# Patient Record
Sex: MALE | Race: WHITE | NOT HISPANIC OR LATINO
[De-identification: names, ages, dates, MRNs, and addresses within clinical notes are randomized per-mention and may not be internally consistent; named-entity substitution may affect disease eponyms.]

---

## 2017-02-01 ENCOUNTER — APPOINTMENT (OUTPATIENT)
Dept: INTERNAL MEDICINE | Facility: CLINIC | Age: 62
End: 2017-02-01

## 2017-02-01 VITALS
DIASTOLIC BLOOD PRESSURE: 60 MMHG | BODY MASS INDEX: 32.47 KG/M2 | SYSTOLIC BLOOD PRESSURE: 110 MMHG | HEIGHT: 66 IN | RESPIRATION RATE: 14 BRPM | WEIGHT: 202 LBS | HEART RATE: 60 BPM

## 2017-02-01 LAB
ALBUMIN: 150
CREATININE: 200
MICROALBUMIN/CREAT UR TEST STR-RTO: ABNORMAL

## 2017-02-08 ENCOUNTER — APPOINTMENT (OUTPATIENT)
Dept: CARDIOLOGY | Facility: CLINIC | Age: 62
End: 2017-02-08

## 2017-02-08 ENCOUNTER — NON-APPOINTMENT (OUTPATIENT)
Age: 62
End: 2017-02-08

## 2017-02-08 VITALS
SYSTOLIC BLOOD PRESSURE: 130 MMHG | HEART RATE: 70 BPM | WEIGHT: 202 LBS | DIASTOLIC BLOOD PRESSURE: 76 MMHG | HEIGHT: 66 IN | BODY MASS INDEX: 32.47 KG/M2

## 2017-02-21 ENCOUNTER — RX RENEWAL (OUTPATIENT)
Age: 62
End: 2017-02-21

## 2017-03-15 ENCOUNTER — EMERGENCY (EMERGENCY)
Facility: HOSPITAL | Age: 62
LOS: 1 days | Discharge: HOME | End: 2017-03-15
Attending: EMERGENCY MEDICINE | Admitting: EMERGENCY MEDICINE
Payer: COMMERCIAL

## 2017-03-15 VITALS
RESPIRATION RATE: 19 BRPM | DIASTOLIC BLOOD PRESSURE: 79 MMHG | HEART RATE: 62 BPM | SYSTOLIC BLOOD PRESSURE: 148 MMHG | TEMPERATURE: 98 F | OXYGEN SATURATION: 96 %

## 2017-03-15 VITALS — OXYGEN SATURATION: 99 % | HEART RATE: 103 BPM | RESPIRATION RATE: 16 BRPM

## 2017-03-15 DIAGNOSIS — Z90.89 ACQUIRED ABSENCE OF OTHER ORGANS: ICD-10-CM
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
## 2017-03-15 DIAGNOSIS — E78.5 HYPERLIPIDEMIA, UNSPECIFIED: ICD-10-CM

## 2017-03-15 DIAGNOSIS — S09.90XA UNSPECIFIED INJURY OF HEAD, INITIAL ENCOUNTER: ICD-10-CM

## 2017-03-15 DIAGNOSIS — Z98.89 OTHER SPECIFIED POSTPROCEDURAL STATES: Chronic | ICD-10-CM

## 2017-03-15 DIAGNOSIS — Z90.89 ACQUIRED ABSENCE OF OTHER ORGANS: Chronic | ICD-10-CM

## 2017-03-15 DIAGNOSIS — Z79.82 LONG TERM (CURRENT) USE OF ASPIRIN: ICD-10-CM
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
## 2017-03-15 DIAGNOSIS — Z23 ENCOUNTER FOR IMMUNIZATION: ICD-10-CM

## 2017-03-15 DIAGNOSIS — S01.01XA LACERATION WITHOUT FOREIGN BODY OF SCALP, INITIAL ENCOUNTER: ICD-10-CM

## 2017-03-15 DIAGNOSIS — M79.641 PAIN IN RIGHT HAND: ICD-10-CM
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
## 2017-03-15 DIAGNOSIS — E11.65 TYPE 2 DIABETES MELLITUS WITH HYPERGLYCEMIA: ICD-10-CM

## 2017-03-15 DIAGNOSIS — I10 ESSENTIAL (PRIMARY) HYPERTENSION: ICD-10-CM

## 2017-03-15 LAB
ACETONE SERPL-MCNC: NEGATIVE — SIGNIFICANT CHANGE UP
ALBUMIN SERPL ELPH-MCNC: 3.7 G/DL — SIGNIFICANT CHANGE UP (ref 3.3–5)
ALP SERPL-CCNC: 74 U/L — SIGNIFICANT CHANGE UP (ref 40–120)
ALT FLD-CCNC: 16 U/L RC — SIGNIFICANT CHANGE UP (ref 10–45)
ANION GAP SERPL CALC-SCNC: 12 MMOL/L — SIGNIFICANT CHANGE UP (ref 5–17)
ANION GAP SERPL CALC-SCNC: 19 MMOL/L — HIGH (ref 5–17)
APTT BLD: 32.2 SEC — SIGNIFICANT CHANGE UP (ref 27.5–37.4)
AST SERPL-CCNC: 11 U/L — SIGNIFICANT CHANGE UP (ref 10–40)
BASE EXCESS BLDV CALC-SCNC: 0.9 MMOL/L — SIGNIFICANT CHANGE UP (ref -2–2)
BASOPHILS # BLD AUTO: 0 K/UL — SIGNIFICANT CHANGE UP (ref 0–0.2)
BASOPHILS NFR BLD AUTO: 0.3 % — SIGNIFICANT CHANGE UP (ref 0–2)
BILIRUB SERPL-MCNC: 0.3 MG/DL — SIGNIFICANT CHANGE UP (ref 0.2–1.2)
BUN SERPL-MCNC: 17 MG/DL — SIGNIFICANT CHANGE UP (ref 7–23)
BUN SERPL-MCNC: 23 MG/DL — SIGNIFICANT CHANGE UP (ref 7–23)
CA-I SERPL-SCNC: 1.26 MMOL/L — SIGNIFICANT CHANGE UP (ref 1.12–1.3)
CALCIUM SERPL-MCNC: 8.6 MG/DL — SIGNIFICANT CHANGE UP (ref 8.4–10.5)
CALCIUM SERPL-MCNC: 9.9 MG/DL — SIGNIFICANT CHANGE UP (ref 8.4–10.5)
CHLORIDE BLDV-SCNC: 103 MMOL/L — SIGNIFICANT CHANGE UP (ref 96–108)
CHLORIDE SERPL-SCNC: 106 MMOL/L — SIGNIFICANT CHANGE UP (ref 96–108)
CHLORIDE SERPL-SCNC: 98 MMOL/L — SIGNIFICANT CHANGE UP (ref 96–108)
CO2 BLDV-SCNC: 28 MMOL/L — SIGNIFICANT CHANGE UP (ref 22–30)
CO2 SERPL-SCNC: 18 MMOL/L — LOW (ref 22–31)
CO2 SERPL-SCNC: 22 MMOL/L — SIGNIFICANT CHANGE UP (ref 22–31)
CREAT SERPL-MCNC: 1.18 MG/DL — SIGNIFICANT CHANGE UP (ref 0.5–1.3)
CREAT SERPL-MCNC: 1.42 MG/DL — HIGH (ref 0.5–1.3)
EOSINOPHIL # BLD AUTO: 0.2 K/UL — SIGNIFICANT CHANGE UP (ref 0–0.5)
EOSINOPHIL NFR BLD AUTO: 2 % — SIGNIFICANT CHANGE UP (ref 0–6)
GAS PNL BLDV: 135 MMOL/L — LOW (ref 136–145)
GAS PNL BLDV: SIGNIFICANT CHANGE UP
GAS PNL BLDV: SIGNIFICANT CHANGE UP
GLUCOSE BLDV-MCNC: 471 MG/DL — HIGH (ref 70–99)
GLUCOSE SERPL-MCNC: 225 MG/DL — HIGH (ref 70–99)
GLUCOSE SERPL-MCNC: 505 MG/DL — CRITICAL HIGH (ref 70–99)
HCO3 BLDV-SCNC: 27 MMOL/L — SIGNIFICANT CHANGE UP (ref 21–29)
HCT VFR BLD CALC: 44 % — SIGNIFICANT CHANGE UP (ref 39–50)
HCT VFR BLDA CALC: 43 % — SIGNIFICANT CHANGE UP (ref 39–50)
HGB BLD CALC-MCNC: 14 G/DL — SIGNIFICANT CHANGE UP (ref 13–17)
HGB BLD-MCNC: 14.1 G/DL — SIGNIFICANT CHANGE UP (ref 13–17)
INR BLD: 0.99 RATIO — SIGNIFICANT CHANGE UP (ref 0.88–1.16)
LACTATE BLDV-MCNC: 1.1 MMOL/L — SIGNIFICANT CHANGE UP (ref 0.7–2)
LYMPHOCYTES # BLD AUTO: 2.4 K/UL — SIGNIFICANT CHANGE UP (ref 1–3.3)
LYMPHOCYTES # BLD AUTO: 25.5 % — SIGNIFICANT CHANGE UP (ref 13–44)
MCHC RBC-ENTMCNC: 27.1 PG — SIGNIFICANT CHANGE UP (ref 27–34)
MCHC RBC-ENTMCNC: 31.9 GM/DL — LOW (ref 32–36)
MCV RBC AUTO: 85 FL — SIGNIFICANT CHANGE UP (ref 80–100)
MONOCYTES # BLD AUTO: 0.9 K/UL — SIGNIFICANT CHANGE UP (ref 0–0.9)
MONOCYTES NFR BLD AUTO: 9.6 % — SIGNIFICANT CHANGE UP (ref 2–14)
NEUTROPHILS # BLD AUTO: 6 K/UL — SIGNIFICANT CHANGE UP (ref 1.8–7.4)
NEUTROPHILS NFR BLD AUTO: 62.6 % — SIGNIFICANT CHANGE UP (ref 43–77)
PCO2 BLDV: 49 MMHG — SIGNIFICANT CHANGE UP (ref 35–50)
PH BLDV: 7.35 — SIGNIFICANT CHANGE UP (ref 7.35–7.45)
PLATELET # BLD AUTO: 244 K/UL — SIGNIFICANT CHANGE UP (ref 150–400)
PO2 BLDV: 26 MMHG — SIGNIFICANT CHANGE UP (ref 25–45)
POTASSIUM BLDV-SCNC: 4.6 MMOL/L — SIGNIFICANT CHANGE UP (ref 3.5–5)
POTASSIUM SERPL-MCNC: 3.8 MMOL/L — SIGNIFICANT CHANGE UP (ref 3.5–5.3)
POTASSIUM SERPL-MCNC: 5.7 MMOL/L — HIGH (ref 3.5–5.3)
POTASSIUM SERPL-SCNC: 3.8 MMOL/L — SIGNIFICANT CHANGE UP (ref 3.5–5.3)
POTASSIUM SERPL-SCNC: 5.7 MMOL/L — HIGH (ref 3.5–5.3)
PROT SERPL-MCNC: 6.3 G/DL — SIGNIFICANT CHANGE UP (ref 6–8.3)
PROTHROM AB SERPL-ACNC: 10.8 SEC — SIGNIFICANT CHANGE UP (ref 10–13.1)
RBC # BLD: 5.18 M/UL — SIGNIFICANT CHANGE UP (ref 4.2–5.8)
RBC # FLD: 13.3 % — SIGNIFICANT CHANGE UP (ref 10.3–14.5)
SAO2 % BLDV: 38 % — LOW (ref 67–88)
SODIUM SERPL-SCNC: 135 MMOL/L — SIGNIFICANT CHANGE UP (ref 135–145)
SODIUM SERPL-SCNC: 140 MMOL/L — SIGNIFICANT CHANGE UP (ref 135–145)
WBC # BLD: 9.5 K/UL — SIGNIFICANT CHANGE UP (ref 3.8–10.5)
WBC # FLD AUTO: 9.5 K/UL — SIGNIFICANT CHANGE UP (ref 3.8–10.5)

## 2017-03-15 PROCEDURE — G0378: CPT

## 2017-03-15 PROCEDURE — 73130 X-RAY EXAM OF HAND: CPT | Mod: 26,RT

## 2017-03-15 PROCEDURE — 85610 PROTHROMBIN TIME: CPT

## 2017-03-15 PROCEDURE — 96372 THER/PROPH/DIAG INJ SC/IM: CPT | Mod: XU

## 2017-03-15 PROCEDURE — 93010 ELECTROCARDIOGRAM REPORT: CPT | Mod: 59

## 2017-03-15 PROCEDURE — 82330 ASSAY OF CALCIUM: CPT

## 2017-03-15 PROCEDURE — 93005 ELECTROCARDIOGRAM TRACING: CPT | Mod: XU

## 2017-03-15 PROCEDURE — 84295 ASSAY OF SERUM SODIUM: CPT

## 2017-03-15 PROCEDURE — 82435 ASSAY OF BLOOD CHLORIDE: CPT

## 2017-03-15 PROCEDURE — 96374 THER/PROPH/DIAG INJ IV PUSH: CPT | Mod: XU

## 2017-03-15 PROCEDURE — 73130 X-RAY EXAM OF HAND: CPT

## 2017-03-15 PROCEDURE — 12001 RPR S/N/AX/GEN/TRNK 2.5CM/<: CPT

## 2017-03-15 PROCEDURE — 80048 BASIC METABOLIC PNL TOTAL CA: CPT

## 2017-03-15 PROCEDURE — 82947 ASSAY GLUCOSE BLOOD QUANT: CPT

## 2017-03-15 PROCEDURE — 70450 CT HEAD/BRAIN W/O DYE: CPT

## 2017-03-15 PROCEDURE — 84132 ASSAY OF SERUM POTASSIUM: CPT

## 2017-03-15 PROCEDURE — 82803 BLOOD GASES ANY COMBINATION: CPT

## 2017-03-15 PROCEDURE — 85027 COMPLETE CBC AUTOMATED: CPT

## 2017-03-15 PROCEDURE — 99284 EMERGENCY DEPT VISIT MOD MDM: CPT | Mod: 25

## 2017-03-15 PROCEDURE — 82009 KETONE BODYS QUAL: CPT

## 2017-03-15 PROCEDURE — 85730 THROMBOPLASTIN TIME PARTIAL: CPT

## 2017-03-15 PROCEDURE — 99220: CPT | Mod: 25

## 2017-03-15 PROCEDURE — 70450 CT HEAD/BRAIN W/O DYE: CPT | Mod: 26,77

## 2017-03-15 PROCEDURE — 90715 TDAP VACCINE 7 YRS/> IM: CPT

## 2017-03-15 PROCEDURE — 90471 IMMUNIZATION ADMIN: CPT

## 2017-03-15 PROCEDURE — 83605 ASSAY OF LACTIC ACID: CPT

## 2017-03-15 PROCEDURE — 70450 CT HEAD/BRAIN W/O DYE: CPT | Mod: 26

## 2017-03-15 PROCEDURE — 85014 HEMATOCRIT: CPT

## 2017-03-15 PROCEDURE — 80053 COMPREHEN METABOLIC PANEL: CPT

## 2017-03-15 RX ORDER — TETANUS TOXOID, REDUCED DIPHTHERIA TOXOID AND ACELLULAR PERTUSSIS VACCINE, ADSORBED 5; 2.5; 8; 8; 2.5 [IU]/.5ML; [IU]/.5ML; UG/.5ML; UG/.5ML; UG/.5ML
0.5 SUSPENSION INTRAMUSCULAR ONCE
Qty: 0 | Refills: 0 | Status: COMPLETED | OUTPATIENT
Start: 2017-03-15 | End: 2017-03-15

## 2017-03-15 RX ORDER — SODIUM CHLORIDE 9 MG/ML
1000 INJECTION INTRAMUSCULAR; INTRAVENOUS; SUBCUTANEOUS ONCE
Qty: 0 | Refills: 0 | Status: COMPLETED | OUTPATIENT
Start: 2017-03-15 | End: 2017-03-15

## 2017-03-15 RX ORDER — SODIUM CHLORIDE 9 MG/ML
3 INJECTION INTRAMUSCULAR; INTRAVENOUS; SUBCUTANEOUS EVERY 8 HOURS
Qty: 0 | Refills: 0 | Status: DISCONTINUED | OUTPATIENT
Start: 2017-03-15 | End: 2017-03-19

## 2017-03-15 RX ORDER — INSULIN LISPRO 100/ML
6 VIAL (ML) SUBCUTANEOUS ONCE
Qty: 0 | Refills: 0 | Status: COMPLETED | OUTPATIENT
Start: 2017-03-15 | End: 2017-03-15

## 2017-03-15 RX ORDER — ACETAMINOPHEN 500 MG
1000 TABLET ORAL ONCE
Qty: 0 | Refills: 0 | Status: COMPLETED | OUTPATIENT
Start: 2017-03-15 | End: 2017-03-15

## 2017-03-15 RX ORDER — INSULIN GLARGINE 100 [IU]/ML
15 INJECTION, SOLUTION SUBCUTANEOUS ONCE
Qty: 0 | Refills: 0 | Status: COMPLETED | OUTPATIENT
Start: 2017-03-15 | End: 2017-03-15

## 2017-03-15 RX ADMIN — Medication 6 UNIT(S): at 09:25

## 2017-03-15 RX ADMIN — Medication 400 MILLIGRAM(S): at 07:00

## 2017-03-15 RX ADMIN — SODIUM CHLORIDE 3 MILLILITER(S): 9 INJECTION INTRAMUSCULAR; INTRAVENOUS; SUBCUTANEOUS at 16:09

## 2017-03-15 RX ADMIN — SODIUM CHLORIDE 2000 MILLILITER(S): 9 INJECTION INTRAMUSCULAR; INTRAVENOUS; SUBCUTANEOUS at 09:25

## 2017-03-15 RX ADMIN — Medication 1000 MILLIGRAM(S): at 07:00

## 2017-03-15 RX ADMIN — SODIUM CHLORIDE 2000 MILLILITER(S): 9 INJECTION INTRAMUSCULAR; INTRAVENOUS; SUBCUTANEOUS at 08:10

## 2017-03-15 RX ADMIN — TETANUS TOXOID, REDUCED DIPHTHERIA TOXOID AND ACELLULAR PERTUSSIS VACCINE, ADSORBED 0.5 MILLILITER(S): 5; 2.5; 8; 8; 2.5 SUSPENSION INTRAMUSCULAR at 07:07

## 2017-03-15 RX ADMIN — INSULIN GLARGINE 15 UNIT(S): 100 INJECTION, SOLUTION SUBCUTANEOUS at 09:24

## 2017-03-15 NOTE — ED PROVIDER NOTE - PROGRESS NOTE DETAILS
60yo M, on Brelinta, s/p mechanical fall, with head trauma; initial CT head shows no acute bleeding.  On exam, no neurologic deficits, well appearing.  Labs notable for hyperglycemia (500) without signs of DKA (neg acetone, normal pH).  Given half usual dose of evening lantus this am and 6 U humalog sc.  Will place in CDU for repeat bmp, serial FSG and repeat head CT 6 hours post initial head ct.  If glucose improves and no evidence of ICH on repeat CT, may be discharged from CDU.  Christian

## 2017-03-15 NOTE — ED PROVIDER NOTE - MEDICAL DECISION MAKING DETAILS
61M cad on asa and plavix presents s/p slip and fall on ice. will obtain ct head labs lac repair and reassess 61M cad on asa and plavix presents s/p slip and fall on ice. will obtain ct head labs lac repair and reassess  Aguilar Cha DO; see attending attestation

## 2017-03-15 NOTE — ED PROVIDER NOTE - SHIFT CHANGE DETAILS
62yo M, h/o HTN DM CAD on brelinta, presenting s/p mechanical fall, +head trauma; CT performed, read pending.  HA initially on presentation, now resolved.  Labs notable for elevated glucose (500) - additional labs sent and are pending (vbg, acetone - r/o dka).  Plan by prior team to obtain repeat CT head 6 hours post initial ct (1300) and to attempt glycemic control if patient not in DKA (if in DKA, will require admission). -Michael

## 2017-03-15 NOTE — ED CDU PROVIDER NOTE - DETAILS
- observation  - vitals q4h  - monitor finger sticks, repeat BMP  - repeat CT head at 130pm  - case discussed with Dr. Cha

## 2017-03-15 NOTE — ED PROCEDURE NOTE - CPROC ED LACER REPAIR DETAIL1
The wound was explored to base in bloodless field./No foreign body/All foreign material was removed.

## 2017-03-15 NOTE — ED CDU PROVIDER NOTE - PROGRESS NOTE DETAILS
CDU NOTE RONIT KEMP: Pt resting comfortably, feeling well without complaint. NAD, VSS. pt waiting for repeat head CT. CDU NOTE RONIT KEMP: Pt resting comfortably, feeling well without complaint. NAD, VSS. Waiting for CT head results. CDU NOTE RONIT KEMP: CT head negative. case and plan discussed with Dr. Castillo; pt stable for discharge. ct stable head injury instructyions given I , Dr Delio Castillo has seen and evaluated the patient.  The pateient reports improvement in symptoms.  The patient is stable for discharge home and will follow up with their primary physiacian.

## 2017-03-15 NOTE — ED CDU PROVIDER NOTE - PLAN OF CARE
1. Hydrate, rest.  2. Continue your home medications  3. 1. Hydrate, rest.  2. Continue your home medications  3. Follow up with your PMD in 1-2 days. Bring a copy of your results with you.   4. Return to the ED if you experience any worsening headaches, blurry vision, mental status change, or other concerning symptoms.   3.

## 2017-03-15 NOTE — ED CDU PROVIDER NOTE - PHYSICAL EXAMINATION
Constitutional: mild distress  Eyes: PERRLA EOMI  Head: 0.5 cm lac to occiput  Cardiac: regular rate   Resp: Lungs CTAB  GI: Abd s/nt/nd  Neuro: CN2-12 intact  Skin: lac to occiput  msk: ttp of right hand dorsum. no snuffbox ttp.   The patient’s cervical collar was clinically cleared using the NEXUS criteria: they have no focal neurologic deficit, no midline cervical spine tenderness is present, they have a normal level of consciousness and are not intoxicated, and no distracting injury is present.

## 2017-03-15 NOTE — ED CDU PROVIDER NOTE - OBJECTIVE STATEMENT
62y/o M with PMH HTN, HLD, DM, CAD s/p stent on asa and plavix presents s/p fall. Pt was walking slipped on ice fell backwards and hit head. No LOC. no neck pain. No other injuries. Pain in head moderate constant non-radiating. happened immediately prior to arrival. 62y/o M with PMH HTN, HLD, DM, CAD s/p stent on asa and plavix presents s/p fall. Pt was walking from car to work, slipped on ice, fell backwards and hit head. No LOC. no neck pain. pt states he had a H/A but it resolved with Tylenol. pt states he has mild pain in R hand when making a fist, xray was done in ED - no fx. denies vision changes, weakness, decreased balance, CP, SOB, pain anywhere else.  pt states he missed his night dose of DM meds last night and had a large breakfast today.

## 2017-03-15 NOTE — ED PROVIDER NOTE - ATTENDING CONTRIBUTION TO CARE
61yoM on plavix s/p slip and fell with head trauma.   On exam, scalp laceration. GCS 15  A: closed head trauma; laceration  P: CT brain, labs, cxr, tetanus, lac repair.   Pt on plavix, some evidence suggests higher rate of delayed bleeds, will admit ot CDU and get 6h repeat ct brain.

## 2017-03-15 NOTE — ED PROVIDER NOTE - PHYSICAL EXAMINATION
Constitutional: mild distress  Eyes: PERRLA EOMI  Head: 0.5 cm lac to occiput  Cardiac: regular rate   Resp: Lungs CTAB  GI: Abd s/nt/nd  Neuro: CN2-12 intact  Skin: lac to occiput Constitutional: mild distress  Eyes: PERRLA EOMI  Head: 0.5 cm lac to occiput  Cardiac: regular rate   Resp: Lungs CTAB  GI: Abd s/nt/nd  Neuro: CN2-12 intact  Skin: lac to occiput  msk: ttp of right hand dorsum. no snuffbox ttp.   The patient’s cervical collar was clinically cleared using the NEXUS criteria: they have no focal neurologic deficit, no midline cervical spine tenderness is present, they have a normal level of consciousness and are not intoxicated, and no distracting injury is present.

## 2017-03-15 NOTE — ED PROVIDER NOTE - NS ED ROS FT
Constitutional: No fever or chills  Eyes: No visual changes  HEENT: No throat pain  CV: No chest pain  Resp: No SOB no cough  GI: No abd pain, nause or vomiting  : No dysuria  MSK: No musculoskeletal pain  Skin: lac to occiput  Neuro: + headache Constitutional: No fever or chills  Eyes: No visual changes  HEENT: No throat pain  CV: No chest pain  Resp: No SOB no cough  GI: No abd pain, nause or vomiting  : No dysuria  MSK: hand pain  Skin: lac to occiput  Neuro: + headache

## 2017-03-15 NOTE — ED CDU PROVIDER NOTE - ATTENDING CONTRIBUTION TO CARE
I have examined and evaluated this patient with the above resident or PA, and agree with the documented clinical history, exam and plan.   Briefly: 62yo M, on Brelinta, s/p mechanical fall, with head trauma; initial CT head shows no acute bleeding.  On exam, no neurologic deficits, well appearing.  Labs notable for hyperglycemia (500) without signs of DKA (neg acetone, normal pH).  Given half usual dose of evening lantus this am and 6 U humalog sc.  Will place in CDU for repeat bmp, serial FSG and repeat head CT 6 hours post initial head ct.  If glucose improves and no evidence of ICH on repeat CT, may be discharged from CDU.  -Michael

## 2017-03-15 NOTE — ED CDU PROVIDER NOTE - NS ED ROS FT
Constitutional: No fever or chills  Eyes: No visual changes  HEENT: No throat pain  CV: No chest pain  Resp: No SOB no cough  GI: No abd pain, nause or vomiting  : No dysuria  MSK: hand pain  Skin: lac to occiput  Neuro: + headache

## 2017-03-21 ENCOUNTER — EMERGENCY (EMERGENCY)
Facility: HOSPITAL | Age: 62
LOS: 1 days | Discharge: HOME | End: 2017-03-21
Attending: EMERGENCY MEDICINE | Admitting: EMERGENCY MEDICINE
Payer: COMMERCIAL

## 2017-03-21 VITALS
OXYGEN SATURATION: 97 % | SYSTOLIC BLOOD PRESSURE: 106 MMHG | RESPIRATION RATE: 18 BRPM | HEART RATE: 74 BPM | DIASTOLIC BLOOD PRESSURE: 69 MMHG | TEMPERATURE: 97 F
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
## 2017-03-21 DIAGNOSIS — Z98.89 OTHER SPECIFIED POSTPROCEDURAL STATES: Chronic | ICD-10-CM

## 2017-03-21 DIAGNOSIS — S01.01XD LACERATION WITHOUT FOREIGN BODY OF SCALP, SUBSEQUENT ENCOUNTER: ICD-10-CM

## 2017-03-21 DIAGNOSIS — Z90.89 ACQUIRED ABSENCE OF OTHER ORGANS: ICD-10-CM

## 2017-03-21 DIAGNOSIS — Z90.89 ACQUIRED ABSENCE OF OTHER ORGANS: Chronic | ICD-10-CM

## 2017-03-21 DIAGNOSIS — I10 ESSENTIAL (PRIMARY) HYPERTENSION: ICD-10-CM

## 2017-03-21 LAB
ALBUMIN SERPL ELPH-MCNC: 4.4 G/DL — SIGNIFICANT CHANGE UP (ref 3.3–5)
ALP SERPL-CCNC: 91 U/L — SIGNIFICANT CHANGE UP (ref 40–120)
ALT FLD-CCNC: 17 U/L RC — SIGNIFICANT CHANGE UP (ref 10–45)
ANION GAP SERPL CALC-SCNC: 14 MMOL/L — SIGNIFICANT CHANGE UP (ref 5–17)
AST SERPL-CCNC: 12 U/L — SIGNIFICANT CHANGE UP (ref 10–40)
BASOPHILS # BLD AUTO: 0 K/UL — SIGNIFICANT CHANGE UP (ref 0–0.2)
BASOPHILS NFR BLD AUTO: 0.4 % — SIGNIFICANT CHANGE UP (ref 0–2)
BILIRUB SERPL-MCNC: 0.6 MG/DL — SIGNIFICANT CHANGE UP (ref 0.2–1.2)
BUN SERPL-MCNC: 52 MG/DL — HIGH (ref 7–23)
CALCIUM SERPL-MCNC: 9.8 MG/DL — SIGNIFICANT CHANGE UP (ref 8.4–10.5)
CHLORIDE SERPL-SCNC: 96 MMOL/L — SIGNIFICANT CHANGE UP (ref 96–108)
CO2 SERPL-SCNC: 22 MMOL/L — SIGNIFICANT CHANGE UP (ref 22–31)
CREAT SERPL-MCNC: 2.01 MG/DL — HIGH (ref 0.5–1.3)
EOSINOPHIL # BLD AUTO: 0.3 K/UL — SIGNIFICANT CHANGE UP (ref 0–0.5)
EOSINOPHIL NFR BLD AUTO: 2 % — SIGNIFICANT CHANGE UP (ref 0–6)
GLUCOSE SERPL-MCNC: 410 MG/DL — HIGH (ref 70–99)
HCT VFR BLD CALC: 41.6 % — SIGNIFICANT CHANGE UP (ref 39–50)
HGB BLD-MCNC: 14.1 G/DL — SIGNIFICANT CHANGE UP (ref 13–17)
HIV 1 & 2 AB SERPL IA.RAPID: SIGNIFICANT CHANGE UP
LYMPHOCYTES # BLD AUTO: 28 % — SIGNIFICANT CHANGE UP (ref 13–44)
LYMPHOCYTES # BLD AUTO: 3.7 K/UL — HIGH (ref 1–3.3)
MCHC RBC-ENTMCNC: 28.3 PG — SIGNIFICANT CHANGE UP (ref 27–34)
MCHC RBC-ENTMCNC: 33.9 GM/DL — SIGNIFICANT CHANGE UP (ref 32–36)
MCV RBC AUTO: 83.5 FL — SIGNIFICANT CHANGE UP (ref 80–100)
MONOCYTES # BLD AUTO: 1 K/UL — HIGH (ref 0–0.9)
MONOCYTES NFR BLD AUTO: 7.7 % — SIGNIFICANT CHANGE UP (ref 2–14)
NEUTROPHILS # BLD AUTO: 8.2 K/UL — HIGH (ref 1.8–7.4)
NEUTROPHILS NFR BLD AUTO: 62 % — SIGNIFICANT CHANGE UP (ref 43–77)
PLATELET # BLD AUTO: 303 K/UL — SIGNIFICANT CHANGE UP (ref 150–400)
POTASSIUM SERPL-MCNC: 5 MMOL/L — SIGNIFICANT CHANGE UP (ref 3.5–5.3)
POTASSIUM SERPL-SCNC: 5 MMOL/L — SIGNIFICANT CHANGE UP (ref 3.5–5.3)
PROT SERPL-MCNC: 7.4 G/DL — SIGNIFICANT CHANGE UP (ref 6–8.3)
RBC # BLD: 4.98 M/UL — SIGNIFICANT CHANGE UP (ref 4.2–5.8)
RBC # FLD: 14 % — SIGNIFICANT CHANGE UP (ref 10.3–14.5)
SODIUM SERPL-SCNC: 132 MMOL/L — LOW (ref 135–145)
WBC # BLD: 13.2 K/UL — HIGH (ref 3.8–10.5)
WBC # FLD AUTO: 13.2 K/UL — HIGH (ref 3.8–10.5)

## 2017-03-21 PROCEDURE — 80053 COMPREHEN METABOLIC PANEL: CPT

## 2017-03-21 PROCEDURE — 85027 COMPLETE CBC AUTOMATED: CPT

## 2017-03-21 PROCEDURE — G0463: CPT

## 2017-03-21 PROCEDURE — 80074 ACUTE HEPATITIS PANEL: CPT

## 2017-03-21 PROCEDURE — 86703 HIV-1/HIV-2 1 RESULT ANTBDY: CPT

## 2017-03-21 PROCEDURE — 87521 HEPATITIS C PROBE&RVRS TRNSC: CPT

## 2017-03-21 NOTE — ED ADULT NURSE NOTE - OBJECTIVE STATEMENT
61 year old male arrives to ed for staple removal. 1 staple placed 3/15 after slip and fall on ice. states co workers were exposed to his blood when he fell and pt needs blood work done them. no headache lightheadednes or dizziness no n/v seen by MD  staple site clean dry intact

## 2017-03-21 NOTE — ED PROVIDER NOTE - MEDICAL DECISION MAKING DETAILS
Ren:60y/o M here for staple removal. pt had 1 staple placed in occipital area of head on 3/15/17 s/p fall from slipping on ice.   1. staple removal  2 pt states some co-workers were exposed to his blood last week when he fell and he needs blood work done for his workers comp- will draw labs and d/c and will call with all results not completed at the time of discharge

## 2017-03-21 NOTE — ED PROVIDER NOTE - PLAN OF CARE
1. Apply bacitracin.    2. Follow up with your PMD within 48-72 hours.   3. Any increased pain, redness, streaking (red lines), swelling, fever, chills return to ER. 1. Apply bacitracin. Hydrate as your creatinine was elevated.   2. Follow up with your PMD within 48-72 hours. Bring a copy of your results and discuss results with your PMD.   3. Any increased pain, redness, streaking (red lines), swelling, fever, chills return to ER.

## 2017-03-21 NOTE — ED PROVIDER NOTE - OBJECTIVE STATEMENT
62y/o M here for staple removal. pt had 1 staple placed in occipital area of head on 3/15/17 s/p fall from slipping on ice. pt states some co-workers were exposed to his blood last week when he fell and he needs blood work done for his workers comp.

## 2017-03-21 NOTE — ED PROVIDER NOTE - CARE PLAN
Principal Discharge DX:	Laceration  Instructions for follow-up, activity and diet:	1. Apply bacitracin.    2. Follow up with your PMD within 48-72 hours.   3. Any increased pain, redness, streaking (red lines), swelling, fever, chills return to ER. Principal Discharge DX:	Laceration  Instructions for follow-up, activity and diet:	1. Apply bacitracin. Hydrate as your creatinine was elevated.   2. Follow up with your PMD within 48-72 hours. Bring a copy of your results and discuss results with your PMD.   3. Any increased pain, redness, streaking (red lines), swelling, fever, chills return to ER.

## 2017-03-22 LAB
HAV IGM SER-ACNC: SIGNIFICANT CHANGE UP
HBV CORE IGM SER-ACNC: SIGNIFICANT CHANGE UP
HBV SURFACE AG SER-ACNC: SIGNIFICANT CHANGE UP
HCV AB S/CO SERPL IA: 1.5 S/CO — SIGNIFICANT CHANGE UP
HCV AB SERPL-IMP: ABNORMAL

## 2017-03-23 LAB
HCV RNA FLD QL NAA+PROBE: SIGNIFICANT CHANGE UP
HCV RNA SPEC QL PROBE+SIG AMP: DETECTED

## 2017-03-23 NOTE — ED POST DISCHARGE NOTE - OTHER COMMUNICATION
Gave pt phone number for after care told to call us back monday to see if final results come back. If neg, nothing to do. Pt agrees if not back by monday, asked patient to call ID for appt. [phone] number given to pt. pravin

## 2017-04-04 ENCOUNTER — APPOINTMENT (OUTPATIENT)
Dept: INTERNAL MEDICINE | Facility: CLINIC | Age: 62
End: 2017-04-04

## 2017-04-04 ENCOUNTER — LABORATORY RESULT (OUTPATIENT)
Age: 62
End: 2017-04-04

## 2017-04-04 VITALS
HEART RATE: 60 BPM | WEIGHT: 199 LBS | BODY MASS INDEX: 32.12 KG/M2 | DIASTOLIC BLOOD PRESSURE: 80 MMHG | SYSTOLIC BLOOD PRESSURE: 125 MMHG

## 2017-04-07 LAB
HCV AB SER QL: ABNORMAL
HCV S/CO RATIO: 1.2 S/CO

## 2017-05-02 ENCOUNTER — APPOINTMENT (OUTPATIENT)
Dept: INTERNAL MEDICINE | Facility: CLINIC | Age: 62
End: 2017-05-02

## 2017-05-02 VITALS
WEIGHT: 199 LBS | SYSTOLIC BLOOD PRESSURE: 118 MMHG | OXYGEN SATURATION: 98 % | BODY MASS INDEX: 32.12 KG/M2 | DIASTOLIC BLOOD PRESSURE: 72 MMHG | RESPIRATION RATE: 12 BRPM | HEART RATE: 68 BPM

## 2017-05-06 RX ORDER — IBUPROFEN 800 MG/1
800 TABLET, FILM COATED ORAL
Qty: 90 | Refills: 0 | Status: COMPLETED | COMMUNITY
Start: 2017-03-11

## 2017-06-07 ENCOUNTER — NON-APPOINTMENT (OUTPATIENT)
Age: 62
End: 2017-06-07

## 2017-06-07 ENCOUNTER — APPOINTMENT (OUTPATIENT)
Dept: CARDIOLOGY | Facility: CLINIC | Age: 62
End: 2017-06-07

## 2017-06-07 VITALS
SYSTOLIC BLOOD PRESSURE: 130 MMHG | DIASTOLIC BLOOD PRESSURE: 80 MMHG | OXYGEN SATURATION: 97 % | WEIGHT: 200 LBS | HEART RATE: 72 BPM | BODY MASS INDEX: 32.28 KG/M2

## 2017-06-07 RX ORDER — MONTELUKAST 10 MG/1
10 TABLET, FILM COATED ORAL
Qty: 90 | Refills: 0 | Status: COMPLETED | COMMUNITY
Start: 2017-03-11

## 2017-09-05 ENCOUNTER — APPOINTMENT (OUTPATIENT)
Dept: INTERNAL MEDICINE | Facility: CLINIC | Age: 62
End: 2017-09-05
Payer: COMMERCIAL

## 2017-09-05 VITALS
DIASTOLIC BLOOD PRESSURE: 70 MMHG | BODY MASS INDEX: 31.82 KG/M2 | WEIGHT: 198 LBS | SYSTOLIC BLOOD PRESSURE: 125 MMHG | HEIGHT: 66 IN | HEART RATE: 65 BPM | OXYGEN SATURATION: 98 % | RESPIRATION RATE: 14 BRPM

## 2017-09-05 PROCEDURE — 99396 PREV VISIT EST AGE 40-64: CPT

## 2017-10-11 ENCOUNTER — APPOINTMENT (OUTPATIENT)
Dept: CARDIOLOGY | Facility: CLINIC | Age: 62
End: 2017-10-11

## 2017-11-29 ENCOUNTER — APPOINTMENT (OUTPATIENT)
Dept: CARDIOLOGY | Facility: CLINIC | Age: 62
End: 2017-11-29
Payer: COMMERCIAL

## 2017-11-29 ENCOUNTER — NON-APPOINTMENT (OUTPATIENT)
Age: 62
End: 2017-11-29

## 2017-11-29 VITALS — DIASTOLIC BLOOD PRESSURE: 80 MMHG | SYSTOLIC BLOOD PRESSURE: 125 MMHG | HEART RATE: 71 BPM | OXYGEN SATURATION: 99 %

## 2017-11-29 PROCEDURE — 93000 ELECTROCARDIOGRAM COMPLETE: CPT

## 2017-11-29 PROCEDURE — 99214 OFFICE O/P EST MOD 30 MIN: CPT

## 2017-12-03 ENCOUNTER — RX RENEWAL (OUTPATIENT)
Age: 62
End: 2017-12-03

## 2018-03-13 ENCOUNTER — APPOINTMENT (OUTPATIENT)
Dept: INTERNAL MEDICINE | Facility: CLINIC | Age: 63
End: 2018-03-13
Payer: COMMERCIAL

## 2018-03-13 VITALS
WEIGHT: 186 LBS | SYSTOLIC BLOOD PRESSURE: 140 MMHG | RESPIRATION RATE: 12 BRPM | BODY MASS INDEX: 29.89 KG/M2 | OXYGEN SATURATION: 98 % | DIASTOLIC BLOOD PRESSURE: 80 MMHG | HEART RATE: 79 BPM | HEIGHT: 66 IN

## 2018-03-13 DIAGNOSIS — Z11.59 ENCOUNTER FOR SCREENING FOR OTHER VIRAL DISEASES: ICD-10-CM

## 2018-03-13 PROCEDURE — 99214 OFFICE O/P EST MOD 30 MIN: CPT

## 2018-03-14 PROBLEM — Z11.59 NEED FOR HEPATITIS C SCREENING TEST: Status: RESOLVED | Noted: 2017-04-04 | Resolved: 2018-03-14

## 2018-09-07 ENCOUNTER — APPOINTMENT (OUTPATIENT)
Dept: INTERNAL MEDICINE | Facility: CLINIC | Age: 63
End: 2018-09-07

## 2018-09-11 ENCOUNTER — APPOINTMENT (OUTPATIENT)
Dept: INTERNAL MEDICINE | Facility: CLINIC | Age: 63
End: 2018-09-11
Payer: COMMERCIAL

## 2018-09-11 VITALS
SYSTOLIC BLOOD PRESSURE: 138 MMHG | OXYGEN SATURATION: 98 % | BODY MASS INDEX: 28.57 KG/M2 | HEART RATE: 77 BPM | WEIGHT: 177 LBS | DIASTOLIC BLOOD PRESSURE: 70 MMHG

## 2018-09-11 DIAGNOSIS — R06.09 OTHER FORMS OF DYSPNEA: ICD-10-CM

## 2018-09-11 DIAGNOSIS — T82.867A THROMBOSIS DUE CARDIAC PROSTHETIC DEVICES, IMPLANTS AND GRAFTS, INITIAL ENCOUNTER: ICD-10-CM

## 2018-09-11 DIAGNOSIS — I21.9 ACUTE MYOCARDIAL INFARCTION, UNSPECIFIED: ICD-10-CM

## 2018-09-11 DIAGNOSIS — Z00.00 ENCOUNTER FOR GENERAL ADULT MEDICAL EXAMINATION W/OUT ABNORMAL FINDINGS: ICD-10-CM

## 2018-09-11 DIAGNOSIS — E66.9 OBESITY, UNSPECIFIED: ICD-10-CM

## 2018-09-11 DIAGNOSIS — Z87.898 PERSONAL HISTORY OF OTHER SPECIFIED CONDITIONS: ICD-10-CM

## 2018-09-11 DIAGNOSIS — Z86.79 PERSONAL HISTORY OF OTHER DISEASES OF THE CIRCULATORY SYSTEM: ICD-10-CM

## 2018-09-11 LAB
ANION GAP SERPL CALC-SCNC: 17 MMOL/L
BUN SERPL-MCNC: 22 MG/DL
CALCIUM SERPL-MCNC: 9.8 MG/DL
CHLORIDE SERPL-SCNC: 94 MMOL/L
CHOLEST SERPL-MCNC: 237 MG/DL
CHOLEST/HDLC SERPL: 5.4 RATIO
CO2 SERPL-SCNC: 23 MMOL/L
CREAT SERPL-MCNC: 1.19 MG/DL
GLUCOSE SERPL-MCNC: 447 MG/DL
HDLC SERPL-MCNC: 44 MG/DL
LDLC SERPL CALC-MCNC: 157 MG/DL
POTASSIUM SERPL-SCNC: 4.2 MMOL/L
SODIUM SERPL-SCNC: 134 MMOL/L
TRIGL SERPL-MCNC: 182 MG/DL

## 2018-09-11 PROCEDURE — G0009: CPT

## 2018-09-11 PROCEDURE — 99397 PER PM REEVAL EST PAT 65+ YR: CPT | Mod: 25

## 2018-09-11 PROCEDURE — 90670 PCV13 VACCINE IM: CPT

## 2018-09-12 LAB — HBA1C MFR BLD HPLC: >15.5 %

## 2018-09-19 ENCOUNTER — RX RENEWAL (OUTPATIENT)
Age: 63
End: 2018-09-19

## 2018-09-30 NOTE — COUNSELING
[Weight management counseling provided] : Weight management [Healthy eating counseling provided] : healthy eating [Activity counseling provided] : activity [Behavioral health counseling provided] : behavioral health  [Decrease Portions] : Decrease food portions [Walking] : Walking [Patient Non-adherent to care plan] : Patient non-adherent to care plan [Patient motivation] : Patient motivation [Needs reinforcement, provided] : Patient needs reinforcement on understanding lifestyle changes and  the steps needed to achieve self management goals and reinforcement was provided

## 2018-09-30 NOTE — REVIEW OF SYSTEMS
[Dysuria] : no dysuria [Incontinence] : no incontinence [Hesitancy] : no hesitancy [Nocturia] : nocturia [Hematuria] : no hematuria [Frequency] : frequency [Impotence] : impotence [Negative] : Heme/Lymph [FreeTextEntry8] : see hpi

## 2018-09-30 NOTE — ASSESSMENT
[FreeTextEntry1] : 1) HCM - encouraged flu vacc at local pharmacy or here in a few weeks, underscored importance in DM.  Needed prevnar still, given.  TDAP utd; had zostavax 2017, can boost with shingrix in coming years.  Colon screen utd.  Labs for cpe ordered.  2) DM - going to have very high A1C mostly likely; got A1C with rest of labs, will report back to him and likely start insulin/glucometer back to regimen he was on after his CAD/intervention.  Urged visits to endo, more consistency, checking, being more engaged.  Self foot exam and ophtho reminded.  Microalb, filament exams followed q visit at endo.  Monofilament fine here today.  3) bp/lipids in good secondary prevention range - on atorva 80, asa, ARB, seeing card.

## 2018-09-30 NOTE — HEALTH RISK ASSESSMENT
[Good] : ~his/her~ current health as good [Very Good] : ~his/her~  mood as very good [] : No [No falls in past year] : Patient reported no falls in the past year [0] : 2) Feeling down, depressed, or hopeless: Not at all (0) [Patient reported colonoscopy was normal] : Patient reported colonoscopy was normal [Behavioral] : behavioral [Health Literacy] : health literacy [With Significant Other] : lives with significant other [Retired] : retired [College] : College [] :  [# Of Children ___] : has [unfilled] children [High Risk Behavior] : no high risk behavior [Feels Safe at Home] : Feels safe at home [Fully functional (bathing, dressing, toileting, transferring, walking, feeding)] : Fully functional (bathing, dressing, toileting, transferring, walking, feeding) [Fully functional (using the telephone, shopping, preparing meals, housekeeping, doing laundry, using] : Fully functional and needs no help or supervision to perform IADLs (using the telephone, shopping, preparing meals, housekeeping, doing laundry, using transportation, managing medications and managing finances) [Reports changes in hearing] : Reports no changes in hearing [Reports changes in vision] : Reports no changes in vision [Reports changes in dental health] : Reports no changes in dental health [Smoke Detector] : smoke detector [Carbon Monoxide Detector] : carbon monoxide detector [Guns at Home] : no guns at home [Safety elements used in home] : safety elements used in home [Seat Belt] :  uses seat belt [Sunscreen] : uses sunscreen [Travel to Developing Areas] : travel to developing areas [TB Exposure] : is not being exposed to tuberculosis [Caregiver Concerns] : does not have caregiver concerns [ColonoscopyDate] : 05/16 [ColonoscopyComments] : no polyps [HepatitisCDate] : 04/17

## 2018-09-30 NOTE — PHYSICAL EXAM
[No Acute Distress] : no acute distress [Well Nourished] : well nourished [Well Developed] : well developed [Well-Appearing] : well-appearing [Normal Sclera/Conjunctiva] : normal sclera/conjunctiva [PERRL] : pupils equal round and reactive to light [EOMI] : extraocular movements intact [Normal Outer Ear/Nose] : the outer ears and nose were normal in appearance [Normal Oropharynx] : the oropharynx was normal [Normal TMs] : both tympanic membranes were normal [Normal Nasal Mucosa] : the nasal mucosa was normal [No JVD] : no jugular venous distention [Supple] : supple [No Lymphadenopathy] : no lymphadenopathy [Thyroid Normal, No Nodules] : the thyroid was normal and there were no nodules present [No Respiratory Distress] : no respiratory distress  [Clear to Auscultation] : lungs were clear to auscultation bilaterally [No Accessory Muscle Use] : no accessory muscle use [Normal Percussion] : the chest was normal to percussion [Normal Rate] : normal rate  [Regular Rhythm] : with a regular rhythm [Normal S1, S2] : normal S1 and S2 [No Murmur] : no murmur heard [No Carotid Bruits] : no carotid bruits [No Abdominal Bruit] : a ~M bruit was not heard ~T in the abdomen [No Varicosities] : no varicosities [Pedal Pulses Present] : the pedal pulses are present [No Edema] : there was no peripheral edema [No Extremity Clubbing/Cyanosis] : no extremity clubbing/cyanosis [No Palpable Aorta] : no palpable aorta [Soft] : abdomen soft [Non Tender] : non-tender [Non-distended] : non-distended [No Masses] : no abdominal mass palpated [No HSM] : no HSM [Normal Bowel Sounds] : normal bowel sounds [Normal Supraclavicular Nodes] : no supraclavicular lymphadenopathy [Normal Posterior Cervical Nodes] : no posterior cervical lymphadenopathy [Normal Anterior Cervical Nodes] : no anterior cervical lymphadenopathy [Normal Inguinal Nodes] : no inguinal lymphadenopathy [No CVA Tenderness] : no CVA  tenderness [No Spinal Tenderness] : no spinal tenderness [No Joint Swelling] : no joint swelling [Grossly Normal Strength/Tone] : grossly normal strength/tone [No Rash] : no rash [No Skin Lesions] : no skin lesions [Normal Gait] : normal gait [Coordination Grossly Intact] : coordination grossly intact [No Focal Deficits] : no focal deficits [Deep Tendon Reflexes (DTR)] : deep tendon reflexes were 2+ and symmetric [Speech Grossly Normal] : speech grossly normal [Memory Grossly Normal] : memory grossly normal [Normal Affect] : the affect was normal [Alert and Oriented x3] : oriented to person, place, and time [Normal Mood] : the mood was normal [Normal Insight/Judgement] : insight and judgment were intact [] : both feet [de-identified] : no suspiciuos nevi

## 2018-09-30 NOTE — HISTORY OF PRESENT ILLNESS
[FreeTextEntry1] : Here for annual exam [de-identified] : Here for annual and to f/u DM, HTN, lipids, CAD s/p stenting of LAD/restenting for stent failure, obesity, prolactinoma.  Has been ok since last visit but reports he wanted to see how his sugars were off of insulin last few months.  Has continued ?byetta that his endo began, along with oral meds (unclear what last regimen was with endo, no consult notes here).  Believes his sugars may not have responded well to that because noticing a lot of urination.  Doesn't check sugars on his glucometer.  Sometimes can't explain why he isn't always engaged with his DM, doesn't consistently do 'what he's supposed to'.  Has no anginal syx; denies sprague, palpitations, orthopnea, cough, wheeze; has no syx referable to GI or  systems, other than polyuria of his DM.

## 2018-11-26 ENCOUNTER — RX RENEWAL (OUTPATIENT)
Age: 63
End: 2018-11-26

## 2019-07-15 NOTE — ED PROVIDER NOTE - DISCHARGE REVIEW MATERIAL PRESENTED
"""Follow drusen without treatment. Call if vision or distortion increases. Recommend regular amsler checks. """ .

## 2019-09-26 ENCOUNTER — OUTPATIENT (OUTPATIENT)
Dept: OUTPATIENT SERVICES | Facility: HOSPITAL | Age: 64
LOS: 1 days | End: 2019-09-26
Payer: SELF-PAY

## 2019-09-26 DIAGNOSIS — E11.9 TYPE 2 DIABETES MELLITUS WITHOUT COMPLICATIONS: ICD-10-CM

## 2019-09-26 DIAGNOSIS — R63.4 ABNORMAL WEIGHT LOSS: ICD-10-CM

## 2019-09-26 DIAGNOSIS — Z98.89 OTHER SPECIFIED POSTPROCEDURAL STATES: Chronic | ICD-10-CM

## 2019-09-26 DIAGNOSIS — Z90.89 ACQUIRED ABSENCE OF OTHER ORGANS: Chronic | ICD-10-CM

## 2019-09-26 LAB
ALBUMIN SERPL ELPH-MCNC: 3.5 G/DL — SIGNIFICANT CHANGE UP (ref 3.3–5)
ALP SERPL-CCNC: 118 U/L — SIGNIFICANT CHANGE UP (ref 30–120)
ALT FLD-CCNC: 24 U/L DA — SIGNIFICANT CHANGE UP (ref 10–60)
ANION GAP SERPL CALC-SCNC: 10 MMOL/L — SIGNIFICANT CHANGE UP (ref 5–17)
AST SERPL-CCNC: 17 U/L — SIGNIFICANT CHANGE UP (ref 10–40)
BASOPHILS # BLD AUTO: 0.07 K/UL — SIGNIFICANT CHANGE UP (ref 0–0.2)
BASOPHILS NFR BLD AUTO: 0.7 % — SIGNIFICANT CHANGE UP (ref 0–2)
BILIRUB SERPL-MCNC: 0.6 MG/DL — SIGNIFICANT CHANGE UP (ref 0.2–1.2)
BUN SERPL-MCNC: 24 MG/DL — HIGH (ref 7–23)
CALCIUM SERPL-MCNC: 9.7 MG/DL — SIGNIFICANT CHANGE UP (ref 8.4–10.5)
CHLORIDE SERPL-SCNC: 98 MMOL/L — SIGNIFICANT CHANGE UP (ref 96–108)
CO2 SERPL-SCNC: 28 MMOL/L — SIGNIFICANT CHANGE UP (ref 22–31)
CREAT SERPL-MCNC: 1.15 MG/DL — SIGNIFICANT CHANGE UP (ref 0.5–1.3)
EOSINOPHIL # BLD AUTO: 0.09 K/UL — SIGNIFICANT CHANGE UP (ref 0–0.5)
EOSINOPHIL NFR BLD AUTO: 0.9 % — SIGNIFICANT CHANGE UP (ref 0–6)
GLUCOSE SERPL-MCNC: 420 MG/DL — HIGH (ref 70–99)
HBA1C BLD-MCNC: 14 % — HIGH (ref 4–5.6)
HCT VFR BLD CALC: 44.1 % — SIGNIFICANT CHANGE UP (ref 39–50)
HGB BLD-MCNC: 14.9 G/DL — SIGNIFICANT CHANGE UP (ref 13–17)
IMM GRANULOCYTES NFR BLD AUTO: 0.8 % — SIGNIFICANT CHANGE UP (ref 0–1.5)
LDH SERPL L TO P-CCNC: 239 U/L — SIGNIFICANT CHANGE UP (ref 50–242)
LYMPHOCYTES # BLD AUTO: 2.28 K/UL — SIGNIFICANT CHANGE UP (ref 1–3.3)
LYMPHOCYTES # BLD AUTO: 23.6 % — SIGNIFICANT CHANGE UP (ref 13–44)
MCHC RBC-ENTMCNC: 28.8 PG — SIGNIFICANT CHANGE UP (ref 27–34)
MCHC RBC-ENTMCNC: 33.8 GM/DL — SIGNIFICANT CHANGE UP (ref 32–36)
MCV RBC AUTO: 85.1 FL — SIGNIFICANT CHANGE UP (ref 80–100)
MONOCYTES # BLD AUTO: 0.89 K/UL — SIGNIFICANT CHANGE UP (ref 0–0.9)
MONOCYTES NFR BLD AUTO: 9.2 % — SIGNIFICANT CHANGE UP (ref 2–14)
NEUTROPHILS # BLD AUTO: 6.25 K/UL — SIGNIFICANT CHANGE UP (ref 1.8–7.4)
NEUTROPHILS NFR BLD AUTO: 64.8 % — SIGNIFICANT CHANGE UP (ref 43–77)
NRBC # BLD: 0 /100 WBCS — SIGNIFICANT CHANGE UP (ref 0–0)
PLATELET # BLD AUTO: 371 K/UL — SIGNIFICANT CHANGE UP (ref 150–400)
POTASSIUM SERPL-MCNC: 3.8 MMOL/L — SIGNIFICANT CHANGE UP (ref 3.5–5.3)
POTASSIUM SERPL-SCNC: 3.8 MMOL/L — SIGNIFICANT CHANGE UP (ref 3.5–5.3)
PROT SERPL-MCNC: 7.2 G/DL — SIGNIFICANT CHANGE UP (ref 6–8.3)
RBC # BLD: 5.18 M/UL — SIGNIFICANT CHANGE UP (ref 4.2–5.8)
RBC # FLD: 12.9 % — SIGNIFICANT CHANGE UP (ref 10.3–14.5)
SODIUM SERPL-SCNC: 136 MMOL/L — SIGNIFICANT CHANGE UP (ref 135–145)
T4 FREE SERPL-MCNC: 1.4 NG/DL — SIGNIFICANT CHANGE UP (ref 0.9–1.8)
TSH SERPL-MCNC: 1.35 UIU/ML — SIGNIFICANT CHANGE UP (ref 0.27–4.2)
WBC # BLD: 9.66 K/UL — SIGNIFICANT CHANGE UP (ref 3.8–10.5)
WBC # FLD AUTO: 9.66 K/UL — SIGNIFICANT CHANGE UP (ref 3.8–10.5)

## 2019-09-26 PROCEDURE — 84439 ASSAY OF FREE THYROXINE: CPT

## 2019-09-26 PROCEDURE — 86480 TB TEST CELL IMMUN MEASURE: CPT

## 2019-09-26 PROCEDURE — 83615 LACTATE (LD) (LDH) ENZYME: CPT

## 2019-09-26 PROCEDURE — 83036 HEMOGLOBIN GLYCOSYLATED A1C: CPT

## 2019-09-26 PROCEDURE — 85027 COMPLETE CBC AUTOMATED: CPT

## 2019-09-26 PROCEDURE — 84443 ASSAY THYROID STIM HORMONE: CPT

## 2019-09-26 PROCEDURE — 36415 COLL VENOUS BLD VENIPUNCTURE: CPT

## 2019-09-26 PROCEDURE — 80053 COMPREHEN METABOLIC PANEL: CPT

## 2019-09-28 LAB
GAMMA INTERFERON BACKGROUND BLD IA-ACNC: 0.01 IU/ML — SIGNIFICANT CHANGE UP
M TB IFN-G BLD-IMP: NEGATIVE — SIGNIFICANT CHANGE UP
M TB IFN-G CD4+ BCKGRND COR BLD-ACNC: 0.01 IU/ML — SIGNIFICANT CHANGE UP
M TB IFN-G CD4+CD8+ BCKGRND COR BLD-ACNC: 0 IU/ML — SIGNIFICANT CHANGE UP
QUANT TB PLUS MITOGEN MINUS NIL: >10 IU/ML — SIGNIFICANT CHANGE UP

## 2019-10-01 ENCOUNTER — APPOINTMENT (OUTPATIENT)
Dept: INTERNAL MEDICINE | Facility: CLINIC | Age: 64
End: 2019-10-01
Payer: COMMERCIAL

## 2019-10-01 VITALS
SYSTOLIC BLOOD PRESSURE: 120 MMHG | WEIGHT: 153 LBS | BODY MASS INDEX: 24.59 KG/M2 | HEART RATE: 98 BPM | OXYGEN SATURATION: 98 % | HEIGHT: 66 IN | DIASTOLIC BLOOD PRESSURE: 70 MMHG

## 2019-10-01 DIAGNOSIS — R63.4 ABNORMAL WEIGHT LOSS: ICD-10-CM

## 2019-10-01 PROCEDURE — 82044 UR ALBUMIN SEMIQUANTITATIVE: CPT | Mod: QW

## 2019-10-01 PROCEDURE — 81003 URINALYSIS AUTO W/O SCOPE: CPT | Mod: QW

## 2019-10-01 PROCEDURE — 99214 OFFICE O/P EST MOD 30 MIN: CPT | Mod: 25

## 2019-10-02 LAB
ALBUMIN: 150
BILIRUB UR QL STRIP: NORMAL
CLARITY UR: CLEAR
COLLECTION METHOD: NORMAL
CREATININE: 50
GLUCOSE UR-MCNC: NORMAL
HCG UR QL: 0.2 EU/DL
HGB UR QL STRIP.AUTO: NORMAL
KETONES UR-MCNC: NORMAL
LEUKOCYTE ESTERASE UR QL STRIP: NORMAL
MICROALBUMIN/CREAT UR TEST STR-RTO: 300
NITRITE UR QL STRIP: NORMAL
PH UR STRIP: 5.5
PROT UR STRIP-MCNC: NORMAL
SP GR UR STRIP: 1.01

## 2019-10-06 PROBLEM — R63.4 WEIGHT LOSS, UNINTENTIONAL: Status: ACTIVE | Noted: 2019-09-24

## 2019-10-06 NOTE — HISTORY OF PRESENT ILLNESS
[FreeTextEntry8] : Here earlier than scheduled appt because has been sicker.  Has had a big drop in weight over last few months.  Is worried, also noting he has less energy, feels lightheaded and that his muscles in legs are smaller in his thighs and that he feels weaker in his legs.  Has no fever, chills, sob, sprague, BRB, melena, dysphagia, emesis, anorexia - has regular appetite.  On further history taking, he admits that over this time he stopped 'all his meds'.  Was on janumet with his endo, and also with that had trulicity added.  'Got tired of the whole thing', thought he could just be very careful with his diet.  Slowly with this approach he began losing the weight.  Admits to increased urination and thirst.  Has not been checking his sugars over the time period because hates sticking himself.  Sees magaly Pereira - 'he's going to tear into me'.

## 2019-10-06 NOTE — PHYSICAL EXAM
[No Acute Distress] : no acute distress [Normal Voice/Communication] : normal voice/communication [Ill-Appearing] : ill-appearing [No JVD] : no jugular venous distention [No Lymphadenopathy] : no lymphadenopathy [Supple] : supple [Thyroid Normal, No Nodules] : the thyroid was normal and there were no nodules present [No Respiratory Distress] : no respiratory distress  [No Accessory Muscle Use] : no accessory muscle use [Clear to Auscultation] : lungs were clear to auscultation bilaterally [Normal Percussion] : the chest was normal to percussion [Normal Rate] : normal rate  [Regular Rhythm] : with a regular rhythm [Normal S1, S2] : normal S1 and S2 [No Murmur] : no murmur heard [No Carotid Bruits] : no carotid bruits [No Edema] : there was no peripheral edema [No Extremity Clubbing/Cyanosis] : no extremity clubbing/cyanosis [Soft] : abdomen soft [Non Tender] : non-tender [Non-distended] : non-distended [No Masses] : no abdominal mass palpated [Normal Bowel Sounds] : normal bowel sounds [No HSM] : no HSM [Alert and Oriented x3] : oriented to person, place, and time [Speech Grossly Normal] : speech grossly normal [Normal Mood] : the mood was normal [de-identified] : looks drawn for him [de-identified] : no icterus/pallor

## 2019-10-06 NOTE — ASSESSMENT
[FreeTextEntry1] : 1) encouraged flu vacc within a month.  2) DM - did labs ahead of this visit because of weight loss.  A1C > 15.5.  Glc at time of draw 400+, Na and Cl dilutionally down sl.  Kidneys ok, no gap, no drop in bicarb.  Today on UA here sl ketones.  Does not want hospital, which we discussed.  Don't believe he is in florid DKA, he is type 2.  Needs to get on insulin to get out of toxicity/catabolic state.  He knows how to use pen from recent trulicity he was put on.  Lantus 20 HS started, also given humalog 3-4 AM, 4-5 lunch, 7-8 dinner discussed.  He'd like to try and get a freestyle sarah with endocrine so he doesn't have to stick himself.  Holding off on janumet right now b/c so glucose toxic.  Can go back to his trulicity with insulin in short time if can't get his sugars < 300; once < 200 can consider back to janumet.  To see endocrine next 1-2 weeks.  3) if he doesn't put weight back on with correction of sugar, will expand wt loss work up.  4) offered therapist to discuss his frustration, ?illness related depression/maladjustment at times.  Urged to not go off his meds ever again and to see endo q 3 mos for A1C cycle.

## 2019-10-06 NOTE — REVIEW OF SYSTEMS
[Fever] : no fever [Chills] : no chills [Fatigue] : fatigue [Night Sweats] : no night sweats [Recent Change In Weight] : ~T recent weight change [Vision Problems] : no vision problems [Dysuria] : no dysuria [Hesitancy] : no hesitancy [Nocturia] : nocturia [Hematuria] : no hematuria [Frequency] : frequency [Anxiety] : no anxiety [Negative] : Gastrointestinal [FreeTextEntry2] : see hpi [FreeTextEntry9] : see hpi [de-identified] : gets very frustrated/down w his DM - today is case in point.

## 2019-10-06 NOTE — COUNSELING
[Fall prevention counseling provided] : Fall prevention counseling provided [Patient motivation] : Patient motivation [Other: ____] : [unfilled] [Needs reinforcement, provided] : Patient needs reinforcement on understanding of lifestyle changes and steps needed to achieve self management goal; reinforcement was provided

## 2019-10-08 ENCOUNTER — APPOINTMENT (OUTPATIENT)
Dept: INTERNAL MEDICINE | Facility: CLINIC | Age: 64
End: 2019-10-08

## 2019-10-18 ENCOUNTER — MEDICATION RENEWAL (OUTPATIENT)
Age: 64
End: 2019-10-18

## 2019-10-22 ENCOUNTER — RX RENEWAL (OUTPATIENT)
Age: 64
End: 2019-10-22

## 2019-11-13 ENCOUNTER — EMERGENCY (EMERGENCY)
Facility: HOSPITAL | Age: 64
LOS: 1 days | Discharge: HOME | End: 2019-11-13
Attending: EMERGENCY MEDICINE
Payer: COMMERCIAL

## 2019-11-13 VITALS
OXYGEN SATURATION: 99 % | TEMPERATURE: 98 F | DIASTOLIC BLOOD PRESSURE: 84 MMHG | SYSTOLIC BLOOD PRESSURE: 135 MMHG | HEART RATE: 93 BPM | RESPIRATION RATE: 16 BRPM

## 2019-11-13 VITALS
SYSTOLIC BLOOD PRESSURE: 102 MMHG | HEART RATE: 112 BPM | DIASTOLIC BLOOD PRESSURE: 73 MMHG | WEIGHT: 153 LBS | HEIGHT: 67 IN | OXYGEN SATURATION: 100 % | RESPIRATION RATE: 18 BRPM | TEMPERATURE: 98 F

## 2019-11-13 DIAGNOSIS — Z90.89 ACQUIRED ABSENCE OF OTHER ORGANS: Chronic | ICD-10-CM

## 2019-11-13 DIAGNOSIS — Z98.89 OTHER SPECIFIED POSTPROCEDURAL STATES: Chronic | ICD-10-CM

## 2019-11-13 LAB
ALBUMIN SERPL ELPH-MCNC: 4.1 G/DL — SIGNIFICANT CHANGE UP (ref 3.3–5)
ALP SERPL-CCNC: 93 U/L — SIGNIFICANT CHANGE UP (ref 40–120)
ALT FLD-CCNC: 14 U/L — SIGNIFICANT CHANGE UP (ref 10–45)
ANION GAP SERPL CALC-SCNC: 20 MMOL/L — HIGH (ref 5–17)
APPEARANCE UR: CLEAR — SIGNIFICANT CHANGE UP
AST SERPL-CCNC: 14 U/L — SIGNIFICANT CHANGE UP (ref 10–40)
BACTERIA # UR AUTO: NEGATIVE — SIGNIFICANT CHANGE UP
BASE EXCESS BLDV CALC-SCNC: 0.1 MMOL/L — SIGNIFICANT CHANGE UP (ref -2–2)
BASOPHILS # BLD AUTO: 0.07 K/UL — SIGNIFICANT CHANGE UP (ref 0–0.2)
BASOPHILS NFR BLD AUTO: 0.5 % — SIGNIFICANT CHANGE UP (ref 0–2)
BILIRUB SERPL-MCNC: 0.7 MG/DL — SIGNIFICANT CHANGE UP (ref 0.2–1.2)
BILIRUB UR-MCNC: NEGATIVE — SIGNIFICANT CHANGE UP
BUN SERPL-MCNC: 23 MG/DL — SIGNIFICANT CHANGE UP (ref 7–23)
CA-I SERPL-SCNC: 1.21 MMOL/L — SIGNIFICANT CHANGE UP (ref 1.12–1.3)
CALCIUM SERPL-MCNC: 10.5 MG/DL — SIGNIFICANT CHANGE UP (ref 8.4–10.5)
CHLORIDE BLDV-SCNC: 95 MMOL/L — LOW (ref 96–108)
CHLORIDE SERPL-SCNC: 93 MMOL/L — LOW (ref 96–108)
CO2 BLDV-SCNC: 25 MMOL/L — SIGNIFICANT CHANGE UP (ref 22–30)
CO2 SERPL-SCNC: 21 MMOL/L — LOW (ref 22–31)
COLOR SPEC: SIGNIFICANT CHANGE UP
CREAT SERPL-MCNC: 1.15 MG/DL — SIGNIFICANT CHANGE UP (ref 0.5–1.3)
DIFF PNL FLD: NEGATIVE — SIGNIFICANT CHANGE UP
EOSINOPHIL # BLD AUTO: 0.01 K/UL — SIGNIFICANT CHANGE UP (ref 0–0.5)
EOSINOPHIL NFR BLD AUTO: 0.1 % — SIGNIFICANT CHANGE UP (ref 0–6)
EPI CELLS # UR: 2 /HPF — SIGNIFICANT CHANGE UP
ETHANOL SERPL-MCNC: SIGNIFICANT CHANGE UP MG/DL (ref 0–10)
GAS PNL BLDV: 136 MMOL/L — SIGNIFICANT CHANGE UP (ref 135–145)
GAS PNL BLDV: SIGNIFICANT CHANGE UP
GLUCOSE BLDV-MCNC: 379 MG/DL — HIGH (ref 70–99)
GLUCOSE SERPL-MCNC: 434 MG/DL — HIGH (ref 70–99)
GLUCOSE UR QL: ABNORMAL
HCO3 BLDV-SCNC: 24 MMOL/L — SIGNIFICANT CHANGE UP (ref 21–29)
HCT VFR BLD CALC: 45.4 % — SIGNIFICANT CHANGE UP (ref 39–50)
HCT VFR BLDA CALC: 47 % — SIGNIFICANT CHANGE UP (ref 39–50)
HGB BLD CALC-MCNC: 15.4 G/DL — SIGNIFICANT CHANGE UP (ref 13–17)
HGB BLD-MCNC: 15.4 G/DL — SIGNIFICANT CHANGE UP (ref 13–17)
HYALINE CASTS # UR AUTO: 0 /LPF — SIGNIFICANT CHANGE UP (ref 0–2)
IMM GRANULOCYTES NFR BLD AUTO: 1.1 % — SIGNIFICANT CHANGE UP (ref 0–1.5)
KETONES UR-MCNC: ABNORMAL
LACTATE BLDV-MCNC: 3.7 MMOL/L — HIGH (ref 0.7–2)
LEUKOCYTE ESTERASE UR-ACNC: NEGATIVE — SIGNIFICANT CHANGE UP
LYMPHOCYTES # BLD AUTO: 1.74 K/UL — SIGNIFICANT CHANGE UP (ref 1–3.3)
LYMPHOCYTES # BLD AUTO: 12.7 % — LOW (ref 13–44)
MCHC RBC-ENTMCNC: 28.7 PG — SIGNIFICANT CHANGE UP (ref 27–34)
MCHC RBC-ENTMCNC: 33.9 GM/DL — SIGNIFICANT CHANGE UP (ref 32–36)
MCV RBC AUTO: 84.5 FL — SIGNIFICANT CHANGE UP (ref 80–100)
MONOCYTES # BLD AUTO: 1.09 K/UL — HIGH (ref 0–0.9)
MONOCYTES NFR BLD AUTO: 8 % — SIGNIFICANT CHANGE UP (ref 2–14)
NEUTROPHILS # BLD AUTO: 10.64 K/UL — HIGH (ref 1.8–7.4)
NEUTROPHILS NFR BLD AUTO: 77.6 % — HIGH (ref 43–77)
NITRITE UR-MCNC: NEGATIVE — SIGNIFICANT CHANGE UP
NRBC # BLD: 0 /100 WBCS — SIGNIFICANT CHANGE UP (ref 0–0)
PCO2 BLDV: 37 MMHG — SIGNIFICANT CHANGE UP (ref 35–50)
PH BLDV: 7.42 — SIGNIFICANT CHANGE UP (ref 7.35–7.45)
PH UR: 6 — SIGNIFICANT CHANGE UP (ref 5–8)
PLATELET # BLD AUTO: 381 K/UL — SIGNIFICANT CHANGE UP (ref 150–400)
PO2 BLDV: 22 MMHG — LOW (ref 25–45)
POTASSIUM BLDV-SCNC: 3.7 MMOL/L — SIGNIFICANT CHANGE UP (ref 3.5–5.3)
POTASSIUM SERPL-MCNC: 4.5 MMOL/L — SIGNIFICANT CHANGE UP (ref 3.5–5.3)
POTASSIUM SERPL-SCNC: 4.5 MMOL/L — SIGNIFICANT CHANGE UP (ref 3.5–5.3)
PROT SERPL-MCNC: 7.2 G/DL — SIGNIFICANT CHANGE UP (ref 6–8.3)
PROT UR-MCNC: ABNORMAL
RBC # BLD: 5.37 M/UL — SIGNIFICANT CHANGE UP (ref 4.2–5.8)
RBC # FLD: 13.7 % — SIGNIFICANT CHANGE UP (ref 10.3–14.5)
RBC CASTS # UR COMP ASSIST: 1 /HPF — SIGNIFICANT CHANGE UP (ref 0–4)
SAO2 % BLDV: 34 % — LOW (ref 67–88)
SODIUM SERPL-SCNC: 134 MMOL/L — LOW (ref 135–145)
SP GR SPEC: 1.01 — SIGNIFICANT CHANGE UP (ref 1.01–1.02)
UROBILINOGEN FLD QL: NEGATIVE — SIGNIFICANT CHANGE UP
WBC # BLD: 13.7 K/UL — HIGH (ref 3.8–10.5)
WBC # FLD AUTO: 13.7 K/UL — HIGH (ref 3.8–10.5)
WBC UR QL: 3 /HPF — SIGNIFICANT CHANGE UP (ref 0–5)

## 2019-11-13 PROCEDURE — 73502 X-RAY EXAM HIP UNI 2-3 VIEWS: CPT | Mod: 26,RT

## 2019-11-13 PROCEDURE — 80307 DRUG TEST PRSMV CHEM ANLYZR: CPT

## 2019-11-13 PROCEDURE — 82435 ASSAY OF BLOOD CHLORIDE: CPT

## 2019-11-13 PROCEDURE — 70450 CT HEAD/BRAIN W/O DYE: CPT

## 2019-11-13 PROCEDURE — 96360 HYDRATION IV INFUSION INIT: CPT

## 2019-11-13 PROCEDURE — 82962 GLUCOSE BLOOD TEST: CPT

## 2019-11-13 PROCEDURE — 85027 COMPLETE CBC AUTOMATED: CPT

## 2019-11-13 PROCEDURE — 70450 CT HEAD/BRAIN W/O DYE: CPT | Mod: 26

## 2019-11-13 PROCEDURE — 99285 EMERGENCY DEPT VISIT HI MDM: CPT | Mod: 25

## 2019-11-13 PROCEDURE — 96361 HYDRATE IV INFUSION ADD-ON: CPT

## 2019-11-13 PROCEDURE — 82947 ASSAY GLUCOSE BLOOD QUANT: CPT

## 2019-11-13 PROCEDURE — 80053 COMPREHEN METABOLIC PANEL: CPT

## 2019-11-13 PROCEDURE — 99284 EMERGENCY DEPT VISIT MOD MDM: CPT

## 2019-11-13 PROCEDURE — 84295 ASSAY OF SERUM SODIUM: CPT

## 2019-11-13 PROCEDURE — 81001 URINALYSIS AUTO W/SCOPE: CPT

## 2019-11-13 PROCEDURE — 90792 PSYCH DIAG EVAL W/MED SRVCS: CPT | Mod: GT

## 2019-11-13 PROCEDURE — 82803 BLOOD GASES ANY COMBINATION: CPT

## 2019-11-13 PROCEDURE — 82330 ASSAY OF CALCIUM: CPT

## 2019-11-13 PROCEDURE — 84132 ASSAY OF SERUM POTASSIUM: CPT

## 2019-11-13 PROCEDURE — 73502 X-RAY EXAM HIP UNI 2-3 VIEWS: CPT

## 2019-11-13 PROCEDURE — 83605 ASSAY OF LACTIC ACID: CPT

## 2019-11-13 PROCEDURE — 85014 HEMATOCRIT: CPT

## 2019-11-13 RX ORDER — SODIUM CHLORIDE 9 MG/ML
1000 INJECTION INTRAMUSCULAR; INTRAVENOUS; SUBCUTANEOUS ONCE
Refills: 0 | Status: COMPLETED | OUTPATIENT
Start: 2019-11-13 | End: 2019-11-13

## 2019-11-13 RX ADMIN — SODIUM CHLORIDE 1000 MILLILITER(S): 9 INJECTION INTRAMUSCULAR; INTRAVENOUS; SUBCUTANEOUS at 20:30

## 2019-11-13 RX ADMIN — SODIUM CHLORIDE 1000 MILLILITER(S): 9 INJECTION INTRAMUSCULAR; INTRAVENOUS; SUBCUTANEOUS at 20:00

## 2019-11-13 RX ADMIN — SODIUM CHLORIDE 2000 MILLILITER(S): 9 INJECTION INTRAMUSCULAR; INTRAVENOUS; SUBCUTANEOUS at 17:57

## 2019-11-13 RX ADMIN — SODIUM CHLORIDE 1000 MILLILITER(S): 9 INJECTION INTRAMUSCULAR; INTRAVENOUS; SUBCUTANEOUS at 16:57

## 2019-11-13 NOTE — ED ADULT NURSE NOTE - OBJECTIVE STATEMENT
65yo male brought to ED by family c/o unmanaged diabetes presenting with unstable glucose levels. Hyperglycemia, patient and family states patient is not compliant with insulin therapy because patient does not like needles. Pt presents with yellowish skin, dry mouth and cool extremities. Oral temp is 97.5 degrees fahrenheit. PMH diabetes, right leg weakness and pain, hyperlipidemia, HTN. IV 20 gg RIGHT Forearm.  Pt is receiving normal saline bolus 1000mL. Patient resting comfortably although pt states "im very thirsty". Safety and comfort measures in place. Family at the bedside. Will continue to monitor.

## 2019-11-13 NOTE — ED PROVIDER NOTE - NSFOLLOWUPINSTRUCTIONS_ED_ALL_ED_FT
Thank you for visiting our Emergency Department, it has been a pleasure taking part in your healthcare.    Please follow up with your Primary Doctor in 1-2 days.    Deconditioning  Deconditioning refers to the changes in your body that occur during a period of inactivity. The changes happen in your heart, lungs, and muscles. They decrease your ability to be active, and they make you feel tired and weak.  There are three stages of deconditioning:  Mild deconditioning. At this stage, you will notice a change in your ability to do your usual exercise activities, such as running, biking, or swimming.Moderate deconditioning. At this stage, you will notice a change in your ability to do normal everyday activities, such as walking, grocery shopping, and doing chores.Severe deconditioning. At this stage, you will notice a change in your ability to do minimal activity or normal self-care.Deconditioning can occur after only a few days of inactivity. The longer the period of inactivity, the more severe the deconditioning will be, and the longer it will take to return to your previous level of functioning.  What are the causes?  Deconditioning is often caused by inactivity due to:  Illnesses, such as cancer, stroke, heart attack, fibromyalgia, and chronic fatigue syndrome.Injuries, especially back injuries, broken bones, and ligament and tendon injuries.A long stay in the hospital.Pregnancy, especially if long periods of bed rest are needed.What increases the risk?  This condition is more likely to develop in:  People who are hospitalized.People on bed rest.People who are obese.People with poor nutrition.Elderly adults.People with injuries or illnesses that interfere with movement and activity.What are the signs or symptoms?  Symptoms of deconditioning include:  Weakness.Tiredness.Shortness of breath with minor exertion.A faster-than-normal heartbeat. You may not notice this without taking your pulse.Pain or discomfort with activity.Decreased strength.Decreased sense of balance.Decreased endurance.Difficulty doing your usual forms of exercise.Difficulty doing activities of daily living, such as grocery shopping or chores.Difficulty walking around the house and doing basic self-care, such as getting to the bathroom, preparing meals, or doing laundry.How is this diagnosed?  Deconditioning is diagnosed based on your medical history and a physical exam. During the physical exam, your health care provider will check for signs of deconditioning, such as:  Decreased size of muscles.Decreased strength.Trouble with balance.Shortness of breath or abnormally increased heart rate after minor exertion.How is this treated?  Treatment for deconditioning usually involves following a structured exercise program in which activity is increased gradually. Your health care provider will determine which exercises are right for you. The exercise program will likely include aerobic exercise and strength training:  Aerobic exercise helps improve the functioning of the heart and lungs as well as the muscles.Strength training helps improve muscle size and strength.Both of these types of exercise will improve your endurance. You may be referred to a physical therapist who can create a safe strengthening program for you to follow.  Follow these instructions at home:  Follow the exercise program that is recommended by your health care provider or physical therapist.Do not increase your exercise any faster than directed.Eat a healthy diet.Do not use any products that contain nicotine or tobacco, such as cigarettes and e-cigarettes. If you need help quitting, ask your health care provider.Take over-the-counter and prescription medicines only as told by your health care provider.Keep all follow-up visits as told by your health care provider. This is important.Contact a health care provider if:  You are not able to carry out the prescribed exercise program.You are becoming more and more fatigued and weak.You become light-headed when rising to a sitting or standing position.Your level of endurance decreases after it has improved.Get help right away if:  You have chest pain.You are very short of breath.You have any episodes of passing out.This information is not intended to replace advice given to you by your health care provider. Make sure you discuss any questions you have with your health care provider.

## 2019-11-13 NOTE — ED ADULT NURSE NOTE - NSIMPLEMENTINTERV_GEN_ALL_ED
Implemented All Fall Risk Interventions:  [place] to call system. Call bell, personal items and telephone within reach. Instruct patient to call for assistance. Room bathroom lighting operational. Non-slip footwear when patient is off stretcher. Physically safe environment: no spills, clutter or unnecessary equipment. Stretcher in lowest position, wheels locked, appropriate side rails in place. Provide visual cue, wrist band, yellow gown, etc. Monitor gait and stability. Monitor for mental status changes and reorient to person, place, and time. Review medications for side effects contributing to fall risk. Reinforce activity limits and safety measures with patient and family.

## 2019-11-13 NOTE — ED BEHAVIORAL HEALTH ASSESSMENT NOTE - SUMMARY
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  on assessment, pt is pleasant and denies depression, suicidal ideation , and all psychiatric sx's. he has had a decline in physical functioning due to noncompliance with insulin but in no way does this appear to be attempts of self harm or due to major depression. wife has no major safety concerns. pt is not in need of inpatient psychiatric hospitalization, as he is not an acute psychiatric danger. 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  on assessment, pt is pleasant and denies depression, suicidal ideation , and all psychiatric sx's. he has had an adjustment to aging and long-term, and a decline in physical functioning due to a stubborn noncompliance with insulin, but in no way does this appear to be attempts of self harm or due to major depression. wife has no major safety concerns. pt is not in need of inpatient psychiatric hospitalization, as he is not an acute psychiatric danger.

## 2019-11-13 NOTE — ED ADULT NURSE REASSESSMENT NOTE - NS ED NURSE REASSESS COMMENT FT1
Pt resting comfortably, denies any pain or discomfort. Awaiting labwork and dispo. Ambulated safely with steady gait with 1 person assist to the restroom. Comfort and safety measures maintained.

## 2019-11-13 NOTE — ED ADULT NURSE NOTE - EENT WDL
Eyes with no visual disturbances.  Ears clean and dry and no hearing difficulties. Nose with pink mucosa and no drainage.  Mouth mucous membranes pale and dry.  No tenderness or swelling to throat or neck.

## 2019-11-13 NOTE — ED PROVIDER NOTE - PATIENT PORTAL LINK FT
You can access the FollowMyHealth Patient Portal offered by [facility] by registering at the following website: [email or web address]. By joining [facility]’s FollowMyHealth portal, you will also be able to view your health information using other applications (apps) compatible with our system.

## 2019-11-13 NOTE — ED PROVIDER NOTE - OBJECTIVE STATEMENT
64y male PMHx uncontrolled DM 2, depression presenting from home with elevated blood sugar. Pt has been experiencing weight loss, fatigue with frequent falls for the past month. As per pts family, he has a long history of noncompliance with his medications. His last A1C was reported to be 15. Family is concerned about his constant noncompliance and psychiatric issues at home including depression and hoarding with frequent falls at home. They spoke to his PCP Dr Blair Jefferson who told him to come in for admission for diabetes education and possible psych eval. Pt currently c/o dry mouth with no other acute complaints. Denies CP, SOB, palpitations, syncope, nausea, vomiting, diarrhea, abd pain, fever, chills.  Pts FS was 413 in triage. 64y male PMHx uncontrolled DM 2, depression presenting from home with elevated blood sugar. Pt has been experiencing weight loss, fatigue for the past few months. Notes increased falls for the last month. As per pts family, he has a long history of noncompliance with his medications. His last A1C was reported to be 15. Family is concerned about his constant noncompliance and psychiatric issues at home including depression and hoarding with frequent falls at home. They spoke to his PCP Dr Blair Jefferson who told him to come in for admission for diabetes education and possible psych eval. Pt currently c/o dry mouth with no other acute complaints. Pt states he does not want to take insulin because of the needles. Denies CP, SOB, palpitations, syncope, nausea, vomiting, diarrhea, abd pain, fever, chills.  Pts FS was 413 in triage.

## 2019-11-13 NOTE — ED PROVIDER NOTE - FAMILY HISTORY
This note was copied from the mother's chart.     06/01/19 1220   Maternal Assessment   Breast Density Bilateral:;soft   Maternal Infant Feeding   Maternal Emotional State relaxed   Equipment Type   Breast Pump Type double electric, hospital grade   Breast Pump Flange Type hard   Breast Pump Flange Size 24 mm   Breast Pumping   Breast Pumping Interventions post-feed pumping encouraged   Breast Pumping double electric breast pump utilized   lactation education reviewed for pumping mother for NICU. NICU guide given. Reviewed. Discussed hand expression post pumping. encouraged to call for assistance.    Mother  Still living? Unknown  Family history of Hodgkins disease, Age at diagnosis: Age Unknown     Father  Still living? Unknown  Family history of Alzheimer's disease, Age at diagnosis: Age Unknown

## 2019-11-13 NOTE — ED PROVIDER NOTE - ATTENDING CONTRIBUTION TO CARE
Nara Harrison MD - Attending Physician: I have personally seen and examined this patient. I have discussed the case with the ACP. I have reviewed all pertinent clinical information, including history, physical exam, plan and the ACP’s note and agree except as noted. See MDM Nara Harrison MD - Attending Physician: I have personally seen and examined this patient with the resident/fellow.  I have fully participated in the care of this patient. I have reviewed all pertinent clinical information, including history, physical exam, plan and the Resident/Fellow’s note and agree except as noted. See MDM

## 2019-11-13 NOTE — ED BEHAVIORAL HEALTH ASSESSMENT NOTE - RISK ASSESSMENT
denies depression, no prior Low Acute Suicide Risk denies depression, no prior suicidality or psychiatric history, supportive family, children/grandchildren

## 2019-11-13 NOTE — ED PROVIDER NOTE - CLINICAL SUMMARY MEDICAL DECISION MAKING FREE TEXT BOX
Nara Harrison MD - Attending Physician: Pt here with chronic med noncompliance, dehydration. No SI, has capacity to make decisions and refusing to take his meds. Exam unremarkable without signs of acute pathology. Labs, IVF, Xr hip due to pan s/p fall, CT head given minor CHI s/p fall in past to r/o chronic subdural, d/w PMD

## 2019-11-13 NOTE — ED BEHAVIORAL HEALTH ASSESSMENT NOTE - SUICIDE PROTECTIVE FACTORS
Supportive social network of family or friends/Responsibility to family and others/Identifies reasons for living/Has future plans

## 2019-11-13 NOTE — ED PROVIDER NOTE - NEUROLOGICAL, MLM
Alert and oriented, no focal deficits, no motor or sensory deficits. Alert and oriented, no focal deficits, no motor or sensory deficits. Normal gait using cane (baseline). No ataxia

## 2019-11-13 NOTE — ED BEHAVIORAL HEALTH ASSESSMENT NOTE - HPI (INCLUDE ILLNESS QUALITY, SEVERITY, DURATION, TIMING, CONTEXT, MODIFYING FACTORS, ASSOCIATED SIGNS AND SYMPTOMS)
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  On interview, pt is pleasant, cooperative and engaging. he states that he has had an adjustment to aging and detention. He also hates needles and taking medications for diabetes. he admits he is stubborn, and stubbornly decided last summer he would only take his long acting insulin, so that he could have a summer like he did when he was young. He has had increasing weakness in his legs over the past months, and several episodes of falling at home as a result. he has not gone to the hospital, but has told his doctor, Dr. Aldridge, who recommended pt come to ED today. pt uses cane to ambulate. family is concerned pt has had weight loss and increase of mild hoarding activity . on interview, pt clearly, repeatedly and adamantly denies all psychiatric symptoms, saying he is not depressed, has never been depressed or anxious, denies any suicidal ideation or homicidal ideation , he is future oriented, states he has children and grandchildren to live for. he denies substance use. he is focussed on receiving physical therapy for the weakness in his legs, and feels strongly he needs this prior to going home.  Spoke with pt's wife, who supported the above. she states he has become noncompliant with insulin, not due to depression she believes, but rather 'stubborness.' she is worried about his weight loss and falls, and would like him to receive help. she denies that pt has been suicidal or a danger in any way. she has no safety concerns but would be open to pt receiving further treatment for physical rehab and outpatient f/u for possible underlying adjustment symptoms.

## 2019-11-13 NOTE — ED BEHAVIORAL HEALTH ASSESSMENT NOTE - DESCRIPTION
Pt in ED for ~ 6.5  hrs prior to Telepsych consult at 22:29. Per Triage RN, provider (verbally conveyed to primary RN Herbert) that Pt arrived at ~ 16:07 dressed appropriately for the weather, with good hygiene/grooming. Pt was compliant with good cooperation for entire process of wanding/search/ and gowning and was escorted to the  area with no issues. Nothing of note in pt’s belongings. RN reports patient provided both urine specimen and routine bloodwork willingly. Pt continued to request food and drink throughout his stay. However, his sugar levels were too high and he was not allowed to have anything yet. As per RN, pt. has not been agitated or irritable during his stay. Pt’s eye contact is good and speech volume, rate, and articulation are normal, clear, and coherent. Pt’s affect is euthymic and his thought process is linear and logical. Pt reports to RN/provider that he has fallen a few times in the home and he does not want to be a burden to his family. His family reports he is depressed and he is not compliant with his insulin. Pt. does not endorse SI/HI and any delusions. RN states he is A/Ox 4 and does not appear cognitively impaired. Per RN, pt. has not required psychiatric or medical medications or any security interventions. The pt. has spent his time in the emergency room keeping to himself and has also walked the hallways a few times.  Pt had his wife at bedside but she has left a few hours ago. Pt is placed on a 1:1 observation and in a private room ready for telepsychiatry evaluation. 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 owns a dog. plans to retire to [place]

## 2019-11-13 NOTE — ED PROVIDER NOTE - CARE PROVIDER_API CALL
Blair Jefferson)  Internal Medicine  [address], [facility] [address]  [place] [address]  Phone: [phone]  Fax: [phone]  Follow Up Time: 1-3 Days

## 2019-11-13 NOTE — ED PROVIDER NOTE - ENMT, MLM
+dry mucous membranes. Airway patent, Nasal mucosa clear. Throat has no vesicles, no oropharyngeal exudates and uvula is midline.

## 2019-11-13 NOTE — ED ADULT NURSE REASSESSMENT NOTE - NS ED NURSE REASSESS COMMENT FT1
Pt resting comfortably and feeling better. Pt is going to be admitted to [facility]

## 2019-11-13 NOTE — ED PROVIDER NOTE - CARE PLAN
Principal Discharge DX:	Noncompliance with medication regimen  Secondary Diagnosis:	Type 2 diabetes mellitus Principal Discharge DX:	Noncompliance with medication regimen  Secondary Diagnosis:	Type 2 diabetes mellitus  Secondary Diagnosis:	Malaise and fatigue

## 2019-11-13 NOTE — ED PROVIDER NOTE - PROGRESS NOTE DETAILS
Attempted to call PMD. Service reports that they will not page doctor off hours and will not get in contact for us to speak with him Had long discussion with patient and family. No acute pathology found. No focal exam findings. Patient alert and orient, aware of his surroundings, denies SI/HI. Reports he does not take his meds as he does not want to, aware his symptoms are a result of his noncompliance. Refusing discharge. Wants admit to "make me better. "Initially refused placement then with family reports wants rehab. Pt without current physical limitations. Spoke with Hospitalist. Agrees that history, labs, imaging reveal no emergent indication for admission at this time. Pt refusing dc, family left bedside. Pt capable of self discharge if needed. Will consult TelePsych for safety assessment. If deemed safe dc will call family and dc patient. If deemed need for admit or in-person psych consult, will speak with hospitalist again for admission Spoke to tele psych-  will consult pt in ED for safety assessment. Spoke with Telepsych. No safety concerns. Likely adjustment disorder to [facility], resulting in poor compliance and deconditioning. No concern for SI or true depression requiring admission.   Spoke with patient who still does not want to go home. Pt reports he wants to be admitted so he can "get some help." Able to ambulate, get to bathroom without support here, taking po. Pt to call wife and discuss dispo plan Spoke with Hospitalist. Agrees that history, labs, imaging reveal no emergent indication for admission at this time. Pt refusing dc, family left bedside. Pt capable of self discharge if needed. SW to speak with patient. Will consult TelePsych for safety assessment. If deemed safe dc will call family and dc patient. If deemed need for admit or in-person psych consult, will speak with hospitalist again for admission Patient called wife, Son to come get him. Got out of bed, ambulated to bathroom and back unassisted. Discussed at length deconditioning, need for medication compliance, follow-up with PMD, and return precautions. Psych referral given to patient as per Telepsych. No immediate safety concerns at this time.

## 2019-11-13 NOTE — ED BEHAVIORAL HEALTH ASSESSMENT NOTE - REFERRAL / APPOINTMENT DETAILS
faxed to [facility] ED resources and referrals for outpatient [facility] f/u for pt to pursue for adjustment to aging and halfway

## 2019-11-14 DIAGNOSIS — F43.20 ADJUSTMENT DISORDER, UNSPECIFIED: ICD-10-CM

## 2019-11-14 NOTE — ED ADULT NURSE REASSESSMENT NOTE - NS ED NURSE REASSESS COMMENT FT1
Pt ambulated to restroom with steady gait with personal cane and standby assist. Denies dizziness or weakness. TBDC, comfort and safety measures maintained.

## 2019-11-14 NOTE — ED PROVIDER NOTE - PMH
Deviated nasal septum    Dyslipidemia    Hypertension    Prolactinoma    Type 2 diabetes mellitus Detail Level: Detailed Initiate Treatment: Clobetasol 0.05% cream BID X 2-4 weeks Samples Given: Vanicream Plan: Advised topical steroid and recom mild soaps and avoid scrubbing the area. Advised pt that reaction most likely due to the neosporin and +/- ingredients in the ketoconazole cream (? propylene glycol, other)\\nRTC 2-4 wks prn

## 2019-12-04 ENCOUNTER — APPOINTMENT (OUTPATIENT)
Dept: CARDIOLOGY | Facility: CLINIC | Age: 64
End: 2019-12-04

## 2019-12-11 ENCOUNTER — APPOINTMENT (OUTPATIENT)
Dept: INTERNAL MEDICINE | Facility: CLINIC | Age: 64
End: 2019-12-11

## 2019-12-14 ENCOUNTER — RX RENEWAL (OUTPATIENT)
Age: 64
End: 2019-12-14

## 2019-12-26 ENCOUNTER — OUTPATIENT (OUTPATIENT)
Dept: OUTPATIENT SERVICES | Facility: HOSPITAL | Age: 64
LOS: 1 days | Discharge: STILL ADMITTED OR ADMINISTRATIVE | End: 2019-12-26

## 2019-12-26 DIAGNOSIS — Z98.89 OTHER SPECIFIED POSTPROCEDURAL STATES: Chronic | ICD-10-CM

## 2019-12-26 DIAGNOSIS — Z90.89 ACQUIRED ABSENCE OF OTHER ORGANS: Chronic | ICD-10-CM

## 2019-12-27 DIAGNOSIS — F32.9 MAJOR DEPRESSIVE DISORDER, SINGLE EPISODE, UNSPECIFIED: ICD-10-CM

## 2019-12-27 DIAGNOSIS — E11.9 TYPE 2 DIABETES MELLITUS WITHOUT COMPLICATIONS: ICD-10-CM

## 2020-01-21 ENCOUNTER — APPOINTMENT (OUTPATIENT)
Dept: INTERNAL MEDICINE | Facility: CLINIC | Age: 65
End: 2020-01-21
Payer: COMMERCIAL

## 2020-01-21 VITALS
HEART RATE: 97 BPM | WEIGHT: 136 LBS | DIASTOLIC BLOOD PRESSURE: 55 MMHG | HEIGHT: 66 IN | BODY MASS INDEX: 21.86 KG/M2 | OXYGEN SATURATION: 98 % | SYSTOLIC BLOOD PRESSURE: 100 MMHG

## 2020-01-21 PROCEDURE — 99214 OFFICE O/P EST MOD 30 MIN: CPT

## 2020-01-26 NOTE — CURRENT MEDS
[Takes medication as prescribed] : takes [None] : Patient does not have any barriers to medication adherence tender.../no rebound, no guarding/no pulsating masses/nondistended/soft

## 2020-01-26 NOTE — HISTORY OF PRESENT ILLNESS
[FreeTextEntry1] : Here with wife to f/u his DM [de-identified] : Since his last visit here for uncontrolled DM, he has been not so well mostly.   Had a long period of time of staying home, having more trouble with his leg strength, energy level and his spirits.  Got into a spiral of stopping his medications, being very frustrated - some of this was diabetes fatigue, fear of getting worse and needing anything to do with needles.  Attempted to start on lantus, took a while for him to get his supplies, try the pen. Wife had hesitation giving it to him as well.  Has lost a tremendous amount of weight becoming catabolic with sugars > 4-500, A1C was > 15.5 range at an ER visit at that time.  Was polydypsic.  Maintained normal HCO3.\par \par Now has slowly gotten on lantus, we have also added humalog via pen 9 units at each meal.  He has acclimated, although still 'doesn't like having to do it'.  Saw his endocrine - he and wife describes a visit where he was lambasted basically.  We arranged a sarah reader/sensor system because he is so averse to needles.  He did have partial coverage for it, which was helpful. Now seeing sugars in 110-160 range and has an excellent A1C today.  Still very weak in his legs and hasn't gained his weight back.  \par \par Agreeable to PT, agreeable to psychologist.  In all of this family took him to psych ER at my behest.  He was deemed not suicidal, and in need of rx/therapy.  They don't want to use [facility] because parking, getting to it, and discomfort there.  Started on sertraline at 25, we have rapidly titrated to 100 mg - says his spirits are fair since.

## 2020-01-26 NOTE — COUNSELING
[Fall prevention counseling provided] : Fall prevention counseling provided [No throw rugs] : No throw rugs [Adequate lighting] : Adequate lighting [Use proper foot wear] : Use proper foot wear [FreeTextEntry1] : asked to use walker more than cane for now.  His legs are thinner/weaker R > L.  He is agreeable, has at home [Use recommended devices] : Use recommended devices [Encouraged to increase physical activity] : Encouraged to increase physical activity [Behavioral health counseling provided] : Behavioral health counseling provided [____ min/wk Activity] : [unfilled] min/wk activity [Patient Non-adherent to care plan] : Patient non-adherent to care plan [Patient motivation] : Patient motivation [Needs reinforcement, provided] : Patient needs reinforcement on understanding of lifestyle changes and steps needed to achieve self management goal; reinforcement was provided

## 2020-01-26 NOTE — ASSESSMENT
[FreeTextEntry1] : 1) A1C on labs w endo last week 6.6.  Congratulated.  To keep lantus/humalog going at current doses.  Would like to add metformin back at low dose soon, but needs to gain weight back, holding off.  D/C januvia that was in [facility].  Urged to stick to lower carb diet.  Believe no longer in dehydration/stress hormone cycle.  2) talked long time about his spirits, his consistency/acceptance/angst re: to his DM, needing needles, etc.  Will try to get him a BH therapy piece here as he/family do not want to go to [place].  Have recently pushed sertraline to 100 mg daily, so will observe on it for a time.  Has no SI, no longer a health/dz management related threat to himself.  3) neuro referral - has an uneven atrophy/weakness in LEs after the severely hyperglycemic months.  ?DM myopathy, ?other etiology.  Can consider spinal source, eg.  ?role for EMGs, etc.  Also given PT referral for strengthening, possible LS stenosis if part of etiology, fall prevention, ambulatory training.

## 2020-01-26 NOTE — PHYSICAL EXAM
[No Acute Distress] : no acute distress [No JVD] : no jugular venous distention [No Lymphadenopathy] : no lymphadenopathy [Supple] : supple [No Respiratory Distress] : no respiratory distress  [No Accessory Muscle Use] : no accessory muscle use [Clear to Auscultation] : lungs were clear to auscultation bilaterally [Regular Rhythm] : with a regular rhythm [Normal Rate] : normal rate  [Normal Percussion] : the chest was normal to percussion [No Carotid Bruits] : no carotid bruits [Normal S1, S2] : normal S1 and S2 [No Edema] : there was no peripheral edema [Speech Grossly Normal] : speech grossly normal [Coordination Grossly Intact] : coordination grossly intact [No Extremity Clubbing/Cyanosis] : no extremity clubbing/cyanosis [Memory Grossly Normal] : memory grossly normal [Alert and Oriented x3] : oriented to person, place, and time [de-identified] : using walker; LLE 4/5 strength throughout; RLE w 3+/5 strength at hip flexors [de-identified] : still seems measured, nervous discussing his deterioration/his difficulty with rx of his DM - wife seems sl frustrated w him [de-identified] : looks better than last visit overall; still a bit drawn appearing

## 2020-04-10 ENCOUNTER — APPOINTMENT (OUTPATIENT)
Dept: INTERNAL MEDICINE | Facility: CLINIC | Age: 65
End: 2020-04-10
Payer: COMMERCIAL

## 2020-04-10 PROCEDURE — G2012 BRIEF CHECK IN BY MD/QHP: CPT

## 2020-04-10 PROCEDURE — 99443: CPT

## 2020-04-12 ENCOUNTER — RX RENEWAL (OUTPATIENT)
Age: 65
End: 2020-04-12

## 2020-04-27 ENCOUNTER — RX RENEWAL (OUTPATIENT)
Age: 65
End: 2020-04-27

## 2020-05-13 ENCOUNTER — APPOINTMENT (OUTPATIENT)
Dept: CARDIOLOGY | Facility: CLINIC | Age: 65
End: 2020-05-13
Payer: COMMERCIAL

## 2020-05-13 ENCOUNTER — NON-APPOINTMENT (OUTPATIENT)
Age: 65
End: 2020-05-13

## 2020-05-13 VITALS
OXYGEN SATURATION: 99 % | HEIGHT: 66 IN | HEART RATE: 83 BPM | BODY MASS INDEX: 20.25 KG/M2 | WEIGHT: 126 LBS | SYSTOLIC BLOOD PRESSURE: 110 MMHG | DIASTOLIC BLOOD PRESSURE: 72 MMHG

## 2020-05-13 DIAGNOSIS — I10 ESSENTIAL (PRIMARY) HYPERTENSION: ICD-10-CM

## 2020-05-13 PROCEDURE — 93000 ELECTROCARDIOGRAM COMPLETE: CPT

## 2020-05-13 PROCEDURE — 99214 OFFICE O/P EST MOD 30 MIN: CPT

## 2020-05-14 NOTE — HISTORY OF PRESENT ILLNESS
[FreeTextEntry1] : Wm  is returning  for a routine follow-up \par Recent events reviewed with him (Fall, weight loss, back injury, leg weakness)\par No CP\par No palps\par Doesnt check sugars regularly\par Cant exercise because of leg weakness - sitting in wheelchair in office today\par \par  \par \par \par

## 2020-05-14 NOTE — PHYSICAL EXAM
[General Appearance - Well Developed] : well developed [Normal Appearance] : normal appearance [Well Groomed] : well groomed [No Deformities] : no deformities [General Appearance - Well Nourished] : well nourished [General Appearance - In No Acute Distress] : no acute distress [Normal Conjunctiva] : the conjunctiva exhibited no abnormalities [Normal Oral Mucosa] : normal oral mucosa [Eyelids - No Xanthelasma] : the eyelids demonstrated no xanthelasmas [No Oral Cyanosis] : no oral cyanosis [No Oral Pallor] : no oral pallor [Normal Jugular Venous A Waves Present] : normal jugular venous A waves present [No Jugular Venous Mojica A Waves] : no jugular venous mojica A waves [Normal Jugular Venous V Waves Present] : normal jugular venous V waves present [Respiration, Rhythm And Depth] : normal respiratory rhythm and effort [Auscultation Breath Sounds / Voice Sounds] : lungs were clear to auscultation bilaterally [Exaggerated Use Of Accessory Muscles For Inspiration] : no accessory muscle use [Heart Sounds] : normal S1 and S2 [Heart Rate And Rhythm] : heart rate and rhythm were normal [Abdomen Soft] : soft [Murmurs] : no murmurs present [Abdomen Tenderness] : non-tender [Abnormal Walk] : normal gait [Abdomen Mass (___ Cm)] : no abdominal mass palpated [Cyanosis, Localized] : no localized cyanosis [Nail Clubbing] : no clubbing of the fingernails [Gait - Sufficient For Exercise Testing] : the gait was sufficient for exercise testing [Petechial Hemorrhages (___cm)] : no petechial hemorrhages [] : no rash [Skin Color & Pigmentation] : normal skin color and pigmentation [No Venous Stasis] : no venous stasis [Skin Lesions] : no skin lesions [No Xanthoma] : no  xanthoma was observed [No Skin Ulcers] : no skin ulcer [Oriented To Time, Place, And Person] : oriented to person, place, and time [Affect] : the affect was normal [Mood] : the mood was normal [No Anxiety] : not feeling anxious

## 2020-05-14 NOTE — REVIEW OF SYSTEMS
[Feeling Fatigued] : feeling fatigued [Negative] : Endocrine [Dyspnea on exertion] : not dyspnea during exertion

## 2020-06-03 ENCOUNTER — APPOINTMENT (OUTPATIENT)
Dept: INTERNAL MEDICINE | Facility: CLINIC | Age: 65
End: 2020-06-03
Payer: COMMERCIAL

## 2020-06-03 ENCOUNTER — APPOINTMENT (OUTPATIENT)
Dept: INTERNAL MEDICINE | Facility: CLINIC | Age: 65
End: 2020-06-03

## 2020-06-03 DIAGNOSIS — R49.9 UNSPECIFIED VOICE AND RESONANCE DISORDER: ICD-10-CM

## 2020-06-03 DIAGNOSIS — R29.898 OTHER SYMPTOMS AND SIGNS INVOLVING THE MUSCULOSKELETAL SYSTEM: ICD-10-CM

## 2020-06-03 PROCEDURE — 99214 OFFICE O/P EST MOD 30 MIN: CPT | Mod: 95

## 2020-06-08 PROBLEM — R49.9 ABNORMAL VOICE: Status: ACTIVE | Noted: 2020-06-08

## 2020-06-08 PROBLEM — R29.898 WEAKNESS OF BOTH LOWER EXTREMITIES: Status: ACTIVE | Noted: 2020-01-21

## 2020-06-08 NOTE — PHYSICAL EXAM
[No Acute Distress] : no acute distress [Normal Sclera/Conjunctiva] : normal sclera/conjunctiva [No JVD] : no jugular venous distention [No Respiratory Distress] : no respiratory distress  [Supple] : supple [No Accessory Muscle Use] : no accessory muscle use [No Edema] : there was no peripheral edema [No Extremity Clubbing/Cyanosis] : no extremity clubbing/cyanosis [Soft] : abdomen soft [Non Tender] : non-tender [No Joint Swelling] : no joint swelling [Non-distended] : non-distended [de-identified] : looks somewhat drawn; voice at a whisper through a lot of conversation [de-identified] : no pallor, icterus [de-identified] : self palpation across abdomen w/o tenderness [de-identified] : sitting in chair in kitchen; moving arms/UEs/hands/digits.  Wife afraid to get him up to watch gait; too weak [de-identified] : appears slow moving, lost words once in conversation

## 2020-06-08 NOTE — REVIEW OF SYSTEMS
[Cough] : cough [Unsteady Walk] : ataxia [Depression] : depression [Negative] : Genitourinary [Fever] : no fever [Chills] : no chills [Night Sweats] : no night sweats [Shortness Of Breath] : no shortness of breath [Dyspnea on Exertion] : not dyspnea on exertion [Abdominal Pain] : no abdominal pain [Nausea] : no nausea [Vomiting] : no vomiting [Headache] : no headache [Fainting] : no fainting [Memory Loss] : no memory loss [Suicidal] : not suicidal [Insomnia] : no insomnia [Anxiety] : no anxiety [FreeTextEntry6] : see hpi [FreeTextEntry9] : see hphi [de-identified] : see hpi [de-identified] : see hpi

## 2020-06-08 NOTE — ASSESSMENT
[FreeTextEntry1] : 1) DM - last A1C April 6.5.  No longer thing hyperglycemia/hydration disturbance from that large contributor to overall well being/strength.  2) discussed him w Dr. Winters/neuro a few days after visit.  The weakness/thinning to arms and voice change, light cough/mucus (?aspiration possibility) concerning - ?possible he has an underlying myopathy/neuromuscular dz.  He suggests EMG next after initial imaging was clear.  He will do televideo visit w pt based on this conversation and make plan.  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  3) if that work up negative, ?will discuss w endo; only other entity not checked so far would be cortisol disturbance, although not having n/v/anorexia it seems.  Other labs for overall situation were negative in January.  4) safety - urged to allow/have PT/OT in home to work with him.  May need placement if wife struggling too much with this. Also can push for an aide.  Sons don't think he's making good decisions and don't think wife can handle this much more.  Did encourage ER on last call here, but he/she declined. If we need placement at some point, would have to come from a hospital d/c.  5) will follow carefully.   6) will wean sertraline to 100 mg daily, then 50 mg daily and hold there.  Has not been helpful; at that point, may add eg, bupropion, ?some role for eg, seroquel, abilify, etc as atypical antidepressant w hoarding/perseveration about his DM/needles/insulin, etc.

## 2020-06-08 NOTE — HISTORY OF PRESENT ILLNESS
[Home] : at home, [unfilled] , at the time of the visit. [Verbal consent obtained from patient] : the patient, [unfilled] [Medical Office: ([facility])___] : at the medical office located in  [FreeTextEntry1] : Pt's wife/family called for telemedicine/video follow up [de-identified] : Called for video visit to review how he's been since last visit here.  Unfortunately his slow deterioration continues.  He has had lower extremity weakness/?wasting for many months now.  Initially the issue bothered his R leg, then slowly he has felt ambulatory weakness in both legs.  With the initial complaints, pt had a period of non adherence to his meds/insulin, was very frustrated/?depressed with it all.  Was staying at home mostly, sons report some hoarding x years.  It took an ER visit and a visit to psych urgent care, along with Dr. Pereira and I encouraging him, but now he is on insulin again, and for last 4-5 mos, sugars have been in <200 range and last A1C in April w endo was 6.5.  Despite this, his muscle thinning/weakness has persisted. He has had multiple falls.  Pt saw neurologist at my request, given his thinning LE muscles, and CT brain and c spine were done, w/o structural findings to explain things. \par \par At this visit, family and wife report his LE weakness continues, he hasn't had PT in home which we had tried to set up; he now feels weak in his arms, and also is noticing his voice isn't as strong.  There is also an occasional cough with some upper mucus, no fever/chills, no other syx of viral syndrome/eg, COVID.  He has not been out of house all through COVID.  Wife notes that when he throws legs around to stand out of bed, needs assist; also if he tries to walk with walker he often needs complete manual support not to slither downward as walking.  He affirms all of this report.  He states appetite good, that he's been eating fairly well.  Also says he tries to hydrate.  Has been using urinal, has been trying to get to the bathroom in time.  Using diapers.  \par \par Has been on sertraline through this, to try and address any situational depression that is contributing.  Family sees no difference in his spirits, energy on it.

## 2020-06-16 DIAGNOSIS — Z87.440 PERSONAL HISTORY OF URINARY (TRACT) INFECTIONS: ICD-10-CM

## 2020-06-17 ENCOUNTER — INPATIENT (INPATIENT)
Facility: HOSPITAL | Age: 65
LOS: 21 days | Discharge: NURSING, REHAB OR LONG-TERM CARE | DRG: 56 | End: 2020-07-09
Attending: HOSPITALIST | Admitting: HOSPITALIST
Payer: COMMERCIAL

## 2020-06-17 VITALS
HEART RATE: 79 BPM | OXYGEN SATURATION: 97 % | HEIGHT: 67 IN | TEMPERATURE: 98 F | RESPIRATION RATE: 17 BRPM | WEIGHT: 130.07 LBS | DIASTOLIC BLOOD PRESSURE: 68 MMHG | SYSTOLIC BLOOD PRESSURE: 120 MMHG

## 2020-06-17 DIAGNOSIS — R53.1 WEAKNESS: ICD-10-CM

## 2020-06-17 DIAGNOSIS — E78.5 HYPERLIPIDEMIA, UNSPECIFIED: ICD-10-CM

## 2020-06-17 DIAGNOSIS — Z98.89 OTHER SPECIFIED POSTPROCEDURAL STATES: Chronic | ICD-10-CM

## 2020-06-17 DIAGNOSIS — Z02.9 ENCOUNTER FOR ADMINISTRATIVE EXAMINATIONS, UNSPECIFIED: ICD-10-CM

## 2020-06-17 DIAGNOSIS — R62.7 ADULT FAILURE TO THRIVE: ICD-10-CM

## 2020-06-17 DIAGNOSIS — R41.0 DISORIENTATION, UNSPECIFIED: ICD-10-CM

## 2020-06-17 DIAGNOSIS — Z90.89 ACQUIRED ABSENCE OF OTHER ORGANS: Chronic | ICD-10-CM

## 2020-06-17 DIAGNOSIS — I10 ESSENTIAL (PRIMARY) HYPERTENSION: ICD-10-CM

## 2020-06-17 DIAGNOSIS — R31.9 HEMATURIA, UNSPECIFIED: ICD-10-CM

## 2020-06-17 DIAGNOSIS — E11.9 TYPE 2 DIABETES MELLITUS WITHOUT COMPLICATIONS: ICD-10-CM

## 2020-06-17 DIAGNOSIS — Z29.9 ENCOUNTER FOR PROPHYLACTIC MEASURES, UNSPECIFIED: ICD-10-CM

## 2020-06-17 LAB
ALBUMIN SERPL ELPH-MCNC: 2.9 G/DL — LOW (ref 3.3–5)
ALP SERPL-CCNC: 161 U/L — HIGH (ref 40–120)
ALT FLD-CCNC: 19 U/L — SIGNIFICANT CHANGE UP (ref 10–45)
ANION GAP SERPL CALC-SCNC: 12 MMOL/L — SIGNIFICANT CHANGE UP (ref 5–17)
ANION GAP SERPL CALC-SCNC: 23 MMOL/L
APPEARANCE UR: ABNORMAL
AST SERPL-CCNC: 14 U/L — SIGNIFICANT CHANGE UP (ref 10–40)
BACTERIA # UR AUTO: ABNORMAL
BASE EXCESS BLDV CALC-SCNC: 3.6 MMOL/L — HIGH (ref -2–2)
BASOPHILS # BLD AUTO: 0.03 K/UL — SIGNIFICANT CHANGE UP (ref 0–0.2)
BASOPHILS # BLD AUTO: 0.06 K/UL
BASOPHILS NFR BLD AUTO: 0.2 % — SIGNIFICANT CHANGE UP (ref 0–2)
BASOPHILS NFR BLD AUTO: 0.3 %
BILIRUB SERPL-MCNC: 0.5 MG/DL — SIGNIFICANT CHANGE UP (ref 0.2–1.2)
BILIRUB UR-MCNC: NEGATIVE — SIGNIFICANT CHANGE UP
BUN SERPL-MCNC: 22 MG/DL
BUN SERPL-MCNC: 23 MG/DL — SIGNIFICANT CHANGE UP (ref 7–23)
CA-I SERPL-SCNC: 1.14 MMOL/L — SIGNIFICANT CHANGE UP (ref 1.12–1.3)
CALCIUM SERPL-MCNC: 8.8 MG/DL — SIGNIFICANT CHANGE UP (ref 8.4–10.5)
CALCIUM SERPL-MCNC: 9 MG/DL
CHLORIDE BLDV-SCNC: 101 MMOL/L — SIGNIFICANT CHANGE UP (ref 96–108)
CHLORIDE SERPL-SCNC: 93 MMOL/L
CHLORIDE SERPL-SCNC: 97 MMOL/L — SIGNIFICANT CHANGE UP (ref 96–108)
CO2 BLDV-SCNC: 30 MMOL/L — SIGNIFICANT CHANGE UP (ref 22–30)
CO2 SERPL-SCNC: 19 MMOL/L
CO2 SERPL-SCNC: 24 MMOL/L — SIGNIFICANT CHANGE UP (ref 22–31)
COLOR SPEC: YELLOW — SIGNIFICANT CHANGE UP
CREAT SERPL-MCNC: 0.99 MG/DL
CREAT SERPL-MCNC: 1.02 MG/DL — SIGNIFICANT CHANGE UP (ref 0.5–1.3)
DIFF PNL FLD: ABNORMAL
EOSINOPHIL # BLD AUTO: 0.03 K/UL
EOSINOPHIL # BLD AUTO: 0.03 K/UL — SIGNIFICANT CHANGE UP (ref 0–0.5)
EOSINOPHIL NFR BLD AUTO: 0.2 %
EOSINOPHIL NFR BLD AUTO: 0.2 % — SIGNIFICANT CHANGE UP (ref 0–6)
EPI CELLS # UR: 0 /HPF — SIGNIFICANT CHANGE UP
GAS PNL BLDV: 128 MMOL/L — LOW (ref 135–145)
GAS PNL BLDV: SIGNIFICANT CHANGE UP
GLUCOSE BLDC GLUCOMTR-MCNC: 198 MG/DL — HIGH (ref 70–99)
GLUCOSE BLDV-MCNC: 182 MG/DL — HIGH (ref 70–99)
GLUCOSE SERPL-MCNC: 188 MG/DL — HIGH (ref 70–99)
GLUCOSE SERPL-MCNC: 224 MG/DL
GLUCOSE UR QL: ABNORMAL
HCO3 BLDV-SCNC: 29 MMOL/L — SIGNIFICANT CHANGE UP (ref 21–29)
HCT VFR BLD CALC: 30 % — LOW (ref 39–50)
HCT VFR BLD CALC: 36.8 %
HCT VFR BLDA CALC: 33 % — LOW (ref 39–50)
HGB BLD CALC-MCNC: 10.7 G/DL — LOW (ref 13–17)
HGB BLD-MCNC: 11.6 G/DL
HGB BLD-MCNC: 9.6 G/DL — LOW (ref 13–17)
HYALINE CASTS # UR AUTO: 12 /LPF — HIGH (ref 0–2)
IMM GRANULOCYTES NFR BLD AUTO: 0.9 %
IMM GRANULOCYTES NFR BLD AUTO: 1 % — SIGNIFICANT CHANGE UP (ref 0–1.5)
KETONES UR-MCNC: NEGATIVE — SIGNIFICANT CHANGE UP
LACTATE BLDV-MCNC: 1.5 MMOL/L — SIGNIFICANT CHANGE UP (ref 0.7–2)
LEUKOCYTE ESTERASE UR-ACNC: ABNORMAL
LYMPHOCYTES # BLD AUTO: 0.79 K/UL — LOW (ref 1–3.3)
LYMPHOCYTES # BLD AUTO: 1.51 K/UL
LYMPHOCYTES # BLD AUTO: 6.3 % — LOW (ref 13–44)
LYMPHOCYTES NFR BLD AUTO: 8.2 %
MAGNESIUM SERPL-MCNC: 1.8 MG/DL — SIGNIFICANT CHANGE UP (ref 1.6–2.6)
MAN DIFF?: NORMAL
MCHC RBC-ENTMCNC: 26.4 PG
MCHC RBC-ENTMCNC: 26.4 PG — LOW (ref 27–34)
MCHC RBC-ENTMCNC: 31.5 GM/DL
MCHC RBC-ENTMCNC: 32 GM/DL — SIGNIFICANT CHANGE UP (ref 32–36)
MCV RBC AUTO: 82.6 FL — SIGNIFICANT CHANGE UP (ref 80–100)
MCV RBC AUTO: 83.8 FL
MONOCYTES # BLD AUTO: 0.93 K/UL — HIGH (ref 0–0.9)
MONOCYTES # BLD AUTO: 1.24 K/UL
MONOCYTES NFR BLD AUTO: 6.8 %
MONOCYTES NFR BLD AUTO: 7.4 % — SIGNIFICANT CHANGE UP (ref 2–14)
NEUTROPHILS # BLD AUTO: 10.71 K/UL — HIGH (ref 1.8–7.4)
NEUTROPHILS # BLD AUTO: 15.35 K/UL
NEUTROPHILS NFR BLD AUTO: 83.6 %
NEUTROPHILS NFR BLD AUTO: 84.9 % — HIGH (ref 43–77)
NITRITE UR-MCNC: NEGATIVE — SIGNIFICANT CHANGE UP
NRBC # BLD: 0 /100 WBCS — SIGNIFICANT CHANGE UP (ref 0–0)
PCO2 BLDV: 48 MMHG — SIGNIFICANT CHANGE UP (ref 35–50)
PH BLDV: 7.39 — SIGNIFICANT CHANGE UP (ref 7.35–7.45)
PH UR: 6 — SIGNIFICANT CHANGE UP (ref 5–8)
PHOSPHATE SERPL-MCNC: 2.9 MG/DL — SIGNIFICANT CHANGE UP (ref 2.5–4.5)
PLATELET # BLD AUTO: 280 K/UL — SIGNIFICANT CHANGE UP (ref 150–400)
PLATELET # BLD AUTO: 421 K/UL
PO2 BLDV: 26 MMHG — SIGNIFICANT CHANGE UP (ref 25–45)
POTASSIUM BLDV-SCNC: 3.3 MMOL/L — LOW (ref 3.5–5.3)
POTASSIUM SERPL-MCNC: 3.5 MMOL/L — SIGNIFICANT CHANGE UP (ref 3.5–5.3)
POTASSIUM SERPL-SCNC: 3.5 MMOL/L — SIGNIFICANT CHANGE UP (ref 3.5–5.3)
POTASSIUM SERPL-SCNC: 4.3 MMOL/L
PROT SERPL-MCNC: 6 G/DL — SIGNIFICANT CHANGE UP (ref 6–8.3)
PROT UR-MCNC: 100 — SIGNIFICANT CHANGE UP
RBC # BLD: 3.63 M/UL — LOW (ref 4.2–5.8)
RBC # BLD: 4.39 M/UL
RBC # FLD: 14.1 % — SIGNIFICANT CHANGE UP (ref 10.3–14.5)
RBC # FLD: 14.4 %
RBC CASTS # UR COMP ASSIST: 7 /HPF — HIGH (ref 0–4)
SAO2 % BLDV: 38 % — LOW (ref 67–88)
SARS-COV-2 RNA SPEC QL NAA+PROBE: SIGNIFICANT CHANGE UP
SODIUM SERPL-SCNC: 133 MMOL/L — LOW (ref 135–145)
SODIUM SERPL-SCNC: 135 MMOL/L
SP GR SPEC: 1.02 — SIGNIFICANT CHANGE UP (ref 1.01–1.02)
UROBILINOGEN FLD QL: NEGATIVE — SIGNIFICANT CHANGE UP
WBC # BLD: 12.61 K/UL — HIGH (ref 3.8–10.5)
WBC # FLD AUTO: 12.61 K/UL — HIGH (ref 3.8–10.5)
WBC # FLD AUTO: 18.35 K/UL
WBC UR QL: 130 /HPF — HIGH (ref 0–5)

## 2020-06-17 PROCEDURE — 93306 TTE W/DOPPLER COMPLETE: CPT | Mod: 26

## 2020-06-17 PROCEDURE — 99223 1ST HOSP IP/OBS HIGH 75: CPT | Mod: GC

## 2020-06-17 PROCEDURE — 71260 CT THORAX DX C+: CPT | Mod: 26

## 2020-06-17 PROCEDURE — 74177 CT ABD & PELVIS W/CONTRAST: CPT | Mod: 26

## 2020-06-17 PROCEDURE — 99285 EMERGENCY DEPT VISIT HI MDM: CPT

## 2020-06-17 RX ORDER — HEPARIN SODIUM 5000 [USP'U]/ML
5000 INJECTION INTRAVENOUS; SUBCUTANEOUS EVERY 8 HOURS
Refills: 0 | Status: COMPLETED | OUTPATIENT
Start: 2020-06-17 | End: 2020-06-18

## 2020-06-17 RX ORDER — PIPERACILLIN AND TAZOBACTAM 4; .5 G/20ML; G/20ML
3.38 INJECTION, POWDER, LYOPHILIZED, FOR SOLUTION INTRAVENOUS ONCE
Refills: 0 | Status: COMPLETED | OUTPATIENT
Start: 2020-06-17 | End: 2020-06-17

## 2020-06-17 RX ORDER — DEXTROSE 50 % IN WATER 50 %
25 SYRINGE (ML) INTRAVENOUS ONCE
Refills: 0 | Status: DISCONTINUED | OUTPATIENT
Start: 2020-06-17 | End: 2020-07-09

## 2020-06-17 RX ORDER — DEXTROSE 50 % IN WATER 50 %
15 SYRINGE (ML) INTRAVENOUS ONCE
Refills: 0 | Status: DISCONTINUED | OUTPATIENT
Start: 2020-06-17 | End: 2020-07-09

## 2020-06-17 RX ORDER — DEXTROSE 50 % IN WATER 50 %
12.5 SYRINGE (ML) INTRAVENOUS ONCE
Refills: 0 | Status: DISCONTINUED | OUTPATIENT
Start: 2020-06-17 | End: 2020-07-09

## 2020-06-17 RX ORDER — PIPERACILLIN AND TAZOBACTAM 4; .5 G/20ML; G/20ML
3.38 INJECTION, POWDER, LYOPHILIZED, FOR SOLUTION INTRAVENOUS EVERY 8 HOURS
Refills: 0 | Status: DISCONTINUED | OUTPATIENT
Start: 2020-06-17 | End: 2020-06-19

## 2020-06-17 RX ORDER — GLUCAGON INJECTION, SOLUTION 0.5 MG/.1ML
1 INJECTION, SOLUTION SUBCUTANEOUS ONCE
Refills: 0 | Status: DISCONTINUED | OUTPATIENT
Start: 2020-06-17 | End: 2020-07-09

## 2020-06-17 RX ORDER — CEFTRIAXONE 500 MG/1
INJECTION, POWDER, FOR SOLUTION INTRAMUSCULAR; INTRAVENOUS
Refills: 0 | Status: DISCONTINUED | OUTPATIENT
Start: 2020-06-17 | End: 2020-06-17

## 2020-06-17 RX ORDER — INSULIN LISPRO 100/ML
VIAL (ML) SUBCUTANEOUS AT BEDTIME
Refills: 0 | Status: DISCONTINUED | OUTPATIENT
Start: 2020-06-17 | End: 2020-07-09

## 2020-06-17 RX ORDER — INSULIN LISPRO 100/ML
VIAL (ML) SUBCUTANEOUS
Refills: 0 | Status: DISCONTINUED | OUTPATIENT
Start: 2020-06-17 | End: 2020-07-09

## 2020-06-17 RX ORDER — SODIUM CHLORIDE 9 MG/ML
1000 INJECTION, SOLUTION INTRAVENOUS
Refills: 0 | Status: DISCONTINUED | OUTPATIENT
Start: 2020-06-17 | End: 2020-07-09

## 2020-06-17 RX ADMIN — HEPARIN SODIUM 5000 UNIT(S): 5000 INJECTION INTRAVENOUS; SUBCUTANEOUS at 21:23

## 2020-06-17 RX ADMIN — PIPERACILLIN AND TAZOBACTAM 200 GRAM(S): 4; .5 INJECTION, POWDER, LYOPHILIZED, FOR SOLUTION INTRAVENOUS at 18:26

## 2020-06-17 NOTE — ED ADULT NURSE NOTE - NSIMPLEMENTINTERV_GEN_ALL_ED
Implemented All Fall Risk Interventions:  [place] to call system. Call bell, personal items and telephone within reach. Instruct patient to call for assistance. Room bathroom lighting operational. Non-slip footwear when patient is off stretcher. Physically safe environment: no spills, clutter or unnecessary equipment. Stretcher in lowest position, wheels locked, appropriate side rails in place. Provide visual cue, wrist band, yellow gown, etc. Monitor gait and stability. Monitor for mental status changes and reorient to person, place, and time. Review medications for side effects contributing to fall risk. Reinforce activity limits and safety measures with patient and family.

## 2020-06-17 NOTE — H&P ADULT - PROBLEM SELECTOR PLAN 2
Hx of 6 months of weight loss, FTT 2/2 neuromuscular disorder vs paraneoplastic vs infection (Endocarditis?) vs psychiatric illness  - Physical therapy evaluation  - UTI treatment as above  - follow up CT C/A/P  - continue with wellbutrin   - follow up blood cultures Hx of 6 months of weight loss, FTT 2/2 neuromuscular disorder vs paraneoplastic vs infection (Endocarditis?) vs psychiatric illness  - Physical therapy evaluation  - UTI treatment as above  - continue with wellbutrin   - follow up blood cultures  - TTE/ DALI

## 2020-06-17 NOTE — ED CLERICAL - NS ED CLERK NOTE PRE-ARRIVAL INFORMATION; ADDITIONAL PRE-ARRIVAL INFORMATION
CC/Reason For referral: Diabetes; weak; fever; blood in urine; challenging mobility; and White blood count 18,000   Preferred Consultant(if applicable):  Who admits for you (if needed):  Do you have documents you would like to fax over?  Would you still like to speak to an ED attending? Yes

## 2020-06-17 NOTE — ED PROVIDER NOTE - OBJECTIVE STATEMENT
64y male; PMHx: Depression HTN, HLD, IDDMT2, CAD SP ptca #1 stent CHF, Prolactinoma. BIBEMS for weakness and dysuria for the past 1 week. Report gross hematuria in his urine for the past 1 week, was seen by Blair Jefferson MD (PMD) and started on Cipro, called today with WBC and sent to ED for admission. Denies any fevers, chills, CP, SOB, nausea, vomiting, diarrhea, constipation, bloody stools.

## 2020-06-17 NOTE — H&P ADULT - ATTENDING COMMENTS
Briefly, this is a 63 yo Male with a  pmhx DM2 on insulin, HTN, CAD s/p PCI, depression with sevral month history of weight loss and progressive LE weakness who presented with 2 days of hematuria and for abnormal outpatient lab work. Pt went to see his PCP for his hematuria, was ordered for a CBC which resulted a WBC of 18 and started on Cipro for which he took two doses. Currently, pt complains of some suprapubic discomfort when he presses on the region. Labs and imaging reviewed. Pt WBC improved to 12 on CBC drawn in ED. CT abdomen and pelvis significant for a left renal abscess and emphysematous cystitis. Given abscess and emphysematous cystitis will broaden abx to Zosyn for better anaerobic coverage. f/u cultures sensitivities. Place griffith. Would consult Urology given CT findings. Given size of abscess <5cm medical management with abx alone is indicated. Should cultures return negative would then consider aspiration and drainage for culture data to help guide antimicrobial selection. TTE pending to r/o IE. As for patients progressive weakness, etiology has not yet be elucidated possible myopathy vs paraneoplastic syndrome vs infectious vs endocrine. DDx is broad. Has seen a Neurologist (Dr. Winters) outpatient. Would consult his group while patient is hospitalized for further workup

## 2020-06-17 NOTE — ED ADULT NURSE NOTE - OBJECTIVE STATEMENT
Patient is a 64 yr old male with a PMh of DM/HLD/HTN who presents to the ED from home via EMS complaining of elevated WBCs. Patient states his doctor had called him and told him his white count was elevated and for him to come to the ED. Patient states he had saw his doctor yesterday due to blood in his urine and they did blood work, started him on cipro, but was unable to give a UA. Patient states he last peed on his own this morning and did feel some discomfort/pain while doing so. Upon assessment, patient is AOx4, hoarse/raspy voice, afebrile 99.5 F rectally, lung sounds clear upon ausculation, abdomen soft/flat/non distended/+bowel sounds all 4 quadrants, heart sounds S1/S2 present, +pulses, poor skin turgor with obvious weight loss, glucose monitoring device on left arm, and denies n/v/d/f/chills/CP/SOB/cough/sick contact. Provider assessed at bedside, heplock placed with labs drawn/sent, straight cathed at bedside with sterile technique/ patient tolerated well, UA/UC sent, call bell at bedside, all safety precautions maintained, and will continue to monitor.

## 2020-06-17 NOTE — ED PROVIDER NOTE - ATTENDING CONTRIBUTION TO CARE
Private Physician  Blair Jefferson [phone].  64y male pmh Depression HTN, HLD, DMT2, CAD SP ptca #1 stent CHF, Prolactinoma . Pt comes to ed complains of UTI. Pt complains of 1wk hx of gross hematuria. no fever chills. nvd, cp, shortness of breath, Pt has has been on cipro for same past few days and had labs drawn showing elevated wbc and referred to ed. Has had LE weakness with diff ambulating past two years. Able to get to bathroom with cane PE Adult male looking midly fatigued. NCAT neck supple chest clear anterior & posterior bd soft +bs No mass guarding rebound guarding. neuro gcs 15 speech soft, moves all extr  Tin Benson MD, Facep

## 2020-06-17 NOTE — H&P ADULT - NSICDXFAMILYHX_GEN_ALL_CORE_FT
FAMILY HISTORY:  Father  Still living? Unknown  Family history of Alzheimer's disease, Age at diagnosis: Age Unknown    Mother  Still living? Unknown  Family history of Hodgkins disease, Age at diagnosis: Age Unknown

## 2020-06-17 NOTE — CONSULT NOTE ADULT - SUBJECTIVE AND OBJECTIVE BOX
HPI:  Patient is a 64y Male pmhx DM2 on insulin, HTN, CAD w. LAD stent/restent after thrombosis, depression. Admitted for hematuria, weight loss. Pt reports  hematuria without clots x 2days, some burning with urination. Suprapubic discomfort.   Denies urinary frequency, urgency and dysuria. Describes color as dark red and brown. Pt denies F/C/N/V, or flank pain. pt Called aj PCP Dr. Jefferson on 6/15 who recommended hydration and sent for bloodwork the following day. Had cbc with wbc with 18 and blood culture. Started on ciprofloxacin. Was told to come to the ED due to continued weakness. pt states the Abx and hydration help with the hematouria, his urine back to yellow color today.  CT done at ED found emphysematous cystitis and L renal abscess.      PAST MEDICAL & SURGICAL HISTORY:  HLD (hyperlipidemia)  Type 2 diabetes mellitus  Hypertension  Prolactinoma  Deviated nasal septum  Dyslipidemia  History of tonsillectomy  H/O nasal septoplasty    FAMILY HISTORY:  Family history of Alzheimer's disease  Family history of Hodgkins disease    SOCIAL HISTORY:   Tobacco hx:  No Known Allergies      REVIEW OF SYSTEMS: Pertinent positives and negatives as stated in HPI, otherwise negative    Vital signs      Output      Physical Exam  Gen: NAD  Pulm: No respiratory distress, no subcostal retractions  Abd: Soft, mild tenderness on the LUQ and suprapubic area, ND  Back: No CVAT b/l  : Grewal in place, draining yellow urine      LABS:         @ 14:04    WBC 12.61 / Hct 30.0  / SCr 1.02         133<L>  |  97  |  23  ----------------------------<  188<H>  3.5   |  24  |  1.02    Ca    8.8      2020 14:04  Phos  2.9       Mg     1.8         TPro  6.0  /  Alb  2.9<L>  /  TBili  0.5  /  DBili  x   /  AST  14  /  ALT  19  /  AlkPhos  161<H>        Urinalysis Basic - ( 2020 14:03 )    Color: Yellow / Appearance: Slightly Turbid / S.021 / pH: x  Gluc: x / Ketone: Negative  / Bili: Negative / Urobili: Negative   Blood: x / Protein: 100 / Nitrite: Negative   Leuk Esterase: Large / RBC: 7 /hpf /  /HPF   Sq Epi: x / Non Sq Epi: 0 /hpf / Bacteria: Moderate        Urine Cx:   Blood Cx:    Radiology: HPI:  Patient is a 64y Male pmhx DM2 on insulin, HTN, CAD w. LAD stent/restent after thrombosis, depression. Admitted for hematuria, weight loss. Pt reports  hematuria without clots x 2days, some burning with urination. Suprapubic discomfort.   Denies urinary frequency, urgency and dysuria. Describes color as dark red and brown. Pt denies F/C/N/V, or flank pain. pt Called aj PCP Dr. Jefferson on 6/15 who recommended hydration and sent for bloodwork the following day. Had cbc with wbc with 18 and blood culture. Started on ciprofloxacin. Was told to come to the ED due to continued weakness. pt states the Abx and hydration help with the hematouria, his urine back to yellow color today.  CT done at ED found emphysematous cystitis and L renal abscess.      PAST MEDICAL & SURGICAL HISTORY:  HLD (hyperlipidemia)  Type 2 diabetes mellitus  Hypertension  Prolactinoma  Deviated nasal septum  Dyslipidemia  History of tonsillectomy  H/O nasal septoplasty    FAMILY HISTORY:  Family history of Alzheimer's disease  Family history of Hodgkins disease    SOCIAL HISTORY:   Tobacco hx:  No Known Allergies      REVIEW OF SYSTEMS: Pertinent positives and negatives as stated in HPI, otherwise negative    Vital signs      Output      Physical Exam  Gen: NAD  Pulm: No respiratory distress, no subcostal retractions  Abd: Soft, mild tenderness on the LUQ and suprapubic area, ND  Back: No CVAT b/l  : Grewal in place, draining yellow urine      LABS:         @ 14:04    WBC 12.61 / Hct 30.0  / SCr 1.02         133<L>  |  97  |  23  ----------------------------<  188<H>  3.5   |  24  |  1.02    Ca    8.8      2020 14:04  Phos  2.9       Mg     1.8         TPro  6.0  /  Alb  2.9<L>  /  TBili  0.5  /  DBili  x   /  AST  14  /  ALT  19  /  AlkPhos  161<H>        Urinalysis Basic - ( 2020 14:03 )    Color: Yellow / Appearance: Slightly Turbid / S.021 / pH: x  Gluc: x / Ketone: Negative  / Bili: Negative / Urobili: Negative   Blood: x / Protein: 100 / Nitrite: Negative   Leuk Esterase: Large / RBC: 7 /hpf /  /HPF   Sq Epi: x / Non Sq Epi: 0 /hpf / Bacteria: Moderate        Urine Cx:   Blood Cx:    Radiology:  < from: CT Abdomen and Pelvis w/ Oral Cont and w/ IV Cont (20 @ 15:10) >  KIDNEYS/URETERS: There is an ill-defined hypodense area in the lower pole of the left kidney. There is associated left perirenal fat stranding and fluid within the Gerota's fascia    BLADDER: There is air within the bladder lumen and wall as well as mural thickening compatible with emphysematous cystitis  REPRODUCTIVE ORGANS: Prostate within normal limits.    BOWEL: There is a large colonic stool burden. The stomach is markedly distended. Appendix is normal.  PERITONEUM: No ascites.  VESSELS: Atherosclerotic changes.  RETROPERITONEUM/LYMPH NODES: No lymphadenopathy.    ABDOMINAL WALL: Within normal limits.  BONES: Old right third rib fracture. Osseous degenerative change.    IMPRESSION:   Emphysematous cystitis with left renal abscess.      < end of copied text >

## 2020-06-17 NOTE — H&P ADULT - PROBLEM SELECTOR PLAN 4
Confusion iso FTT, possible UTI.   - cw wellbutrin  - frequent reorientation Confusion iso FTT, possible UTI.   - cw wellbutrin  - cw sertraline   - frequent reorientation

## 2020-06-17 NOTE — ED PROCEDURE NOTE - PROCEDURE ADDITIONAL DETAILS
Emergency Department Focused Ultrasound performed at patient's bedside for educational purposes. The study will have a follow up study performed or was performed in the direct supervision of an ultrasound trained attending. F/U Study CT Chest, Abd/Pelvis.

## 2020-06-17 NOTE — H&P ADULT - NSHPPHYSICALEXAM_GEN_ALL_CORE
T(C): 37.5 (06-17-20 @ 13:03), Max: 37.5 (06-17-20 @ 13:03)  HR: 72 (06-17-20 @ 13:03) (72 - 79)  BP: 117/60 (06-17-20 @ 13:03) (117/60 - 120/68)  RR: 18 (06-17-20 @ 13:03) (17 - 18)  SpO2: 100% (06-17-20 @ 13:03) (97% - 100%)  PHYSICAL EXAM:  GENERAL: NAD, well-developed  HEAD:  Atraumatic, Normocephalic  EYES: EOMI, PERRLA, conjunctiva and sclera clear  NECK: Supple, No JVD  CHEST/LUNG: Clear to auscultation bilaterally; No wheeze  HEART: Regular rate and rhythm; No murmurs, rubs, or gallops  ABDOMEN: Soft, Nontender, Nondistended; Bowel sounds present  EXTREMITIES:, No clubbing, cyanosis, or edema  PSYCH: AAOx3  NEUROLOGY: non-focal  SKIN: No rashes or lesions T(C): 37.5 (06-17-20 @ 13:03), Max: 37.5 (06-17-20 @ 13:03)  HR: 72 (06-17-20 @ 13:03) (72 - 79)  BP: 117/60 (06-17-20 @ 13:03) (117/60 - 120/68)  RR: 18 (06-17-20 @ 13:03) (17 - 18)  SpO2: 100% (06-17-20 @ 13:03) (97% - 100%)  PHYSICAL EXAM:  GENERAL: NAD, cachectic   HEAD:  Atraumatic, Normocephalic  EYES: EOMI, PERRLA, xanthelasma  NECK: Supple, No JVD  CHEST/LUNG: Clear to auscultation bilaterally; No wheeze  HEART: Regular rate and rhythm; No murmurs, rubs, or gallops  ABDOMEN: Soft, Nontender, Nondistended; Bowel sounds present  EXTREMITIES:, No clubbing, cyanosis, or edema, muscle wasting  PSYCH: AAOx3  NEUROLOGY: non-focal, 2/5 strength RLE, 4/5 LLE; 5/5 UE  SKIN: No rashes or lesions

## 2020-06-17 NOTE — ED PROVIDER NOTE - CLINICAL SUMMARY MEDICAL DECISION MAKING FREE TEXT BOX
64y male; PMHx: Depression HTN, HLD, DMT2, CAD SP ptca #1 stent CHF, Prolactinoma. BIBEMS for weakness and dysuria for the past 1 week. Report gross hematuria in his urine for the past 1 week, was seen by Blair Jefferson MD (PMD) and started on Cipro, called today with WBC and sent to ED for admission. Denies any fevers, chills, CP, SOB, nausea, vomiting, diarrhea, constipation, bloody stools. Exam (RRR, CTA B/L, Soft NTND, No CVA Tenderness; Globally Weak and Dehydrated), presentation, and history concerning for cystitis vs. pyelonephritis vs. dehydration; minimal concern for meningitis, encephalitis, appendicitis, cholecystitis. Plan: CBC, CMP, EKG, UA, UCx, COVID, VBG, Fluids.

## 2020-06-17 NOTE — H&P ADULT - PROBLEM SELECTOR PLAN 1
Pt presenting with 2 days of hematuria likely 2/2 UTI.   - U/A with LE, proteins, moderate blood  - follow up CT c/a/p  - received 2 days of cipro as outpatient  - will continue with ceftriaxone for total of 14 day antibiotic course  - fu outpatient blood cx's, fu urine culture Pt presenting with 2 days of hematuria 2/2 cystitis. CT a/p showing left renal abscess and emphysematous   - U/A with LE, proteins, moderate blood  - received 2 days of cipro as outpatient  - will continue with zosyn for total of 14 day antibiotic course  - fu outpatient blood cx's, fu urine culture  - urology consult

## 2020-06-17 NOTE — H&P ADULT - PROBLEM SELECTOR PLAN 9
Transitions of Care Status:  1.  Name of PCP: Dr. Jefferson  2.  PCP Contacted on Admission: [x ] Y    [ ] N    3.  PCP contacted at Discharge: [ ] Y    [ ] N    [ ] N/A  4.  Post-Discharge Appointment Date and Location:  5.  Summary of Handoff given to PCP:

## 2020-06-17 NOTE — H&P ADULT - NSHPREVIEWOFSYSTEMS_GEN_ALL_CORE
REVIEW OF SYSTEMS:  CONSTITUTIONAL: No weakness, fevers or chills  EYES/ENT: No visual changes;  No vertigo or throat pain   NECK: No pain or stiffness  RESPIRATORY: No cough, wheezing, hemoptysis; No shortness of breath  CARDIOVASCULAR: No chest pain or palpitations  GASTROINTESTINAL: No abdominal or epigastric pain. No nausea, vomiting, or hematemesis; No diarrhea or constipation. No melena or hematochezia.  GENITOURINARY: No dysuria, frequency or hematuria  NEUROLOGICAL: No numbness or weakness  SKIN: No itching, burning, rashes, or lesions   All other review of systems is negative unless indicated above. REVIEW OF SYSTEMS:  CONSTITUTIONAL: + weakness, + feversnor chills  EYES/ENT: No visual changes;  No vertigo or throat pain   NECK: No pain or stiffness  RESPIRATORY: No cough, wheezing, hemoptysis; No shortness of breath  CARDIOVASCULAR: No chest pain or palpitations  GASTROINTESTINAL: No abdominal or epigastric pain. No nausea, vomiting, or hematemesis; No diarrhea or constipation. No melena or hematochezia.  GENITOURINARY: + dysuria, frequenc +hematuria  NEUROLOGICAL: No numbness + diffuse weakness  SKIN: No itching, burning, rashes, or lesions   All other review of systems is negative unless indicated above.

## 2020-06-17 NOTE — ED PROVIDER NOTE - NS ED ROS FT
Constitution: No Fever or chills, No Weight Loss,   Eyes: No visual changes  HEENT: No cough, No Discharge, No Rhinorrhea, No URI symptoms  Cardio: No Chest pain, No Palpitations, No Dyspnea  Resp: No SOB, No Wheezing  GI: No abdominal pain, No Nausea, No Vomiting, No Constipation, No Diarrhea  : (+) Hematuria; (+) burning upon urination, trouble urinating, no foul odor from urine  MSK: No Back pain, No Numbness, No Tingling, No Weakness  Neuro: (+) Weakness; No Headache, No changes to Vision, No changes to Hearing, Normal Gait  Skin: No rashes, No Bruising, No Swelling

## 2020-06-17 NOTE — H&P ADULT - PROBLEM SELECTOR PLAN 5
A1c 6.6, on insulin and orals  -  home insulin regimen, 24 units long acting, 9units premeal  - GEORGI A1c 6.6, on insulin and orals  - has not been taking insulin the last 2 weeks  - GEORGI and premeals

## 2020-06-17 NOTE — CONSULT NOTE ADULT - ASSESSMENT
HPI:  Patient is a 64y Male pmhx DM2 on insulin, HTN, CAD w. LAD stent/restent after thrombosis, depression. Admitted for hematuria, weight loss. Pt reports  hematuria without clots x 2days. had CT show emphysematous cystitis and L renal abscess. Currently AVSS, Yellow urine with griffith in place, WBC 12.6. normal Cr level and + UA      Plan  –cont. griffith   - Abx treatment   –Monitor urine color   –F/U Urine Cx HPI:  Patient is a 64y Male pmhx DM2 on insulin, HTN, CAD w. LAD stent/restent after thrombosis, depression. Admitted for hematuria, weight loss. Pt reports  hematuria without clots x 2days. had CT show emphysematous cystitis and L renal abscess. Currently AVSS, Yellow urine with griffith in place, WBC 12.6. normal Cr level and + UA      Plan  - No urgent urologic intervention at this time  - Continue IV Abx, f/u Cultures and sensitivity  - Continue griffith  - Monitor for fevers, trend WBC/Cr  - Recommend re-imaging of renal collection/abscess to monitor progression vs. resolution; if spiking fevers/ imaging shows increased abscess >4cm, possible IR for percutaneous drainage  - Conservative management per medicine    d/w attending Dr. Bravo

## 2020-06-17 NOTE — H&P ADULT - ASSESSMENT
63 yo M pmhx DM2 on insulin, HTN, CAD w. LAD stent and restent after thrombosis, depression presenting with 2 days of hematuria likely 2/2 cystitis. Pt also presenting with 6 months of weight loss/FTT 2/2 neuromuscular disorder vs paraneoplastic vs infectious. 65 yo M pmhx DM2 on insulin, HTN, CAD w. LAD stent and restent after thrombosis, depression presenting with 2 days of hematuria 2/2 cystitis CT a/p findings of 2.5 cm Left renal abscess. Pt also presenting with 6 months of weight loss/FTT 2/2 neuromuscular disorder vs paraneoplastic vs infectious.

## 2020-06-17 NOTE — H&P ADULT - NSHPLABSRESULTS_GEN_ALL_CORE
9.6    12.61 )-----------( 280      ( 17 Jun 2020 14:04 )             30.0   06-17    133<L>  |  97  |  23  ----------------------------<  188<H>  3.5   |  24  |  1.02    Ca    8.8      17 Jun 2020 14:04  Phos  2.9     06-17  Mg     1.8     06-17    TPro  6.0  /  Alb  2.9<L>  /  TBili  0.5  /  DBili  x   /  AST  14  /  ALT  19  /  AlkPhos  161<H>  06-17 9.6    12.61 )-----------( 280      ( 17 Jun 2020 14:04 )             30.0   06-17    133<L>  |  97  |  23  ----------------------------<  188<H>  3.5   |  24  |  1.02    Ca    8.8      17 Jun 2020 14:04  Phos  2.9     06-17  Mg     1.8     06-17    TPro  6.0  /  Alb  2.9<L>  /  TBili  0.5  /  DBili  x   /  AST  14  /  ALT  19  /  AlkPhos  161<H>  06-17    < from: CT Abdomen and Pelvis w/ Oral Cont and w/ IV Cont (06.17.20 @ 15:10) >      COMPARISON: None available.    PROCEDURE:   CT of the Chest, Abdomen and Pelvis was performed with intravenous contrast.   Intravenous contrast: 90 ml Omnipaque 350. 10 ml discarded.  Oral contrast: Positive contrast was administered.  Sagittal and coronal reformats were performed.    FINDINGS:  CHEST:   LUNGS AND LARGE AIRWAYS: Patent central airways. Trace left pleural effusion and basilar atelectasis. Linear atelectasis or scarring left lower lobe.  PLEURA: No pleural effusion.  VESSELS: Within normal limits.  HEART: Heart size is normal. Status post stenting of the left anterior descending coronary artery. No pericardial effusion.  MEDIASTINUM AND NUZHAT: No lymphadenopathy.  CHEST WALL AND LOWER NECK: Within normal limits.    ABDOMEN AND PELVIS:  LIVER: Within normal limits.  BILE DUCTS: Normal caliber.  GALLBLADDER: Cholelithiasis.  SPLEEN: Within normal limits.  PANCREAS: Within normal limits.  ADRENALS: Within normal limits.  KIDNEYS/URETERS: There is an ill-defined hypodense area in the lower pole of the left kidney. There is associated left perirenal fat stranding and fluid within the Gerota's fascia    BLADDER: There is air within the bladder lumen and wall as well as mural thickening compatible with emphysematous cystitis  REPRODUCTIVE ORGANS: Prostate within normal limits.    BOWEL: There is a large colonic stool burden. The stomach is markedly distended. Appendix is normal.  PERITONEUM: No ascites.  VESSELS: Atherosclerotic changes.  RETROPERITONEUM/LYMPH NODES: No lymphadenopathy.    ABDOMINAL WALL: Within normal limits.  BONES: Old right third rib fracture. Osseous degenerative change.    IMPRESSION:   Emphysematous cystitis with left renal abscess.    < end of copied text >

## 2020-06-17 NOTE — H&P ADULT - HISTORY OF PRESENT ILLNESS
65 yo M pmhx DM2 on insulin, HTN, CAD w. LAD stent and restent after thrombosis, depression presenting with 2 days of hematuria. Per outpatient notes, patient, and PCP Dr. Jefferson, pt noted to have maroon colored urine starting on 6/15. Called Dr. Jefferson who recommended hydration and sent for bloodwork the following day. Hematuria worsened the next day, associated with fevers, and some AMS. Pt unable to produce urine sample, but had cbc with wbc with 18 and blood culture. Started on ciprofloxacin. Was told to come to the ED due to continued weakness, hematuria, and fever. Pt denies chest pain, sob, nausea, vomiting, abdominal pain. He has vague urinary symptoms including some burning with urination. He states that he has not had fevers today, and the hematuria has resolved. Had one case of this previously when he was much younger that resolved with antibiotics.     Of note, pt has had failure to thrive over last 6 months with around 60lb weight loss. Started with RLE muscle wasting and progressed to rest of extremities. Followed with neurology, MRI brain and spine without any findings.     In the ED, pt found to have WBC of 12, +LE in U/A. Afebrile, nontoxic appearing. Received CT C/A/P. 63 yo M pmhx DM2 on insulin, HTN, CAD w. LAD stent/restent after thrombosis, depression presenting with 2 days of hematuria. Per outpatient notes, patient, and PCP Dr. Jefferson, pt noted to have maroon colored urine starting on 6/15. Called Dr. Jefferson who recommended hydration and sent for bloodwork the following day. Hematuria worsened the next day, associated with fevers, and some AMS. Pt unable to produce urine sample, but had cbc with wbc with 18 and blood culture. Started on ciprofloxacin. Was told to come to the ED due to continued weakness, hematuria, and fever. Pt denies chest pain, sob, nausea, vomiting, abdominal pain. He has vague urinary symptoms including some burning with urination. He states that he has not had fevers today, and the hematuria has resolved. Had one case of this previously when he was much younger that resolved with antibiotics.     Of note, pt has had failure to thrive over last 6 months with around 80lb weight loss. Started around 1 year ago with global weakness and frequent falls. In the fall pt had worsening RLE weakness and muscle wasting, which progressed to rest of extremities. Followed with neurology, MRI brain and spine without any findings.     In the ED, pt found to have WBC of 12, +LE in U/A. Afebrile, nontoxic appearing. Received CT C/A/P.

## 2020-06-17 NOTE — H&P ADULT - NSICDXPASTMEDICALHX_GEN_ALL_CORE_FT
PAST MEDICAL HISTORY:  Deviated nasal septum     Dyslipidemia     HLD (hyperlipidemia)     Hypertension     Prolactinoma     Type 2 diabetes mellitus

## 2020-06-17 NOTE — ED ADULT NURSE NOTE - PMH
Deviated nasal septum    Dyslipidemia    HLD (hyperlipidemia)    Hypertension    Prolactinoma    Type 2 diabetes mellitus

## 2020-06-17 NOTE — ED PROVIDER NOTE - PROGRESS NOTE DETAILS
Discussed with Dr Jefferson was seen by neuro and planning EMG/possible Muscle bx. Has not improved with antidepressants. Brain MRI spine not abnormal. Sent for admit. Agrees with plan. Abx was started with out culture.   Tin Benson MD, Facep Resident [place]: spoke with patient's wife (Maribel), reports patient was altered (AO x0)  yesterday, unclear why. Bloods were drawn yesterday, unable to get urine from him yesterday. Pt will warrant admission as per our eval (Ronny agrees with plan of care). Discussed with Dr Rowan Cesar ([facility]) agrees with plan tba  Tin Benson MD, Facep

## 2020-06-18 ENCOUNTER — TRANSCRIPTION ENCOUNTER (OUTPATIENT)
Age: 65
End: 2020-06-18

## 2020-06-18 DIAGNOSIS — R41.0 DISORIENTATION, UNSPECIFIED: ICD-10-CM

## 2020-06-18 LAB
A1C WITH ESTIMATED AVERAGE GLUCOSE RESULT: 7.7 % — HIGH (ref 4–5.6)
ALBUMIN SERPL ELPH-MCNC: 3.1 G/DL — LOW (ref 3.3–5)
ALP SERPL-CCNC: 167 U/L — HIGH (ref 40–120)
ALT FLD-CCNC: 18 U/L — SIGNIFICANT CHANGE UP (ref 10–45)
ANION GAP SERPL CALC-SCNC: 10 MMOL/L — SIGNIFICANT CHANGE UP (ref 5–17)
AST SERPL-CCNC: 17 U/L — SIGNIFICANT CHANGE UP (ref 10–40)
B BURGDOR C6 AB SER-ACNC: NEGATIVE — SIGNIFICANT CHANGE UP
B BURGDOR IGG+IGM SER-ACNC: 0.02 INDEX — SIGNIFICANT CHANGE UP (ref 0.01–0.89)
BASOPHILS # BLD AUTO: 0.03 K/UL — SIGNIFICANT CHANGE UP (ref 0–0.2)
BASOPHILS NFR BLD AUTO: 0.3 % — SIGNIFICANT CHANGE UP (ref 0–2)
BILIRUB SERPL-MCNC: 0.5 MG/DL — SIGNIFICANT CHANGE UP (ref 0.2–1.2)
BUN SERPL-MCNC: 19 MG/DL — SIGNIFICANT CHANGE UP (ref 7–23)
CALCIUM SERPL-MCNC: 8.7 MG/DL — SIGNIFICANT CHANGE UP (ref 8.4–10.5)
CHLORIDE SERPL-SCNC: 97 MMOL/L — SIGNIFICANT CHANGE UP (ref 96–108)
CO2 SERPL-SCNC: 24 MMOL/L — SIGNIFICANT CHANGE UP (ref 22–31)
CREAT SERPL-MCNC: 0.98 MG/DL — SIGNIFICANT CHANGE UP (ref 0.5–1.3)
CULTURE RESULTS: NO GROWTH — SIGNIFICANT CHANGE UP
EOSINOPHIL # BLD AUTO: 0.03 K/UL — SIGNIFICANT CHANGE UP (ref 0–0.5)
EOSINOPHIL NFR BLD AUTO: 0.3 % — SIGNIFICANT CHANGE UP (ref 0–6)
ESTIMATED AVERAGE GLUCOSE: 174 MG/DL — HIGH (ref 68–114)
FOLATE SERPL-MCNC: 4.9 NG/ML — SIGNIFICANT CHANGE UP
GLUCOSE BLDC GLUCOMTR-MCNC: 123 MG/DL — HIGH (ref 70–99)
GLUCOSE BLDC GLUCOMTR-MCNC: 133 MG/DL — HIGH (ref 70–99)
GLUCOSE BLDC GLUCOMTR-MCNC: 143 MG/DL — HIGH (ref 70–99)
GLUCOSE BLDC GLUCOMTR-MCNC: 145 MG/DL — HIGH (ref 70–99)
GLUCOSE BLDC GLUCOMTR-MCNC: 152 MG/DL — HIGH (ref 70–99)
GLUCOSE SERPL-MCNC: 132 MG/DL — HIGH (ref 70–99)
HAV IGM SER-ACNC: SIGNIFICANT CHANGE UP
HBV CORE IGM SER-ACNC: SIGNIFICANT CHANGE UP
HBV SURFACE AG SER-ACNC: SIGNIFICANT CHANGE UP
HCT VFR BLD CALC: 30.1 % — LOW (ref 39–50)
HCV AB S/CO SERPL IA: 1.41 S/CO — HIGH (ref 0–0.99)
HCV AB SERPL-IMP: ABNORMAL
HGB BLD-MCNC: 9.8 G/DL — LOW (ref 13–17)
HIV 1+2 AB+HIV1 P24 AG SERPL QL IA: SIGNIFICANT CHANGE UP
IMM GRANULOCYTES NFR BLD AUTO: 0.8 % — SIGNIFICANT CHANGE UP (ref 0–1.5)
LYMPHOCYTES # BLD AUTO: 1.16 K/UL — SIGNIFICANT CHANGE UP (ref 1–3.3)
LYMPHOCYTES # BLD AUTO: 10.7 % — LOW (ref 13–44)
MAGNESIUM SERPL-MCNC: 1.9 MG/DL — SIGNIFICANT CHANGE UP (ref 1.6–2.6)
MCHC RBC-ENTMCNC: 26.1 PG — LOW (ref 27–34)
MCHC RBC-ENTMCNC: 32.6 GM/DL — SIGNIFICANT CHANGE UP (ref 32–36)
MCV RBC AUTO: 80.3 FL — SIGNIFICANT CHANGE UP (ref 80–100)
MONOCYTES # BLD AUTO: 1.06 K/UL — HIGH (ref 0–0.9)
MONOCYTES NFR BLD AUTO: 9.8 % — SIGNIFICANT CHANGE UP (ref 2–14)
NEUTROPHILS # BLD AUTO: 8.44 K/UL — HIGH (ref 1.8–7.4)
NEUTROPHILS NFR BLD AUTO: 78.1 % — HIGH (ref 43–77)
NRBC # BLD: 0 /100 WBCS — SIGNIFICANT CHANGE UP (ref 0–0)
PHOSPHATE SERPL-MCNC: 3.9 MG/DL — SIGNIFICANT CHANGE UP (ref 2.5–4.5)
PLATELET # BLD AUTO: 278 K/UL — SIGNIFICANT CHANGE UP (ref 150–400)
POTASSIUM SERPL-MCNC: 3.3 MMOL/L — LOW (ref 3.5–5.3)
POTASSIUM SERPL-SCNC: 3.3 MMOL/L — LOW (ref 3.5–5.3)
PROT SERPL-MCNC: 6.6 G/DL — SIGNIFICANT CHANGE UP (ref 6–8.3)
RBC # BLD: 3.75 M/UL — LOW (ref 4.2–5.8)
RBC # FLD: 14 % — SIGNIFICANT CHANGE UP (ref 10.3–14.5)
SARS-COV-2 IGG SERPL QL IA: NEGATIVE — SIGNIFICANT CHANGE UP
SARS-COV-2 IGM SERPL IA-ACNC: <0.1 INDEX — SIGNIFICANT CHANGE UP
SODIUM SERPL-SCNC: 131 MMOL/L — LOW (ref 135–145)
SPECIMEN SOURCE: SIGNIFICANT CHANGE UP
T PALLIDUM AB TITR SER: NEGATIVE — SIGNIFICANT CHANGE UP
VIT B12 SERPL-MCNC: 928 PG/ML — SIGNIFICANT CHANGE UP (ref 232–1245)
WBC # BLD: 10.81 K/UL — HIGH (ref 3.8–10.5)
WBC # FLD AUTO: 10.81 K/UL — HIGH (ref 3.8–10.5)

## 2020-06-18 PROCEDURE — 99232 SBSQ HOSP IP/OBS MODERATE 35: CPT | Mod: GC

## 2020-06-18 PROCEDURE — 99233 SBSQ HOSP IP/OBS HIGH 50: CPT

## 2020-06-18 RX ORDER — HEPARIN SODIUM 5000 [USP'U]/ML
5000 INJECTION INTRAVENOUS; SUBCUTANEOUS EVERY 8 HOURS
Refills: 0 | Status: DISCONTINUED | OUTPATIENT
Start: 2020-06-18 | End: 2020-07-09

## 2020-06-18 RX ORDER — BUPROPION HYDROCHLORIDE 150 MG/1
150 TABLET, EXTENDED RELEASE ORAL DAILY
Refills: 0 | Status: DISCONTINUED | OUTPATIENT
Start: 2020-06-18 | End: 2020-06-27

## 2020-06-18 RX ORDER — POTASSIUM CHLORIDE 20 MEQ
40 PACKET (EA) ORAL ONCE
Refills: 0 | Status: DISCONTINUED | OUTPATIENT
Start: 2020-06-18 | End: 2020-06-18

## 2020-06-18 RX ORDER — SERTRALINE 25 MG/1
50 TABLET, FILM COATED ORAL DAILY
Refills: 0 | Status: DISCONTINUED | OUTPATIENT
Start: 2020-06-18 | End: 2020-07-09

## 2020-06-18 RX ORDER — POTASSIUM CHLORIDE 20 MEQ
40 PACKET (EA) ORAL ONCE
Refills: 0 | Status: COMPLETED | OUTPATIENT
Start: 2020-06-18 | End: 2020-06-18

## 2020-06-18 RX ORDER — ASPIRIN/CALCIUM CARB/MAGNESIUM 324 MG
81 TABLET ORAL DAILY
Refills: 0 | Status: DISCONTINUED | OUTPATIENT
Start: 2020-06-18 | End: 2020-07-09

## 2020-06-18 RX ORDER — BROMOCRIPTINE MESYLATE 5 MG/1
5 CAPSULE ORAL DAILY
Refills: 0 | Status: DISCONTINUED | OUTPATIENT
Start: 2020-06-18 | End: 2020-07-09

## 2020-06-18 RX ORDER — ATORVASTATIN CALCIUM 80 MG/1
80 TABLET, FILM COATED ORAL AT BEDTIME
Refills: 0 | Status: DISCONTINUED | OUTPATIENT
Start: 2020-06-18 | End: 2020-07-09

## 2020-06-18 RX ADMIN — PIPERACILLIN AND TAZOBACTAM 25 GRAM(S): 4; .5 INJECTION, POWDER, LYOPHILIZED, FOR SOLUTION INTRAVENOUS at 01:10

## 2020-06-18 RX ADMIN — BROMOCRIPTINE MESYLATE 5 MILLIGRAM(S): 5 CAPSULE ORAL at 14:36

## 2020-06-18 RX ADMIN — HEPARIN SODIUM 5000 UNIT(S): 5000 INJECTION INTRAVENOUS; SUBCUTANEOUS at 05:35

## 2020-06-18 RX ADMIN — Medication 40 MILLIEQUIVALENT(S): at 14:50

## 2020-06-18 RX ADMIN — PIPERACILLIN AND TAZOBACTAM 25 GRAM(S): 4; .5 INJECTION, POWDER, LYOPHILIZED, FOR SOLUTION INTRAVENOUS at 09:27

## 2020-06-18 RX ADMIN — ATORVASTATIN CALCIUM 80 MILLIGRAM(S): 80 TABLET, FILM COATED ORAL at 21:49

## 2020-06-18 RX ADMIN — SERTRALINE 50 MILLIGRAM(S): 25 TABLET, FILM COATED ORAL at 14:36

## 2020-06-18 RX ADMIN — HEPARIN SODIUM 5000 UNIT(S): 5000 INJECTION INTRAVENOUS; SUBCUTANEOUS at 14:35

## 2020-06-18 RX ADMIN — PIPERACILLIN AND TAZOBACTAM 25 GRAM(S): 4; .5 INJECTION, POWDER, LYOPHILIZED, FOR SOLUTION INTRAVENOUS at 18:25

## 2020-06-18 RX ADMIN — BUPROPION HYDROCHLORIDE 150 MILLIGRAM(S): 150 TABLET, EXTENDED RELEASE ORAL at 14:36

## 2020-06-18 RX ADMIN — Medication 81 MILLIGRAM(S): at 14:36

## 2020-06-18 RX ADMIN — HEPARIN SODIUM 5000 UNIT(S): 5000 INJECTION INTRAVENOUS; SUBCUTANEOUS at 21:49

## 2020-06-18 NOTE — PROGRESS NOTE ADULT - ATTENDING COMMENTS
Patient seen and examined, films reviewed. Cont. Grewal catheter and abx. Repeat imaging in a few days. Will follow closely.

## 2020-06-18 NOTE — DISCHARGE NOTE PROVIDER - CARE PROVIDERS DIRECT ADDRESSES
[email or web address]

## 2020-06-18 NOTE — PROGRESS NOTE ADULT - SUBJECTIVE AND OBJECTIVE BOX
Jaziel Isabel, pgy 1  [facility] Team 3  [phone]  After 7pm; page night float    Patient is a 64y old  Male who presents with a chief complaint of Hematuria; weight loss (2020 18:50)      SUBJECTIVE / OVERNIGHT EVENTS:  ADDITIONAL REVIEW OF SYSTEMS:    MEDICATIONS  (STANDING):  aspirin enteric coated 81 milliGRAM(s) Oral daily  atorvastatin 80 milliGRAM(s) Oral at bedtime  bromocriptine Capsule 5 milliGRAM(s) Oral daily  buPROPion XL . 150 milliGRAM(s) Oral daily  dextrose 5%. 1000 milliLiter(s) (50 mL/Hr) IV Continuous <Continuous>  dextrose 50% Injectable 12.5 Gram(s) IV Push once  dextrose 50% Injectable 25 Gram(s) IV Push once  dextrose 50% Injectable 25 Gram(s) IV Push once  heparin   Injectable 5000 Unit(s) SubCutaneous every 8 hours  insulin lispro (HumaLOG) corrective regimen sliding scale   SubCutaneous three times a day before meals  insulin lispro (HumaLOG) corrective regimen sliding scale   SubCutaneous at bedtime  piperacillin/tazobactam IVPB.. 3.375 Gram(s) IV Intermittent every 8 hours  sertraline 50 milliGRAM(s) Oral daily    MEDICATIONS  (PRN):  dextrose 40% Gel 15 Gram(s) Oral once PRN Blood Glucose LESS THAN 70 milliGRAM(s)/deciliter  glucagon  Injectable 1 milliGRAM(s) IntraMuscular once PRN Glucose LESS THAN 70 milligrams/deciliter      CAPILLARY BLOOD GLUCOSE      POCT Blood Glucose.: 198 mg/dL (2020 21:22)    I&O's Summary    2020 07:01  -  2020 07:00  --------------------------------------------------------  IN: 200 mL / OUT: 0 mL / NET: 200 mL        PHYSICAL EXAM:  Vital Signs Last 24 Hrs  T(C): 37.5 (2020 00:58), Max: 37.5 (2020 13:03)  T(F): 99.5 (2020 00:58), Max: 99.5 (2020 13:03)  HR: 83 (2020 00:58) (72 - 83)  BP: 164/79 (2020 00:58) (117/60 - 164/80)  BP(mean): --  RR: 18 (2020 00:58) (17 - 18)  SpO2: 98% (2020 00:58) (97% - 100%)  CONSTITUTIONAL: NAD, well-developed, well-groomed  EYES: PERRLA; conjunctiva and sclera clear  ENMT: Moist oral mucosa, no pharyngeal injection or exudates; normal dentition  NECK: Supple, no palpable masses; no thyromegaly  RESPIRATORY: Normal respiratory effort; lungs are clear to auscultation bilaterally  CARDIOVASCULAR: Regular rate and rhythm, normal S1 and S2, no murmur/rub/gallop; No lower extremity edema; Peripheral pulses are 2+ bilaterally  ABDOMEN: Nontender to palpation, normoactive bowel sounds, no rebound/guarding; No hepatosplenomegaly  MUSCULOSKELETAL:  Normal gait; no clubbing or cyanosis of digits; no joint swelling or tenderness to palpation  PSYCH: A+O to person, place, and time; affect appropriate  NEUROLOGY: CN 2-12 are intact and symmetric; no gross sensory deficits   SKIN: No rashes; no palpable lesions    LABS:                        9.6    12.61 )-----------( 280      ( 2020 14:04 )             30.0     06-17    133<L>  |  97  |  23  ----------------------------<  188<H>  3.5   |  24  |  1.02    Ca    8.8      2020 14:04  Phos  2.9     06-17  Mg     1.8     06-17    TPro  6.0  /  Alb  2.9<L>  /  TBili  0.5  /  DBili  x   /  AST  14  /  ALT  19  /  AlkPhos  161<H>  06-17          Urinalysis Basic - ( 2020 14:03 )    Color: Yellow / Appearance: Slightly Turbid / S.021 / pH: x  Gluc: x / Ketone: Negative  / Bili: Negative / Urobili: Negative   Blood: x / Protein: 100 / Nitrite: Negative   Leuk Esterase: Large / RBC: 7 /hpf /  /HPF   Sq Epi: x / Non Sq Epi: 0 /hpf / Bacteria: Moderate          RADIOLOGY & ADDITIONAL TESTS:  Results Reviewed:   Imaging Personally Reviewed:  Electrocardiogram Personally Reviewed:    COORDINATION OF CARE:  Care Discussed with Consultants/Other Providers [Y/N]:  Prior or Outpatient Records Reviewed [Y/N]: Jaziel Isabel, pgy 1  [facility] Team 3  [phone]  After 7pm; page night float    Patient is a 64y old  Male who presents with a chief complaint of Hematuria; weight loss (2020 18:50)      SUBJECTIVE / OVERNIGHT EVENTS: PT seen and examined at bedside, no acute events overnight. no fevers, chills, nausea, vomiting, chest pain, sob. he feels stronger this morning and is asking when he can leave the hospital   ADDITIONAL REVIEW OF SYSTEMS:  negative unless previously stated.   MEDICATIONS  (STANDING):  aspirin enteric coated 81 milliGRAM(s) Oral daily  atorvastatin 80 milliGRAM(s) Oral at bedtime  bromocriptine Capsule 5 milliGRAM(s) Oral daily  buPROPion XL . 150 milliGRAM(s) Oral daily  dextrose 5%. 1000 milliLiter(s) (50 mL/Hr) IV Continuous <Continuous>  dextrose 50% Injectable 12.5 Gram(s) IV Push once  dextrose 50% Injectable 25 Gram(s) IV Push once  dextrose 50% Injectable 25 Gram(s) IV Push once  heparin   Injectable 5000 Unit(s) SubCutaneous every 8 hours  insulin lispro (HumaLOG) corrective regimen sliding scale   SubCutaneous three times a day before meals  insulin lispro (HumaLOG) corrective regimen sliding scale   SubCutaneous at bedtime  piperacillin/tazobactam IVPB.. 3.375 Gram(s) IV Intermittent every 8 hours  sertraline 50 milliGRAM(s) Oral daily    MEDICATIONS  (PRN):  dextrose 40% Gel 15 Gram(s) Oral once PRN Blood Glucose LESS THAN 70 milliGRAM(s)/deciliter  glucagon  Injectable 1 milliGRAM(s) IntraMuscular once PRN Glucose LESS THAN 70 milligrams/deciliter      CAPILLARY BLOOD GLUCOSE      POCT Blood Glucose.: 198 mg/dL (2020 21:22)    I&O's Summary    2020 07:01  -  2020 07:00  --------------------------------------------------------  IN: 200 mL / OUT: 0 mL / NET: 200 mL        PHYSICAL EXAM:  Vital Signs Last 24 Hrs  T(C): 37.5 (2020 00:58), Max: 37.5 (2020 13:03)  T(F): 99.5 (2020 00:58), Max: 99.5 (2020 13:03)  HR: 83 (2020 00:58) (72 - 83)  BP: 164/79 (2020 00:58) (117/60 - 164/80)  BP(mean): --  RR: 18 (2020 00:58) (17 - 18)  SpO2: 98% (2020 00:58) (97% - 100%)  	GENERAL: NAD, cachectic   	HEAD:  Atraumatic, Normocephalic  	EYES: EOMI, PERRLA, xanthelasma  	NECK: Supple, No JVD  	CHEST/LUNG: Clear to auscultation bilaterally; No wheeze  	HEART: Regular rate and rhythm; No murmurs, rubs, or gallops  	ABDOMEN: Soft, Nontender, Nondistended; Bowel sounds present  	EXTREMITIES:, No clubbing, cyanosis, or edema, muscle wasting  	PSYCH: AAOx3  	NEUROLOGY: non-focal, 2/5 strength RLE, 4/5 LLE; 5/5 UE  SKIN: No rashes or lesions    LABS:                        9.6    12.61 )-----------( 280      ( 2020 14:04 )             30.0     06-17    133<L>  |  97  |  23  ----------------------------<  188<H>  3.5   |  24  |  1.02    Ca    8.8      2020 14:04  Phos  2.9     06-17  Mg     1.8     06-17    TPro  6.0  /  Alb  2.9<L>  /  TBili  0.5  /  DBili  x   /  AST  14  /  ALT  19  /  AlkPhos  161<H>  06-17          Urinalysis Basic - ( 2020 14:03 )    Color: Yellow / Appearance: Slightly Turbid / S.021 / pH: x  Gluc: x / Ketone: Negative  / Bili: Negative / Urobili: Negative   Blood: x / Protein: 100 / Nitrite: Negative   Leuk Esterase: Large / RBC: 7 /hpf /  /HPF   Sq Epi: x / Non Sq Epi: 0 /hpf / Bacteria: Moderate    < from: TTE with Doppler (w/Cont) (20 @ 17:01) >  Observations:  Mitral Valve: Mitral annular calcification, otherwise  normal mitral valve. Mild mitral regurgitation.  Aortic Valve/Aorta: Normal trileaflet aortic valve. Peak  transaortic valve gradient equals 3 mm Hg, estimated aortic  valve area equals 3.4 sqcm. Minimal aortic regurgitation.  Peak left ventricular outflow tract gradient equals 3 mm  Hg.  Aortic Root: 3.2 cm.  LVOT diameter: 2.1 cm.  Left Atrium: Normal left atrium.  LA volume index = 26  cc/m2.  Left Ventricle: Overall preserved left ventricular ejection  fraction despite segmental wall motion abnormalities.  Endocardial visualization enhanced with intravenous  injection of Ultrasonic Enhancing Agent (Definity). No left  ventricular thrombus. The apical anterior wall, and the  apical lateral wall are hypokinetic.  Increased relative  wall thickness with normal left ventricular mass index,  consistent with concentric left ventricular remodeling.  Right Heart: Normal right atrium. Normal right ventricular  size and function. Tricuspid valve not well visualized,  probably normal. Minimal tricuspid regurgitation. Pulmonic  valve not well visualized, probably normal. Minimal  pulmonic regurgitation.  Pericardium/Pleura: Normal pericardium with no pericardial  effusion.  Hemodynamic: Estimated right atrial pressure is 8 mm Hg.  Estimated right ventricular systolic pressure equals 17 mm  Hg, assuming right atrial pressure equals 8 mm Hg,  consistent with normal pulmonary pressures.  ------------------------------------------------------------------------  Conclusions:  1. Increased relative wall thickness with normal left  ventricular mass index, consistent with concentric left  ventricular remodeling.  2. Overall preserved leftventricular ejection fraction  despite segmental wall motion abnormalities.  Endocardial  visualization enhanced with intravenous injection of  Ultrasonic Enhancing Agent (Definity). No left ventricular  thrombus. The apical anterior wall, and the apical lateral  wall are hypokinetic.  3. Normal right ventricular size and function.  4. Estimated right ventricular systolic pressure equals 17  mm Hg, assuming right atrial pressure equals 8 mm Hg,  consistent with normal pulmonary pressures.  *** Compared with echocardiogram of 3/8/2016, no  significant changes noted.    < end of copied text >        RADIOLOGY & ADDITIONAL TESTS:  Results Reviewed:   Imaging Personally Reviewed:  Electrocardiogram Personally Reviewed:    COORDINATION OF CARE:  Care Discussed with Consultants/Other Providers [Y/N]:  Prior or Outpatient Records Reviewed [Y/N]:

## 2020-06-18 NOTE — PHYSICAL THERAPY INITIAL EVALUATION ADULT - ADDITIONAL COMMENTS
Pt lives in a  with 9 GIBSON; 1 flight to upstairs bedroom. PTA pt required assist with amb via RW; assist with ADLs from spouse and son. Pt owns RW and cane.

## 2020-06-18 NOTE — PROGRESS NOTE ADULT - PROBLEM SELECTOR PLAN 5
A1c 6.6, on insulin and orals  - has not been taking insulin the last 2 weeks  - GEORGI and premeals

## 2020-06-18 NOTE — DISCHARGE NOTE PROVIDER - NSDCMRMEDTOKEN_GEN_ALL_CORE_FT
aspirin 81 mg oral delayed release tablet: 1 tab(s) orally once a day  bromocriptine 5 mg oral capsule: 1 cap(s) orally once a day  buPROPion 150 mg/24 hours (XL) oral tablet, extended release: 1 tab(s) orally every 24 hours  Lipitor 80 mg oral tablet: 1 tab(s) orally once a day (at bedtime)  montelukast 10 mg oral tablet: 1 tab(s) orally once a day  sertraline 100 mg oral tablet: 1 tab(s) orally once a day  Vitamin D3 2000 intl units (50 mcg) oral capsule: 1 cap(s) orally once a day aspirin 81 mg oral delayed release tablet: 1 tab(s) orally once a day  bromocriptine 5 mg oral capsule: 1 cap(s) orally once a day  buPROPion 150 mg/24 hours (XL) oral tablet, extended release: 1 tab(s) orally every 24 hours  cefTRIAXone 1 g intravenous injection: 1 gram(s) intravenous every 24 hours  clonazePAM: 0.25 milligram(s) orally every 8 hours, hold for sedation  heparin: 5000 unit(s) subcutaneous every 8 hours  Lipitor 80 mg oral tablet: 1 tab(s) orally once a day (at bedtime)  montelukast 10 mg oral tablet: 1 tab(s) orally once a day  sertraline 50 mg oral tablet: 1 tab(s) orally once a day  Vitamin D3 2000 intl units (50 mcg) oral capsule: 1 cap(s) orally once a day aspirin 81 mg oral delayed release tablet: 1 tab(s) orally once a day  bromocriptine 5 mg oral capsule: 1 cap(s) orally once a day  cefuroxime 500 mg oral tablet: 1 tab(s) orally every 12 hours  clonazePAM: 0.25 milligram(s) orally every 8 hours, hold for sedation  heparin: 5000 unit(s) subcutaneous every 8 hours  Lipitor 80 mg oral tablet: 1 tab(s) orally once a day (at bedtime)  montelukast 10 mg oral tablet: 1 tab(s) orally once a day  pantoprazole 40 mg oral delayed release tablet: 1 tab(s) orally once a day (before a meal)  polyethylene glycol 3350 oral powder for reconstitution: 17 gram(s) orally once a day  riluzole 50 mg oral tablet: 1 tab(s) orally 2 times a day (before meals)  senna oral tablet: 2 tab(s) orally once a day (at bedtime)  sertraline 50 mg oral tablet: 1 tab(s) orally once a day  Vitamin D3 2000 intl units (50 mcg) oral capsule: 1 cap(s) orally once a day

## 2020-06-18 NOTE — DISCHARGE NOTE PROVIDER - PROVIDER TOKENS
PROVIDER:[TOKEN:[[id number]],FOLLOWUP:[1 week]],PROVIDER:[TOKEN:[[id number]],FOLLOWUP:[1 week]],PROVIDER:[TOKEN:[[id number]],FOLLOWUP:[1 week]]

## 2020-06-18 NOTE — DISCHARGE NOTE PROVIDER - NSDCQMCOGNITION_NEU_ALL_CORE
Difficulty making decisions Difficulty concentrating/Difficulty remembering/Difficulty making decisions

## 2020-06-18 NOTE — PROGRESS NOTE ADULT - PROBLEM SELECTOR PLAN 1
Pt presenting with 2 days of hematuria 2/2 cystitis. CT a/p showing left renal abscess and emphysematous cystitis   - U/A with LE, proteins, moderate blood  - received 2 days of cipro as outpatient  - will continue with zosyn for total of 14 day antibiotic course  - fu outpatient blood cx's, fu urine culture  - urology consult; fu recommendations  - will need repeat imaging to monitor course of abscess; no further intervention at this time . Pt presenting with 2 days of hematuria 2/2 cystitis. CT a/p showing left renal abscess and emphysematous cystitis   - U/A with LE, proteins, moderate blood  - received 2 days of cipro as outpatient  - will continue with zosyn for total of 14 day antibiotic course; will follow up with ID for length of abx  - fu outpatient blood cx's, fu urine culture  - urology consult; fu recommendations  - will need repeat imaging to monitor course of abscess on 6/24; no further intervention at this time .

## 2020-06-18 NOTE — PHYSICAL THERAPY INITIAL EVALUATION ADULT - DISCHARGE DISPOSITION, PT EVAL
subacute rehabif pt returns home, Home PT to assess safety, increase strength and endurance assisting with functional activities and ADLs. Assist with all mobility and ADLs. DME needed: w/c, 3:1 commode. Pt owns RW and cane.

## 2020-06-18 NOTE — PROGRESS NOTE ADULT - ATTENDING COMMENTS
Seen sitting in chair. Pt states he feels somewhat better than yesterday, abdominal pain improved. Has been afebrile, nontoxic appearing. Urology recommendations appreciated. Plan to continue with Zosyn and repeat CT imaging in approx one week to assess for resolution of renal abscess. No urologic intervention needed at this time. Cultures pending. Will likely require ID eval for antibiotic selection and duration pending culture data. Pt's outpatient Neurologist Dr. Winters contacted by Dr. Isabel this am, no need for further inpatient testing for myopathy, next test that is required is EMG, which can only be done as outpatient  PT recommending MARI

## 2020-06-18 NOTE — DISCHARGE NOTE PROVIDER - NSDCCPCAREPLAN_GEN_ALL_CORE_FT
PRINCIPAL DISCHARGE DIAGNOSIS  Diagnosis: Hematuria  Assessment and Plan of Treatment: You came to the hospital with blood in your urine. This was due to a urinary tract infection. You received a CT scan which found bladder infection and an abscess in your kidney. You were given 1 week of IV antibiotics. PRINCIPAL DISCHARGE DIAGNOSIS  Diagnosis: Emphysematous cystitis  Assessment and Plan of Treatment: You came to the hospital with blood in your urine. This was due to a urinary tract infection. You received a CT scan which found bladder infection (emphysematous cystitis) and an abscess in your kidney. You were treated with antibiotics and a griffith catheter to help drain any fluid contributing to infection. PRINCIPAL DISCHARGE DIAGNOSIS  Diagnosis: Emphysematous cystitis  Assessment and Plan of Treatment: You came to the hospital with blood in your urine. This was due to a urinary tract infection. You received a CT scan which found bladder infection (emphysematous cystitis) and an abscess in your kidney. You were treated with antibiotics and a griffith catheter to help drain any fluid contributing to infection. A later CT showed improvement in the kidney abscess. You should continue taking antibiotics until Sunday 7/12 to complete the treatment for this. You should follow up with your primary care doctor after you are discharged from rehab.      SECONDARY DISCHARGE DIAGNOSES  Diagnosis: ALS (amyotrophic lateral sclerosis)  Assessment and Plan of Treatment: You have been diagnosed with ALS, which is a progressive neurodegenerative disorder. This was confirmed with a procedure called an EMG, by the neurologists. You should take the medication, Riluzole, twice daily and follow up with your Neurologist, Dr. Winters, as well as the ALS specialist, Dr. Caba after discharge from rehab.

## 2020-06-18 NOTE — PHYSICAL THERAPY INITIAL EVALUATION ADULT - PERTINENT HX OF CURRENT PROBLEM, REHAB EVAL
65 yo M pmhx DM2 on insulin, HTN, CAD w. LAD stent and restent after thrombosis, depression presenting with 2 days of hematuria 2/2 cystitis CT a/p findings of 2.5 cm Left renal abscess. Pt also presenting with 6 months of weight loss/FTT 2/2 neuromuscular disorder vs paraneoplastic vs infectious. CT: Emphysematous cystitis with left renal abscess.

## 2020-06-18 NOTE — DISCHARGE NOTE PROVIDER - NSDCCAREPROVSEEN_GEN_ALL_CORE_FT
[facility] Medicine, Team 3 [facility] Medicine, Team 3  Raphael Castillo

## 2020-06-18 NOTE — PROGRESS NOTE ADULT - ASSESSMENT
63 yo M pmhx DM2 on insulin, HTN, CAD w. LAD stent and restent after thrombosis, depression presenting with 2 days of hematuria 2/2 cystitis CT a/p findings of 2.5 cm Left renal abscess. Pt also presenting with 6 months of weight loss/FTT 2/2 neuromuscular disorder vs paraneoplastic vs infectious.

## 2020-06-18 NOTE — PROGRESS NOTE ADULT - ASSESSMENT
64y Male pmhx DM2 on insulin, HTN, CAD w. LAD stent/restent after thrombosis, depression. Admitted for hematuria, weight loss. Pt reports  hematuria without clots x 2days. had CT show emphysematous cystitis and L renal abscess. Currently AVSS, Yellow urine with griffith in place, WBC 12.6. normal Cr level and + UA      Plan  - No urgent urologic intervention at this time  - Continue IV Abx, f/u Cultures and sensitivity  - Continue griffith, can remove after 4-5 days antibiotic treatment, check post-void residual after removal  - Monitor for fevers, trend WBC/Cr  - Recommend re-imaging in a week, to eval renal collection/abscess to monitor progression vs. resolution  - if spiking fevers, would re-image at that time  - Conservative management per medicine  - Please call with further questions 64y Male pmhx DM2 on insulin, HTN, CAD w. LAD stent/restent after thrombosis, depression. Admitted for hematuria, weight loss. Pt reports  hematuria without clots x 2days. had CT show emphysematous cystitis and L renal abscess. Currently AVSS, Yellow urine with griffith in place, WBC 12.6. normal Cr level and + UA      Plan  - No urgent urologic intervention at this time  - Continue IV Abx, f/u Cultures and sensitivity  - Continue griffith until after re-imaged at 1 week  - Monitor for fevers, trend WBC/Cr  - Recommend re-imaging in a week, to eval renal collection/abscess to monitor progression vs. resolution  - if spiking fevers, would re-image at that time  - Conservative management per medicine  - Please call with further questions, and call once re-imaging done

## 2020-06-18 NOTE — DISCHARGE NOTE PROVIDER - CARE PROVIDER_API CALL
Blair Jefferson  INTERNAL MEDICINE  [address]  [place] [address]  Phone: [phone]  Fax: [phone]  Follow Up Time: 1 week    Trent Winters  ELECTRODIAGNOSTIC MEDICINE  [address]  [place] [address]  Phone: [phone]  Fax: [phone]  Follow Up Time: 1 week    Gama Caba  NEUROLOGY  [address]  [place] [address]  Phone: [phone]  Fax: [phone]  Follow Up Time: 1 week

## 2020-06-18 NOTE — DISCHARGE NOTE PROVIDER - HOSPITAL COURSE
63 yo M pmhx DM2 on insulin, HTN, CAD w. LAD stent/restent after thrombosis, depression presenting with 2 days of hematuria. Per outpatient notes, patient, and PCP Dr. Jefferson, pt noted to have maroon colored urine starting on 6/15. Called Dr. Jefferson who recommended hydration and sent for bloodwork the following day. Hematuria worsened the next day, associated with fevers, and some AMS. Pt unable to produce urine sample, but had cbc with wbc with 18 and blood culture. Started on ciprofloxacin. Was told to come to the ED due to continued weakness, hematuria, and fever. Pt denies chest pain, sob, nausea, vomiting, abdominal pain. He has vague urinary symptoms including some burning with urination. He states that he has not had fevers today, and the hematuria has resolved. Had one case of this previously when he was much younger that resolved with antibiotics.         Of note, pt has had failure to thrive over last 6 months with around 80lb weight loss. Started around 1 year ago with global weakness and frequent falls. In the fall pt had worsening RLE weakness and muscle wasting, which progressed to rest of extremities. Followed with neurology, MRI brain and spine without any findings.         In the ED, pt found to have WBC of 12, +LE in U/A. Afebrile, nontoxic appearing. Received CT C/A/P which found left renal abscess and emphysematous cystitis. Started on zosyn. Received blood cultures which were negative. TTE without any new findings. 65 yo M pmhx prolactinoma, DM2 on insulin, HTN, CAD w. LAD stent and restent after thrombosis, depression presenting with 2 days of hematuria 2/2 cystitis CT a/p findings of 2.5 cm Left renal abscess. Pt also presenting with 6 months of weight loss/FTT 2/2 neuromuscular disorder (ALS?) vs paraneoplastic vs infectious. Pt had speech and swallow eval, found to have dysphagia. Urine cx and blood cx both negative, though treated outpt for UTI with cipro. Given this  and weight loss there is concern for noninfectious etiologies such as malignancy or other processes. We treated for cystitis and abscess with ertapenem, griffith for source control.  Repeat CT A/P to assess changes in abscess and cystitis were delayed due to bowel contrast from dysphagia evaluation that remained in his abd for an extended period of time, since pt was having minimal BMs. On day 10 of adm, pt was observed to have ?focal aware seizure (lip smacking and picking of blanket) and ?GTC, however no post-ictal. Concerned for provoked seizure from medications - buproprion and ertapenem. Consequently these medications were tapered, and changed respectively (to zosyn). CT head did not show acute changes. EEG showed _____________.        [] eeg , final thoughts on what caused sz, and whether resolved    []f/u ct a/p 65 yo M pmhx prolactinoma, DM2 on insulin, HTN, CAD w. LAD stent and restent after thrombosis, depression presenting with 2 days of hematuria 2/2 cystitis CT a/p findings of 2.5 cm Left renal abscess. Pt also presenting with 6 months of weight loss/FTT 2/2 ALS confirmed by electromyogram on 7/8. Paraneoplastic panel work up was negative vs infectious etiology workup negative as per blood culture. Pt had speech and swallow eval, found to have dysphagia. However, patient requested soft feeds and his family were made aware. His urine cx and blood cx both negative, though treated outpt for UTI with cipro. Given his progressive onset of hypphonism, and global weakness predominantly in his extremities (more LE> UE) in the setting of sever weight loss. We treated for cystitis and left renal abscess with ertapenem and switched to ceftriaxone and griffith for source control. Initially, his CT A/P to assess changes in abscess and cystitis were delayed due to bowel contrast from dysphagia evaluation that remained in his abd for an extended period of time, since pt was having minimal BMs. After patient was given bowel prep, enemas, laxatives, he was able to have a large bowel movement which allowed for CT A/P to be performed. Repeat CT A/P showed resolution of left renal abscess and negative for signs of malignancy. On day 10 of adm, pt was observed to have focal aware seizure-like activities (lip smacking and picking of blanket) and ?GTC, however no post-ictal findings and negative EEG overnight monitoring and negative MRI. Concerned for provoked seizure from medications - buproprion and ertapenem which were discontinued but was started on klonapin. Ertanepenem was changed to zosyn then ceftriaxone. On Monday 7/6, patient was found to be improving with significantly less hypophonism, cessation of seizure-like activity, and mildly improved muscle strengths. EMG performed by Dr. Caba found ALS to pt's diagnosis. Plan for subacute rehabilitation and will be started on ALS medication riluzole 50 mg BID. 63 yo M pmhx prolactinoma, DM2 on insulin, HTN, CAD w. LAD stent and restent after thrombosis, depression presenting with 2 days of hematuria 2/2 cystitis CT a/p findings of 2.5 cm Left renal abscess. Pt also presenting with 6 months of weight loss/FTT 2/2 ALS confirmed by electromyogram on 7/8. Paraneoplastic panel work up was negative vs infectious etiology workup negative as per blood culture. Pt had speech and swallow eval, found to have dysphagia. However, patient requested soft feeds and his family were made aware. His urine cx and blood cx both negative, though treated outpt for UTI with cipro. Given his progressive onset of hypophonism, and global weakness predominantly in his extremities (more LE> UE) in the setting of severe weight loss. He was treated for cystitis and left renal abscess with ertapenem and switched to ceftriaxone. Due to urinary retention, he will be discharged with a Grewal catheter. Repeat CT A/P with IV contrast showed resolution of left renal abscess and negative for signs of malignancy. On day 10 of adm, pt was observed to have focal aware seizure-like activities (lip smacking and picking of blanket) and ?GTC, however no post-ictal findings and negative EEG overnight monitoring and negative MRI. Concerned for provoked seizure from medications - buproprion and ertapenem which were discontinued but was started on klonopin 0.25 mg TID. Ertanepenem was changed to zosyn then ceftriaxone. On Monday 7/6, patient was found to be improving with significantly less hypophonism, cessation of seizure-like activity, and mildly improved muscle strengths. EMG performed by Dr. Caba confirmed the diagnosis of ALS. Plan for subacute rehabilitation and will be discharged on ALS medication riluzole 50 mg BID.

## 2020-06-18 NOTE — DISCHARGE NOTE PROVIDER - NSDCFUSCHEDAPPT_GEN_ALL_CORE_FT
RAHUL VITALE ; 06/26/2020 ; NPP Cardio Echo [address] RAHUL Rao ; 07/15/2020 ; [facility] Med GenInt [address] [facility] RAHUL VITALE ; 07/15/2020 ; NPP Med GenInt [address] [facility] RAHUL VITALE ; 07/15/2020 ; NPP Med GenInt [address] [facility] RAHUL VITALE ; 07/15/2020 ; NPP Med GenInt [address] [facility] RAHUL VITALE ; 07/15/2020 ; NPP Med GenInt [address] [facility]

## 2020-06-18 NOTE — PROGRESS NOTE ADULT - SUBJECTIVE AND OBJECTIVE BOX
afebrile    T(F): 99.5, Max: 99.5 (06-17-20 @ 13:03)  HR: 83  BP: 164/79  SpO2: 98%      Gen NAD  Abd soft, NTND   no CVAT      06-17 @ 14:04    WBC 12.61 / Hct 30.0  / SCr 1.02

## 2020-06-18 NOTE — PROGRESS NOTE ADULT - PROBLEM SELECTOR PLAN 3
As above iso FTT with global weakness  - dietician consult  - PT consult  - Lp?  - will speak with outpatient neurologist today 2/2 ALS? As above iso FTT with global weakness  - dietician consult  - PT consult  - neurologist fu outpatient

## 2020-06-19 DIAGNOSIS — N15.1 RENAL AND PERINEPHRIC ABSCESS: ICD-10-CM

## 2020-06-19 LAB
ALBUMIN SERPL ELPH-MCNC: 2.9 G/DL — LOW (ref 3.3–5)
ALP SERPL-CCNC: 152 U/L — HIGH (ref 40–120)
ALT FLD-CCNC: 16 U/L — SIGNIFICANT CHANGE UP (ref 10–45)
ANION GAP SERPL CALC-SCNC: 11 MMOL/L — SIGNIFICANT CHANGE UP (ref 5–17)
APPEARANCE UR: ABNORMAL
AST SERPL-CCNC: 16 U/L — SIGNIFICANT CHANGE UP (ref 10–40)
BASOPHILS # BLD AUTO: 0.03 K/UL — SIGNIFICANT CHANGE UP (ref 0–0.2)
BASOPHILS NFR BLD AUTO: 0.3 % — SIGNIFICANT CHANGE UP (ref 0–2)
BILIRUB SERPL-MCNC: 0.5 MG/DL — SIGNIFICANT CHANGE UP (ref 0.2–1.2)
BILIRUB UR-MCNC: NEGATIVE — SIGNIFICANT CHANGE UP
BUN SERPL-MCNC: 19 MG/DL — SIGNIFICANT CHANGE UP (ref 7–23)
CALCIUM SERPL-MCNC: 8.7 MG/DL — SIGNIFICANT CHANGE UP (ref 8.4–10.5)
CHLORIDE SERPL-SCNC: 99 MMOL/L — SIGNIFICANT CHANGE UP (ref 96–108)
CO2 SERPL-SCNC: 23 MMOL/L — SIGNIFICANT CHANGE UP (ref 22–31)
COLOR SPEC: YELLOW — SIGNIFICANT CHANGE UP
CREAT SERPL-MCNC: 1.02 MG/DL — SIGNIFICANT CHANGE UP (ref 0.5–1.3)
DIFF PNL FLD: ABNORMAL
EOSINOPHIL # BLD AUTO: 0.05 K/UL — SIGNIFICANT CHANGE UP (ref 0–0.5)
EOSINOPHIL NFR BLD AUTO: 0.5 % — SIGNIFICANT CHANGE UP (ref 0–6)
GLUCOSE BLDC GLUCOMTR-MCNC: 113 MG/DL — HIGH (ref 70–99)
GLUCOSE BLDC GLUCOMTR-MCNC: 122 MG/DL — HIGH (ref 70–99)
GLUCOSE BLDC GLUCOMTR-MCNC: 159 MG/DL — HIGH (ref 70–99)
GLUCOSE BLDC GLUCOMTR-MCNC: 88 MG/DL — SIGNIFICANT CHANGE UP (ref 70–99)
GLUCOSE SERPL-MCNC: 106 MG/DL — HIGH (ref 70–99)
GLUCOSE UR QL: NEGATIVE — SIGNIFICANT CHANGE UP
HCT VFR BLD CALC: 29.7 % — LOW (ref 39–50)
HGB BLD-MCNC: 9.6 G/DL — LOW (ref 13–17)
IMM GRANULOCYTES NFR BLD AUTO: 0.9 % — SIGNIFICANT CHANGE UP (ref 0–1.5)
KETONES UR-MCNC: NEGATIVE — SIGNIFICANT CHANGE UP
LEUKOCYTE ESTERASE UR-ACNC: ABNORMAL
LYMPHOCYTES # BLD AUTO: 1.19 K/UL — SIGNIFICANT CHANGE UP (ref 1–3.3)
LYMPHOCYTES # BLD AUTO: 12.4 % — LOW (ref 13–44)
MAGNESIUM SERPL-MCNC: 2 MG/DL — SIGNIFICANT CHANGE UP (ref 1.6–2.6)
MCHC RBC-ENTMCNC: 26.3 PG — LOW (ref 27–34)
MCHC RBC-ENTMCNC: 32.3 GM/DL — SIGNIFICANT CHANGE UP (ref 32–36)
MCV RBC AUTO: 81.4 FL — SIGNIFICANT CHANGE UP (ref 80–100)
MONOCYTES # BLD AUTO: 1.23 K/UL — HIGH (ref 0–0.9)
MONOCYTES NFR BLD AUTO: 12.8 % — SIGNIFICANT CHANGE UP (ref 2–14)
NEUTROPHILS # BLD AUTO: 6.99 K/UL — SIGNIFICANT CHANGE UP (ref 1.8–7.4)
NEUTROPHILS NFR BLD AUTO: 73.1 % — SIGNIFICANT CHANGE UP (ref 43–77)
NITRITE UR-MCNC: NEGATIVE — SIGNIFICANT CHANGE UP
NRBC # BLD: 0 /100 WBCS — SIGNIFICANT CHANGE UP (ref 0–0)
PH UR: 6 — SIGNIFICANT CHANGE UP (ref 5–8)
PHOSPHATE SERPL-MCNC: 3.7 MG/DL — SIGNIFICANT CHANGE UP (ref 2.5–4.5)
PLATELET # BLD AUTO: 268 K/UL — SIGNIFICANT CHANGE UP (ref 150–400)
POTASSIUM SERPL-MCNC: 3.6 MMOL/L — SIGNIFICANT CHANGE UP (ref 3.5–5.3)
POTASSIUM SERPL-SCNC: 3.6 MMOL/L — SIGNIFICANT CHANGE UP (ref 3.5–5.3)
PROCALCITONIN SERPL-MCNC: 0.47 NG/ML — HIGH (ref 0.02–0.1)
PROT SERPL-MCNC: 6.3 G/DL — SIGNIFICANT CHANGE UP (ref 6–8.3)
PROT UR-MCNC: ABNORMAL
RBC # BLD: 3.65 M/UL — LOW (ref 4.2–5.8)
RBC # FLD: 14.2 % — SIGNIFICANT CHANGE UP (ref 10.3–14.5)
SODIUM SERPL-SCNC: 133 MMOL/L — LOW (ref 135–145)
SP GR SPEC: 1.03 — HIGH (ref 1.01–1.02)
UROBILINOGEN FLD QL: ABNORMAL
WBC # BLD: 9.58 K/UL — SIGNIFICANT CHANGE UP (ref 3.8–10.5)
WBC # FLD AUTO: 9.58 K/UL — SIGNIFICANT CHANGE UP (ref 3.8–10.5)

## 2020-06-19 PROCEDURE — 99254 IP/OBS CNSLTJ NEW/EST MOD 60: CPT

## 2020-06-19 PROCEDURE — 99232 SBSQ HOSP IP/OBS MODERATE 35: CPT | Mod: GC

## 2020-06-19 RX ORDER — ERTAPENEM SODIUM 1 G/1
1000 INJECTION, POWDER, LYOPHILIZED, FOR SOLUTION INTRAMUSCULAR; INTRAVENOUS EVERY 24 HOURS
Refills: 0 | Status: DISCONTINUED | OUTPATIENT
Start: 2020-06-19 | End: 2020-06-24

## 2020-06-19 RX ADMIN — ERTAPENEM SODIUM 120 MILLIGRAM(S): 1 INJECTION, POWDER, LYOPHILIZED, FOR SOLUTION INTRAMUSCULAR; INTRAVENOUS at 11:58

## 2020-06-19 RX ADMIN — BUPROPION HYDROCHLORIDE 150 MILLIGRAM(S): 150 TABLET, EXTENDED RELEASE ORAL at 11:58

## 2020-06-19 RX ADMIN — HEPARIN SODIUM 5000 UNIT(S): 5000 INJECTION INTRAVENOUS; SUBCUTANEOUS at 12:52

## 2020-06-19 RX ADMIN — SERTRALINE 50 MILLIGRAM(S): 25 TABLET, FILM COATED ORAL at 11:58

## 2020-06-19 RX ADMIN — PIPERACILLIN AND TAZOBACTAM 25 GRAM(S): 4; .5 INJECTION, POWDER, LYOPHILIZED, FOR SOLUTION INTRAVENOUS at 02:43

## 2020-06-19 RX ADMIN — HEPARIN SODIUM 5000 UNIT(S): 5000 INJECTION INTRAVENOUS; SUBCUTANEOUS at 04:33

## 2020-06-19 RX ADMIN — HEPARIN SODIUM 5000 UNIT(S): 5000 INJECTION INTRAVENOUS; SUBCUTANEOUS at 22:46

## 2020-06-19 RX ADMIN — ATORVASTATIN CALCIUM 80 MILLIGRAM(S): 80 TABLET, FILM COATED ORAL at 22:46

## 2020-06-19 RX ADMIN — Medication 81 MILLIGRAM(S): at 11:58

## 2020-06-19 RX ADMIN — BROMOCRIPTINE MESYLATE 5 MILLIGRAM(S): 5 CAPSULE ORAL at 11:58

## 2020-06-19 RX ADMIN — Medication 1: at 12:56

## 2020-06-19 NOTE — SWALLOW BEDSIDE ASSESSMENT ADULT - SLP PERTINENT HISTORY OF CURRENT PROBLEM
65 yo M pmhx DM2 on insulin, HTN, CAD w. LAD stent and restent after thrombosis, depression presenting with 2 days of hematuria 2/2 cystitis CT a/p findings of 2.5 cm Left renal abscess. Pt also presenting with 6 months of weight loss/FTT 2/2 neuromuscular disorder vs paraneoplastic vs infectious. Of note, pt has had failure to thrive over last 6 months with around 80lb weight loss. Started around 1 year ago with global weakness and frequent falls. In the fall pt had worsening RLE weakness and muscle wasting, which progressed to rest of extremities. Followed with neurology, MRI brain and spine without any findings. In the ED, pt found to have WBC of 12, +LE in U/A. Afebrile, nontoxic appearing. CT chest: LUNGS AND LARGE AIRWAYS: Patent central airways. Trace left pleural effusion and basilar atelectasis. Linear atelectasis or scarring left lower lobe. IMPRESSION: Emphysematous cystitis with left renal abscess. 6/19: medicine concerned for ?ALS.

## 2020-06-19 NOTE — PROGRESS NOTE ADULT - PROBLEM SELECTOR PLAN 1
Pt presenting with 2 days of hematuria 2/2 cystitis. CT a/p showing left renal abscess and emphysematous cystitis   - U/A with LE, proteins, moderate blood  - received 2 days of cipro as outpatient  - will continue with zosyn for total of 14 day antibiotic course; will follow up with ID for length of abx today   - fu outpatient blood cx's, fu urine culture  - urology consult; fu recommendations  - will need repeat imaging to monitor course of abscess on 6/24; no further intervention at this time . Pt presenting with 2 days of hematuria 2/2 cystitis. CT a/p showing left renal abscess and emphysematous cystitis   - U/A with LE, proteins, moderate blood  - received 2 days of cipro as outpatient  - will continue with zosyn for total of 14 day antibiotic course; will consult ID for length of abx   - fu outpatient blood cx's, fu urine culture  - urology consult; fu recommendations  - will need repeat imaging to monitor course of abscess on 6/24; no further intervention at this time .

## 2020-06-19 NOTE — DIETITIAN INITIAL EVALUATION ADULT. - PROBLEM SELECTOR PLAN 1
Pt presenting with 2 days of hematuria 2/2 cystitis. CT a/p showing left renal abscess and emphysematous   - U/A with LE, proteins, moderate blood  - received 2 days of cipro as outpatient  - will continue with zosyn for total of 14 day antibiotic course  - fu outpatient blood cx's, fu urine culture  - urology consult

## 2020-06-19 NOTE — CHART NOTE - NSCHARTNOTEFT_GEN_A_CORE
Upon Nutritional Assessment by the Registered Dietitian your patient was determined to meet criteria / has evidence of the following diagnosis/diagnoses:          [ ]  Mild Protein Calorie Malnutrition        [ x]  Moderate Protein Calorie Malnutrition        [ ] Severe Protein Calorie Malnutrition        [ ] Unspecified Protein Calorie Malnutrition        [ ] Underweight / BMI <19        [ ] Morbid Obesity / BMI > 40      Findings as based on:  [x ] Comprehensive nutrition assessment mild-moderate muscle mass and body fat loss, meeting <75% estimated needs PTA.   [x ] Nutrition Focused Physical Exam  [ ] Other:       Nutrition Plan/Recommendations:  As medically feasible add glucerna 1 daily, diet texture deferred to medical team and speech pathologist         PROVIDER Section:     By signing this assessment you are acknowledging and agree with the diagnosis/diagnoses assigned by the Registered Dietitian    Comments:

## 2020-06-19 NOTE — PROGRESS NOTE ADULT - SUBJECTIVE AND OBJECTIVE BOX
aJziel Isabel, pgy 1  [facility] Team 3  [phone]  After 7pm; page night float    Patient is a 64y old  Male who presents with a chief complaint of Hematuria; weight loss (2020 13:53)      SUBJECTIVE / OVERNIGHT EVENTS:  ADDITIONAL REVIEW OF SYSTEMS:    MEDICATIONS  (STANDING):  aspirin enteric coated 81 milliGRAM(s) Oral daily  atorvastatin 80 milliGRAM(s) Oral at bedtime  bromocriptine Capsule 5 milliGRAM(s) Oral daily  buPROPion XL . 150 milliGRAM(s) Oral daily  dextrose 5%. 1000 milliLiter(s) (50 mL/Hr) IV Continuous <Continuous>  dextrose 50% Injectable 12.5 Gram(s) IV Push once  dextrose 50% Injectable 25 Gram(s) IV Push once  dextrose 50% Injectable 25 Gram(s) IV Push once  heparin   Injectable 5000 Unit(s) SubCutaneous every 8 hours  insulin lispro (HumaLOG) corrective regimen sliding scale   SubCutaneous three times a day before meals  insulin lispro (HumaLOG) corrective regimen sliding scale   SubCutaneous at bedtime  piperacillin/tazobactam IVPB.. 3.375 Gram(s) IV Intermittent every 8 hours  sertraline 50 milliGRAM(s) Oral daily    MEDICATIONS  (PRN):  dextrose 40% Gel 15 Gram(s) Oral once PRN Blood Glucose LESS THAN 70 milliGRAM(s)/deciliter  glucagon  Injectable 1 milliGRAM(s) IntraMuscular once PRN Glucose LESS THAN 70 milligrams/deciliter      CAPILLARY BLOOD GLUCOSE      POCT Blood Glucose.: 145 mg/dL (2020 21:41)  POCT Blood Glucose.: 133 mg/dL (2020 17:30)  POCT Blood Glucose.: 143 mg/dL (2020 13:58)  POCT Blood Glucose.: 152 mg/dL (2020 12:48)  POCT Blood Glucose.: 123 mg/dL (2020 08:02)    I&O's Summary    2020 07:01  -  2020 07:00  --------------------------------------------------------  IN: 240 mL / OUT: 1350 mL / NET: -1110 mL        PHYSICAL EXAM:  Vital Signs Last 24 Hrs  T(C): 36.6 (2020 00:02), Max: 36.6 (2020 08:38)  T(F): 97.9 (2020 00:02), Max: 97.9 (2020 00:02)  HR: 72 (2020 00:02) (72 - 90)  BP: 130/71 (2020 00:02) (99/59 - 130/71)  BP(mean): --  RR: 18 (2020 00:02) (18 - 18)  SpO2: 99% (2020 00:02) (97% - 99%)  CONSTITUTIONAL: NAD, well-developed, well-groomed  EYES: PERRLA; conjunctiva and sclera clear  ENMT: Moist oral mucosa, no pharyngeal injection or exudates; normal dentition  NECK: Supple, no palpable masses; no thyromegaly  RESPIRATORY: Normal respiratory effort; lungs are clear to auscultation bilaterally  CARDIOVASCULAR: Regular rate and rhythm, normal S1 and S2, no murmur/rub/gallop; No lower extremity edema; Peripheral pulses are 2+ bilaterally  ABDOMEN: Nontender to palpation, normoactive bowel sounds, no rebound/guarding; No hepatosplenomegaly  MUSCULOSKELETAL:  Normal gait; no clubbing or cyanosis of digits; no joint swelling or tenderness to palpation  PSYCH: A+O to person, place, and time; affect appropriate  NEUROLOGY: CN 2-12 are intact and symmetric; no gross sensory deficits   SKIN: No rashes; no palpable lesions    LABS:                        9.8    10.81 )-----------( 278      ( 2020 13:31 )             30.1     06-18    131<L>  |  97  |  19  ----------------------------<  132<H>  3.3<L>   |  24  |  0.98    Ca    8.7      2020 13:31  Phos  3.9     06-18  Mg     1.9     06-18    TPro  6.6  /  Alb  3.1<L>  /  TBili  0.5  /  DBili  x   /  AST  17  /  ALT  18  /  AlkPhos  167<H>  06-18          Urinalysis Basic - ( 2020 14:03 )    Color: Yellow / Appearance: Slightly Turbid / S.021 / pH: x  Gluc: x / Ketone: Negative  / Bili: Negative / Urobili: Negative   Blood: x / Protein: 100 / Nitrite: Negative   Leuk Esterase: Large / RBC: 7 /hpf /  /HPF   Sq Epi: x / Non Sq Epi: 0 /hpf / Bacteria: Moderate        Culture - Blood (collected 2020 17:00)  Source: .Blood Blood-Peripheral  Preliminary Report (2020 17:01):    No growth to date.    Culture - Blood (collected 2020 16:59)  Source: .Blood Blood-Venous  Preliminary Report (2020 17:01):    No growth to date.    Culture - Urine (collected 2020 16:58)  Source: .Urine Clean Catch (Midstream)  Final Report (2020 13:32):    No growth        RADIOLOGY & ADDITIONAL TESTS:  Results Reviewed:   Imaging Personally Reviewed:  Electrocardiogram Personally Reviewed:    COORDINATION OF CARE:  Care Discussed with Consultants/Other Providers [Y/N]:  Prior or Outpatient Records Reviewed [Y/N]: Jaziel Isabel, pgy 1  [facility] Team 3  [phone]  After 7pm; page night float    Patient is a 64y old  Male who presents with a chief complaint of Hematuria; weight loss (2020 13:53)      SUBJECTIVE / OVERNIGHT EVENTS: Pt seen and examined at bedside. No acute events overnight. sTates that he feels stronger this morning. No chest pain, sob, abdominal pain, fevers, chills, nausea vomiting. Understanding that he will need to be in the hospital for a bit for IV abx.   ADDITIONAL REVIEW OF SYSTEMS:  negative unless previously noted    MEDICATIONS  (STANDING):  aspirin enteric coated 81 milliGRAM(s) Oral daily  atorvastatin 80 milliGRAM(s) Oral at bedtime  bromocriptine Capsule 5 milliGRAM(s) Oral daily  buPROPion XL . 150 milliGRAM(s) Oral daily  dextrose 5%. 1000 milliLiter(s) (50 mL/Hr) IV Continuous <Continuous>  dextrose 50% Injectable 12.5 Gram(s) IV Push once  dextrose 50% Injectable 25 Gram(s) IV Push once  dextrose 50% Injectable 25 Gram(s) IV Push once  heparin   Injectable 5000 Unit(s) SubCutaneous every 8 hours  insulin lispro (HumaLOG) corrective regimen sliding scale   SubCutaneous three times a day before meals  insulin lispro (HumaLOG) corrective regimen sliding scale   SubCutaneous at bedtime  piperacillin/tazobactam IVPB.. 3.375 Gram(s) IV Intermittent every 8 hours  sertraline 50 milliGRAM(s) Oral daily    MEDICATIONS  (PRN):  dextrose 40% Gel 15 Gram(s) Oral once PRN Blood Glucose LESS THAN 70 milliGRAM(s)/deciliter  glucagon  Injectable 1 milliGRAM(s) IntraMuscular once PRN Glucose LESS THAN 70 milligrams/deciliter      CAPILLARY BLOOD GLUCOSE      POCT Blood Glucose.: 145 mg/dL (2020 21:41)  POCT Blood Glucose.: 133 mg/dL (2020 17:30)  POCT Blood Glucose.: 143 mg/dL (2020 13:58)  POCT Blood Glucose.: 152 mg/dL (2020 12:48)  POCT Blood Glucose.: 123 mg/dL (2020 08:02)    I&O's Summary    2020 07:01  -  2020 07:00  --------------------------------------------------------  IN: 240 mL / OUT: 1350 mL / NET: -1110 mL        PHYSICAL EXAM:  Vital Signs Last 24 Hrs  T(C): 36.6 (2020 00:02), Max: 36.6 (2020 08:38)  T(F): 97.9 (2020 00:02), Max: 97.9 (2020 00:02)  HR: 72 (2020 00:02) (72 - 90)  BP: 130/71 (2020 00:02) (99/59 - 130/71)  BP(mean): --  RR: 18 (2020 00:02) (18 - 18)  SpO2: 99% (2020 00:02) (97% - 99%)  	GENERAL: NAD, cachectic   	HEAD:  Atraumatic, Normocephalic  	EYES: EOMI, PERRLA, xanthelasma  	NECK: Supple, No JVD  	CHEST/LUNG: Clear to auscultation bilaterally; No wheeze  	HEART: Regular rate and rhythm; No murmurs, rubs, or gallops  	ABDOMEN: Soft, Nontender, Nondistended; Bowel sounds present  	EXTREMITIES:, No clubbing, cyanosis, or edema, muscle wasting  	PSYCH: AAOx3  	NEUROLOGY: non-focal, 2/5 strength RLE, 4/5 LLE; 5/5 UE  SKIN: No rashes or lesions    LABS:                        9.8    10.81 )-----------( 278      ( 2020 13:31 )             30.1     06-18    131<L>  |  97  |  19  ----------------------------<  132<H>  3.3<L>   |  24  |  0.98    Ca    8.7      2020 13:31  Phos  3.9     06-18  Mg     1.9     06-18    TPro  6.6  /  Alb  3.1<L>  /  TBili  0.5  /  DBili  x   /  AST  17  /  ALT  18  /  AlkPhos  167<H>  06-18          Urinalysis Basic - ( 2020 14:03 )    Color: Yellow / Appearance: Slightly Turbid / S.021 / pH: x  Gluc: x / Ketone: Negative  / Bili: Negative / Urobili: Negative   Blood: x / Protein: 100 / Nitrite: Negative   Leuk Esterase: Large / RBC: 7 /hpf /  /HPF   Sq Epi: x / Non Sq Epi: 0 /hpf / Bacteria: Moderate        Culture - Blood (collected 2020 17:00)  Source: .Blood Blood-Peripheral  Preliminary Report (2020 17:01):    No growth to date.    Culture - Blood (collected 2020 16:59)  Source: .Blood Blood-Venous  Preliminary Report (2020 17:01):    No growth to date.    Culture - Urine (collected 2020 16:58)  Source: .Urine Clean Catch (Midstream)  Final Report (2020 13:32):    No growth        RADIOLOGY & ADDITIONAL TESTS:  Results Reviewed:   Imaging Personally Reviewed:  Electrocardiogram Personally Reviewed:    COORDINATION OF CARE:  Care Discussed with Consultants/Other Providers [Y/N]:  Prior or Outpatient Records Reviewed [Y/N]:

## 2020-06-19 NOTE — CONSULT NOTE ADULT - ASSESSMENT
63 yo M pmhx DM2 on insulin, HTN, CAD w. LAD stent/restent after thrombosis, depression presenting with 2 days of hematuria  Patient with no fevers,chills  Leucocytosis as OP  Admitted CT with emphysematous cystitis and renal lesion ? abscess  But also with 80 lb weight loss over 6 months  In hospital minimal leucocytosis, No fevers  Blood and urine cs negative (though did recieve cipro as OP)    ?UTI-cystitis with early renal abscess  ? Malignancy with superinfection  ? 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    B) Renal mass  ? absces  ? malignancy   ? other etiology  imaging and abx plan as above  Check urine cytology    C)Leucocytosis  likely from above  Plan as detailed above    Will tailor plan for ID issues  per course,results.Will defer to primary team on management of other issues.  Assessment, plan and recommendations as detailed above were discussed with the medical/primary  team-Dr Olvera, Urology Dr Bravo.  Infectious Diseases Service will cover over weekend.  Please call [phone] if issues

## 2020-06-19 NOTE — SWALLOW BEDSIDE ASSESSMENT ADULT - ASR SWALLOW RECOMMEND DIAG
Recommend MBS to further assess swallow mechanism; order received and exam scheduled for 6/20./VFSS/MBS

## 2020-06-19 NOTE — DIETITIAN INITIAL EVALUATION ADULT. - OTHER INFO
Diet PTA: Pt reports decreased appetite/intake over the past ~1 year which pt attributes to "stomach shrinking" and inability to consume large portions of foods as he was able to before. Pt will try to eat 2 meals per day prepared by his wife consisting of a variety of regular textured foods. Pt with type 2 DM and will monitor BG daily with values ranging from 125-180, pt will try to avoid sweets and desserts, will drink diet sodas.     Weight: Pt reports previous UBW of 218 lbs-stated he was last at this weight ~2 years ago and stated he had experienced a progressive decline in weight over this timeframe to recent weight ~120 lbs where pt stated he has remained stable for the past 3-6 months, pt with current dosing weight of 129.8 lbs (6/17), overall 88 lb (40%) wt loss within the past 2 years per pt. Per review of [facility] pt with weight of 205 lbs (1/2016) 186 lbs (3/2018), 136 lbs (1/2020), overall 56 lb (30%) wt loss within 2 years, 5% within the past 3 months.     Pt with no food allergies, no micronutrient supplementation. No N+V, last BM noted this morning. Pt denies difficulty chewing/swallowing at home, stated he consumed regular textured foods and thin liquids with no coughing with meals or feeling foods are getting stuck-plan for swallow evaluation today.     Diet education: RD reviewed high calorie, high protein nutrition therapy in efforts to facilitate weight gain with emphasis on consuming smaller, more frequent meals with focus on foods with protein first.

## 2020-06-19 NOTE — SWALLOW BEDSIDE ASSESSMENT ADULT - PHARYNGEAL PHASE
given congested cough at baseline, it is difficult to discern if cough is related to PO intake immediate cough post swallow/Wet vocal quality post oral intake

## 2020-06-19 NOTE — SWALLOW BEDSIDE ASSESSMENT ADULT - SLP GENERAL OBSERVATIONS
Patient encountered awake and alert, on RA, positioned upright in bed, A&Ox4. Vocal quality hoarse/breathy/weak. +Weak congested cough at baseline. Able to follow commands and make wants/needs known.

## 2020-06-19 NOTE — PROGRESS NOTE ADULT - PROBLEM SELECTOR PLAN 6
Hx of CAD, Xanthelasma on physical exam  - cw atorvastatin 80mg A1c 7.7, on insulin and orals  - has not been taking insulin the last 2 weeks  - GEORGI and premeals

## 2020-06-19 NOTE — DIETITIAN INITIAL EVALUATION ADULT. - REASON INDICATOR FOR ASSESSMENT
Pt seen for consult for assessment. Pt with PMH including type 2 DM on insulin, HTN, CAD admitted with hematuria-found to have renal abscess, pt also with 80 lb wt loss, FTT-plan for outpatient neurology work up

## 2020-06-19 NOTE — SWALLOW BEDSIDE ASSESSMENT ADULT - SWALLOW EVAL: DIAGNOSIS
Patient admitted with hematuria, progressive weakness, and FTT. Patient presents with a suspected pharyngeal dysphagia characterized by immediate coughing and wet/congested vocal quality post intake of mechanical soft solids. Difficult to discern if cough was related to intake of more conservative textures as well given congested cough at baseline. +Dysphonia.

## 2020-06-19 NOTE — PROGRESS NOTE ADULT - PROBLEM SELECTOR PLAN 5
A1c 7.7, on insulin and orals  - has not been taking insulin the last 2 weeks  - GEORGI and premeals Confusion iso FTT, possible UTI.   - cw wellbutrin  - cw sertraline   - frequent reorientation

## 2020-06-19 NOTE — SWALLOW BEDSIDE ASSESSMENT ADULT - SWALLOW EVAL: PATIENT/FAMILY GOALS STATEMENT
Pt reported intermittent coughing during PO intake and change in vocal quality since last summer.  Stated he was consuming regular solids at home PTA, however, was eating very little due to poor appetite.  Denied hx of PNA, but stated he thinks he may have PNA now given congested baseline cough.

## 2020-06-19 NOTE — DIETITIAN INITIAL EVALUATION ADULT. - PROBLEM SELECTOR PLAN 2
Hx of 6 months of weight loss, FTT 2/2 neuromuscular disorder vs paraneoplastic vs infection (Endocarditis?) vs psychiatric illness  - Physical therapy evaluation  - UTI treatment as above  - continue with wellbutrin   - follow up blood cultures  - TTE/ DALI

## 2020-06-19 NOTE — SWALLOW BEDSIDE ASSESSMENT ADULT - SWALLOW EVAL: RECOMMENDED DIET
Continue with conservative diet of dysphagia I diet with honey thick fluids. Crush meds or provide via alternate source.

## 2020-06-19 NOTE — DIETITIAN INITIAL EVALUATION ADULT. - PHYSICAL APPEARANCE
Nutrition focused physical exam conducted with verbal consent from patient, pt with evidence of moderate muscle mass loss temple, clavicle and lower extremities, mild fat loss to orbital region an moderate fat loss to triceps/other (specify) Ht: 5'7", Wt: 129.8 lbs, BMI: 20.4 kg/m2, IBW: 148 lbs +/- 10%, %IBW: 87%  No edema, Skin intact per nursing flowsheets

## 2020-06-19 NOTE — DIETITIAN INITIAL EVALUATION ADULT. - ADD RECOMMEND
1. Diet texture deferred to speech pathologist and medical team-currently dysphagia 1 diet with honey consistency liquids-pt agreeable to trial of chocolate glucerna, 2. Continue to encourage po intake and obtain/honor food preferences as able, 3. Monitor PO intake, diet tolerance, weight trends, labs, and skin integrity, 4. Malnutrition alert placed in chart 1. Diet texture deferred to speech pathologist and medical team-currently dysphagia 1 diet with honey consistency liquids-Consider adding glucerna 1 daily if medically feasible, 2. Continue to encourage po intake and obtain/honor food preferences as able, 3. Monitor PO intake, diet tolerance, weight trends, labs, and skin integrity, 4. Malnutrition alert placed in chart

## 2020-06-19 NOTE — SWALLOW BEDSIDE ASSESSMENT ADULT - ASR SWALLOW ASPIRATION MONITOR
pneumonia/upper respiratory infection/Monitor for s/s aspiration/laryngeal penetration. If noted:  D/C p.o. intake, provide non-oral nutrition/hydration/meds, and contact this service @ [phone]/change of breathing pattern/fever

## 2020-06-19 NOTE — PROGRESS NOTE ADULT - PROBLEM SELECTOR PLAN 3
2/2 ALS? As above iso FTT with global weakness  - dietician consult  - PT consult  - neurologist fu outpatient Hx of 6 months of weight loss, FTT 2/2 neuromuscular disorder (ALS) vs paraneoplastic (less likely due to negative CT findings vs infection (Endocarditis? - less likely due to TTE results, no blood cx, no murmur) vs psychiatric illness  - Physical therapy evaluation  - UTI treatment as above  - continue with wellbutrin, sertraline   - follow up blood cultures  - TTE without new findings  - discussed with Neurologist outpatient Dr. Winters; will need EMG as outpatient , no further workup for now.

## 2020-06-19 NOTE — PROGRESS NOTE ADULT - PROBLEM SELECTOR PLAN 9
Transitions of Care Status:  1.  Name of PCP: Dr. Jefferson  2.  PCP Contacted on Admission: [x ] Y    [ ] N    3.  PCP contacted at Discharge: [ ] Y    [ ] N    [ ] N/A  4.  Post-Discharge Appointment Date and Location:  5.  Summary of Handoff given to PCP: DVT: SQH  Diet: CC Regular  Dispo: [facility] vs home with home PT

## 2020-06-19 NOTE — SWALLOW BEDSIDE ASSESSMENT ADULT - SWALLOW EVAL: RECOMMENDED FEEDING/EATING TECHNIQUES
allow for swallow between intakes/crush medication (when feasible)/small sips/bites/maintain upright posture during/after eating for 30 mins/alternate food with liquid

## 2020-06-19 NOTE — CONSULT NOTE ADULT - SUBJECTIVE AND OBJECTIVE BOX
Patient is a 64y old  Male who presents with a chief complaint of Hematuria; weight loss (19 Jun 2020 07:33)    From HPI" HPI:  63 yo M pmhx DM2 on insulin, HTN, CAD w. LAD stent/restent after thrombosis, depression presenting with 2 days of hematuria. Per outpatient notes, patient, and PCP Dr. Jefferson, pt noted to have maroon colored urine starting on 6/15. Called Dr. Jefferson who recommended hydration and sent for bloodwork the following day. Hematuria worsened the next day, associated with fevers, and some AMS. Pt unable to produce urine sample, but had cbc with wbc with 18 and blood culture. Started on ciprofloxacin. Was told to come to the ED due to continued weakness, hematuria, and fever. Pt denies chest pain, sob, nausea, vomiting, abdominal pain. He has vague urinary symptoms including some burning with urination. He states that he has not had fevers today, and the hematuria has resolved. Had one case of this previously when he was much younger that resolved with antibiotics.     Of note, pt has had failure to thrive over last 6 months with around 80lb weight loss. Started around 1 year ago with global weakness and frequent falls. In the fall pt had worsening RLE weakness and muscle wasting, which progressed to rest of extremities. Followed with neurology, MRI brain and spine without any findings.     In the ED, pt found to have WBC of 12, +LE in U/A. Afebrile, nontoxic appearing. Received CT C/A/P. (17 Jun 2020 14:49)  Cta s below  Started on broad spectrum antimicrobials  "    Above verified-agree with above unless noted below.    ID consulted     PAST MEDICAL & SURGICAL HISTORY:  HLD (hyperlipidemia)  Type 2 diabetes mellitus  Hypertension  Prolactinoma  Deviated nasal septum  Dyslipidemia  History of tonsillectomy  H/O nasal septoplasty      Social history:  ex   Smoking,  no   ETOH,      IVDU       FAMILY HISTORY:  Family history of Alzheimer's disease  Family history of Hodgkins disease  - Family history of medical problems in all first degree relatives reviewed.None of these were found to be related to patients current illness.    REVIEW OF SYSTEMS  General:+ fatigue no  chills. Fevers absent  Weight loss 80 lbs over last 6 months-unintentional    Skin:No rash  	  Ophthalmologic:Denies any visual complaints,discharge redness or photophobia  	  ENMT:No nasal discharge,headache,sinus congestion or throat pain.No dental complaints    Respiratory and Thorax:No cough,sputum or chest pain.Denies shortness of breath  	  Cardiovascular:	No chest pain,palpitaions or dizziness    Gastrointestinal:	NO nausea,abdominal pain or diarrhea.    Genitourinary:	No dysuria,frequency. No flank pain  + hematuria    Musculoskeletal:	No joint swelling or pain.No weakness    Neurological:No confusion,diziness.No extremity weakness.No bladder or bowel incontinence	    Psychiatric:No delusions or hallucinations	    Hematology/Lymphatics:	No LN swelling.No gum bleeding     Endocrine:	No recent weight gain or loss.No abnormal heat/cold intolerance    Allergic/Immunologic:	No hives or rash   Allergies    No Known Allergies    Intolerances        Antimicrobials:    ertapenem  IVPB 1000 milliGRAM(s) IV Intermittent every 24 hours      Prior Antimicrobials:  MEDICATIONS  (STANDING):    ertapenem  IVPB   120 mL/Hr IV Intermittent (06-19-20 @ 11:58)    piperacillin/tazobactam IVPB.   200 mL/Hr IV Intermittent (06-17-20 @ 18:26)    piperacillin/tazobactam IVPB..   25 mL/Hr IV Intermittent (06-19-20 @ 02:43)   25 mL/Hr IV Intermittent (06-18-20 @ 18:25)   25 mL/Hr IV Intermittent (06-18-20 @ 09:27)   25 mL/Hr IV Intermittent (06-18-20 @ 01:10)      Other medications reviewed      Vital Signs Last 24 Hrs  T(C): 36.4 (19 Jun 2020 09:38), Max: 36.6 (19 Jun 2020 00:02)  T(F): 97.6 (19 Jun 2020 09:38), Max: 97.9 (19 Jun 2020 00:02)  HR: 70 (19 Jun 2020 09:38) (70 - 77)  BP: 133/66 (19 Jun 2020 09:38) (117/70 - 133/66)  BP(mean): --  RR: 18 (19 Jun 2020 09:38) (18 - 18)  SpO2: 97% (19 Jun 2020 09:38) (97% - 99%)    PHYSICAL EXAM:Pleasant patient in no acute distress.      Constitutional:Comfortable.Awake and alert  + cachexia     Eyes:PERRL EOMI.NO discharge or conjunctival injection    ENMT:No sinus tenderness.No thrush.No pharyngeal exudate or erythema.Fair dental hygiene    Neck:Supple,No LN,no JVD      Respiratory:Good air entry bilaterally,CTA    Cardiovascular:S1 S2 wnl, No murmurs,rub or gallops    Gastrointestinal:Soft BS(+) no tenderness no masses ,No rebound or guarding    Genitourinary:No CVA tendereness     No scrotal or epidimal tenderness  Grewla     Extremities:No cyanosis,clubbing or edema.    Vascular:peripheral pulses felt    Neurological:AAO X 3,No grossly focal deficits    Skin:No rash     Lymph Nodes:No palpable LNs    Musculoskeletal:No joint swelling or LOM    Psychiatric:Affect normal.          Labs:                            9.6    9.58  )-----------( 268      ( 19 Jun 2020 07:12 )             29.7     WBC Count: 9.58 (06-19-20 @ 07:12)  WBC Count: 10.81 (06-18-20 @ 13:31)  WBC Count: 12.61 (06-17-20 @ 14:04)          06-19    133<L>  |  99  |  19  ----------------------------<  106<H>  3.6   |  23  |  1.02    Creatinine, Serum: 1.02 mg/dL (06-19-20 @ 06:56)  Creatinine, Serum: 0.98 mg/dL (06-18-20 @ 13:31)  Creatinine, Serum: 1.02 mg/dL (06-17-20 @ 14:04)    Ca    8.7      19 Jun 2020 06:56  Phos  3.7     06-19  Mg     2.0     06-19    TPro  6.3  /  Alb  2.9<L>  /  TBili  0.5  /  DBili  x   /  AST  16  /  ALT  16  /  AlkPhos  152<H>  06-19            Culture - Blood (collected 17 Jun 2020 17:00)  Source: .Blood Blood-Peripheral  Preliminary Report (18 Jun 2020 17:01):    No growth to date.    Culture - Blood (collected 17 Jun 2020 16:59)  Source: .Blood Blood-Venous  Preliminary Report (18 Jun 2020 17:01):    No growth to date.    Culture - Urine (collected 17 Jun 2020 16:58)  Source: .Urine Clean Catch (Midstream)  Final Report (18 Jun 2020 13:32):    No growth    Urine Microscopic-Add On (NC) (06.17.20 @ 14:03)    Epithelial Cells: 0 /hpf    Bacteria: Moderate    Red Blood Cell - Urine: 7 /hpf    Hyaline Casts: 12 /lpf    White Blood Cell - Urine: 130 /HPF        < from: CT Abdomen and Pelvis w/ Oral Cont and w/ IV Cont (06.17.20 @ 15:10) >  KIDNEYS/URETERS: There is an ill-defined hypodense area in the lower pole of the left kidney. There is associated left perirenal fat stranding and fluid within the Gerota's fascia    BLADDER: There is air within the bladder lumen and wall as well as mural thickening compatible with emphysematous cystitis  REPRODUCTIVE ORGANS: Prostate within normal limits.    BOWEL: There is a large colonic stool burden. The stomach is markedly distended. Appendix is normal.  PERITONEUM: No ascites.  VESSELS: Atherosclerotic changes.  RETROPERITONEUM/LYMPH NODES: No lymphadenopathy.    ABDOMINAL WALL: Within normal limits.  BONES: Old right third rib fracture. Osseous degenerative change.    IMPRESSION:   Emphysematous cystitis with left renal abscess.            < end of copied text >      Imaging studies(films) were independently reviewed.Findings as detailed in report above

## 2020-06-19 NOTE — PROGRESS NOTE ADULT - PROBLEM SELECTOR PLAN 4
Confusion iso FTT, possible UTI.   - cw wellbutrin  - cw sertraline   - frequent reorientation 2/2 ALS? As above iso FTT with global weakness  - dietician consult  - PT consult  - neurologist fu outpatient

## 2020-06-19 NOTE — PROGRESS NOTE ADULT - PROBLEM SELECTOR PLAN 2
Hx of 6 months of weight loss, FTT 2/2 neuromuscular disorder (ALS) vs paraneoplastic (less likely due to negative CT findings vs infection (Endocarditis? - less likely due to TTE results, no blood cx, no murmur) vs psychiatric illness  - Physical therapy evaluation  - UTI treatment as above  - continue with wellbutrin, sertraline   - follow up blood cultures  - TTE without new findings  - discussed with Neurologist outpatient Dr. Winters; will need EMG as outpatient , no further workup for now. Repeat CT on 6/24  antibiotics as above.   ID consult   iso DM

## 2020-06-19 NOTE — PROGRESS NOTE ADULT - ATTENDING COMMENTS
Pt seen at bedside. Abdominal pain improving, afebrile. On Zosyn. ID consulted, d/w Dr. Méndez. Plan to transition to [facility] and repeat CT A/P Sunday evening. If not improved may require biopsy.

## 2020-06-19 NOTE — PROGRESS NOTE ADULT - ASSESSMENT
63 yo M pmhx DM2 on insulin, HTN, CAD w. LAD stent and restent after thrombosis, depression presenting with 2 days of hematuria 2/2 cystitis CT a/p findings of 2.5 cm Left renal abscess. Pt also presenting with 6 months of weight loss/FTT 2/2 neuromuscular disorder vs paraneoplastic vs infectious. 63 yo M pmhx DM2 on insulin, HTN, CAD w. LAD stent and restent after thrombosis, depression presenting with 2 days of hematuria 2/2 cystitis CT a/p findings of 2.5 cm Left renal abscess. Pt also presenting with 6 months of weight loss/FTT 2/2 neuromuscular disorder (ALS?) vs paraneoplastic vs infectious.

## 2020-06-20 DIAGNOSIS — R13.10 DYSPHAGIA, UNSPECIFIED: ICD-10-CM

## 2020-06-20 DIAGNOSIS — K21.9 GASTRO-ESOPHAGEAL REFLUX DISEASE WITHOUT ESOPHAGITIS: ICD-10-CM

## 2020-06-20 LAB
ANION GAP SERPL CALC-SCNC: 13 MMOL/L — SIGNIFICANT CHANGE UP (ref 5–17)
BUN SERPL-MCNC: 15 MG/DL — SIGNIFICANT CHANGE UP (ref 7–23)
CALCIUM SERPL-MCNC: 9 MG/DL — SIGNIFICANT CHANGE UP (ref 8.4–10.5)
CHLORIDE SERPL-SCNC: 99 MMOL/L — SIGNIFICANT CHANGE UP (ref 96–108)
CO2 SERPL-SCNC: 22 MMOL/L — SIGNIFICANT CHANGE UP (ref 22–31)
CREAT SERPL-MCNC: 0.85 MG/DL — SIGNIFICANT CHANGE UP (ref 0.5–1.3)
CULTURE RESULTS: NO GROWTH — SIGNIFICANT CHANGE UP
GLUCOSE BLDC GLUCOMTR-MCNC: 103 MG/DL — HIGH (ref 70–99)
GLUCOSE BLDC GLUCOMTR-MCNC: 128 MG/DL — HIGH (ref 70–99)
GLUCOSE BLDC GLUCOMTR-MCNC: 153 MG/DL — HIGH (ref 70–99)
GLUCOSE BLDC GLUCOMTR-MCNC: 83 MG/DL — SIGNIFICANT CHANGE UP (ref 70–99)
GLUCOSE SERPL-MCNC: 82 MG/DL — SIGNIFICANT CHANGE UP (ref 70–99)
HCT VFR BLD CALC: 31 % — LOW (ref 39–50)
HGB BLD-MCNC: 10.2 G/DL — LOW (ref 13–17)
MAGNESIUM SERPL-MCNC: 2 MG/DL — SIGNIFICANT CHANGE UP (ref 1.6–2.6)
MCHC RBC-ENTMCNC: 26.4 PG — LOW (ref 27–34)
MCHC RBC-ENTMCNC: 32.9 GM/DL — SIGNIFICANT CHANGE UP (ref 32–36)
MCV RBC AUTO: 80.1 FL — SIGNIFICANT CHANGE UP (ref 80–100)
NRBC # BLD: 0 /100 WBCS — SIGNIFICANT CHANGE UP (ref 0–0)
PHOSPHATE SERPL-MCNC: 3.3 MG/DL — SIGNIFICANT CHANGE UP (ref 2.5–4.5)
PLATELET # BLD AUTO: 301 K/UL — SIGNIFICANT CHANGE UP (ref 150–400)
POTASSIUM SERPL-MCNC: 3.5 MMOL/L — SIGNIFICANT CHANGE UP (ref 3.5–5.3)
POTASSIUM SERPL-SCNC: 3.5 MMOL/L — SIGNIFICANT CHANGE UP (ref 3.5–5.3)
RBC # BLD: 3.87 M/UL — LOW (ref 4.2–5.8)
RBC # FLD: 14.2 % — SIGNIFICANT CHANGE UP (ref 10.3–14.5)
SODIUM SERPL-SCNC: 134 MMOL/L — LOW (ref 135–145)
SPECIMEN SOURCE: SIGNIFICANT CHANGE UP
WBC # BLD: 8.48 K/UL — SIGNIFICANT CHANGE UP (ref 3.8–10.5)
WBC # FLD AUTO: 8.48 K/UL — SIGNIFICANT CHANGE UP (ref 3.8–10.5)

## 2020-06-20 PROCEDURE — 99232 SBSQ HOSP IP/OBS MODERATE 35: CPT

## 2020-06-20 PROCEDURE — 99233 SBSQ HOSP IP/OBS HIGH 50: CPT | Mod: GC

## 2020-06-20 PROCEDURE — 99233 SBSQ HOSP IP/OBS HIGH 50: CPT

## 2020-06-20 PROCEDURE — 74230 X-RAY XM SWLNG FUNCJ C+: CPT | Mod: 26

## 2020-06-20 RX ADMIN — ERTAPENEM SODIUM 120 MILLIGRAM(S): 1 INJECTION, POWDER, LYOPHILIZED, FOR SOLUTION INTRAMUSCULAR; INTRAVENOUS at 11:43

## 2020-06-20 RX ADMIN — BUPROPION HYDROCHLORIDE 150 MILLIGRAM(S): 150 TABLET, EXTENDED RELEASE ORAL at 11:43

## 2020-06-20 RX ADMIN — HEPARIN SODIUM 5000 UNIT(S): 5000 INJECTION INTRAVENOUS; SUBCUTANEOUS at 21:26

## 2020-06-20 RX ADMIN — Medication 81 MILLIGRAM(S): at 11:43

## 2020-06-20 RX ADMIN — BROMOCRIPTINE MESYLATE 5 MILLIGRAM(S): 5 CAPSULE ORAL at 11:44

## 2020-06-20 RX ADMIN — HEPARIN SODIUM 5000 UNIT(S): 5000 INJECTION INTRAVENOUS; SUBCUTANEOUS at 05:57

## 2020-06-20 RX ADMIN — SERTRALINE 50 MILLIGRAM(S): 25 TABLET, FILM COATED ORAL at 11:43

## 2020-06-20 RX ADMIN — HEPARIN SODIUM 5000 UNIT(S): 5000 INJECTION INTRAVENOUS; SUBCUTANEOUS at 13:28

## 2020-06-20 RX ADMIN — Medication 1: at 12:49

## 2020-06-20 RX ADMIN — ATORVASTATIN CALCIUM 80 MILLIGRAM(S): 80 TABLET, FILM COATED ORAL at 21:26

## 2020-06-20 NOTE — PROGRESS NOTE ADULT - SUBJECTIVE AND OBJECTIVE BOX
PROGRESS NOTE:     CONTACT INFO:  Aston Kohli MD  Internal Medicine PGY3  Pager: [phone] [place]/ [id number] [facility]    Patient is a 64y old  Male who presents with a chief complaint of Hematuria; weight loss (2020 12:35)      SUBJECTIVE / OVERNIGHT EVENTS:  No acute events overnight.   ADDITIONAL REVIEW OF SYSTEMS:    MEDICATIONS  (STANDING):  aspirin enteric coated 81 milliGRAM(s) Oral daily  atorvastatin 80 milliGRAM(s) Oral at bedtime  bromocriptine Capsule 5 milliGRAM(s) Oral daily  buPROPion XL . 150 milliGRAM(s) Oral daily  dextrose 5%. 1000 milliLiter(s) (50 mL/Hr) IV Continuous <Continuous>  dextrose 50% Injectable 12.5 Gram(s) IV Push once  dextrose 50% Injectable 25 Gram(s) IV Push once  dextrose 50% Injectable 25 Gram(s) IV Push once  ertapenem  IVPB 1000 milliGRAM(s) IV Intermittent every 24 hours  heparin   Injectable 5000 Unit(s) SubCutaneous every 8 hours  insulin lispro (HumaLOG) corrective regimen sliding scale   SubCutaneous three times a day before meals  insulin lispro (HumaLOG) corrective regimen sliding scale   SubCutaneous at bedtime  sertraline 50 milliGRAM(s) Oral daily    MEDICATIONS  (PRN):  dextrose 40% Gel 15 Gram(s) Oral once PRN Blood Glucose LESS THAN 70 milliGRAM(s)/deciliter  glucagon  Injectable 1 milliGRAM(s) IntraMuscular once PRN Glucose LESS THAN 70 milligrams/deciliter      CAPILLARY BLOOD GLUCOSE      POCT Blood Glucose.: 113 mg/dL (2020 22:12)  POCT Blood Glucose.: 88 mg/dL (2020 17:15)  POCT Blood Glucose.: 159 mg/dL (2020 12:50)  POCT Blood Glucose.: 122 mg/dL (2020 09:33)    I&O's Summary    2020 07:01  -  2020 07:00  --------------------------------------------------------  IN: 440 mL / OUT: 950 mL / NET: -510 mL        PHYSICAL EXAM:  Vital Signs Last 24 Hrs  T(C): 37.3 (2020 00:59), Max: 37.3 (2020 00:59)  T(F): 99.1 (2020 00:59), Max: 99.1 (2020 00:59)  HR: 70 (2020 00:59) (66 - 70)  BP: 148/78 (2020 00:59) (133/66 - 148/78)  BP(mean): --  RR: 18 (2020 00:59) (18 - 18)  SpO2: 96% (2020 00:59) (96% - 99%)    CONSTITUTIONAL: NAD, well-developed  RESPIRATORY: Normal respiratory effort; lungs are clear to auscultation bilaterally  CARDIOVASCULAR: Regular rate and rhythm, normal S1 and S2, no murmur/rub/gallop; No lower extremity edema; Peripheral pulses are 2+ bilaterally  ABDOMEN: Nontender to palpation, normoactive bowel sounds, no rebound/guarding; No hepatosplenomegaly  MUSCLOSKELETAL: no clubbing or cyanosis of digits; no joint swelling or tenderness to palpation  PSYCH: A+O to person, place, and time; affect appropriate    LABS:                        10.2   8.48  )-----------( 301      ( 2020 07:02 )             31.0     06-20    134<L>  |  99  |  15  ----------------------------<  82  3.5   |  22  |  0.85    Ca    9.0      2020 06:57  Phos  3.3     06-20  Mg     2.0     06-20    TPro  6.3  /  Alb  2.9<L>  /  TBili  0.5  /  DBili  x   /  AST  16  /  ALT  16  /  AlkPhos  152<H>  06-19          Urinalysis Basic - ( 2020 16:43 )    Color: Yellow / Appearance: Slightly Turbid / S.027 / pH: x  Gluc: x / Ketone: Negative  / Bili: Negative / Urobili: 3 mg/dL   Blood: x / Protein: 30 mg/dL / Nitrite: Negative   Leuk Esterase: Large / RBC: 10 /hpf / WBC 41 /HPF   Sq Epi: x / Non Sq Epi: 2 / Bacteria: Negative        Culture - Blood (collected 2020 17:00)  Source: .Blood Blood-Peripheral  Preliminary Report (2020 17:01):    No growth to date.    Culture - Blood (collected 2020 16:59)  Source: .Blood Blood-Venous  Preliminary Report (2020 17:01):    No growth to date.    Culture - Urine (collected 2020 16:58)  Source: .Urine Clean Catch (Midstream)  Final Report (2020 13:32):    No growth        RADIOLOGY & ADDITIONAL TESTS:  Results Reviewed:   Imaging Personally Reviewed:  Electrocardiogram Personally Reviewed:    COORDINATION OF CARE:  Care Discussed with Consultants/Other Providers [Y/N]:  Prior or Outpatient Records Reviewed [Y/N]: PROGRESS NOTE:     CONTACT INFO:  Aston Kohli MD  Internal Medicine PGY3  Pager: [phone] [place]/ [id number] [facility]    Patient is a 64y old  Male who presents with a chief complaint of Hematuria; weight loss (2020 12:35)      SUBJECTIVE / OVERNIGHT EVENTS:  No acute events overnight. afebrile overnight. Plan for repeat imaging of kidney tomorrow.   ADDITIONAL REVIEW OF SYSTEMS: Denies vision changes, nausea, vomiting, diarrhea, chest pain, abd pain, dysuria.     MEDICATIONS  (STANDING):  aspirin enteric coated 81 milliGRAM(s) Oral daily  atorvastatin 80 milliGRAM(s) Oral at bedtime  bromocriptine Capsule 5 milliGRAM(s) Oral daily  buPROPion XL . 150 milliGRAM(s) Oral daily  dextrose 5%. 1000 milliLiter(s) (50 mL/Hr) IV Continuous <Continuous>  dextrose 50% Injectable 12.5 Gram(s) IV Push once  dextrose 50% Injectable 25 Gram(s) IV Push once  dextrose 50% Injectable 25 Gram(s) IV Push once  ertapenem  IVPB 1000 milliGRAM(s) IV Intermittent every 24 hours  heparin   Injectable 5000 Unit(s) SubCutaneous every 8 hours  insulin lispro (HumaLOG) corrective regimen sliding scale   SubCutaneous three times a day before meals  insulin lispro (HumaLOG) corrective regimen sliding scale   SubCutaneous at bedtime  sertraline 50 milliGRAM(s) Oral daily    MEDICATIONS  (PRN):  dextrose 40% Gel 15 Gram(s) Oral once PRN Blood Glucose LESS THAN 70 milliGRAM(s)/deciliter  glucagon  Injectable 1 milliGRAM(s) IntraMuscular once PRN Glucose LESS THAN 70 milligrams/deciliter      CAPILLARY BLOOD GLUCOSE      POCT Blood Glucose.: 113 mg/dL (2020 22:12)  POCT Blood Glucose.: 88 mg/dL (2020 17:15)  POCT Blood Glucose.: 159 mg/dL (2020 12:50)  POCT Blood Glucose.: 122 mg/dL (2020 09:33)    I&O's Summary    2020 07:01  -  2020 07:00  --------------------------------------------------------  IN: 440 mL / OUT: 950 mL / NET: -510 mL        PHYSICAL EXAM:  Vital Signs Last 24 Hrs  T(C): 37.3 (2020 00:59), Max: 37.3 (2020 00:59)  T(F): 99.1 (2020 00:59), Max: 99.1 (2020 00:59)  HR: 70 (2020 00:59) (66 - 70)  BP: 148/78 (2020 00:59) (133/66 - 148/78)  BP(mean): --  RR: 18 (2020 00:59) (18 - 18)  SpO2: 96% (2020 00:59) (96% - 99%)    CONSTITUTIONAL: NAD, well-developed  RESPIRATORY: Normal respiratory effort; lungs are clear to auscultation bilaterally  CARDIOVASCULAR: Regular rate and rhythm, normal S1 and S2, no murmur/rub/gallop; No lower extremity edema; Peripheral pulses are 2+ bilaterally  ABDOMEN: Nontender to palpation, normoactive bowel sounds, no rebound/guarding; No hepatosplenomegaly  MUSCLOSKELETAL: no clubbing or cyanosis of digits; no joint swelling or tenderness to palpation  PSYCH: A+O to person, place, and time; affect appropriate    LABS:                        10.2   8.48  )-----------( 301      ( 2020 07:02 )             31.0     06-20    134<L>  |  99  |  15  ----------------------------<  82  3.5   |  22  |  0.85    Ca    9.0      2020 06:57  Phos  3.3     06-20  Mg     2.0     06-20    TPro  6.3  /  Alb  2.9<L>  /  TBili  0.5  /  DBili  x   /  AST  16  /  ALT  16  /  AlkPhos  152<H>  06-19          Urinalysis Basic - ( 2020 16:43 )    Color: Yellow / Appearance: Slightly Turbid / S.027 / pH: x  Gluc: x / Ketone: Negative  / Bili: Negative / Urobili: 3 mg/dL   Blood: x / Protein: 30 mg/dL / Nitrite: Negative   Leuk Esterase: Large / RBC: 10 /hpf / WBC 41 /HPF   Sq Epi: x / Non Sq Epi: 2 / Bacteria: Negative        Culture - Blood (collected 2020 17:00)  Source: .Blood Blood-Peripheral  Preliminary Report (2020 17:01):    No growth to date.    Culture - Blood (collected 2020 16:59)  Source: .Blood Blood-Venous  Preliminary Report (2020 17:01):    No growth to date.    Culture - Urine (collected 2020 16:58)  Source: .Urine Clean Catch (Midstream)  Final Report (2020 13:32):    No growth        RADIOLOGY & ADDITIONAL TESTS:  Results Reviewed: y  Imaging Personally Reviewed: y  Electrocardiogram Personally Reviewed: y    COORDINATION OF CARE:  Care Discussed with Consultants/Other Providers [Y/N]: y  Prior or Outpatient Records Reviewed [Y/N]:

## 2020-06-20 NOTE — SWALLOW VFSS/MBS ASSESSMENT ADULT - ADDITIONAL RECOMMENDATIONS
Maintain good oral hygiene.  Trial of restorative swallow therapy post d/c in rehab setting pending neuro w/u as therapy is contraindicated for some disease processes.

## 2020-06-20 NOTE — SWALLOW VFSS/MBS ASSESSMENT ADULT - DIAGNOSTIC IMPRESSIONS
Patient presents with an oropharyngeal dysphagia characterized by premature spillage into the pharynx prior to airway closure, delayed trigger of the swallow, laryngeal penetration without retrieval with honey thick liquids, and silent aspiration of purees, solids, nectar thick liquids, and thin liquids. A chin tuck and cued cough was not effective in maintaining airway protection.    Disorders: reduced BOT to posterior pharyngeal wall contact, reduced linguapalatal contact, delay in trigger of the swallow reflex, reduced hyo-laryngeal excursion, reduced epiglottic inversion, reduced laryngeal closure, reduced supraglottic sensation, reduced subglottic sensation.

## 2020-06-20 NOTE — SWALLOW VFSS/MBS ASSESSMENT ADULT - SLP GENERAL OBSERVATIONS
Patient received awake and alert, upright and secure in LYNNE chair, on RA. Able to follow commands for evaluation purposes.

## 2020-06-20 NOTE — SWALLOW VFSS/MBS ASSESSMENT ADULT - SLP PERTINENT HISTORY OF CURRENT PROBLEM
63 yo M pmhx DM2 on insulin, HTN, CAD w. LAD stent and restent after thrombosis, depression presenting with 2 days of hematuria 2/2 cystitis CT a/p findings of 2.5 cm Left renal abscess. Pt also presenting with 6 months of weight loss/FTT 2/2 neuromuscular disorder vs paraneoplastic vs infectious. Of note, pt has had failure to thrive over last 6 months with around 80lb weight loss. Started around 1 year ago with global weakness and frequent falls. In the fall pt had worsening RLE weakness and muscle wasting, which progressed to rest of extremities. Followed with neurology, MRI brain and spine without any findings. In the ED, pt found to have WBC of 12, +LE in U/A. Afebrile, nontoxic appearing. CT chest: LUNGS AND LARGE AIRWAYS: Patent central airways. Trace left pleural effusion and basilar atelectasis. Linear atelectasis or scarring left lower lobe. IMPRESSION: Emphysematous cystitis with left renal abscess. 6/19: medicine concerned for ?ALS.

## 2020-06-20 NOTE — PROGRESS NOTE ADULT - ASSESSMENT
65 yo M pmhx DM2 on insulin, HTN, CAD w. LAD stent and restent after thrombosis, depression presenting with 2 days of hematuria 2/2 cystitis CT a/p findings of 2.5 cm Left renal abscess. Pt also presenting with 6 months of weight loss/FTT 2/2 neuromuscular disorder (ALS?) vs paraneoplastic vs infectious.

## 2020-06-20 NOTE — PROGRESS NOTE ADULT - PROBLEM SELECTOR PLAN 3
Hx of 6 months of weight loss, FTT 2/2 neuromuscular disorder (ALS) vs paraneoplastic (less likely due to negative CT findings vs infection (Endocarditis? - less likely due to TTE results, no blood cx, no murmur) vs psychiatric illness  - Physical therapy evaluation  - UTI treatment as above  - continue with wellbutrin, sertraline   - TTE without new findings  - discussed with Neurologist outpatient Dr. Winters; will need EMG as outpatient , no further workup for now. Evaluated by s/s  - reports persistently aspirating, recommending NPO at this time  - will discuss with pt and family

## 2020-06-20 NOTE — PROGRESS NOTE ADULT - PROBLEM SELECTOR PLAN 5
resolved  Confusion iso FTT, possible UTI.   - cw wellbutrin  - cw sertraline   - frequent reorientation 2/2 ALS? As above iso FTT with global weakness  - dietician consult  - PT consult  - needs neuro consult

## 2020-06-20 NOTE — SWALLOW VFSS/MBS ASSESSMENT ADULT - RECOMMENDED CONSISTENCY
NPO, with non-oral nutrition/hydration/medications. NPO, with non-oral nutrition/hydration/medications.    Discussed recommendations with patient who stated he does not wish to pursue alternative means of nutrition (e.g., PEG) despite risks/complications of aspiration. MD Kohli (T3) informed of same. Suggest GOC conversation with MD to confirm and document pt/family wishes re: provision of nutrition.

## 2020-06-20 NOTE — CONSULT NOTE ADULT - SUBJECTIVE AND OBJECTIVE BOX
CC: dysphagia    HPI: 63yo male with PMHx DM2 on insulin, HTN, CAD w. LAD stent/restent after thrombosis, depression, admitted for hematuria. ENT consulted for dysphagia. Pt states dysphagia and hoarseness began 3 months ago. He states he is able to tolerate regular diet. Pt denies improvement or worsening of symptoms. He also admits to weight loss over the past 6 months. Pt denies fever, chills, n/v, HA, SOB, odynophagia, hemoptysis.      PAST MEDICAL & SURGICAL HISTORY:  HLD (hyperlipidemia)  Type 2 diabetes mellitus  Hypertension  Prolactinoma  Deviated nasal septum  Dyslipidemia  History of tonsillectomy  H/O nasal septoplasty    Allergies    No Known Allergies    Intolerances      MEDICATIONS  (STANDING):  aspirin enteric coated 81 milliGRAM(s) Oral daily  atorvastatin 80 milliGRAM(s) Oral at bedtime  bromocriptine Capsule 5 milliGRAM(s) Oral daily  buPROPion XL . 150 milliGRAM(s) Oral daily  dextrose 5%. 1000 milliLiter(s) (50 mL/Hr) IV Continuous <Continuous>  dextrose 50% Injectable 12.5 Gram(s) IV Push once  dextrose 50% Injectable 25 Gram(s) IV Push once  dextrose 50% Injectable 25 Gram(s) IV Push once  ertapenem  IVPB 1000 milliGRAM(s) IV Intermittent every 24 hours  heparin   Injectable 5000 Unit(s) SubCutaneous every 8 hours  insulin lispro (HumaLOG) corrective regimen sliding scale   SubCutaneous three times a day before meals  insulin lispro (HumaLOG) corrective regimen sliding scale   SubCutaneous at bedtime  sertraline 50 milliGRAM(s) Oral daily    MEDICATIONS  (PRN):  dextrose 40% Gel 15 Gram(s) Oral once PRN Blood Glucose LESS THAN 70 milliGRAM(s)/deciliter  glucagon  Injectable 1 milliGRAM(s) IntraMuscular once PRN Glucose LESS THAN 70 milligrams/deciliter      Social History: never smoker    Family history: Pt denies any significant family history     ROS:   ENT: all negative except as noted in HPI   CV: denies palpitations  Pulm: denies SOB, cough, hemoptysis  GI: denies change in apetite, indigestion, n/v  : denies pertinent urinary symptoms, urgency  Neuro: denies numbness/tingling, loss of sensation  Psych: denies anxiety  MS: denies muscle weakness, instability  Heme: denies easy bruising or bleeding  Endo: denies heat/cold intolerance, excessive sweating  Vascular: denies LE edema    Vital Signs Last 24 Hrs  T(C): 36.3 (20 Jun 2020 16:16), Max: 37.3 (20 Jun 2020 00:59)  T(F): 97.4 (20 Jun 2020 16:16), Max: 99.1 (20 Jun 2020 00:59)  HR: 71 (20 Jun 2020 16:16) (70 - 75)  BP: 136/78 (20 Jun 2020 16:16) (136/78 - 148/78)  BP(mean): --  RR: 18 (20 Jun 2020 16:16) (18 - 18)  SpO2: 94% (20 Jun 2020 16:16) (92% - 96%)                          10.2   8.48  )-----------( 301      ( 20 Jun 2020 07:02 )             31.0    06-20    134<L>  |  99  |  15  ----------------------------<  82  3.5   |  22  |  0.85    Ca    9.0      20 Jun 2020 06:57  Phos  3.3     06-20  Mg     2.0     06-20    TPro  6.3  /  Alb  2.9<L>  /  TBili  0.5  /  DBili  x   /  AST  16  /  ALT  16  /  AlkPhos  152<H>  06-19       PHYSICAL EXAM:  Gen: NAD, + hoarseness   Skin: No rashes, bruises, or lesions  Head: Normocephalic, Atraumatic  Face: no edema, erythema, or fluctuance. Parotid glands soft without mass  Eyes: no scleral injection  Nose: Nares bilaterally patent, no discharge  Mouth: No Stridor / Drooling / Trismus.  Mucosa moist, tongue/uvula midline, oropharynx clear  Neck: Flat, supple, no lymphadenopathy, trachea midline, no masses  Lymphatic: No lymphadenopathy  Resp: breathing easily, no stridor  CV: no peripheral edema/cyanosis  GI: nondistended   Peripheral vascular: no JVD or edema  Neuro: facial nerve intact, no facial droop      Fiberoptic Indirect laryngoscopy:  (Scope #2 used)  Reason for Laryngoscopy: dysphagia, hoarseness    Patient was unable to cooperate with mirror.  Nasopharynx, oropharynx, and hypopharynx clear, no bleeding. Tongue base, posterior pharyngeal wall, vallecula, epiglottis, and subglottis appear normal. + post cricoid edema and erythema consistent with GERD, small glottic gap. Otherwise, no erythema, edema, pooling of secretions, masses or lesions. Airway patent, no foreign body visualized. No glottic/supraglottic edema. True vocal cords, arytenoids, vestibular folds, ventricles, pyriform sinuses, and aryepiglottic folds appear normal bilaterally. Vocal cords mobile b/l.

## 2020-06-20 NOTE — PROGRESS NOTE ADULT - PROBLEM SELECTOR PLAN 1
Pt presenting with 2 days of hematuria 2/2 cystitis. 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 received 2 days of cipro as outpatient  - U/A with LE, proteins, moderate blood  - cont invanz, f/u id rec  - urology consult; fu recommendations  - will need repeat imaging to monitor course of abscess on 6/24; no further intervention at this time .

## 2020-06-20 NOTE — PROGRESS NOTE ADULT - PROBLEM SELECTOR PLAN 4
2/2 ALS? As above iso FTT with global weakness  - dietician consult  - PT consult  - neurologist fu outpatient Hx of 6 months of weight loss, FTT 2/2 neuromuscular disorder (ALS) vs paraneoplastic (less likely due to negative CT findings vs infection (Endocarditis? - less likely due to TTE results, no blood cx, no murmur) vs psychiatric illness  - Physical therapy evaluation  - UTI treatment as above  - continue with wellbutrin, sertraline   - TTE without new findings  - discussed with Neurologist outpatient Dr. Winters; will need EMG as outpatient , no further workup for now.

## 2020-06-20 NOTE — PROGRESS NOTE ADULT - SUBJECTIVE AND OBJECTIVE BOX
INFECTIOUS DISEASES FOLLOW UP--Trent Orozco MD  Pager [phone]    This is a follow up note for this  64y Male with  Disorientation  presumed R renal abscess, UTI but paucity of  symptoms.  No fevers/dysuria.  WBC count normalized.  bc and uc neg    Further ROS:  CONSTITUTIONAL:  No fever, fair appetite  CARDIOVASCULAR:  No chest pain or palpitations  RESPIRATORY:  No dyspnea  GASTROINTESTINAL:  No nausea, vomiting, diarrhea, or abdominal pain    Allergies  No Known Allergies    ANTIBIOTICS/RELEVANT:  antimicrobials  ertapenem  IVPB 1000 milliGRAM(s) IV Intermittent every 24 hours    OTHER:  aspirin enteric coated 81 milliGRAM(s) Oral daily  atorvastatin 80 milliGRAM(s) Oral at bedtime  bromocriptine Capsule 5 milliGRAM(s) Oral daily  buPROPion XL . 150 milliGRAM(s) Oral daily  dextrose 40% Gel 15 Gram(s) Oral once PRN  dextrose 5%. 1000 milliLiter(s) IV Continuous <Continuous>  dextrose 50% Injectable 12.5 Gram(s) IV Push once  dextrose 50% Injectable 25 Gram(s) IV Push once  dextrose 50% Injectable 25 Gram(s) IV Push once  glucagon  Injectable 1 milliGRAM(s) IntraMuscular once PRN  heparin   Injectable 5000 Unit(s) SubCutaneous every 8 hours  insulin lispro (HumaLOG) corrective regimen sliding scale   SubCutaneous three times a day before meals  insulin lispro (HumaLOG) corrective regimen sliding scale   SubCutaneous at bedtime  sertraline 50 milliGRAM(s) Oral daily    Objective:  Vital Signs Last 24 Hrs  T(C): 37.3 (2020 00:59), Max: 37.3 (2020 00:59)  T(F): 99.1 (2020 00:59), Max: 99.1 (2020 00:59)  HR: 70 (2020 00:59) (66 - 70)  BP: 148/78 (2020 00:59) (133/66 - 148/78)  BP(mean): --  RR: 18 (2020 00:59) (18 - 18)  SpO2: 96% (2020 00:59) (96% - 99%)    PHYSICAL EXAM:  frail, disheveled, chronically ill appearing  pale  Constitutional:no acute distress  Eyes:DWAYNE, EOMI  Ear/Nose/Throat: no oral lesions, 	  Respiratory: clear BL  Cardiovascular: S1S2  Gastrointestinal:soft, (+) BS, no tenderness no cva pain  Extremities:no e/e/c  No Lymphadenopathy  IV sites not inflammed.    LABS:                        10.2   8.48  )-----------( 301      ( 2020 07:02 )             31.0     06-20    134<L>  |  99  |  15  ----------------------------<  82  3.5   |  22  |  0.85    Ca    9.0      2020 06:57  Phos  3.3     06-20  Mg     2.0     06-20    TPro  6.3  /  Alb  2.9<L>  /  TBili  0.5  /  DBili  x   /  AST  16  /  ALT  16  /  AlkPhos  152<H>  06-19      Urinalysis Basic - ( 2020 16:43 )    Color: Yellow / Appearance: Slightly Turbid / S.027 / pH: x  Gluc: x / Ketone: Negative  / Bili: Negative / Urobili: 3 mg/dL   Blood: x / Protein: 30 mg/dL / Nitrite: Negative   Leuk Esterase: Large / RBC: 10 /hpf / WBC 41 /HPF   Sq Epi: x / Non Sq Epi: 2 / Bacteria: Negative    MICROBIOLOGY:  bc and uc neg

## 2020-06-20 NOTE — PROGRESS NOTE ADULT - ASSESSMENT
imp/rx:  chronically ill.  non acutely ill.  disconnect of symptoms/imaging and cultures.  But wbc is normalized.    continue current abx.  interval re-imaging.

## 2020-06-20 NOTE — PROGRESS NOTE ADULT - PROBLEM SELECTOR PLAN 6
A1c 7.7, on insulin and orals  - has not been taking insulin the last 2 weeks  - GEORGI and premeals resolved  Confusion iso FTT, possible UTI.   - cw wellbutrin  - cw sertraline   - frequent reorientation

## 2020-06-20 NOTE — PROGRESS NOTE ADULT - ASSESSMENT
64y Male pmhx DM2 on insulin, HTN, CAD w. LAD stent/restent after thrombosis, depression. Admitted for hematuria, weight loss. Pt reports  hematuria without clots x 2days. had CT show emphysematous cystitis and L renal abscess. Currently AVSS, Yellow urine with griffith in place, WBC 12.6. normal Cr level and + UA    Reassessed 6/20, primary team with concern for potential malignancy rather than L renal abscess.      Plan  - Continue IV Abx, f/u Cultures and sensitivity  - Continue griffith until after re-imaged at 1 week  - Monitor for fevers, trend WBC/Cr  - Rec repeat CT with and without contrast to eval renal abscess vs malignancy    Discussed with Dr Bravo

## 2020-06-20 NOTE — PROGRESS NOTE ADULT - PROBLEM SELECTOR PLAN 8
off antihypertensives Hx of CAD, Xanthelasma on physical exam  - cw atorvastatin 80mg    HTN  off antihypertensives

## 2020-06-20 NOTE — SWALLOW VFSS/MBS ASSESSMENT ADULT - ORAL PHASE
Incomplete tongue to palate contact/Uncontrolled bolus / spillover in radha-pharynx Uncontrolled bolus / spillover in hypopharynx/Incomplete tongue to palate contact/Uncontrolled bolus / spillover in radha-pharynx

## 2020-06-20 NOTE — SWALLOW VFSS/MBS ASSESSMENT ADULT - ORAL PHASE COMMENTS
Spillage to the valleculae. Spillage to the valleculae with occasional passive spillover to the pyriform sinus.

## 2020-06-21 LAB
ANION GAP SERPL CALC-SCNC: 12 MMOL/L — SIGNIFICANT CHANGE UP (ref 5–17)
BUN SERPL-MCNC: 20 MG/DL — SIGNIFICANT CHANGE UP (ref 7–23)
CALCIUM SERPL-MCNC: 9.1 MG/DL — SIGNIFICANT CHANGE UP (ref 8.4–10.5)
CHLORIDE SERPL-SCNC: 102 MMOL/L — SIGNIFICANT CHANGE UP (ref 96–108)
CO2 SERPL-SCNC: 25 MMOL/L — SIGNIFICANT CHANGE UP (ref 22–31)
CREAT SERPL-MCNC: 0.89 MG/DL — SIGNIFICANT CHANGE UP (ref 0.5–1.3)
GLUCOSE BLDC GLUCOMTR-MCNC: 154 MG/DL — HIGH (ref 70–99)
GLUCOSE BLDC GLUCOMTR-MCNC: 159 MG/DL — HIGH (ref 70–99)
GLUCOSE BLDC GLUCOMTR-MCNC: 186 MG/DL — HIGH (ref 70–99)
GLUCOSE BLDC GLUCOMTR-MCNC: 231 MG/DL — HIGH (ref 70–99)
GLUCOSE SERPL-MCNC: 114 MG/DL — HIGH (ref 70–99)
HCT VFR BLD CALC: 32.1 % — LOW (ref 39–50)
HCV RNA FLD QL NAA+PROBE: SIGNIFICANT CHANGE UP
HCV RNA SPEC QL PROBE+SIG AMP: SIGNIFICANT CHANGE UP
HGB BLD-MCNC: 10.3 G/DL — LOW (ref 13–17)
MAGNESIUM SERPL-MCNC: 2 MG/DL — SIGNIFICANT CHANGE UP (ref 1.6–2.6)
MCHC RBC-ENTMCNC: 26.1 PG — LOW (ref 27–34)
MCHC RBC-ENTMCNC: 32.1 GM/DL — SIGNIFICANT CHANGE UP (ref 32–36)
MCV RBC AUTO: 81.3 FL — SIGNIFICANT CHANGE UP (ref 80–100)
NRBC # BLD: 0 /100 WBCS — SIGNIFICANT CHANGE UP (ref 0–0)
PHOSPHATE SERPL-MCNC: 3.2 MG/DL — SIGNIFICANT CHANGE UP (ref 2.5–4.5)
PLATELET # BLD AUTO: 329 K/UL — SIGNIFICANT CHANGE UP (ref 150–400)
POTASSIUM SERPL-MCNC: 4 MMOL/L — SIGNIFICANT CHANGE UP (ref 3.5–5.3)
POTASSIUM SERPL-SCNC: 4 MMOL/L — SIGNIFICANT CHANGE UP (ref 3.5–5.3)
RBC # BLD: 3.95 M/UL — LOW (ref 4.2–5.8)
RBC # FLD: 14.3 % — SIGNIFICANT CHANGE UP (ref 10.3–14.5)
SODIUM SERPL-SCNC: 139 MMOL/L — SIGNIFICANT CHANGE UP (ref 135–145)
WBC # BLD: 9.98 K/UL — SIGNIFICANT CHANGE UP (ref 3.8–10.5)
WBC # FLD AUTO: 9.98 K/UL — SIGNIFICANT CHANGE UP (ref 3.8–10.5)

## 2020-06-21 PROCEDURE — 99233 SBSQ HOSP IP/OBS HIGH 50: CPT | Mod: GC

## 2020-06-21 PROCEDURE — 99233 SBSQ HOSP IP/OBS HIGH 50: CPT

## 2020-06-21 RX ORDER — PANTOPRAZOLE SODIUM 20 MG/1
40 TABLET, DELAYED RELEASE ORAL
Refills: 0 | Status: DISCONTINUED | OUTPATIENT
Start: 2020-06-21 | End: 2020-07-09

## 2020-06-21 RX ADMIN — ATORVASTATIN CALCIUM 80 MILLIGRAM(S): 80 TABLET, FILM COATED ORAL at 21:41

## 2020-06-21 RX ADMIN — HEPARIN SODIUM 5000 UNIT(S): 5000 INJECTION INTRAVENOUS; SUBCUTANEOUS at 14:59

## 2020-06-21 RX ADMIN — HEPARIN SODIUM 5000 UNIT(S): 5000 INJECTION INTRAVENOUS; SUBCUTANEOUS at 05:50

## 2020-06-21 RX ADMIN — BROMOCRIPTINE MESYLATE 5 MILLIGRAM(S): 5 CAPSULE ORAL at 12:18

## 2020-06-21 RX ADMIN — HEPARIN SODIUM 5000 UNIT(S): 5000 INJECTION INTRAVENOUS; SUBCUTANEOUS at 21:41

## 2020-06-21 RX ADMIN — BUPROPION HYDROCHLORIDE 150 MILLIGRAM(S): 150 TABLET, EXTENDED RELEASE ORAL at 12:19

## 2020-06-21 RX ADMIN — PANTOPRAZOLE SODIUM 40 MILLIGRAM(S): 20 TABLET, DELAYED RELEASE ORAL at 17:53

## 2020-06-21 RX ADMIN — Medication 81 MILLIGRAM(S): at 12:18

## 2020-06-21 RX ADMIN — ERTAPENEM SODIUM 120 MILLIGRAM(S): 1 INJECTION, POWDER, LYOPHILIZED, FOR SOLUTION INTRAMUSCULAR; INTRAVENOUS at 12:39

## 2020-06-21 RX ADMIN — SERTRALINE 50 MILLIGRAM(S): 25 TABLET, FILM COATED ORAL at 12:19

## 2020-06-21 RX ADMIN — Medication 1: at 18:57

## 2020-06-21 RX ADMIN — Medication 2: at 12:16

## 2020-06-21 NOTE — CONSULT NOTE ADULT - SUBJECTIVE AND OBJECTIVE BOX
HPI:  64M w/ PMH of DM, HTN, CAD w/ LAD stent, depression, presented w/ hematuria. Neurology consulted as patient noted to have 100lb weight loss and diffuse weakness for the past year. He follows w/ Dr. Winters who was planning on doing an EMG on . Primary team and family concerned that patient will still be at rehab and will be unable to get the EMG.     Patient states he first noticed RLE weakness beginning last summer, where he reported several falls. He notes that this weakness progressively worsened since. 3 Months ago, he noticed a change in his voice and more difficulty chewing. Denies any sensory symptoms, language or memory difficulties. No back pain. He reportedly had imaging of his brain and spine with Dr. Winters which were reportedly negative.     63 yo M pmhx DM2 on insulin, HTN, CAD w. LAD stent/restent after thrombosis, depression presenting with 2 days of hematuria. Per outpatient notes, patient, and PCP Dr. Jefferson, pt noted to have maroon colored urine starting on 6/15. Called Dr. Jefferson who recommended hydration and sent for bloodwork the following day. Hematuria worsened the next day, associated with fevers, and some AMS. Pt unable to produce urine sample, but had cbc with wbc with 18 and blood culture. Started on ciprofloxacin. Was told to come to the ED due to continued weakness, hematuria, and fever. Pt denies chest pain, sob, nausea, vomiting, abdominal pain. He has vague urinary symptoms including some burning with urination. He states that he has not had fevers today, and the hematuria has resolved. Had one case of this previously when he was much younger that resolved with antibiotics.       (Stroke only)  NIHSS:   MRS:   ICH:     A 10-system ROS was performed and is negative except for those items noted above and/or in the HPI.    PAST MEDICAL & SURGICAL HISTORY:  HLD (hyperlipidemia)  Type 2 diabetes mellitus  Hypertension  Prolactinoma  Deviated nasal septum  Dyslipidemia  History of tonsillectomy  H/O nasal septoplasty    FAMILY HISTORY:  Family history of Alzheimer's disease  Family history of Hodgkins disease    SOCIAL HISTORY:   T/E/D: No smoke, drink, drugs     MEDICATIONS (HOME):  Home Medications:  aspirin 81 mg oral delayed release tablet: 1 tab(s) orally once a day (2020 17:05)  bromocriptine 5 mg oral capsule: 1 cap(s) orally once a day (2020 17:05)  buPROPion 150 mg/24 hours (XL) oral tablet, extended release: 1 tab(s) orally every 24 hours (2020 17:05)  Lipitor 80 mg oral tablet: 1 tab(s) orally once a day (at bedtime) (2020 17:05)  montelukast 10 mg oral tablet: 1 tab(s) orally once a day (2020 17:04)  sertraline 100 mg oral tablet: 1 tab(s) orally once a day (2020 17:05)  Vitamin D3 2000 intl units (50 mcg) oral capsule: 1 cap(s) orally once a day (2020 17:05)      Exam:   MS: A&Ox3. Language fluent, comprensive, w/ intact repetition. Attentive.   CN: VFF. SARIKA. EOMI. V1-V3 intact. Face symmetric. T/u midline. Normal shrug.   Motor: Full strength throughout  Sensation: intact to PP throughout  Coordination: No dysmetria on FNF or H2S bilaterally   Gait: Deferred     STUDIES & IMAGIN- Na 139   K 4.0   P 3.2   Mg 2.0   Ca 9.1   Cr 0.89 HPI:  64M w/ PMH of DM, HTN, CAD w/ LAD stent, depression, presented w/ hematuria. Neurology consulted as patient noted to have 100lb weight loss and diffuse weakness for the past year. He follows w/ Dr. Winters who was planning on doing an EMG on . Primary team and family concerned that patient will still be at rehab and will be unable to get the EMG.     Patient states he first noticed RLE weakness beginning last summer, where he reported several falls. He notes that this weakness progressively worsened since. 3 Months ago, he noticed a change in his voice and more difficulty chewing. Denies any sensory symptoms, language or memory difficulties. No back pain. He reportedly had imaging of his brain and spine with Dr. Winters which were reportedly negative.     A 10-system ROS was performed and is negative except for those items noted above and/or in the HPI.    PAST MEDICAL & SURGICAL HISTORY:  HLD (hyperlipidemia)  Type 2 diabetes mellitus  Hypertension  Prolactinoma  Deviated nasal septum  Dyslipidemia  History of tonsillectomy  H/O nasal septoplasty    FAMILY HISTORY:  Family history of Alzheimer's disease  Family history of Hodgkins disease    SOCIAL HISTORY:   T/E/D: No smoke, drink, drugs     MEDICATIONS (HOME):  Home Medications:  aspirin 81 mg oral delayed release tablet: 1 tab(s) orally once a day (2020 17:05)  bromocriptine 5 mg oral capsule: 1 cap(s) orally once a day (2020 17:05)  buPROPion 150 mg/24 hours (XL) oral tablet, extended release: 1 tab(s) orally every 24 hours (2020 17:05)  Lipitor 80 mg oral tablet: 1 tab(s) orally once a day (at bedtime) (2020 17:05)  montelukast 10 mg oral tablet: 1 tab(s) orally once a day (2020 17:04)  sertraline 100 mg oral tablet: 1 tab(s) orally once a day (2020 17:05)  Vitamin D3 2000 intl units (50 mcg) oral capsule: 1 cap(s) orally once a day (2020 17:05)    Exam:   MS: A&Ox3. Language fluent, comprensive, w/ intact repetition. Attentive.   CN: VFF. SARIKA. EOMI. V1-V3 intact. L. nasolabial flattening. T/u midline. Normal shrug. Hypophonic speech, flaccid dysarthria.   Motor: Diffuse severe atrophy throughout (legs > arms, R > L).     Shoulder Abduction (C5): R 5/5 --- L 5/5  Elbow flexion (C5/C6): R 5/5 --- L 5/5  Elbow extension(C7): R 5/5 --- L 5/5    Wrist extension(C6): R 5/5 --- L 5/5     Wrist flexion (C8): R 5/5 --- L 5/5    Hip flexion(L1/L2): R 4/5 --- L 5/5                    Knee flexion(S1): R 4/5 --- L 5/5                   Knee extension(L3/L4): R 4+/5 --- L 5/5           Dorsiflexion(L4): R 5-/5 --- L 5/5               Plantarflexion(S1/S2): R 5-/5 --- L 5/5            Sensation: intact to PP throughout  Reflexes: 3+ bilateral bis/naida/brachiaoradialis. 2+ ankles and patellas. No clonus. Toes mute.   Coordination: No dysmetria on FNF or H2S bilaterally   Gait: Deferred     STUDIES & IMAGIN-21 Na 139   K 4.0   P 3.2   Mg 2.0   Ca 9.1   Cr 0.89

## 2020-06-21 NOTE — CHART NOTE - NSCHARTNOTEFT_GEN_A_CORE
Discussed speech and swallow evaluation with patient, for recommendation to be NPO due to MBS results. Discussed reasons and concerns for NPO status, including recommendations for NPO tube-feeds. Explained risks of eating by mouth including hypoxia, aspiration, and sudden death. Pt is AOx4 and understanding of risks of eating by mouth and able to verbalize such risks. Despite this risk, pt wishes to continue eating with dysphagia 1 diet.

## 2020-06-21 NOTE — PROGRESS NOTE ADULT - PROBLEM SELECTOR PLAN 3
Evaluated by s/s  - reports persistently aspirating, recommending NPO at this time  - will discuss with pt and family Evaluated by s/s  - reports persistently aspirating, recommending NPO at this time, however pt requesting soft diet. discussed in separate chart note.   - will discuss with pt and family

## 2020-06-21 NOTE — CONSULT NOTE ADULT - ATTENDING COMMENTS
Patient seen and examined, and case discussed with his outpatient neurologist. Would send paraneoplastic panel ([place] panel) while he is here. EMG to be done as outpatient

## 2020-06-21 NOTE — PROGRESS NOTE ADULT - ASSESSMENT
64y Male pmhx DM2 on insulin, HTN, CAD w. LAD stent/restent after thrombosis, depression. Admitted for hematuria, weight loss. Pt reports  hematuria without clots x 2days. had CT show emphysematous cystitis and L renal lesion more likely abscess than mass.      Plan  - Continue IV Abx, f/u Cultures and sensitivity so far no growth (likely due to pre-admission antibiotics)  - Would re-image 6/22. Can order as renal mass protocol  - Monitor for fevers, trend WBC/Cr    Discussed with Dr Bravo

## 2020-06-21 NOTE — PROGRESS NOTE ADULT - SUBJECTIVE AND OBJECTIVE BOX
Jaziel Isabel, pgy 1  [facility] Team 3  [phone]  After 7pm; page night float    Patient is a 64y old  Male who presents with a chief complaint of Hematuria; weight loss (2020 17:31)      SUBJECTIVE / OVERNIGHT EVENTS:  ADDITIONAL REVIEW OF SYSTEMS:    MEDICATIONS  (STANDING):  aspirin enteric coated 81 milliGRAM(s) Oral daily  atorvastatin 80 milliGRAM(s) Oral at bedtime  bromocriptine Capsule 5 milliGRAM(s) Oral daily  buPROPion XL . 150 milliGRAM(s) Oral daily  dextrose 5%. 1000 milliLiter(s) (50 mL/Hr) IV Continuous <Continuous>  dextrose 50% Injectable 12.5 Gram(s) IV Push once  dextrose 50% Injectable 25 Gram(s) IV Push once  dextrose 50% Injectable 25 Gram(s) IV Push once  ertapenem  IVPB 1000 milliGRAM(s) IV Intermittent every 24 hours  heparin   Injectable 5000 Unit(s) SubCutaneous every 8 hours  insulin lispro (HumaLOG) corrective regimen sliding scale   SubCutaneous three times a day before meals  insulin lispro (HumaLOG) corrective regimen sliding scale   SubCutaneous at bedtime  sertraline 50 milliGRAM(s) Oral daily    MEDICATIONS  (PRN):  dextrose 40% Gel 15 Gram(s) Oral once PRN Blood Glucose LESS THAN 70 milliGRAM(s)/deciliter  glucagon  Injectable 1 milliGRAM(s) IntraMuscular once PRN Glucose LESS THAN 70 milligrams/deciliter      CAPILLARY BLOOD GLUCOSE      POCT Blood Glucose.: 128 mg/dL (2020 21:33)  POCT Blood Glucose.: 103 mg/dL (2020 17:52)  POCT Blood Glucose.: 153 mg/dL (2020 12:41)  POCT Blood Glucose.: 83 mg/dL (2020 08:59)    I&O's Summary    2020 07:01  -  2020 07:00  --------------------------------------------------------  IN: 270 mL / OUT: 750 mL / NET: -480 mL        PHYSICAL EXAM:  Vital Signs Last 24 Hrs  T(C): 36.4 (2020 00:00), Max: 36.4 (2020 08:07)  T(F): 97.6 (2020 00:00), Max: 97.6 (2020 08:07)  HR: 79 (2020 00:00) (71 - 79)  BP: 138/78 (2020 00:00) (136/78 - 147/79)  BP(mean): --  RR: 18 (2020 00:00) (18 - 18)  SpO2: 96% (2020 00:00) (92% - 96%)  CONSTITUTIONAL: NAD, well-developed, well-groomed  EYES: PERRLA; conjunctiva and sclera clear  ENMT: Moist oral mucosa, no pharyngeal injection or exudates; normal dentition  NECK: Supple, no palpable masses; no thyromegaly  RESPIRATORY: Normal respiratory effort; lungs are clear to auscultation bilaterally  CARDIOVASCULAR: Regular rate and rhythm, normal S1 and S2, no murmur/rub/gallop; No lower extremity edema; Peripheral pulses are 2+ bilaterally  ABDOMEN: Nontender to palpation, normoactive bowel sounds, no rebound/guarding; No hepatosplenomegaly  MUSCULOSKELETAL:  Normal gait; no clubbing or cyanosis of digits; no joint swelling or tenderness to palpation  PSYCH: A+O to person, place, and time; affect appropriate  NEUROLOGY: CN 2-12 are intact and symmetric; no gross sensory deficits   SKIN: No rashes; no palpable lesions    LABS:                        10.2   8.48  )-----------( 301      ( 2020 07:02 )             31.0     06-20    134<L>  |  99  |  15  ----------------------------<  82  3.5   |  22  |  0.85    Ca    9.0      2020 06:57  Phos  3.3     06-20  Mg     2.0     06-20            Urinalysis Basic - ( 2020 16:43 )    Color: Yellow / Appearance: Slightly Turbid / S.027 / pH: x  Gluc: x / Ketone: Negative  / Bili: Negative / Urobili: 3 mg/dL   Blood: x / Protein: 30 mg/dL / Nitrite: Negative   Leuk Esterase: Large / RBC: 10 /hpf / WBC 41 /HPF   Sq Epi: x / Non Sq Epi: 2 / Bacteria: Negative        Culture - Urine (collected 2020 01:29)  Source: .Urine Clean Catch (Midstream)  Final Report (2020 21:23):    No growth        RADIOLOGY & ADDITIONAL TESTS:  Results Reviewed:   Imaging Personally Reviewed:  Electrocardiogram Personally Reviewed:    COORDINATION OF CARE:  Care Discussed with Consultants/Other Providers [Y/N]:  Prior or Outpatient Records Reviewed [Y/N]: Jaziel Isabel, pgy 1  [facility] Team 3  [phone]  After 7pm; page night float    Patient is a 64y old  Male who presents with a chief complaint of Hematuria; weight loss (2020 17:31)      SUBJECTIVE / OVERNIGHT EVENTS: Pt seen and examined at bedside. No acute event overnight. No chest pain, sob, fevers, chills. overall feels stronger.   ADDITIONAL REVIEW OF SYSTEMS:  negative unless previously noted.     MEDICATIONS  (STANDING):  aspirin enteric coated 81 milliGRAM(s) Oral daily  atorvastatin 80 milliGRAM(s) Oral at bedtime  bromocriptine Capsule 5 milliGRAM(s) Oral daily  buPROPion XL . 150 milliGRAM(s) Oral daily  dextrose 5%. 1000 milliLiter(s) (50 mL/Hr) IV Continuous <Continuous>  dextrose 50% Injectable 12.5 Gram(s) IV Push once  dextrose 50% Injectable 25 Gram(s) IV Push once  dextrose 50% Injectable 25 Gram(s) IV Push once  ertapenem  IVPB 1000 milliGRAM(s) IV Intermittent every 24 hours  heparin   Injectable 5000 Unit(s) SubCutaneous every 8 hours  insulin lispro (HumaLOG) corrective regimen sliding scale   SubCutaneous three times a day before meals  insulin lispro (HumaLOG) corrective regimen sliding scale   SubCutaneous at bedtime  sertraline 50 milliGRAM(s) Oral daily    MEDICATIONS  (PRN):  dextrose 40% Gel 15 Gram(s) Oral once PRN Blood Glucose LESS THAN 70 milliGRAM(s)/deciliter  glucagon  Injectable 1 milliGRAM(s) IntraMuscular once PRN Glucose LESS THAN 70 milligrams/deciliter      CAPILLARY BLOOD GLUCOSE      POCT Blood Glucose.: 128 mg/dL (2020 21:33)  POCT Blood Glucose.: 103 mg/dL (2020 17:52)  POCT Blood Glucose.: 153 mg/dL (2020 12:41)  POCT Blood Glucose.: 83 mg/dL (2020 08:59)    I&O's Summary    2020 07:01  -  2020 07:00  --------------------------------------------------------  IN: 270 mL / OUT: 750 mL / NET: -480 mL        PHYSICAL EXAM:  Vital Signs Last 24 Hrs  T(C): 36.4 (2020 00:00), Max: 36.4 (2020 08:07)  T(F): 97.6 (2020 00:00), Max: 97.6 (2020 08:07)  HR: 79 (2020 00:00) (71 - 79)  BP: 138/78 (2020 00:00) (136/78 - 147/79)  BP(mean): --  RR: 18 (2020 00:00) (18 - 18)  SpO2: 96% (2020 00:00) (92% - 96%)  	GENERAL: NAD, cachectic   	HEAD:  Atraumatic, Normocephalic  	EYES: EOMI, PERRLA, xanthelasma  	NECK: Supple, No JVD  	CHEST/LUNG: Clear to auscultation bilaterally; No wheeze  	HEART: Regular rate and rhythm; No murmurs, rubs, or gallops  	ABDOMEN: Soft, Nontender, Nondistended; Bowel sounds present  	EXTREMITIES:, No clubbing, cyanosis, or edema, muscle wasting  	PSYCH: AAOx3  	NEUROLOGY: non-focal, 2/5 strength RLE, 4/5 LLE; 5/5 UE  SKIN: No rashes or lesions    LABS:                        10.2   8.48  )-----------( 301      ( 2020 07:02 )             31.0     06-20    134<L>  |  99  |  15  ----------------------------<  82  3.5   |  22  |  0.85    Ca    9.0      2020 06:57  Phos  3.3     06-20  Mg     2.0     06-20            Urinalysis Basic - ( 2020 16:43 )    Color: Yellow / Appearance: Slightly Turbid / S.027 / pH: x  Gluc: x / Ketone: Negative  / Bili: Negative / Urobili: 3 mg/dL   Blood: x / Protein: 30 mg/dL / Nitrite: Negative   Leuk Esterase: Large / RBC: 10 /hpf / WBC 41 /HPF   Sq Epi: x / Non Sq Epi: 2 / Bacteria: Negative        Culture - Urine (collected 2020 01:29)  Source: .Urine Clean Catch (Midstream)  Final Report (2020 21:23):    No growth        RADIOLOGY & ADDITIONAL TESTS:  Results Reviewed:   Imaging Personally Reviewed:  Electrocardiogram Personally Reviewed:    COORDINATION OF CARE:  Care Discussed with Consultants/Other Providers [Y/N]:  Prior or Outpatient Records Reviewed [Y/N]:

## 2020-06-21 NOTE — PROGRESS NOTE ADULT - ASSESSMENT
63 yo M pmhx DM2 on insulin, HTN, CAD w. LAD stent and restent after thrombosis, depression presenting with 2 days of hematuria 2/2 cystitis CT a/p findings of 2.5 cm Left renal abscess. Pt also presenting with 6 months of weight loss/FTT 2/2 neuromuscular disorder (ALS?) vs paraneoplastic vs infectious.

## 2020-06-21 NOTE — PROGRESS NOTE ADULT - SUBJECTIVE AND OBJECTIVE BOX
afebrile, asymptomatic    T(F): 97.8, Max: 97.8 (06-21-20 @ 07:46)  HR: 88  BP: 112/70  SpO2: 97%    OUT:    Ureteral Catheter: 750 mL  Total OUT: 750 mL        Gen NAD  Abd soft, NTND   griffith, clear joyce urine      06-21 @ 07:18    WBC 9.98  / Hct 32.1  / SCr 0.89     06-20 @ 07:02    WBC 8.48  / Hct 31.0  / SCr --       .Urine Clean Catch (Midstream)  06-20  No growth      .Blood Blood-Peripheral  06-17  No growth to date.      .Blood Blood-Venous  06-17  No growth to date.      .Urine Clean Catch (Midstream)  06-17  No growth

## 2020-06-21 NOTE — PROGRESS NOTE ADULT - PROBLEM SELECTOR PLAN 7
A1c 7.7, on insulin and orals  - has not been taking insulin the last 2 weeks  - GEORGI and premeals

## 2020-06-21 NOTE — PROGRESS NOTE ADULT - PROBLEM SELECTOR PLAN 1
Pt presenting with 2 days of hematuria 2/2 cystitis. 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 received 2 days of cipro as outpatient  - U/A with LE, proteins, moderate blood  - cont invanz, f/u id rec  - urology consult; fu recommendations  - repeat Ct a/p w/wo IV contrast

## 2020-06-21 NOTE — PROGRESS NOTE ADULT - PROBLEM SELECTOR PLAN 5
2/2 ALS? As above iso FTT with global weakness  - dietician consult  - PT consult  - needs neuro consult

## 2020-06-21 NOTE — CONSULT NOTE ADULT - ASSESSMENT
64M w/ PMH of DM, HTN, CAD w/ LAD stent, depression, presented w/ hematuria. Neurology consulted as patient noted to have 100lb weight loss and diffuse weakness for the past year. He follows w/ Dr. Winters who was planning on doing an EMG on 7/6. Primary team and family concerned that patient will still be at rehab and will be unable to get the EMG.     Impression: Chronic, progressive, RLE monoparesis, diffuse atrophy, 100lb weight loss, flaccid dysarthria, and fasciculations suspicious for motor neuron disease. The preferential RLE involvement may be consistent w/ Flail Leg Syndrome, a slower progressive variant of ALS. Ultimately, patient needs an EMG to confirm. He appears to be cacehxic, w/ atrophy out of proportion to his strength, perhaps suggesting malignancy as a competing and/or coinciding diagnosis. Would continue malignancy workup.     Plan:   [] Given that Dr. Winters has built repertoire with the patient, and plans to do the EMG on 7/6, will not pursue further inpatient workup at this time. If Dr. Winters insists on EMG inpatient, can possibly reach out to Neuromuscular specialist about inpatient EMG.   [] If patient or family seeking second opinion, patient can follow up with Dr. Gama Caba at [address] [place] [address] [phone] 64M w/ PMH of DM, HTN, CAD w/ LAD stent, depression, presented w/ hematuria. Neurology consulted as patient noted to have 100lb weight loss and diffuse weakness for the past year. He follows w/ Dr. Winters who was planning on doing an EMG on 7/6. Primary team and family concerned that patient will still be at rehab and will be unable to get the EMG.     Impression: Chronic, progressive, RLE monoparesis, diffuse atrophy, 100lb weight loss, flaccid dysarthria, and fasciculations suspicious for motor neuron disease. The preferential RLE involvement may be consistent w/ Flail Leg Syndrome, a slower progressive variant of ALS. Ultimately, patient needs an EMG to confirm. He appears to be cacehxic, w/ atrophy out of proportion to his strength, perhaps suggesting malignancy as a competing and/or coinciding diagnosis. Would continue malignancy workup.     Plan:   [] Given that Dr. Winters has built rapport with the patient, and plans to do the EMG on 7/6, will not pursue further inpatient workup at this time. If Dr. Winters insists on EMG inpatient, can possibly reach out to Neuromuscular specialist about inpatient EMG.   [] If patient or family seeking second opinion, patient can follow up with Dr. Gama Caba at [address] [place] [address] [phone]

## 2020-06-21 NOTE — PROGRESS NOTE ADULT - ATTENDING COMMENTS
repeat CT A/P 6/22 per urology   C/w Invanz f/u ID for duration   urology follow up for cystoscopy  neuro eval/ emg   start protonix for gerd per laryngoscopy repeat CT A/P 6/22 per urology   C/w Invanz f/u ID for duration   urology follow up for cystoscopy  neuro eval/ emg   start protonix for gerd per laryngoscopy, on soft diet pt understands risk of aspriation

## 2020-06-21 NOTE — PROGRESS NOTE ADULT - PROBLEM SELECTOR PLAN 6
resolved  Confusion iso FTT, possible UTI.   - cw wellbutrin  - cw sertraline   - frequent reorientation

## 2020-06-22 ENCOUNTER — RESULT REVIEW (OUTPATIENT)
Age: 65
End: 2020-06-22

## 2020-06-22 LAB
ANION GAP SERPL CALC-SCNC: 10 MMOL/L — SIGNIFICANT CHANGE UP (ref 5–17)
BACTERIA BLD CULT: NORMAL
BUN SERPL-MCNC: 21 MG/DL — SIGNIFICANT CHANGE UP (ref 7–23)
CALCIUM SERPL-MCNC: 8.9 MG/DL — SIGNIFICANT CHANGE UP (ref 8.4–10.5)
CHLORIDE SERPL-SCNC: 100 MMOL/L — SIGNIFICANT CHANGE UP (ref 96–108)
CO2 SERPL-SCNC: 27 MMOL/L — SIGNIFICANT CHANGE UP (ref 22–31)
CREAT SERPL-MCNC: 1.07 MG/DL — SIGNIFICANT CHANGE UP (ref 0.5–1.3)
CULTURE RESULTS: SIGNIFICANT CHANGE UP
CULTURE RESULTS: SIGNIFICANT CHANGE UP
GLUCOSE BLDC GLUCOMTR-MCNC: 150 MG/DL — HIGH (ref 70–99)
GLUCOSE BLDC GLUCOMTR-MCNC: 150 MG/DL — HIGH (ref 70–99)
GLUCOSE BLDC GLUCOMTR-MCNC: 177 MG/DL — HIGH (ref 70–99)
GLUCOSE BLDC GLUCOMTR-MCNC: 194 MG/DL — HIGH (ref 70–99)
GLUCOSE SERPL-MCNC: 118 MG/DL — HIGH (ref 70–99)
HCT VFR BLD CALC: 30.1 % — LOW (ref 39–50)
HCV RNA FLD QL NAA+PROBE: SIGNIFICANT CHANGE UP
HCV RNA SPEC QL PROBE+SIG AMP: SIGNIFICANT CHANGE UP
HGB BLD-MCNC: 9.8 G/DL — LOW (ref 13–17)
MAGNESIUM SERPL-MCNC: 2 MG/DL — SIGNIFICANT CHANGE UP (ref 1.6–2.6)
MCHC RBC-ENTMCNC: 26.6 PG — LOW (ref 27–34)
MCHC RBC-ENTMCNC: 32.6 GM/DL — SIGNIFICANT CHANGE UP (ref 32–36)
MCV RBC AUTO: 81.6 FL — SIGNIFICANT CHANGE UP (ref 80–100)
NRBC # BLD: 0 /100 WBCS — SIGNIFICANT CHANGE UP (ref 0–0)
PHOSPHATE SERPL-MCNC: 2.8 MG/DL — SIGNIFICANT CHANGE UP (ref 2.5–4.5)
PLATELET # BLD AUTO: 302 K/UL — SIGNIFICANT CHANGE UP (ref 150–400)
POTASSIUM SERPL-MCNC: 3.9 MMOL/L — SIGNIFICANT CHANGE UP (ref 3.5–5.3)
POTASSIUM SERPL-SCNC: 3.9 MMOL/L — SIGNIFICANT CHANGE UP (ref 3.5–5.3)
RBC # BLD: 3.69 M/UL — LOW (ref 4.2–5.8)
RBC # FLD: 14.6 % — HIGH (ref 10.3–14.5)
SODIUM SERPL-SCNC: 137 MMOL/L — SIGNIFICANT CHANGE UP (ref 135–145)
SPECIMEN SOURCE: SIGNIFICANT CHANGE UP
SPECIMEN SOURCE: SIGNIFICANT CHANGE UP
WBC # BLD: 10.76 K/UL — HIGH (ref 3.8–10.5)
WBC # FLD AUTO: 10.76 K/UL — HIGH (ref 3.8–10.5)

## 2020-06-22 PROCEDURE — 88112 CYTOPATH CELL ENHANCE TECH: CPT | Mod: 26

## 2020-06-22 PROCEDURE — 99232 SBSQ HOSP IP/OBS MODERATE 35: CPT | Mod: GC

## 2020-06-22 PROCEDURE — 74018 RADEX ABDOMEN 1 VIEW: CPT | Mod: 26

## 2020-06-22 PROCEDURE — 99255 IP/OBS CONSLTJ NEW/EST HI 80: CPT

## 2020-06-22 PROCEDURE — 99232 SBSQ HOSP IP/OBS MODERATE 35: CPT

## 2020-06-22 RX ADMIN — HEPARIN SODIUM 5000 UNIT(S): 5000 INJECTION INTRAVENOUS; SUBCUTANEOUS at 13:02

## 2020-06-22 RX ADMIN — HEPARIN SODIUM 5000 UNIT(S): 5000 INJECTION INTRAVENOUS; SUBCUTANEOUS at 21:23

## 2020-06-22 RX ADMIN — ATORVASTATIN CALCIUM 80 MILLIGRAM(S): 80 TABLET, FILM COATED ORAL at 21:23

## 2020-06-22 RX ADMIN — ERTAPENEM SODIUM 120 MILLIGRAM(S): 1 INJECTION, POWDER, LYOPHILIZED, FOR SOLUTION INTRAMUSCULAR; INTRAVENOUS at 13:02

## 2020-06-22 RX ADMIN — PANTOPRAZOLE SODIUM 40 MILLIGRAM(S): 20 TABLET, DELAYED RELEASE ORAL at 05:59

## 2020-06-22 RX ADMIN — Medication 81 MILLIGRAM(S): at 13:02

## 2020-06-22 RX ADMIN — Medication 1: at 18:15

## 2020-06-22 RX ADMIN — BUPROPION HYDROCHLORIDE 150 MILLIGRAM(S): 150 TABLET, EXTENDED RELEASE ORAL at 13:02

## 2020-06-22 RX ADMIN — HEPARIN SODIUM 5000 UNIT(S): 5000 INJECTION INTRAVENOUS; SUBCUTANEOUS at 05:59

## 2020-06-22 RX ADMIN — SERTRALINE 50 MILLIGRAM(S): 25 TABLET, FILM COATED ORAL at 13:02

## 2020-06-22 RX ADMIN — BROMOCRIPTINE MESYLATE 5 MILLIGRAM(S): 5 CAPSULE ORAL at 13:02

## 2020-06-22 NOTE — CDI QUERY NOTE - NSCDIOTHERTXTBX_GEN_ALL_CORE_HH
Patient presented with elevated WBC.  On admission WBC 12K and maroon colored urine, Na+ 133 to 131 (6/18) and glucose 188.  Na+ 137 (6/22).  Patient admitted with (1) Hematuria 2/2 Cystitis with left renal abscess, (2) Failure to thrive in adult, (3) Weakness, Delirium and confusion, possible UTI.  Patient on ertapenem for "genitourinary infection, complicated".    In the Ed patient documented with a baseline of Alert & Oriented x 4.      Patient seen by neurology for "RLE weakness".  As per neuro - "chronic, progressive, RLE monoparesis, diffuse atrophy, 100 lb weight loss, flaccid dysarthria and fasciculations suspicious for motor neuron disease".  Progress note 6/22 from neuro - "asked for second opinion", Impression:  Exa likely indicative of motor neuron disease (ALS)".    Infectious Disease (6/19) & Discharge Note-  "called Dr. Jefferson who recommended hydration and sent for bloodwork the following day.  Hematuria worsened the next day, associated with fevers, and some AMS".  Was told to come to the ED due to continued weakness, hematuria, and fever".        Based on your judgment and above clinical indicators, after study, please select from the diagnoses below for the cause of the "Altered mental status, delirium and confusion" -    (1) Metabolic Encephalopathy  (2) ALS  (3) Other (please specify)  (4) Clinically Undetermined    Thank you for your help,    Lourdes

## 2020-06-22 NOTE — CHART NOTE - NSCHARTNOTEFT_GEN_A_CORE
Nutrition Follow Up Note  Patient seen for: Malnutrition Follow Up     Interim events noted, chart reviewed. Pt admitted c hematuria, renal abscess, on antibiotics. Noted pt S/P MBS on  and recommended for NPO, pt declined any alternate means of nutrition and dysphagia diet and accepted risks of aspiration, placed on soft diet. Noted malignancy work up continues.     Source: pt     Diet : Diet, Soft (20 @ 10:08)      Patient reports: he has been trying to eat as tolerated, reports he had some solid food for dinner last night and was able to finish most of his meal. Reports he would like to have Ensure Enlive in house, will make team aware, noted pt was previously ordered for supplement.         Daily Weight in k.8 (), no new wt at this time       Pertinent Medications: MEDICATIONS  (STANDING):  aspirin enteric coated 81 milliGRAM(s) Oral daily  atorvastatin 80 milliGRAM(s) Oral at bedtime  bromocriptine Capsule 5 milliGRAM(s) Oral daily  buPROPion XL . 150 milliGRAM(s) Oral daily  dextrose 5%. 1000 milliLiter(s) (50 mL/Hr) IV Continuous <Continuous>  dextrose 50% Injectable 12.5 Gram(s) IV Push once  dextrose 50% Injectable 25 Gram(s) IV Push once  dextrose 50% Injectable 25 Gram(s) IV Push once  ertapenem  IVPB 1000 milliGRAM(s) IV Intermittent every 24 hours  heparin   Injectable 5000 Unit(s) SubCutaneous every 8 hours  insulin lispro (HumaLOG) corrective regimen sliding scale   SubCutaneous three times a day before meals  insulin lispro (HumaLOG) corrective regimen sliding scale   SubCutaneous at bedtime  pantoprazole    Tablet 40 milliGRAM(s) Oral before breakfast  sertraline 50 milliGRAM(s) Oral daily    MEDICATIONS  (PRN):  dextrose 40% Gel 15 Gram(s) Oral once PRN Blood Glucose LESS THAN 70 milliGRAM(s)/deciliter  glucagon  Injectable 1 milliGRAM(s) IntraMuscular once PRN Glucose LESS THAN 70 milligrams/deciliter    Pertinent Labs:  @ 07:09: Na 137, BUN 21, Cr 1.07, <H>, K+ 3.9, Phos 2.8, Mg 2.0, Alk Phos --, ALT/SGPT --, AST/SGOT --, HbA1c --    Finger Sticks:  POCT Blood Glucose.: 150 mg/dL ( @ 11:53)  POCT Blood Glucose.: 150 mg/dL ( @ 07:55)  POCT Blood Glucose.: 154 mg/dL ( @ 22:18)  POCT Blood Glucose.: 159 mg/dL ( @ 18:55)  POCT Blood Glucose.: 186 mg/dL ( @ 17:11)  POCT Blood Glucose.: 231 mg/dL ( @ 11:59)      Skin per nursing documentation: no pressure injuries   Edema: none at this time     Estimated Needs:   [x] no change since previous assessment      Previous Nutrition Diagnosis: moderate malnutrition   Nutrition Diagnosis continues at this time, care plan in progress    New Nutrition Diagnosis: none at this time       Recommend  1) Continue c current diet.   2) Recommend Ensure Enlive x 1 daily (350kcal, 20g protein per 8 ounces).   3) Encouraged adequate intake, consuming Prot first at meal times and choosing nutrient dense foods.     Monitoring and Evaluation:     Continue to monitor Nutritional intake, Tolerance to diet prescription, weights, labs, skin integrity    RD remains available upon request and will follow up per protocol  Belkis Lopez MS RD CDN [facility],  #[phone]

## 2020-06-22 NOTE — PROGRESS NOTE ADULT - ASSESSMENT
63 yo M pmhx DM2 on insulin, HTN, CAD w. LAD stent/restent after thrombosis, depression presenting with 2 days of hematuria  Patient with no fevers,chills  Leucocytosis as OP  Admitted CT with emphysematous cystitis and renal lesion ? abscess  But also with 80 lb weight loss over 6 months  In hospital minimal leucocytosis, No fevers  Blood and urine cs negative (though did recieve cipro as OP)    ?UTI-cystitis with early renal abscess  ? Malignancy with superinfection  ? 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    B) Renal mass  ? absces  ? malignancy   ? other etiology  imaging and abx plan as above  Check urine cytology    C)Leucocytosis  likely from above  Plan as detailed above    Will tailor plan for ID issues  per course,results.Will defer to primary team on management of other issues.  Assessment, plan and recommendations as detailed above were discussed with the medical/primary  team    Will Follow.  Beeper [phone] [facility] [id number].   Wknd/afterhours/No response-[phone] or Fellow on call

## 2020-06-22 NOTE — PROGRESS NOTE ADULT - SUBJECTIVE AND OBJECTIVE BOX
Patient is a 64y old  Male who presents with a chief complaint of Hematuria; weight loss (22 Jun 2020 08:57)    Being followed by ID for renal mass, ? emphysematous cystitis     Interval history:denies any urinary complaints  has griffith  no hematuria  No acute events      ROS:  No cough,SOB,CP  No N/V/D./abd pain  No other complaints      Antimicrobials:    ertapenem  IVPB 1000 milliGRAM(s) IV Intermittent every 24 hours    Other medications reviewed    Vital Signs Last 24 Hrs  T(C): 36.8 (06-22-20 @ 08:19), Max: 36.8 (06-22-20 @ 08:19)  T(F): 98.3 (06-22-20 @ 08:19), Max: 98.3 (06-22-20 @ 08:19)  HR: 80 (06-22-20 @ 08:19) (70 - 80)  BP: 133/74 (06-22-20 @ 08:19) (121/72 - 133/74)  BP(mean): --  RR: 18 (06-22-20 @ 08:19) (18 - 18)  SpO2: 90% (06-22-20 @ 08:19) (90% - 96%)    Physical Exam:      cachecxia    HEENT PERRLA EOMI    No oral exudate or erythema    Chest Good AE,CTA    CVS RRR S1 S2 WNl No murmur or rub or gallop    Abd soft BS normal No tenderness no masses  griffith    IV site no erythema tenderness or discharge    CNS AAO X 3 no focal    Lab Data:                          9.8    10.76 )-----------( 302      ( 22 Jun 2020 07:09 )             30.1       06-22    137  |  100  |  21  ----------------------------<  118<H>  3.9   |  27  |  1.07    Ca    8.9      22 Jun 2020 07:09  Phos  2.8     06-22  Mg     2.0     06-22          Culture - Urine (collected 20 Jun 2020 01:29)  Source: .Urine Clean Catch (Midstream)  Final Report (20 Jun 2020 21:23):    No growth    Culture - Blood (collected 17 Jun 2020 17:00)  Source: .Blood Blood-Peripheral  Preliminary Report (18 Jun 2020 17:01):    No growth to date.    Culture - Blood (collected 17 Jun 2020 16:59)  Source: .Blood Blood-Venous  Preliminary Report (18 Jun 2020 17:01):    No growth to date.    Culture - Urine (collected 17 Jun 2020 16:58)  Source: .Urine Clean Catch (Midstream)  Final Report (18 Jun 2020 13:32):    No growth      < from: CT Abdomen and Pelvis w/ Oral Cont and w/ IV Cont (06.17.20 @ 15:10) >    IMPRESSION:   Emphysematous cystitis with left renal abscess.                  < end of copied text >

## 2020-06-22 NOTE — PROGRESS NOTE ADULT - PROBLEM SELECTOR PLAN 4
Hx of 6 months of weight loss, FTT 2/2 neuromuscular disorder (ALS) vs paraneoplastic (less likely due to negative CT findings vs infection (Endocarditis? - less likely due to TTE results, no blood cx, no murmur) vs psychiatric illness  - Physical therapy evaluation  - UTI treatment as above  - continue with wellbutrin, sertraline   - TTE without new findings  - discussed with Neurologist outpatient Dr. Winters; will need EMG as outpatient. House neurology following Hx of 6 months of weight loss, FTT 2/2 neuromuscular disorder (ALS) vs paraneoplastic (less likely due to negative CT findings vs infection (Endocarditis? - less likely due to TTE results, no blood cx, no murmur) vs psychiatric illness  - Physical therapy evaluation  - UTI treatment as above  - continue with wellbutrin, sertraline   - TTE without new findings  - discussed with Neurologist outpatient Dr. Winters; will need EMG as outpatient. House neurology following, gave number for second opinion to be seen inpatient

## 2020-06-22 NOTE — PROGRESS NOTE ADULT - PROBLEM SELECTOR PLAN 1
Pt presenting with 2 days of hematuria 2/2 cystitis. 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 received 2 days of cipro as outpatient  - U/A with LE, proteins, moderate blood  - cont invanz, f/u id rec  - urology consult; fu recommendations  - repeat Ct a/p w/wo IV contrast Pt presenting with 2 days of hematuria 2/2 cystitis. 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 received 2 days of cipro as outpatient  - U/A with LE, proteins, moderate blood  - cont invanz, f/u id rec  - urology consult; fu recommendations  - repeat Ct a/p w/wo IV contrast today

## 2020-06-22 NOTE — CHART NOTE - NSCHARTNOTEFT_GEN_A_CORE
** NEUROLOGY **    Pt seen with neurology attending at bedside.     Asked for second opinion  exam per original neuro consult note      Impression: Exam likely indicative of motor neuron disease (ALS).     - please serum paraneoplastic panel - Dr. Cazares will f/u outpt  - inpt EMG is not routinely done, regardless EMG physician is not available this week. Would f/u with Dr. Winters for EMG.   - aspiration precautions      house neurology attending Dr. Henriquez called and spoke with pt's neurologist Dr. Winters      Please see neuro attending attestation to original consult note for further details.    Thank you for involving us in this pt    Please re-consult PRN ** NEUROLOGY **    Pt seen with neurology attending at bedside.     Asked for second opinion  exam per original neuro consult note      Impression: Exam likely indicative of motor neuron disease (ALS).     - please serum paraneoplastic panel - Dr. Cazares will f/u outpt  - inpt EMG is not routinely done, regardless EMG physician is not available this week. Would f/u with Dr. Winters for EMG.   - aspiration precautions      house neurology attending Dr. Henriquez called and spoke with pt's neurologist Dr. Winters who is in agreement with above      Please see neuro attending attestation to original consult note for further details.    Thank you for involving us in this pt    Please re-consult PRN

## 2020-06-22 NOTE — PROGRESS NOTE ADULT - SUBJECTIVE AND OBJECTIVE BOX
afebrile    T(F): 98.3, Max: 98.3 (06-22-20 @ 08:19)  HR: 80  BP: 133/74  SpO2: 90%    OUT:    Ureteral Catheter: 800 mL  Total OUT: 800 mL        Gen NAD  Abd soft, NTND   griffith clear yellow      06-22 @ 07:09    WBC 10.76 / Hct 30.1  / SCr 1.07     06-21 @ 07:18    WBC 9.98  / Hct 32.1  / SCr 0.89     .Urine Clean Catch (Midstream)  06-20  No growth      .Blood Blood-Peripheral  06-17  No growth to date.      .Blood Blood-Venous  06-17  No growth to date.      .Urine Clean Catch (Midstream)  06-17  No growth

## 2020-06-22 NOTE — PROGRESS NOTE ADULT - SUBJECTIVE AND OBJECTIVE BOX
Jaziel Isabel, pgy 1  [facility] Team 3  [phone]  After 7pm; page night float    Patient is a 64y old  Male who presents with a chief complaint of Hematuria; weight loss (21 Jun 2020 15:43)      SUBJECTIVE / OVERNIGHT EVENTS:  ADDITIONAL REVIEW OF SYSTEMS:    MEDICATIONS  (STANDING):  aspirin enteric coated 81 milliGRAM(s) Oral daily  atorvastatin 80 milliGRAM(s) Oral at bedtime  bromocriptine Capsule 5 milliGRAM(s) Oral daily  buPROPion XL . 150 milliGRAM(s) Oral daily  dextrose 5%. 1000 milliLiter(s) (50 mL/Hr) IV Continuous <Continuous>  dextrose 50% Injectable 12.5 Gram(s) IV Push once  dextrose 50% Injectable 25 Gram(s) IV Push once  dextrose 50% Injectable 25 Gram(s) IV Push once  ertapenem  IVPB 1000 milliGRAM(s) IV Intermittent every 24 hours  heparin   Injectable 5000 Unit(s) SubCutaneous every 8 hours  insulin lispro (HumaLOG) corrective regimen sliding scale   SubCutaneous three times a day before meals  insulin lispro (HumaLOG) corrective regimen sliding scale   SubCutaneous at bedtime  pantoprazole    Tablet 40 milliGRAM(s) Oral before breakfast  sertraline 50 milliGRAM(s) Oral daily    MEDICATIONS  (PRN):  dextrose 40% Gel 15 Gram(s) Oral once PRN Blood Glucose LESS THAN 70 milliGRAM(s)/deciliter  glucagon  Injectable 1 milliGRAM(s) IntraMuscular once PRN Glucose LESS THAN 70 milligrams/deciliter      CAPILLARY BLOOD GLUCOSE      POCT Blood Glucose.: 154 mg/dL (21 Jun 2020 22:18)  POCT Blood Glucose.: 159 mg/dL (21 Jun 2020 18:55)  POCT Blood Glucose.: 186 mg/dL (21 Jun 2020 17:11)  POCT Blood Glucose.: 231 mg/dL (21 Jun 2020 11:59)    I&O's Summary    21 Jun 2020 07:01  -  22 Jun 2020 07:00  --------------------------------------------------------  IN: 50 mL / OUT: 800 mL / NET: -750 mL        PHYSICAL EXAM:  Vital Signs Last 24 Hrs  T(C): 36.5 (22 Jun 2020 00:11), Max: 36.6 (21 Jun 2020 07:46)  T(F): 97.7 (22 Jun 2020 00:11), Max: 97.8 (21 Jun 2020 07:46)  HR: 74 (22 Jun 2020 00:11) (70 - 88)  BP: 133/73 (22 Jun 2020 00:11) (112/70 - 153/76)  BP(mean): --  RR: 18 (22 Jun 2020 00:11) (18 - 18)  SpO2: 94% (22 Jun 2020 00:11) (94% - 97%)  CONSTITUTIONAL: NAD, well-developed, well-groomed  EYES: PERRLA; conjunctiva and sclera clear  ENMT: Moist oral mucosa, no pharyngeal injection or exudates; normal dentition  NECK: Supple, no palpable masses; no thyromegaly  RESPIRATORY: Normal respiratory effort; lungs are clear to auscultation bilaterally  CARDIOVASCULAR: Regular rate and rhythm, normal S1 and S2, no murmur/rub/gallop; No lower extremity edema; Peripheral pulses are 2+ bilaterally  ABDOMEN: Nontender to palpation, normoactive bowel sounds, no rebound/guarding; No hepatosplenomegaly  MUSCULOSKELETAL:  Normal gait; no clubbing or cyanosis of digits; no joint swelling or tenderness to palpation  PSYCH: A+O to person, place, and time; affect appropriate  NEUROLOGY: CN 2-12 are intact and symmetric; no gross sensory deficits   SKIN: No rashes; no palpable lesions    LABS:                        10.3   9.98  )-----------( 329      ( 21 Jun 2020 07:18 )             32.1     06-21    139  |  102  |  20  ----------------------------<  114<H>  4.0   |  25  |  0.89    Ca    9.1      21 Jun 2020 07:18  Phos  3.2     06-21  Mg     2.0     06-21                Culture - Urine (collected 20 Jun 2020 01:29)  Source: .Urine Clean Catch (Midstream)  Final Report (20 Jun 2020 21:23):    No growth        RADIOLOGY & ADDITIONAL TESTS:  Results Reviewed:   Imaging Personally Reviewed:  Electrocardiogram Personally Reviewed:    COORDINATION OF CARE:  Care Discussed with Consultants/Other Providers [Y/N]:  Prior or Outpatient Records Reviewed [Y/N]: Jaziel Isabel, pgy 1  [facility] Team 3  [phone]  After 7pm; page night float    Patient is a 64y old  Male who presents with a chief complaint of Hematuria; weight loss (21 Jun 2020 15:43)      SUBJECTIVE / OVERNIGHT EVENTS: Pt seen and examined at bedside. No acute events overnight. Feeling well overall. Feels much stronger, no fevers, chills. No recent bowel movement  ADDITIONAL REVIEW OF SYSTEMS:  negative unless previously noted.     MEDICATIONS  (STANDING):  aspirin enteric coated 81 milliGRAM(s) Oral daily  atorvastatin 80 milliGRAM(s) Oral at bedtime  bromocriptine Capsule 5 milliGRAM(s) Oral daily  buPROPion XL . 150 milliGRAM(s) Oral daily  dextrose 5%. 1000 milliLiter(s) (50 mL/Hr) IV Continuous <Continuous>  dextrose 50% Injectable 12.5 Gram(s) IV Push once  dextrose 50% Injectable 25 Gram(s) IV Push once  dextrose 50% Injectable 25 Gram(s) IV Push once  ertapenem  IVPB 1000 milliGRAM(s) IV Intermittent every 24 hours  heparin   Injectable 5000 Unit(s) SubCutaneous every 8 hours  insulin lispro (HumaLOG) corrective regimen sliding scale   SubCutaneous three times a day before meals  insulin lispro (HumaLOG) corrective regimen sliding scale   SubCutaneous at bedtime  pantoprazole    Tablet 40 milliGRAM(s) Oral before breakfast  sertraline 50 milliGRAM(s) Oral daily    MEDICATIONS  (PRN):  dextrose 40% Gel 15 Gram(s) Oral once PRN Blood Glucose LESS THAN 70 milliGRAM(s)/deciliter  glucagon  Injectable 1 milliGRAM(s) IntraMuscular once PRN Glucose LESS THAN 70 milligrams/deciliter      CAPILLARY BLOOD GLUCOSE      POCT Blood Glucose.: 154 mg/dL (21 Jun 2020 22:18)  POCT Blood Glucose.: 159 mg/dL (21 Jun 2020 18:55)  POCT Blood Glucose.: 186 mg/dL (21 Jun 2020 17:11)  POCT Blood Glucose.: 231 mg/dL (21 Jun 2020 11:59)    I&O's Summary    21 Jun 2020 07:01  -  22 Jun 2020 07:00  --------------------------------------------------------  IN: 50 mL / OUT: 800 mL / NET: -750 mL        PHYSICAL EXAM:  Vital Signs Last 24 Hrs  T(C): 36.5 (22 Jun 2020 00:11), Max: 36.6 (21 Jun 2020 07:46)  T(F): 97.7 (22 Jun 2020 00:11), Max: 97.8 (21 Jun 2020 07:46)  HR: 74 (22 Jun 2020 00:11) (70 - 88)  BP: 133/73 (22 Jun 2020 00:11) (112/70 - 153/76)  BP(mean): --  RR: 18 (22 Jun 2020 00:11) (18 - 18)  SpO2: 94% (22 Jun 2020 00:11) (94% - 97%)  	GENERAL: NAD, cachectic   	HEAD:  Atraumatic, Normocephalic  	EYES: EOMI, PERRLA, xanthelasma  	NECK: Supple, No JVD  	CHEST/LUNG: Clear to auscultation bilaterally; No wheeze  	HEART: Regular rate and rhythm; No murmurs, rubs, or gallops  	ABDOMEN: Soft, Nontender, Nondistended; Bowel sounds present  	EXTREMITIES:, No clubbing, cyanosis, or edema, muscle wasting  	PSYCH: AAOx3  	NEUROLOGY: non-focal, 2/5 strength RLE, 4/5 LLE; 5/5 UE  SKIN: No rashes or lesions    LABS:                        10.3   9.98  )-----------( 329      ( 21 Jun 2020 07:18 )             32.1     06-21    139  |  102  |  20  ----------------------------<  114<H>  4.0   |  25  |  0.89    Ca    9.1      21 Jun 2020 07:18  Phos  3.2     06-21  Mg     2.0     06-21                Culture - Urine (collected 20 Jun 2020 01:29)  Source: .Urine Clean Catch (Midstream)  Final Report (20 Jun 2020 21:23):    No growth        RADIOLOGY & ADDITIONAL TESTS:  Results Reviewed:   Imaging Personally Reviewed:  Electrocardiogram Personally Reviewed:    COORDINATION OF CARE:  Care Discussed with Consultants/Other Providers [Y/N]:  Prior or Outpatient Records Reviewed [Y/N]:

## 2020-06-22 NOTE — PROGRESS NOTE ADULT - PROBLEM SELECTOR PLAN 3
Evaluated by s/s  - reports persistently aspirating, recommending NPO at this time, however pt requesting soft diet. discussed in separate chart note.   - will discuss with pt and family

## 2020-06-22 NOTE — PROGRESS NOTE ADULT - ASSESSMENT
64y Male pmhx DM2 on insulin, HTN, CAD w. LAD stent/restent after thrombosis, depression. Admitted for hematuria, weight loss. Pt reports  hematuria without clots x 2days. had CT show emphysematous cystitis and L renal lesion more likely abscess than mass.      Plan  - Continue IV Abx, f/u Cultures and sensitivity so far no growth (likely due to pre-admission antibiotics)  - follow-up CT today  - Monitor for fevers, trend WBC/Cr    Discussed with Dr Bravo

## 2020-06-22 NOTE — PROGRESS NOTE ADULT - ATTENDING COMMENTS
Seen at bedside. Clinically improving on Invanz. Plan to repeat CT A/P today to assess possible renal abscess. May require biopsy if findings not consistent with abscess.

## 2020-06-23 LAB
BUN SERPL-MCNC: 19 MG/DL — SIGNIFICANT CHANGE UP (ref 7–23)
CALCIUM SERPL-MCNC: 8.6 MG/DL — SIGNIFICANT CHANGE UP (ref 8.4–10.5)
CHLORIDE SERPL-SCNC: 100 MMOL/L — SIGNIFICANT CHANGE UP (ref 96–108)
CO2 SERPL-SCNC: 26 MMOL/L — SIGNIFICANT CHANGE UP (ref 22–31)
CREAT SERPL-MCNC: 1.05 MG/DL — SIGNIFICANT CHANGE UP (ref 0.5–1.3)
GLUCOSE BLDC GLUCOMTR-MCNC: 135 MG/DL — HIGH (ref 70–99)
GLUCOSE BLDC GLUCOMTR-MCNC: 144 MG/DL — HIGH (ref 70–99)
GLUCOSE BLDC GLUCOMTR-MCNC: 164 MG/DL — HIGH (ref 70–99)
GLUCOSE BLDC GLUCOMTR-MCNC: 172 MG/DL — HIGH (ref 70–99)
GLUCOSE SERPL-MCNC: 151 MG/DL — HIGH (ref 70–99)
HCT VFR BLD CALC: 29 % — LOW (ref 39–50)
HGB BLD-MCNC: 9.2 G/DL — LOW (ref 13–17)
MCHC RBC-ENTMCNC: 26.1 PG — LOW (ref 27–34)
MCHC RBC-ENTMCNC: 31.7 GM/DL — LOW (ref 32–36)
MCV RBC AUTO: 82.2 FL — SIGNIFICANT CHANGE UP (ref 80–100)
NRBC # BLD: 0 /100 WBCS — SIGNIFICANT CHANGE UP (ref 0–0)
PLATELET # BLD AUTO: 310 K/UL — SIGNIFICANT CHANGE UP (ref 150–400)
POTASSIUM SERPL-MCNC: 4.4 MMOL/L — SIGNIFICANT CHANGE UP (ref 3.5–5.3)
POTASSIUM SERPL-SCNC: 4.4 MMOL/L — SIGNIFICANT CHANGE UP (ref 3.5–5.3)
RBC # BLD: 3.53 M/UL — LOW (ref 4.2–5.8)
RBC # FLD: 14.6 % — HIGH (ref 10.3–14.5)
SODIUM SERPL-SCNC: 134 MMOL/L — LOW (ref 135–145)
WBC # BLD: 12.36 K/UL — HIGH (ref 3.8–10.5)
WBC # FLD AUTO: 12.36 K/UL — HIGH (ref 3.8–10.5)

## 2020-06-23 PROCEDURE — 99232 SBSQ HOSP IP/OBS MODERATE 35: CPT

## 2020-06-23 PROCEDURE — 99232 SBSQ HOSP IP/OBS MODERATE 35: CPT | Mod: GC

## 2020-06-23 RX ORDER — SODIUM CHLORIDE 9 MG/ML
1000 INJECTION, SOLUTION INTRAVENOUS
Refills: 0 | Status: DISCONTINUED | OUTPATIENT
Start: 2020-06-23 | End: 2020-06-25

## 2020-06-23 RX ORDER — POLYETHYLENE GLYCOL 3350 17 G/17G
17 POWDER, FOR SOLUTION ORAL DAILY
Refills: 0 | Status: DISCONTINUED | OUTPATIENT
Start: 2020-06-23 | End: 2020-07-09

## 2020-06-23 RX ADMIN — ERTAPENEM SODIUM 120 MILLIGRAM(S): 1 INJECTION, POWDER, LYOPHILIZED, FOR SOLUTION INTRAMUSCULAR; INTRAVENOUS at 12:54

## 2020-06-23 RX ADMIN — BUPROPION HYDROCHLORIDE 150 MILLIGRAM(S): 150 TABLET, EXTENDED RELEASE ORAL at 12:56

## 2020-06-23 RX ADMIN — BROMOCRIPTINE MESYLATE 5 MILLIGRAM(S): 5 CAPSULE ORAL at 12:56

## 2020-06-23 RX ADMIN — HEPARIN SODIUM 5000 UNIT(S): 5000 INJECTION INTRAVENOUS; SUBCUTANEOUS at 21:34

## 2020-06-23 RX ADMIN — HEPARIN SODIUM 5000 UNIT(S): 5000 INJECTION INTRAVENOUS; SUBCUTANEOUS at 05:23

## 2020-06-23 RX ADMIN — Medication 81 MILLIGRAM(S): at 12:57

## 2020-06-23 RX ADMIN — Medication 1: at 18:15

## 2020-06-23 RX ADMIN — Medication 1: at 13:51

## 2020-06-23 RX ADMIN — SODIUM CHLORIDE 100 MILLILITER(S): 9 INJECTION, SOLUTION INTRAVENOUS at 13:51

## 2020-06-23 RX ADMIN — PANTOPRAZOLE SODIUM 40 MILLIGRAM(S): 20 TABLET, DELAYED RELEASE ORAL at 06:29

## 2020-06-23 RX ADMIN — HEPARIN SODIUM 5000 UNIT(S): 5000 INJECTION INTRAVENOUS; SUBCUTANEOUS at 13:00

## 2020-06-23 RX ADMIN — ATORVASTATIN CALCIUM 80 MILLIGRAM(S): 80 TABLET, FILM COATED ORAL at 21:34

## 2020-06-23 RX ADMIN — SERTRALINE 50 MILLIGRAM(S): 25 TABLET, FILM COATED ORAL at 12:57

## 2020-06-23 NOTE — PROVIDER CONTACT NOTE (OTHER) - SITUATION
Pt has small output in griffith  and was bladder scanned and has 432 ml. Pt reports he does not want to be disturbed at this time and wants to deal withi it in am. Pt was explained the risks.

## 2020-06-23 NOTE — PROGRESS NOTE ADULT - ASSESSMENT
63 yo M pmhx DM2 on insulin, HTN, CAD w. LAD stent/restent after thrombosis, depression presenting with 2 days of hematuria  Patient with no fevers,chills  Leucocytosis as OP  Admitted CT with emphysematous cystitis and renal lesion ? abscess  But also with 80 lb weight loss over 6 months  In hospital minimal leucocytosis, No fevers  Blood and urine cs negative (though did recieve cipro as OP)    ?UTI-cystitis with early renal abscess  ? Malignancy with superinfection  ? 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    B) Renal mass  ? absces  ? malignancy   ? other etiology  imaging and abx plan as above  Check urine cytology    C)Leucocytosis  likely from above  Plan as detailed above    Will tailor plan for ID issues  per course,results.Will defer to primary team on management of other issues.  Assessment, plan and recommendations as detailed above were discussed with the medical/primary  team-Dr Olvera    Will Follow.  Beeper [phone] [facility] [id number].

## 2020-06-23 NOTE — PROGRESS NOTE ADULT - PROBLEM SELECTOR PLAN 5
2/2 ALS? 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 As above iso FTT with global weakness  - dietician consult  - PT consult  - neuro consulted; pending paraneoplastic panel  -will f/u for EMG w/ Dr. 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 As above iso FTT with global weakness  - dietician consult  - PT consulted  - neuro consulted; pending paraneoplastic panel  -will f/u for EMG w/ Dr. Winters

## 2020-06-23 NOTE — PROGRESS NOTE ADULT - PROBLEM SELECTOR PLAN 2
Repeat CT today  antibiotics as above.   ID consult   iso DM - Repeat CT today  - ID consulted   - c/w antibiotics as above   - iso DM

## 2020-06-23 NOTE — PROGRESS NOTE ADULT - PROBLEM SELECTOR PLAN 6
resolved  Confusion iso FTT, possible UTI.   - cw wellbutrin  - cw sertraline   - frequent reorientation resolved. 2/2 Metabolic encephalopathy  - cw wellbutrin  - cw sertraline   - frequent reorientation

## 2020-06-23 NOTE — PROGRESS NOTE ADULT - PROBLEM SELECTOR PLAN 1
Pt presenting with 2 days of hematuria 2/2 cystitis. 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 received 2 days of cipro as outpatient  - U/A with LE, proteins, moderate blood  - cont invanz, f/u id rec  - urology consult; fu recommendations  - repeat Ct a/p w/wo IV contrast today (unable to be performed yesterday due to MBS recently leaving contrast in GI tract) Pt presenting with 2 days of hematuria 2/2 cystitis. 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 received 2 days of cipro as outpatient  - U/A with LE, proteins, moderate blood  - cont invanz day 5/10, f/u id rec   - urology consult; fu recommendations  - awaiting official read for CT A/P w/wo IV contrast  -downtrending Hg 9.2; CBC QD Pt presenting with 2 days of hematuria 2/2 cystitis. 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 received 2 days of cipro as outpatient  - U/A with LE, proteins, moderate blood  - cont invanz day 5/10, ID following  - monitor for fever, trend WBC/Cr   - urology consulted;  - awaiting official read for CT A/P w/wo IV contrast Pt presenting with 2 days of hematuria 2/2 cystitis. 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 received 2 days of cipro as outpatient  - U/A with LE, proteins, moderate blood  - cont invanz day 5. Plan for 2-4 week course. ID following  - monitor for fever, trend WBC/Cr   - urology consulted;  - awaiting official read for CT A/P w/wo IV contrast

## 2020-06-23 NOTE — PROGRESS NOTE ADULT - PROBLEM SELECTOR PLAN 4
Hx of 6 months of weight loss, FTT 2/2 neuromuscular disorder (ALS) vs paraneoplastic (less likely due to negative CT findings vs infection (Endocarditis? - less likely due to TTE results, no blood cx, no murmur) vs psychiatric illness  - Physical therapy evaluation  - UTI treatment as above  - continue with wellbutrin, sertraline   - TTE without new findings  - discussed with Neurologist outpatient Dr. Winters; will need EMG as outpatient. House neurology following  - fu urine cytology Hx of 6 months of weight loss, FTT 2/2 neuromuscular disorder (ALS) vs paraneoplastic (less likely due to negative CT findings vs infection (Endocarditis? - less likely due to TTE results, no blood cx, no murmur) vs psychiatric illness  - Physical therapy evaluation recommending MARI  - UTI treatment as above  - continue with Wellbutrin, sertraline   - TTE without new findings  - discussed with Neurologist outpatient Dr. Winters; will need EMG as outpatient. House neurology following  - fu urine cytology

## 2020-06-23 NOTE — PROGRESS NOTE ADULT - SUBJECTIVE AND OBJECTIVE BOX
Patient is a 64y old  Male who presents with a chief complaint of Hematuria; weight loss (23 Jun 2020 07:20)    Being followed by ID for ?pyelo/renal abscess-emphysematous cystitis    Interval history:feels well  denies any complaints   No acute events      ROS:  No cough,SOB,CP  No N/V/D./abd pain  No other complaints      Antimicrobials:    ertapenem  IVPB 1000 milliGRAM(s) IV Intermittent every 24 hours    Other medications reviewed    Vital Signs Last 24 Hrs  T(C): 36.4 (06-23-20 @ 07:54), Max: 36.6 (06-22-20 @ 16:16)  T(F): 97.6 (06-23-20 @ 07:54), Max: 97.9 (06-22-20 @ 16:16)  HR: 73 (06-23-20 @ 07:54) (73 - 76)  BP: 131/72 (06-23-20 @ 07:54) (117/72 - 131/72)  BP(mean): --  RR: 18 (06-23-20 @ 07:54) (18 - 18)  SpO2: 99% (06-23-20 @ 07:54) (95% - 99%)    Physical Exam:    cachecxia    HEENT PERRLA EOMI    No oral exudate or erythema    Chest Good AE,CTA    CVS RRR S1 S2 WNl No murmur or rub or gallop    Abd soft BS normal No tenderness no masses  griffith    IV site no erythema tenderness or discharge    CNS AAO X 3 no focal    Lab Data:                          9.2    12.36 )-----------( 310      ( 23 Jun 2020 07:14 )             29.0     WBC Count: 12.36 (06-23-20 @ 07:14)  WBC Count: 10.76 (06-22-20 @ 07:09)  WBC Count: 9.98 (06-21-20 @ 07:18)  WBC Count: 8.48 (06-20-20 @ 07:02)  WBC Count: 9.58 (06-19-20 @ 07:12)  WBC Count: 10.81 (06-18-20 @ 13:31)  WBC Count: 12.61 (06-17-20 @ 14:04)    06-23    134<L>  |  100  |  19  ----------------------------<  151<H>  4.4   |  26  |  1.05    Ca    8.6      23 Jun 2020 07:14  Phos  2.8     06-22  Mg     2.0     06-22          Culture - Urine (collected 20 Jun 2020 01:29)  Source: .Urine Clean Catch (Midstream)  Final Report (20 Jun 2020 21:23):    No growth          < from: Xray Abdomen 1 View PORTABLE -Urgent (06.22.20 @ 15:10) >  IMPRESSION:     Nonobstructive bowel gas pattern.    Residual contrast material is visualized throughout the colon.          < end of copied text >

## 2020-06-23 NOTE — PROGRESS NOTE ADULT - SUBJECTIVE AND OBJECTIVE BOX
Jaziel Isabel, pgy 1  [facility] Team 3  [phone]  After 7pm; page night float    Patient is a 64y old  Male who presents with a chief complaint of Hematuria; weight loss (22 Jun 2020 11:57)      SUBJECTIVE / OVERNIGHT EVENTS:  ADDITIONAL REVIEW OF SYSTEMS:    MEDICATIONS  (STANDING):  aspirin enteric coated 81 milliGRAM(s) Oral daily  atorvastatin 80 milliGRAM(s) Oral at bedtime  bromocriptine Capsule 5 milliGRAM(s) Oral daily  buPROPion XL . 150 milliGRAM(s) Oral daily  dextrose 5%. 1000 milliLiter(s) (50 mL/Hr) IV Continuous <Continuous>  dextrose 50% Injectable 12.5 Gram(s) IV Push once  dextrose 50% Injectable 25 Gram(s) IV Push once  dextrose 50% Injectable 25 Gram(s) IV Push once  ertapenem  IVPB 1000 milliGRAM(s) IV Intermittent every 24 hours  heparin   Injectable 5000 Unit(s) SubCutaneous every 8 hours  insulin lispro (HumaLOG) corrective regimen sliding scale   SubCutaneous three times a day before meals  insulin lispro (HumaLOG) corrective regimen sliding scale   SubCutaneous at bedtime  pantoprazole    Tablet 40 milliGRAM(s) Oral before breakfast  sertraline 50 milliGRAM(s) Oral daily    MEDICATIONS  (PRN):  dextrose 40% Gel 15 Gram(s) Oral once PRN Blood Glucose LESS THAN 70 milliGRAM(s)/deciliter  glucagon  Injectable 1 milliGRAM(s) IntraMuscular once PRN Glucose LESS THAN 70 milligrams/deciliter      CAPILLARY BLOOD GLUCOSE      POCT Blood Glucose.: 177 mg/dL (22 Jun 2020 21:19)  POCT Blood Glucose.: 194 mg/dL (22 Jun 2020 18:01)  POCT Blood Glucose.: 150 mg/dL (22 Jun 2020 11:53)  POCT Blood Glucose.: 150 mg/dL (22 Jun 2020 07:55)    I&O's Summary    22 Jun 2020 07:01  -  23 Jun 2020 07:00  --------------------------------------------------------  IN: 0 mL / OUT: 650 mL / NET: -650 mL        PHYSICAL EXAM:  Vital Signs Last 24 Hrs  T(C): 36.6 (23 Jun 2020 00:43), Max: 36.8 (22 Jun 2020 08:19)  T(F): 97.8 (23 Jun 2020 00:43), Max: 98.3 (22 Jun 2020 08:19)  HR: 74 (23 Jun 2020 00:43) (74 - 80)  BP: 130/78 (23 Jun 2020 00:43) (117/72 - 133/74)  BP(mean): --  RR: 18 (23 Jun 2020 00:43) (18 - 18)  SpO2: 98% (23 Jun 2020 00:43) (90% - 98%)  CONSTITUTIONAL: NAD, well-developed, well-groomed  EYES: PERRLA; conjunctiva and sclera clear  ENMT: Moist oral mucosa, no pharyngeal injection or exudates; normal dentition  NECK: Supple, no palpable masses; no thyromegaly  RESPIRATORY: Normal respiratory effort; lungs are clear to auscultation bilaterally  CARDIOVASCULAR: Regular rate and rhythm, normal S1 and S2, no murmur/rub/gallop; No lower extremity edema; Peripheral pulses are 2+ bilaterally  ABDOMEN: Nontender to palpation, normoactive bowel sounds, no rebound/guarding; No hepatosplenomegaly  MUSCULOSKELETAL:  Normal gait; no clubbing or cyanosis of digits; no joint swelling or tenderness to palpation  PSYCH: A+O to person, place, and time; affect appropriate  NEUROLOGY: CN 2-12 are intact and symmetric; no gross sensory deficits   SKIN: No rashes; no palpable lesions    LABS:                        9.8    10.76 )-----------( 302      ( 22 Jun 2020 07:09 )             30.1     06-22    137  |  100  |  21  ----------------------------<  118<H>  3.9   |  27  |  1.07    Ca    8.9      22 Jun 2020 07:09  Phos  2.8     06-22  Mg     2.0     06-22                  RADIOLOGY & ADDITIONAL TESTS:  Results Reviewed:   Imaging Personally Reviewed:  Electrocardiogram Personally Reviewed:    COORDINATION OF CARE:  Care Discussed with Consultants/Other Providers [Y/N]:  Prior or Outpatient Records Reviewed [Y/N]: Jaziel Isabel, pgy 1  [facility] Team 3  [phone]  After 7pm; page night float    Patient is a 64y old  Male who presents with a chief complaint of Hematuria; weight loss (22 Jun 2020 11:57)      SUBJECTIVE / OVERNIGHT EVENTS: No acute events overnight. Pt seen and examined at bedside. Pt denies CP, SOB, n/v/d, fever and chills. He reports feeling well overall. Medium-sized bowel movement yesterday.   ADDITIONAL REVIEW OF SYSTEMS: As stated previously noted; otherwise negative.     MEDICATIONS  (STANDING):  aspirin enteric coated 81 milliGRAM(s) Oral daily  atorvastatin 80 milliGRAM(s) Oral at bedtime  bromocriptine Capsule 5 milliGRAM(s) Oral daily  buPROPion XL . 150 milliGRAM(s) Oral daily  dextrose 5%. 1000 milliLiter(s) (50 mL/Hr) IV Continuous <Continuous>  dextrose 50% Injectable 12.5 Gram(s) IV Push once  dextrose 50% Injectable 25 Gram(s) IV Push once  dextrose 50% Injectable 25 Gram(s) IV Push once  ertapenem  IVPB 1000 milliGRAM(s) IV Intermittent every 24 hours  heparin   Injectable 5000 Unit(s) SubCutaneous every 8 hours  insulin lispro (HumaLOG) corrective regimen sliding scale   SubCutaneous three times a day before meals  insulin lispro (HumaLOG) corrective regimen sliding scale   SubCutaneous at bedtime  pantoprazole    Tablet 40 milliGRAM(s) Oral before breakfast  sertraline 50 milliGRAM(s) Oral daily    MEDICATIONS  (PRN):  dextrose 40% Gel 15 Gram(s) Oral once PRN Blood Glucose LESS THAN 70 milliGRAM(s)/deciliter  glucagon  Injectable 1 milliGRAM(s) IntraMuscular once PRN Glucose LESS THAN 70 milligrams/deciliter      CAPILLARY BLOOD GLUCOSE      POCT Blood Glucose.: 177 mg/dL (22 Jun 2020 21:19)  POCT Blood Glucose.: 194 mg/dL (22 Jun 2020 18:01)  POCT Blood Glucose.: 150 mg/dL (22 Jun 2020 11:53)  POCT Blood Glucose.: 150 mg/dL (22 Jun 2020 07:55)    I&O's Summary    22 Jun 2020 07:01  -  23 Jun 2020 07:00  --------------------------------------------------------  IN: 0 mL / OUT: 650 mL / NET: -650 mL        PHYSICAL EXAM:  Vital Signs Last 24 Hrs  T(C): 36.6 (23 Jun 2020 00:43), Max: 36.8 (22 Jun 2020 08:19)  T(F): 97.8 (23 Jun 2020 00:43), Max: 98.3 (22 Jun 2020 08:19)  HR: 74 (23 Jun 2020 00:43) (74 - 80)  BP: 130/78 (23 Jun 2020 00:43) (117/72 - 133/74)  BP(mean): --  RR: 18 (23 Jun 2020 00:43) (18 - 18)  SpO2: 98% (23 Jun 2020 00:43) (90% - 98%)  CONSTITUTIONAL: NAD, well-kempt, cachetic  EYES: PERRLA; EOMI, clear conjunctiva and + xanthelasma on sclera  NECK: Supple, no palpable masses; no thyromegaly  RESPIRATORY: Clear to auscultation bilaterally; no wheezes, crackles, or ronchi  CARDIOVASCULAR: Regular rate and rhythm, normal S1 and S2, no murmur/rub/gallop; No lower extremity edema; Peripheral pulses are 2+ bilaterally  ABDOMEN: +Bowel sounds; nontender to palpation, no rebound/guarding; no hepatosplenomegaly  PSYCH: A+O to person, place, and time; affect appropriate  NEUROLOGY: non-focal, RLE 3/5; LLE 4/5; UE 5/5 bilaterally  SKIN: No rashes; no palpable lesions    LABS:                        9.8    10.76 )-----------( 302      ( 22 Jun 2020 07:09 )             30.1     06-22    137  |  100  |  21  ----------------------------<  118<H>  3.9   |  27  |  1.07    Ca    8.9      22 Jun 2020 07:09  Phos  2.8     06-22  Mg     2.0     06-22            RADIOLOGY & ADDITIONAL TESTS:  Results Reviewed:   Imaging Personally Reviewed:  Electrocardiogram Personally Reviewed:    COORDINATION OF CARE:  Care Discussed with Consultants/Other Providers [Y/N]:  Prior or Outpatient Records Reviewed [Y/N]: Jaziel Isabel, pgy 1  [facility] Team 3  [phone]  After 7pm; page night float    Patient is a 64y old  Male who presents with a chief complaint of Hematuria; weight loss (22 Jun 2020 11:57)      SUBJECTIVE / OVERNIGHT EVENTS: No acute events overnight. Pt seen and examined at bedside. Pt denies CP, SOB, n/v/d, fever and chills. He reports feeling well overall. Medium-sized bowel movement yesterday.   ADDITIONAL REVIEW OF SYSTEMS: As stated previously noted; otherwise negative.     MEDICATIONS  (STANDING):  aspirin enteric coated 81 milliGRAM(s) Oral daily  atorvastatin 80 milliGRAM(s) Oral at bedtime  bromocriptine Capsule 5 milliGRAM(s) Oral daily  buPROPion XL . 150 milliGRAM(s) Oral daily  dextrose 5%. 1000 milliLiter(s) (50 mL/Hr) IV Continuous <Continuous>  dextrose 50% Injectable 12.5 Gram(s) IV Push once  dextrose 50% Injectable 25 Gram(s) IV Push once  dextrose 50% Injectable 25 Gram(s) IV Push once  ertapenem  IVPB 1000 milliGRAM(s) IV Intermittent every 24 hours  heparin   Injectable 5000 Unit(s) SubCutaneous every 8 hours  insulin lispro (HumaLOG) corrective regimen sliding scale   SubCutaneous three times a day before meals  insulin lispro (HumaLOG) corrective regimen sliding scale   SubCutaneous at bedtime  pantoprazole    Tablet 40 milliGRAM(s) Oral before breakfast  sertraline 50 milliGRAM(s) Oral daily    MEDICATIONS  (PRN):  dextrose 40% Gel 15 Gram(s) Oral once PRN Blood Glucose LESS THAN 70 milliGRAM(s)/deciliter  glucagon  Injectable 1 milliGRAM(s) IntraMuscular once PRN Glucose LESS THAN 70 milligrams/deciliter      CAPILLARY BLOOD GLUCOSE      POCT Blood Glucose.: 177 mg/dL (22 Jun 2020 21:19)  POCT Blood Glucose.: 194 mg/dL (22 Jun 2020 18:01)  POCT Blood Glucose.: 150 mg/dL (22 Jun 2020 11:53)  POCT Blood Glucose.: 150 mg/dL (22 Jun 2020 07:55)    I&O's Summary    22 Jun 2020 07:01  -  23 Jun 2020 07:00  --------------------------------------------------------  IN: 0 mL / OUT: 650 mL / NET: -650 mL        PHYSICAL EXAM:  Vital Signs Last 24 Hrs  T(C): 36.6 (23 Jun 2020 00:43), Max: 36.8 (22 Jun 2020 08:19)  T(F): 97.8 (23 Jun 2020 00:43), Max: 98.3 (22 Jun 2020 08:19)  HR: 74 (23 Jun 2020 00:43) (74 - 80)  BP: 130/78 (23 Jun 2020 00:43) (117/72 - 133/74)  BP(mean): --  RR: 18 (23 Jun 2020 00:43) (18 - 18)  SpO2: 98% (23 Jun 2020 00:43) (90% - 98%)  CONSTITUTIONAL: NAD, well-kempt, cachetic  EYES: PERRLA; EOMI, clear conjunctiva and + xanthelasma on sclera  NECK: Supple, no palpable masses; no thyromegaly  RESPIRATORY: Clear to auscultation bilaterally; no wheezes, crackles, or ronchi  CARDIOVASCULAR: Regular rate and rhythm, normal S1 and S2, no murmur/rub/gallop; No lower extremity edema; Peripheral pulses are 2+ bilaterally  ABDOMEN: +Bowel sounds; nontender to palpation, no rebound/guarding; no hepatosplenomegaly  PSYCH: A+O to person, place, and time; affect appropriate  NEUROLOGY: non-focal, RLE 3/5; LLE 4/5; RUE 5/5; LUE 3/5   SKIN: No rashes; no palpable lesions    LABS:                        9.8    10.76 )-----------( 302      ( 22 Jun 2020 07:09 )             30.1     06-22    137  |  100  |  21  ----------------------------<  118<H>  3.9   |  27  |  1.07    Ca    8.9      22 Jun 2020 07:09  Phos  2.8     06-22  Mg     2.0     06-22            RADIOLOGY & ADDITIONAL TESTS:  Results Reviewed:   Imaging Personally Reviewed:  Electrocardiogram Personally Reviewed:    COORDINATION OF CARE:  Care Discussed with Consultants/Other Providers [Y/N]:  Prior or Outpatient Records Reviewed [Y/N]:

## 2020-06-23 NOTE — PROGRESS NOTE ADULT - ATTENDING COMMENTS
Was unable to have CT performed yesterday 2/2 contrast remaining in the bowel. To repeat CT scan today  Clinical course depends on CT results. If consistent with abscess then pt will require 2-4 weeks of abx. If not consistent with abscess then patient may require biopsy vs cystoscopy to eval for potential malignancy. Urine Cytology in lab, results pending

## 2020-06-23 NOTE — PROGRESS NOTE ADULT - PROBLEM SELECTOR PLAN 3
Evaluated by s/s  - reports persistently aspirating, recommending NPO at this time, however pt requesting soft diet. discussed in separate chart note.   - will discuss with pt and family Evaluated by s/s  - reports persistently aspirating, recommending NPO at this time, however pt requesting soft diet. discussed in separate chart note. Reports tolerating some fluid PO.  - will discuss with pt and family

## 2020-06-24 LAB
ANION GAP SERPL CALC-SCNC: 8 MMOL/L — SIGNIFICANT CHANGE UP (ref 5–17)
BUN SERPL-MCNC: 15 MG/DL — SIGNIFICANT CHANGE UP (ref 7–23)
CALCIUM SERPL-MCNC: 9.5 MG/DL — SIGNIFICANT CHANGE UP (ref 8.4–10.5)
CHLORIDE SERPL-SCNC: 97 MMOL/L — SIGNIFICANT CHANGE UP (ref 96–108)
CO2 SERPL-SCNC: 28 MMOL/L — SIGNIFICANT CHANGE UP (ref 22–31)
CREAT SERPL-MCNC: 0.84 MG/DL — SIGNIFICANT CHANGE UP (ref 0.5–1.3)
GLUCOSE BLDC GLUCOMTR-MCNC: 116 MG/DL — HIGH (ref 70–99)
GLUCOSE BLDC GLUCOMTR-MCNC: 145 MG/DL — HIGH (ref 70–99)
GLUCOSE BLDC GLUCOMTR-MCNC: 147 MG/DL — HIGH (ref 70–99)
GLUCOSE BLDC GLUCOMTR-MCNC: 173 MG/DL — HIGH (ref 70–99)
GLUCOSE BLDC GLUCOMTR-MCNC: 174 MG/DL — HIGH (ref 70–99)
GLUCOSE SERPL-MCNC: 112 MG/DL — HIGH (ref 70–99)
HCT VFR BLD CALC: 32 % — LOW (ref 39–50)
HGB BLD-MCNC: 10.3 G/DL — LOW (ref 13–17)
MAGNESIUM SERPL-MCNC: 2 MG/DL — SIGNIFICANT CHANGE UP (ref 1.6–2.6)
MCHC RBC-ENTMCNC: 26.6 PG — LOW (ref 27–34)
MCHC RBC-ENTMCNC: 32.2 GM/DL — SIGNIFICANT CHANGE UP (ref 32–36)
MCV RBC AUTO: 82.7 FL — SIGNIFICANT CHANGE UP (ref 80–100)
NRBC # BLD: 0 /100 WBCS — SIGNIFICANT CHANGE UP (ref 0–0)
PHOSPHATE SERPL-MCNC: 3.1 MG/DL — SIGNIFICANT CHANGE UP (ref 2.5–4.5)
PLATELET # BLD AUTO: 327 K/UL — SIGNIFICANT CHANGE UP (ref 150–400)
POTASSIUM SERPL-MCNC: 4.7 MMOL/L — SIGNIFICANT CHANGE UP (ref 3.5–5.3)
POTASSIUM SERPL-SCNC: 4.7 MMOL/L — SIGNIFICANT CHANGE UP (ref 3.5–5.3)
RBC # BLD: 3.87 M/UL — LOW (ref 4.2–5.8)
RBC # FLD: 14.6 % — HIGH (ref 10.3–14.5)
SODIUM SERPL-SCNC: 133 MMOL/L — LOW (ref 135–145)
WBC # BLD: 8.66 K/UL — SIGNIFICANT CHANGE UP (ref 3.8–10.5)
WBC # FLD AUTO: 8.66 K/UL — SIGNIFICANT CHANGE UP (ref 3.8–10.5)

## 2020-06-24 PROCEDURE — 99232 SBSQ HOSP IP/OBS MODERATE 35: CPT

## 2020-06-24 PROCEDURE — 74018 RADEX ABDOMEN 1 VIEW: CPT | Mod: 26

## 2020-06-24 RX ORDER — LACTULOSE 10 G/15ML
15 SOLUTION ORAL ONCE
Refills: 0 | Status: COMPLETED | OUTPATIENT
Start: 2020-06-24 | End: 2020-06-24

## 2020-06-24 RX ORDER — ERTAPENEM SODIUM 1 G/1
1000 INJECTION, POWDER, LYOPHILIZED, FOR SOLUTION INTRAMUSCULAR; INTRAVENOUS EVERY 24 HOURS
Refills: 0 | Status: DISCONTINUED | OUTPATIENT
Start: 2020-06-24 | End: 2020-06-27

## 2020-06-24 RX ADMIN — SERTRALINE 50 MILLIGRAM(S): 25 TABLET, FILM COATED ORAL at 12:54

## 2020-06-24 RX ADMIN — PANTOPRAZOLE SODIUM 40 MILLIGRAM(S): 20 TABLET, DELAYED RELEASE ORAL at 05:30

## 2020-06-24 RX ADMIN — HEPARIN SODIUM 5000 UNIT(S): 5000 INJECTION INTRAVENOUS; SUBCUTANEOUS at 15:15

## 2020-06-24 RX ADMIN — HEPARIN SODIUM 5000 UNIT(S): 5000 INJECTION INTRAVENOUS; SUBCUTANEOUS at 05:29

## 2020-06-24 RX ADMIN — BROMOCRIPTINE MESYLATE 5 MILLIGRAM(S): 5 CAPSULE ORAL at 12:54

## 2020-06-24 RX ADMIN — ERTAPENEM SODIUM 120 MILLIGRAM(S): 1 INJECTION, POWDER, LYOPHILIZED, FOR SOLUTION INTRAMUSCULAR; INTRAVENOUS at 12:55

## 2020-06-24 RX ADMIN — Medication 81 MILLIGRAM(S): at 12:54

## 2020-06-24 RX ADMIN — HEPARIN SODIUM 5000 UNIT(S): 5000 INJECTION INTRAVENOUS; SUBCUTANEOUS at 21:19

## 2020-06-24 RX ADMIN — ATORVASTATIN CALCIUM 80 MILLIGRAM(S): 80 TABLET, FILM COATED ORAL at 21:19

## 2020-06-24 RX ADMIN — LACTULOSE 15 GRAM(S): 10 SOLUTION ORAL at 12:54

## 2020-06-24 RX ADMIN — BUPROPION HYDROCHLORIDE 150 MILLIGRAM(S): 150 TABLET, EXTENDED RELEASE ORAL at 12:54

## 2020-06-24 NOTE — PROGRESS NOTE ADULT - PROBLEM SELECTOR PLAN 1
Pt presenting with 2 days of hematuria 2/2 cystitis. 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 received 2 days of cipro as outpatient  - U/A with LE, proteins, moderate blood  - cont invanz day 5. Plan for 2-4 week course. ID following  - monitor for fever, trend WBC/Cr   - urology consulted;  - awaiting official read for CT A/P w/wo IV contrast Pt presenting with 2 days of hematuria 2/2 cystitis. 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 received 2 days of cipro as outpatient  - U/A with LE, proteins, moderate blood  - cont invanz day 6. Plan for 2-4 week course. ID following  - monitor for fever, trend WBC/Cr   - urology consulted;  - awaiting large BM for CT A/P w/wo IV contrast Pt presenting with 2 days of hematuria 2/2 cystitis. 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 received 2 days of cipro as outpatient  - U/A with LE, proteins, moderate blood  - cont invanz day 7. Plan for 2-4 week course. ID following  - monitor for fever, trend WBC/Cr   - urology consulted;  - awaiting large BM for CT A/P w/wo IV contrast

## 2020-06-24 NOTE — PROGRESS NOTE ADULT - PROBLEM SELECTOR PLAN 3
Evaluated by s/s  - reports persistently aspirating, recommending NPO at this time, however pt requesting soft diet. discussed in separate chart note. Reports tolerating some fluid PO. Evaluated by s/s  - reports persistently aspirating, recommending NPO at this time, however pt requesting soft diet. discussed in separate chart note. Reports tolerating some fluid PO.  -aspiration precaution

## 2020-06-24 NOTE — PROGRESS NOTE ADULT - SUBJECTIVE AND OBJECTIVE BOX
Jaziel Isabel, pgy 1  [facility] Team 3  [phone]  After 7pm; page night float    Patient is a 64y old  Male who presents with a chief complaint of Hematuria; weight loss (23 Jun 2020 11:17)      SUBJECTIVE / OVERNIGHT EVENTS:  ADDITIONAL REVIEW OF SYSTEMS:    MEDICATIONS  (STANDING):  aspirin enteric coated 81 milliGRAM(s) Oral daily  atorvastatin 80 milliGRAM(s) Oral at bedtime  bromocriptine Capsule 5 milliGRAM(s) Oral daily  buPROPion XL . 150 milliGRAM(s) Oral daily  dextrose 5%. 1000 milliLiter(s) (50 mL/Hr) IV Continuous <Continuous>  dextrose 50% Injectable 12.5 Gram(s) IV Push once  dextrose 50% Injectable 25 Gram(s) IV Push once  dextrose 50% Injectable 25 Gram(s) IV Push once  ertapenem  IVPB 1000 milliGRAM(s) IV Intermittent every 24 hours  heparin   Injectable 5000 Unit(s) SubCutaneous every 8 hours  insulin lispro (HumaLOG) corrective regimen sliding scale   SubCutaneous three times a day before meals  insulin lispro (HumaLOG) corrective regimen sliding scale   SubCutaneous at bedtime  lactated ringers. 1000 milliLiter(s) (100 mL/Hr) IV Continuous <Continuous>  pantoprazole    Tablet 40 milliGRAM(s) Oral before breakfast  polyethylene glycol 3350 17 Gram(s) Oral daily  sertraline 50 milliGRAM(s) Oral daily    MEDICATIONS  (PRN):  dextrose 40% Gel 15 Gram(s) Oral once PRN Blood Glucose LESS THAN 70 milliGRAM(s)/deciliter  glucagon  Injectable 1 milliGRAM(s) IntraMuscular once PRN Glucose LESS THAN 70 milligrams/deciliter      CAPILLARY BLOOD GLUCOSE      POCT Blood Glucose.: 144 mg/dL (23 Jun 2020 21:10)  POCT Blood Glucose.: 164 mg/dL (23 Jun 2020 17:32)  POCT Blood Glucose.: 172 mg/dL (23 Jun 2020 13:29)  POCT Blood Glucose.: 135 mg/dL (23 Jun 2020 09:28)    I&O's Summary    23 Jun 2020 07:01  -  24 Jun 2020 07:00  --------------------------------------------------------  IN: 200 mL / OUT: 1050 mL / NET: -850 mL        PHYSICAL EXAM:  Vital Signs Last 24 Hrs  T(C): 36.6 (24 Jun 2020 07:20), Max: 36.6 (24 Jun 2020 00:29)  T(F): 97.8 (24 Jun 2020 07:20), Max: 97.9 (24 Jun 2020 00:29)  HR: 69 (24 Jun 2020 07:20) (69 - 77)  BP: 137/79 (24 Jun 2020 07:20) (131/72 - 150/74)  BP(mean): --  RR: 18 (24 Jun 2020 07:20) (17 - 18)  SpO2: 98% (24 Jun 2020 07:20) (98% - 99%)  CONSTITUTIONAL: NAD, well-developed, well-groomed  EYES: PERRLA; conjunctiva and sclera clear  ENMT: Moist oral mucosa, no pharyngeal injection or exudates; normal dentition  NECK: Supple, no palpable masses; no thyromegaly  RESPIRATORY: Normal respiratory effort; lungs are clear to auscultation bilaterally  CARDIOVASCULAR: Regular rate and rhythm, normal S1 and S2, no murmur/rub/gallop; No lower extremity edema; Peripheral pulses are 2+ bilaterally  ABDOMEN: Nontender to palpation, normoactive bowel sounds, no rebound/guarding; No hepatosplenomegaly  MUSCULOSKELETAL:  Normal gait; no clubbing or cyanosis of digits; no joint swelling or tenderness to palpation  PSYCH: A+O to person, place, and time; affect appropriate  NEUROLOGY: CN 2-12 are intact and symmetric; no gross sensory deficits   SKIN: No rashes; no palpable lesions    LABS:                        9.2    12.36 )-----------( 310      ( 23 Jun 2020 07:14 )             29.0     06-23    134<L>  |  100  |  19  ----------------------------<  151<H>  4.4   |  26  |  1.05    Ca    8.6      23 Jun 2020 07:14                  RADIOLOGY & ADDITIONAL TESTS:  Results Reviewed:   Imaging Personally Reviewed:  Electrocardiogram Personally Reviewed:    COORDINATION OF CARE:  Care Discussed with Consultants/Other Providers [Y/N]:  Prior or Outpatient Records Reviewed [Y/N]: Jaziel Isabel, pgy 1  [facility] Team 3  [phone]  After 7pm; page night float    Patient is a 64y old  Male who presents with a chief complaint of Hematuria; weight loss (23 Jun 2020 11:17)      SUBJECTIVE / OVERNIGHT EVENTS: No acute events overnight. Pt seen and examined at bedside. Pt denies CP, SOB, n/v/d, fever and chills. He reports feeling well overall. Reports not having bowel movement since yesterday. Had temporary urinary rentention 432cc on bladder scan but eventually urinated 300cc.  ADDITIONAL REVIEW OF SYSTEMS: As stated previously noted; otherwise negative.     MEDICATIONS  (STANDING):  aspirin enteric coated 81 milliGRAM(s) Oral daily  atorvastatin 80 milliGRAM(s) Oral at bedtime  bromocriptine Capsule 5 milliGRAM(s) Oral daily  buPROPion XL . 150 milliGRAM(s) Oral daily  dextrose 5%. 1000 milliLiter(s) (50 mL/Hr) IV Continuous <Continuous>  dextrose 50% Injectable 12.5 Gram(s) IV Push once  dextrose 50% Injectable 25 Gram(s) IV Push once  dextrose 50% Injectable 25 Gram(s) IV Push once  ertapenem  IVPB 1000 milliGRAM(s) IV Intermittent every 24 hours  heparin   Injectable 5000 Unit(s) SubCutaneous every 8 hours  insulin lispro (HumaLOG) corrective regimen sliding scale   SubCutaneous three times a day before meals  insulin lispro (HumaLOG) corrective regimen sliding scale   SubCutaneous at bedtime  lactated ringers. 1000 milliLiter(s) (100 mL/Hr) IV Continuous <Continuous>  pantoprazole    Tablet 40 milliGRAM(s) Oral before breakfast  polyethylene glycol 3350 17 Gram(s) Oral daily  sertraline 50 milliGRAM(s) Oral daily    MEDICATIONS  (PRN):  dextrose 40% Gel 15 Gram(s) Oral once PRN Blood Glucose LESS THAN 70 milliGRAM(s)/deciliter  glucagon  Injectable 1 milliGRAM(s) IntraMuscular once PRN Glucose LESS THAN 70 milligrams/deciliter      CAPILLARY BLOOD GLUCOSE      POCT Blood Glucose.: 144 mg/dL (23 Jun 2020 21:10)  POCT Blood Glucose.: 164 mg/dL (23 Jun 2020 17:32)  POCT Blood Glucose.: 172 mg/dL (23 Jun 2020 13:29)  POCT Blood Glucose.: 135 mg/dL (23 Jun 2020 09:28)    I&O's Summary    23 Jun 2020 07:01  -  24 Jun 2020 07:00  --------------------------------------------------------  IN: 200 mL / OUT: 1050 mL / NET: -850 mL        PHYSICAL EXAM:  Vital Signs Last 24 Hrs  T(C): 36.6 (24 Jun 2020 07:20), Max: 36.6 (24 Jun 2020 00:29)  T(F): 97.8 (24 Jun 2020 07:20), Max: 97.9 (24 Jun 2020 00:29)  HR: 69 (24 Jun 2020 07:20) (69 - 77)  BP: 137/79 (24 Jun 2020 07:20) (131/72 - 150/74)  BP(mean): --  RR: 18 (24 Jun 2020 07:20) (17 - 18)  SpO2: 98% (24 Jun 2020 07:20) (98% - 99%)  CONSTITUTIONAL: NAD, well-kempt, cachetic  EYES: PERRLA; EOMI, clear conjunctiva and + xanthelasma on sclera  NECK: Supple, no palpable masses; no thyromegaly  RESPIRATORY: Clear to auscultation bilaterally; no wheezes, crackles, or ronchi  CARDIOVASCULAR: Regular rate and rhythm, normal S1 and S2, no murmur/rub/gallop; No lower extremity edema; Peripheral pulses are 2+ bilaterally  ABDOMEN: +Bowel sounds; nontender to palpation, no rebound/guarding; no hepatosplenomegaly  PSYCH: A+O to person, place, and time; affect appropriate  NEUROLOGY: non-focal, bilateral UE 5/5 on flexion and 4/5 on extension; bilateral LE hip flexion 4/5; bilateral lower leg flexion 3/5.   SKIN: No rashes; no palpable lesions    LABS:                        9.2    12.36 )-----------( 310      ( 23 Jun 2020 07:14 )             29.0     06-23    134<L>  |  100  |  19  ----------------------------<  151<H>  4.4   |  26  |  1.05    Ca    8.6      23 Jun 2020 07:14                  RADIOLOGY & ADDITIONAL TESTS:  Results Reviewed:   Imaging Personally Reviewed:  Electrocardiogram Personally Reviewed:    COORDINATION OF CARE:  Care Discussed with Consultants/Other Providers [Y/N]:  Prior or Outpatient Records Reviewed [Y/N]: Jaziel Isabel, pgy 1  [facility] Team 3  [phone]  After 7pm; page night float    Patient is a 64y old  Male who presents with a chief complaint of Hematuria; weight loss (23 Jun 2020 11:17)      SUBJECTIVE / OVERNIGHT EVENTS: No acute events overnight. Pt seen and examined at bedside. Pt denies CP, SOB, n/v/d, fever and chills. Reports not having bowel movement for over 2 days yesterday. As per nurse, Pt dad temporary urinary rentention to ~500cc on bladder scan but eventually urinated.  ADDITIONAL REVIEW OF SYSTEMS: As stated previously noted; otherwise negative.     MEDICATIONS  (STANDING):  aspirin enteric coated 81 milliGRAM(s) Oral daily  atorvastatin 80 milliGRAM(s) Oral at bedtime  bromocriptine Capsule 5 milliGRAM(s) Oral daily  buPROPion XL . 150 milliGRAM(s) Oral daily  dextrose 5%. 1000 milliLiter(s) (50 mL/Hr) IV Continuous <Continuous>  dextrose 50% Injectable 12.5 Gram(s) IV Push once  dextrose 50% Injectable 25 Gram(s) IV Push once  dextrose 50% Injectable 25 Gram(s) IV Push once  ertapenem  IVPB 1000 milliGRAM(s) IV Intermittent every 24 hours  heparin   Injectable 5000 Unit(s) SubCutaneous every 8 hours  insulin lispro (HumaLOG) corrective regimen sliding scale   SubCutaneous three times a day before meals  insulin lispro (HumaLOG) corrective regimen sliding scale   SubCutaneous at bedtime  lactated ringers. 1000 milliLiter(s) (100 mL/Hr) IV Continuous <Continuous>  pantoprazole    Tablet 40 milliGRAM(s) Oral before breakfast  polyethylene glycol 3350 17 Gram(s) Oral daily  sertraline 50 milliGRAM(s) Oral daily    MEDICATIONS  (PRN):  dextrose 40% Gel 15 Gram(s) Oral once PRN Blood Glucose LESS THAN 70 milliGRAM(s)/deciliter  glucagon  Injectable 1 milliGRAM(s) IntraMuscular once PRN Glucose LESS THAN 70 milligrams/deciliter      CAPILLARY BLOOD GLUCOSE      POCT Blood Glucose.: 144 mg/dL (23 Jun 2020 21:10)  POCT Blood Glucose.: 164 mg/dL (23 Jun 2020 17:32)  POCT Blood Glucose.: 172 mg/dL (23 Jun 2020 13:29)  POCT Blood Glucose.: 135 mg/dL (23 Jun 2020 09:28)    I&O's Summary    23 Jun 2020 07:01  -  24 Jun 2020 07:00  --------------------------------------------------------  IN: 200 mL / OUT: 1050 mL / NET: -850 mL        PHYSICAL EXAM:  Vital Signs Last 24 Hrs  T(C): 36.6 (24 Jun 2020 07:20), Max: 36.6 (24 Jun 2020 00:29)  T(F): 97.8 (24 Jun 2020 07:20), Max: 97.9 (24 Jun 2020 00:29)  HR: 69 (24 Jun 2020 07:20) (69 - 77)  BP: 137/79 (24 Jun 2020 07:20) (131/72 - 150/74)  BP(mean): --  RR: 18 (24 Jun 2020 07:20) (17 - 18)  SpO2: 98% (24 Jun 2020 07:20) (98% - 99%)  CONSTITUTIONAL: NAD, well-kempt, cachetic  EYES: PERRLA; EOMI, clear conjunctiva and + xanthelasma on sclera  NECK: Supple, no palpable masses; no thyromegaly  RESPIRATORY: Clear to auscultation bilaterally; no wheezes, crackles, or ronchi  CARDIOVASCULAR: Regular rate and rhythm, normal S1 and S2, no murmur/rub/gallop; No lower extremity edema; Peripheral pulses are 2+ bilaterally  ABDOMEN: +Bowel sounds; nontender to palpation, no rebound/guarding; no hepatosplenomegaly  PSYCH: A+O to person, place, and time; affect appropriate  NEUROLOGY: non-focal, bilateral UE 5/5 on flexion and 4/5 on extension; bilateral LE hip flexion 4/5; R lower leg flexion 2/5; L lower leg 3/5.   SKIN: No rashes; no palpable lesions    LABS:                        9.2    12.36 )-----------( 310      ( 23 Jun 2020 07:14 )             29.0     06-23    134<L>  |  100  |  19  ----------------------------<  151<H>  4.4   |  26  |  1.05    Ca    8.6      23 Jun 2020 07:14                  RADIOLOGY & ADDITIONAL TESTS:  Results Reviewed:   Imaging Personally Reviewed:  Electrocardiogram Personally Reviewed:    COORDINATION OF CARE:  Care Discussed with Consultants/Other Providers [Y/N]:  Prior or Outpatient Records Reviewed [Y/N]: Jaziel Isabel, pgy 1  [facility] Team 3  [phone]  After 7pm; page night float    Patient is a 64y old  Male who presents with a chief complaint of Hematuria; weight loss (23 Jun 2020 11:17)      SUBJECTIVE / OVERNIGHT EVENTS: No acute events overnight. Pt seen and examined at bedside. Pt denies CP, SOB, n/v/d, fever and chills. Reports not having bowel movement for over 2 days yesterday.   ADDITIONAL REVIEW OF SYSTEMS: As stated previously noted; otherwise negative.     MEDICATIONS  (STANDING):  aspirin enteric coated 81 milliGRAM(s) Oral daily  atorvastatin 80 milliGRAM(s) Oral at bedtime  bromocriptine Capsule 5 milliGRAM(s) Oral daily  buPROPion XL . 150 milliGRAM(s) Oral daily  dextrose 5%. 1000 milliLiter(s) (50 mL/Hr) IV Continuous <Continuous>  dextrose 50% Injectable 12.5 Gram(s) IV Push once  dextrose 50% Injectable 25 Gram(s) IV Push once  dextrose 50% Injectable 25 Gram(s) IV Push once  ertapenem  IVPB 1000 milliGRAM(s) IV Intermittent every 24 hours  heparin   Injectable 5000 Unit(s) SubCutaneous every 8 hours  insulin lispro (HumaLOG) corrective regimen sliding scale   SubCutaneous three times a day before meals  insulin lispro (HumaLOG) corrective regimen sliding scale   SubCutaneous at bedtime  lactated ringers. 1000 milliLiter(s) (100 mL/Hr) IV Continuous <Continuous>  pantoprazole    Tablet 40 milliGRAM(s) Oral before breakfast  polyethylene glycol 3350 17 Gram(s) Oral daily  sertraline 50 milliGRAM(s) Oral daily    MEDICATIONS  (PRN):  dextrose 40% Gel 15 Gram(s) Oral once PRN Blood Glucose LESS THAN 70 milliGRAM(s)/deciliter  glucagon  Injectable 1 milliGRAM(s) IntraMuscular once PRN Glucose LESS THAN 70 milligrams/deciliter      CAPILLARY BLOOD GLUCOSE      POCT Blood Glucose.: 144 mg/dL (23 Jun 2020 21:10)  POCT Blood Glucose.: 164 mg/dL (23 Jun 2020 17:32)  POCT Blood Glucose.: 172 mg/dL (23 Jun 2020 13:29)  POCT Blood Glucose.: 135 mg/dL (23 Jun 2020 09:28)    I&O's Summary    23 Jun 2020 07:01  -  24 Jun 2020 07:00  --------------------------------------------------------  IN: 200 mL / OUT: 1050 mL / NET: -850 mL        PHYSICAL EXAM:  Vital Signs Last 24 Hrs  T(C): 36.6 (24 Jun 2020 07:20), Max: 36.6 (24 Jun 2020 00:29)  T(F): 97.8 (24 Jun 2020 07:20), Max: 97.9 (24 Jun 2020 00:29)  HR: 69 (24 Jun 2020 07:20) (69 - 77)  BP: 137/79 (24 Jun 2020 07:20) (131/72 - 150/74)  BP(mean): --  RR: 18 (24 Jun 2020 07:20) (17 - 18)  SpO2: 98% (24 Jun 2020 07:20) (98% - 99%)  CONSTITUTIONAL: NAD, well-kempt, cachetic  EYES: PERRLA; EOMI, clear conjunctiva and + xanthelasma on sclera  NECK: Supple, no palpable masses; no thyromegaly  RESPIRATORY: Clear to auscultation bilaterally; no wheezes, crackles, or ronchi  CARDIOVASCULAR: Regular rate and rhythm, normal S1 and S2, no murmur/rub/gallop; No lower extremity edema; Peripheral pulses are 2+ bilaterally  ABDOMEN: +Bowel sounds; nontender to palpation, no rebound/guarding; no hepatosplenomegaly  PSYCH: A+O to person, place, and time; affect appropriate  NEUROLOGY: non-focal, bilateral UE 5/5 on flexion and 4/5 on extension; bilateral LE hip flexion 4/5; R lower leg flexion 2/5; L lower leg 3/5.   SKIN: No rashes; no palpable lesions    LABS:                        9.2    12.36 )-----------( 310      ( 23 Jun 2020 07:14 )             29.0     06-23    134<L>  |  100  |  19  ----------------------------<  151<H>  4.4   |  26  |  1.05    Ca    8.6      23 Jun 2020 07:14                  RADIOLOGY & ADDITIONAL TESTS:  Results Reviewed:   Imaging Personally Reviewed:  Electrocardiogram Personally Reviewed:    COORDINATION OF CARE:  Care Discussed with Consultants/Other Providers [Y/N]:  Prior or Outpatient Records Reviewed [Y/N]:

## 2020-06-24 NOTE — PROGRESS NOTE ADULT - ASSESSMENT
63 yo M pmhx DM2 on insulin, HTN, CAD w. LAD stent/restent after thrombosis, depression presenting with 2 days of hematuria  Patient with no fevers,chills  Leucocytosis as OP  Admitted CT with emphysematous cystitis and renal lesion ? abscess  But also with 80 lb weight loss over 6 months  In hospital minimal leucocytosis, No fevers  Blood and urine cs negative (though did recieve cipro as OP)    ?UTI-cystitis with early renal abscess  ? Malignancy with superinfection  ? 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    B) Renal mass  ? absces  ? malignancy   ? other etiology  imaging and abx plan as above  Check urine cytology    C)Leucocytosis  likely from above  Plan as detailed above    Will tailor plan for ID issues  per course,results.Will defer to primary team on management of other issues.  Assessment, plan and recommendations as detailed above were discussed with the medical/primary  team    Will Follow.  Beeper [phone] [facility] [id number].

## 2020-06-24 NOTE — PROGRESS NOTE ADULT - PROBLEM SELECTOR PLAN 9
DVT: SQH  Diet: soft after family discussion   Dispo: MARI DVT: SQH  Diet: soft feeds after family discussion   Dispo: MARI

## 2020-06-24 NOTE — PROGRESS NOTE ADULT - SUBJECTIVE AND OBJECTIVE BOX
Patient is a 64y old  Male who presents with a chief complaint of Hematuria; weight loss (24 Jun 2020 07:24)    Being followed by ID for emphysematous cystitis,pyelo    Interval history:feels well  denies any complaints   No acute events      ROS:  No cough,SOB,CP  No N/V/D./abd pain  No other complaints      Antimicrobials:    ertapenem  IVPB 1000 milliGRAM(s) IV Intermittent every 24 hours    Other medications reviewed    Vital Signs Last 24 Hrs  T(C): 36.6 (06-24-20 @ 07:20), Max: 36.6 (06-24-20 @ 00:29)  T(F): 97.8 (06-24-20 @ 07:20), Max: 97.9 (06-24-20 @ 00:29)  HR: 69 (06-24-20 @ 07:20) (69 - 77)  BP: 137/79 (06-24-20 @ 07:20) (135/83 - 150/74)  BP(mean): --  RR: 18 (06-24-20 @ 07:20) (17 - 18)  SpO2: 98% (06-24-20 @ 07:20) (98% - 99%)    Physical Exam:    cachecxia    HEENT PERRLA EOMI    No oral exudate or erythema    Chest Good AE,CTA    CVS RRR S1 S2 WNl No murmur or rub or gallop    Abd soft BS normal No tenderness no masses  griffith    IV site no erythema tenderness or discharge    CNS AAO X 3 no focalLab Data:                          10.3   8.66  )-----------( 327      ( 24 Jun 2020 10:15 )             32.0       06-24    133<L>  |  97  |  15  ----------------------------<  112<H>  4.7   |  28  |  0.84    Ca    9.5      24 Jun 2020 10:15  Phos  3.1     06-24  Mg     2.0     06-24          Culture - Urine (collected 20 Jun 2020 01:29)  Source: .Urine Clean Catch (Midstream)  Final Report (20 Jun 2020 21:23):    No growth      < from: Xray Abdomen 1 View PORTABLE -Urgent (06.22.20 @ 15:10) >    IMPRESSION:     Nonobstructive bowel gas pattern.    Residual contrast material is visualized throughout the colon.              < end of copied text >

## 2020-06-24 NOTE — PROGRESS NOTE ADULT - PROBLEM SELECTOR PLAN 6
resolved. 2/2 Metabolic encephalopathy  - cw wellbutrin  - cw sertraline   - frequent reorientation resolved. 2/2 Metabolic encephalopathy  - cw Wellbutrin  - cw sertraline   - frequent reorientation

## 2020-06-24 NOTE — PROGRESS NOTE ADULT - PROBLEM SELECTOR PLAN 7
A1c 7.7, on insulin and orals  - has not been taking insulin the last 2 weeks  - GEORGI and premeals A1C 7.7, on insulin and orals  - has not been taking insulin the last 2 weeks  - GEORGI and premeals

## 2020-06-24 NOTE — PROGRESS NOTE ADULT - PROBLEM SELECTOR PLAN 4
Hx of 6 months of weight loss, FTT 2/2 neuromuscular disorder (ALS) vs paraneoplastic (less likely due to negative CT findings vs infection (Endocarditis? - less likely due to TTE results, no blood cx, no murmur) vs psychiatric illness  - Physical therapy evaluation recommending MARI  - UTI treatment as above  - continue with Wellbutrin, sertraline   - TTE without new findings  - discussed with Neurologist outpatient Dr. Winters; will need EMG as outpatient. House neurology following  - fu urine cytology

## 2020-06-24 NOTE — PROGRESS NOTE ADULT - PROBLEM SELECTOR PLAN 5
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 As above iso FTT with global weakness  - dietician consult  - PT consulted  - neuro consulted; pending paraneoplastic panel  -will f/u for EMG w/ Dr. 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 As above iso FTT with global weakness  - dietician consult  - PT consulted, recommending MARI  - neuro consulted; pending paraneoplastic panel  -will f/u for EMG w/ Dr. Winters

## 2020-06-24 NOTE — PROGRESS NOTE ADULT - ATTENDING COMMENTS
Repeat CT has not been performed 2/2 contrast in the bowel after MDS. Will increase bowel regimen to facilitate passage of contrast. Once contrast passed, will obtain CT abdomen to assess possible renal abscess. If consistent with abscess then pt will require 2-4 weeks of abx. If not consistent with abscess then patient may require biopsy vs cystoscopy to eval for potential malignancy. Urine Cytology in lab, results pending

## 2020-06-25 LAB
ANION GAP SERPL CALC-SCNC: 11 MMOL/L — SIGNIFICANT CHANGE UP (ref 5–17)
BUN SERPL-MCNC: 16 MG/DL — SIGNIFICANT CHANGE UP (ref 7–23)
CALCIUM SERPL-MCNC: 9.4 MG/DL — SIGNIFICANT CHANGE UP (ref 8.4–10.5)
CHLORIDE SERPL-SCNC: 97 MMOL/L — SIGNIFICANT CHANGE UP (ref 96–108)
CO2 SERPL-SCNC: 26 MMOL/L — SIGNIFICANT CHANGE UP (ref 22–31)
CREAT SERPL-MCNC: 0.74 MG/DL — SIGNIFICANT CHANGE UP (ref 0.5–1.3)
GLUCOSE BLDC GLUCOMTR-MCNC: 112 MG/DL — HIGH (ref 70–99)
GLUCOSE BLDC GLUCOMTR-MCNC: 150 MG/DL — HIGH (ref 70–99)
GLUCOSE BLDC GLUCOMTR-MCNC: 158 MG/DL — HIGH (ref 70–99)
GLUCOSE BLDC GLUCOMTR-MCNC: 161 MG/DL — HIGH (ref 70–99)
GLUCOSE BLDC GLUCOMTR-MCNC: 176 MG/DL — HIGH (ref 70–99)
GLUCOSE SERPL-MCNC: 149 MG/DL — HIGH (ref 70–99)
HCT VFR BLD CALC: 32.9 % — LOW (ref 39–50)
HGB BLD-MCNC: 10.6 G/DL — LOW (ref 13–17)
MCHC RBC-ENTMCNC: 26.4 PG — LOW (ref 27–34)
MCHC RBC-ENTMCNC: 32.2 GM/DL — SIGNIFICANT CHANGE UP (ref 32–36)
MCV RBC AUTO: 82 FL — SIGNIFICANT CHANGE UP (ref 80–100)
NON-GYNECOLOGICAL CYTOLOGY STUDY: SIGNIFICANT CHANGE UP
NRBC # BLD: 0 /100 WBCS — SIGNIFICANT CHANGE UP (ref 0–0)
PLATELET # BLD AUTO: 347 K/UL — SIGNIFICANT CHANGE UP (ref 150–400)
POTASSIUM SERPL-MCNC: 4.2 MMOL/L — SIGNIFICANT CHANGE UP (ref 3.5–5.3)
POTASSIUM SERPL-SCNC: 4.2 MMOL/L — SIGNIFICANT CHANGE UP (ref 3.5–5.3)
RBC # BLD: 4.01 M/UL — LOW (ref 4.2–5.8)
RBC # FLD: 14.7 % — HIGH (ref 10.3–14.5)
SARS-COV-2 RNA SPEC QL NAA+PROBE: SIGNIFICANT CHANGE UP
SODIUM SERPL-SCNC: 134 MMOL/L — LOW (ref 135–145)
WBC # BLD: 9.44 K/UL — SIGNIFICANT CHANGE UP (ref 3.8–10.5)
WBC # FLD AUTO: 9.44 K/UL — SIGNIFICANT CHANGE UP (ref 3.8–10.5)

## 2020-06-25 PROCEDURE — 99232 SBSQ HOSP IP/OBS MODERATE 35: CPT

## 2020-06-25 PROCEDURE — 99232 SBSQ HOSP IP/OBS MODERATE 35: CPT | Mod: GC

## 2020-06-25 RX ORDER — MULTIVIT WITH MIN/MFOLATE/K2 340-15/3 G
1 POWDER (GRAM) ORAL ONCE
Refills: 0 | Status: COMPLETED | OUTPATIENT
Start: 2020-06-25 | End: 2020-06-25

## 2020-06-25 RX ORDER — LACTULOSE 10 G/15ML
15 SOLUTION ORAL ONCE
Refills: 0 | Status: COMPLETED | OUTPATIENT
Start: 2020-06-25 | End: 2020-06-25

## 2020-06-25 RX ORDER — POLYETHYLENE GLYCOL 3350 17 G/17G
17 POWDER, FOR SOLUTION ORAL ONCE
Refills: 0 | Status: COMPLETED | OUTPATIENT
Start: 2020-06-25 | End: 2020-06-25

## 2020-06-25 RX ADMIN — POLYETHYLENE GLYCOL 3350 17 GRAM(S): 17 POWDER, FOR SOLUTION ORAL at 05:55

## 2020-06-25 RX ADMIN — PANTOPRAZOLE SODIUM 40 MILLIGRAM(S): 20 TABLET, DELAYED RELEASE ORAL at 05:34

## 2020-06-25 RX ADMIN — Medication 1: at 12:32

## 2020-06-25 RX ADMIN — POLYETHYLENE GLYCOL 3350 17 GRAM(S): 17 POWDER, FOR SOLUTION ORAL at 12:19

## 2020-06-25 RX ADMIN — Medication 1 BOTTLE: at 17:20

## 2020-06-25 RX ADMIN — HEPARIN SODIUM 5000 UNIT(S): 5000 INJECTION INTRAVENOUS; SUBCUTANEOUS at 05:34

## 2020-06-25 RX ADMIN — ERTAPENEM SODIUM 120 MILLIGRAM(S): 1 INJECTION, POWDER, LYOPHILIZED, FOR SOLUTION INTRAMUSCULAR; INTRAVENOUS at 12:19

## 2020-06-25 RX ADMIN — Medication 81 MILLIGRAM(S): at 12:19

## 2020-06-25 RX ADMIN — BROMOCRIPTINE MESYLATE 5 MILLIGRAM(S): 5 CAPSULE ORAL at 12:19

## 2020-06-25 RX ADMIN — LACTULOSE 15 GRAM(S): 10 SOLUTION ORAL at 08:16

## 2020-06-25 RX ADMIN — ATORVASTATIN CALCIUM 80 MILLIGRAM(S): 80 TABLET, FILM COATED ORAL at 21:17

## 2020-06-25 RX ADMIN — BUPROPION HYDROCHLORIDE 150 MILLIGRAM(S): 150 TABLET, EXTENDED RELEASE ORAL at 12:19

## 2020-06-25 RX ADMIN — SERTRALINE 50 MILLIGRAM(S): 25 TABLET, FILM COATED ORAL at 12:19

## 2020-06-25 RX ADMIN — HEPARIN SODIUM 5000 UNIT(S): 5000 INJECTION INTRAVENOUS; SUBCUTANEOUS at 13:16

## 2020-06-25 RX ADMIN — HEPARIN SODIUM 5000 UNIT(S): 5000 INJECTION INTRAVENOUS; SUBCUTANEOUS at 21:17

## 2020-06-25 NOTE — CHART NOTE - NSCHARTNOTEFT_GEN_A_CORE
Nutrition Follow Up Note  Patient seen for: Malnutrition Follow Up     Interim events noted, chart reviewed. Noted pt still pending CT A/P after large BM.     Source: pt, RN     Diet : Diet, Soft (20 @ 10:08)    Patient reports: he has been trying to eat, eats about half of his meals at times, consistent c flow sheets. Pt reports he would like to have Ensure Enlive, made team aware. Pt reports last BM a few days ago.         Daily Weight in k.6 (), Weight in k.8 (); noted wt change, questionable accuracy, would continue to monitor     Pertinent Medications: MEDICATIONS  (STANDING):  aspirin enteric coated 81 milliGRAM(s) Oral daily  atorvastatin 80 milliGRAM(s) Oral at bedtime  bromocriptine Capsule 5 milliGRAM(s) Oral daily  buPROPion XL . 150 milliGRAM(s) Oral daily  dextrose 5%. 1000 milliLiter(s) (50 mL/Hr) IV Continuous <Continuous>  dextrose 50% Injectable 12.5 Gram(s) IV Push once  dextrose 50% Injectable 25 Gram(s) IV Push once  dextrose 50% Injectable 25 Gram(s) IV Push once  ertapenem  IVPB 1000 milliGRAM(s) IV Intermittent every 24 hours  heparin   Injectable 5000 Unit(s) SubCutaneous every 8 hours  insulin lispro (HumaLOG) corrective regimen sliding scale   SubCutaneous three times a day before meals  insulin lispro (HumaLOG) corrective regimen sliding scale   SubCutaneous at bedtime  lactated ringers. 1000 milliLiter(s) (100 mL/Hr) IV Continuous <Continuous>  pantoprazole    Tablet 40 milliGRAM(s) Oral before breakfast  polyethylene glycol 3350 17 Gram(s) Oral daily  sertraline 50 milliGRAM(s) Oral daily    MEDICATIONS  (PRN):  dextrose 40% Gel 15 Gram(s) Oral once PRN Blood Glucose LESS THAN 70 milliGRAM(s)/deciliter  glucagon  Injectable 1 milliGRAM(s) IntraMuscular once PRN Glucose LESS THAN 70 milligrams/deciliter    Pertinent Labs:  @ 10:16: Na 134<L>, BUN 16, Cr 0.74, <H>, K+ 4.2, Phos --, Mg --, Alk Phos --, ALT/SGPT --, AST/SGOT --, HbA1c --    Finger Sticks:  POCT Blood Glucose.: 112 mg/dL ( @ 08:55)  POCT Blood Glucose.: 174 mg/dL ( @ 21:18)  POCT Blood Glucose.: 147 mg/dL ( @ 17:54)  POCT Blood Glucose.: 145 mg/dL ( @ 14:14)  POCT Blood Glucose.: 173 mg/dL ( @ 12:19)      Skin per nursing documentation: no pressure injuries   Edema: none at this time     Estimated Needs:   [x] no change since previous assessment      Previous Nutrition Diagnosis: moderate malnutrition   Nutrition Diagnosis continues at this time, care plan in progress     New Nutrition Diagnosis: none at this time       Recommend  1) Continue c current diet as per pt wishes.   2) As per pt request, recommend Ensure Enlive x1 daily.   3) Encouraged PO intake as tolerated, consuming Prot first at meal times and choosing nutrient dense meals and snacks.     Monitoring and Evaluation:     Continue to monitor Nutritional intake, Tolerance to diet prescription, weights, labs, skin integrity    RD remains available upon request and will follow up per protocol  Belkis Lopez MS RD CDN [facility],  #[phone]

## 2020-06-25 NOTE — PROGRESS NOTE ADULT - PROBLEM SELECTOR PLAN 7
A1C 7.7, on insulin and orals  - had not been taking insulin the 2 weeks prior to admission  - GEORGI and premeals

## 2020-06-25 NOTE — PROGRESS NOTE ADULT - SUBJECTIVE AND OBJECTIVE BOX
Patient is a 64y old  Male who presents with a chief complaint of Hematuria; weight loss (24 Jun 2020 11:39)    Being followed by ID for ? renal abscess, emphysematous cystitis    Interval history:no BM yet  denies any urinary complaints   No acute events      ROS:  No cough,SOB,CP  No N/V/D./abd pain  No other complaints      Antimicrobials:    ertapenem  IVPB 1000 milliGRAM(s) IV Intermittent every 24 hours    Other medications reviewed    Vital Signs Last 24 Hrs  T(C): 36.5 (06-25-20 @ 08:30), Max: 36.5 (06-25-20 @ 08:30)  T(F): 97.7 (06-25-20 @ 08:30), Max: 97.7 (06-25-20 @ 08:30)  HR: 69 (06-25-20 @ 08:30) (68 - 70)  BP: 138/77 (06-25-20 @ 08:30) (138/77 - 153/80)  BP(mean): --  RR: 18 (06-25-20 @ 08:30) (18 - 18)  SpO2: 99% (06-25-20 @ 08:30) (99% - 99%)    Physical Exam:        cachecxia    HEENT PERRLA EOMI    No oral exudate or erythema    Chest Good AE,CTA    CVS RRR S1 S2 WNl No murmur or rub or gallop    Abd soft BS normal No tenderness no masses  griffith    IV site no erythema tenderness or discharge    CNS AAO X 3 no focal  Lab Data:                          10.3   8.66  )-----------( 327      ( 24 Jun 2020 10:15 )             32.0       06-24    133<L>  |  97  |  15  ----------------------------<  112<H>  4.7   |  28  |  0.84    Ca    9.5      24 Jun 2020 10:15  Phos  3.1     06-24  Mg     2.0     06-24          < from: Xray Abdomen 1 View PORTABLE -Urgent (06.22.20 @ 15:10) >    IMPRESSION:     Nonobstructive bowel gas pattern.    Residual contrast material is visualized throughout the colon.                < end of copied text >

## 2020-06-25 NOTE — PROGRESS NOTE ADULT - PROBLEM SELECTOR PLAN 3
Evaluated by s/s  - reports persistently aspirating, recommending NPO at this time, however pt requesting soft diet. discussed in separate chart note. Reports tolerating some fluid PO.  -aspiration precaution

## 2020-06-25 NOTE — PROGRESS NOTE ADULT - PROBLEM SELECTOR PLAN 1
Pt presented with 2 days of hematuria 2/2 cystitis. 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 received 2 days of cipro as outpatient  - U/A with LE, proteins, moderate blood  - cont invanz day 8. Plan for 2-4 week course. ID following. Awaiting large BM prior to repeat CT A/P to re-assess possible renal abscess. If imaging findings not c/w abscess may need renal biopsy vs cystoscopy  - monitor for fever, trend WBC/Cr   - urology consulted; Pt presented with 2 days of hematuria 2/2 cystitis. 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 received 2 days of cipro as outpatient  - U/A with LE, proteins, moderate blood  - cont invanz day 8. Plan for 2-4 week course. ID following. Awaiting large BM prior to repeat CT A/P to re-assess possible renal abscess. If imaging findings not c/w abscess may need renal biopsy vs cystoscopy  - Increase bowel regimen to facilitate bowel movement  - monitor for fever, trend WBC/Cr   - urology consulted;

## 2020-06-25 NOTE — PROGRESS NOTE ADULT - ASSESSMENT
65 yo M pmhx DM2 on insulin, HTN, CAD w. LAD stent/restent after thrombosis, depression presenting with 2 days of hematuria  Patient with no fevers,chills  Leucocytosis as OP  Admitted CT with emphysematous cystitis and renal lesion ? abscess  But also with 80 lb weight loss over 6 months  In hospital minimal leucocytosis, No fevers  Blood and urine cs negative (though did recieve cipro as OP)    ?UTI-cystitis with early renal abscess  ? Malignancy with superinfection  ? 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    B) Renal mass  ? absces  ? malignancy   ? other etiology  imaging and abx plan as above  Check urine cytology    C)Leucocytosis  likely from above  Plan as detailed above    Will tailor plan for ID issues  per course,results.Will defer to primary team on management of other issues.  Assessment, plan and recommendations as detailed above were discussed with the medical/primary  team    Will Follow.  Beeper [phone] [facility] [id number].

## 2020-06-25 NOTE — PROVIDER CONTACT NOTE (MEDICATION) - ASSESSMENT
Pt only took 2 sips of citrate of magnesium. Explain to pt importance of medication. Pt verbalized understanding and insist to refuse.

## 2020-06-25 NOTE — PROGRESS NOTE ADULT - SUBJECTIVE AND OBJECTIVE BOX
Charlie Swartz MD  Division of Hospital Medicine  Pager [phone]  If no response or off hours page: [phone]  ---------------------------------------------------------    RAHUL VITALE  64y  Male      Patient is a 64y old  Male who presents with a chief complaint of Hematuria; weight loss (25 Jun 2020 09:46)      INTERVAL HPI/OVERNIGHT EVENTS:  Seen at bedside. Overall, feels well. Still has not had a large bowel movement, thus CT scan of abdomen has been on hold.       REVIEW OF SYSTEMS: 10 point ROS negative unless listed above    T(C): 36.5 (06-25-20 @ 08:30), Max: 36.5 (06-25-20 @ 08:30)  HR: 69 (06-25-20 @ 08:30) (68 - 70)  BP: 138/77 (06-25-20 @ 08:30) (138/77 - 153/80)  RR: 18 (06-25-20 @ 08:30) (18 - 18)  SpO2: 99% (06-25-20 @ 08:30) (99% - 99%)  Wt(kg): --Vital Signs Last 24 Hrs  T(C): 36.5 (25 Jun 2020 08:30), Max: 36.5 (25 Jun 2020 08:30)  T(F): 97.7 (25 Jun 2020 08:30), Max: 97.7 (25 Jun 2020 08:30)  HR: 69 (25 Jun 2020 08:30) (68 - 70)  BP: 138/77 (25 Jun 2020 08:30) (138/77 - 153/80)  BP(mean): --  RR: 18 (25 Jun 2020 08:30) (18 - 18)  SpO2: 99% (25 Jun 2020 08:30) (99% - 99%)    PHYSICAL EXAM:  CONSTITUTIONAL: NAD, well-kempt, cachetic  EYES: PERRLA; EOMI, clear conjunctiva and + xanthelasma on sclera  RESPIRATORY: Clear to auscultation bilaterally; no wheezes, crackles, or rhonchi  CARDIOVASCULAR: Regular rate and rhythm, normal S1 and S2, no murmur/rub/gallop; No lower extremity edema  ABDOMEN: +Bowel sounds; nontender to palpation, no rebound/guarding; no hepatosplenomegaly  PSYCH: A+O to person, place, and time; affect appropriate  NEUROLOGY: non-focal, bilateral UE 5/5 on flexion and 4/5 on extension; bilateral LE hip flexion 4/5; R lower leg flexion 2/5; L lower leg 3/5.     Consultant(s) Notes Reviewed:  [x ] YES  [ ] NO  Care Discussed with Consultants/Other Providers [ x] YES  [ ] NO    LABS:                        10.6   9.44  )-----------( 347      ( 25 Jun 2020 10:16 )             32.9     06-25    134<L>  |  97  |  16  ----------------------------<  149<H>  4.2   |  26  |  0.74    Ca    9.4      25 Jun 2020 10:16  Phos  3.1     06-24  Mg     2.0     06-24          CAPILLARY BLOOD GLUCOSE      POCT Blood Glucose.: 161 mg/dL (25 Jun 2020 12:24)  POCT Blood Glucose.: 112 mg/dL (25 Jun 2020 08:55)  POCT Blood Glucose.: 174 mg/dL (24 Jun 2020 21:18)  POCT Blood Glucose.: 147 mg/dL (24 Jun 2020 17:54)  POCT Blood Glucose.: 145 mg/dL (24 Jun 2020 14:14)            RADIOLOGY & ADDITIONAL TESTS:    Imaging Personally Reviewed:  [x ] YES  [ ] NO Charlie Swartz MD  Division of Hospital Medicine  Pager [phone]  If no response or off hours page: [phone]  ---------------------------------------------------------    RAHUL VITALE  64y  Male      Patient is a 64y old  Male who presents with a chief complaint of Hematuria; weight loss (25 Jun 2020 09:46)      INTERVAL HPI/OVERNIGHT EVENTS:  Seen at bedside. Overall, feels well. Still has not had a large bowel movement, thus CT scan of abdomen has been on hold.       REVIEW OF SYSTEMS: 10 point ROS negative unless listed above    T(C): 36.5 (06-25-20 @ 08:30), Max: 36.5 (06-25-20 @ 08:30)  HR: 69 (06-25-20 @ 08:30) (68 - 70)  BP: 138/77 (06-25-20 @ 08:30) (138/77 - 153/80)  RR: 18 (06-25-20 @ 08:30) (18 - 18)  SpO2: 99% (06-25-20 @ 08:30) (99% - 99%)  Wt(kg): --Vital Signs Last 24 Hrs  T(C): 36.5 (25 Jun 2020 08:30), Max: 36.5 (25 Jun 2020 08:30)  T(F): 97.7 (25 Jun 2020 08:30), Max: 97.7 (25 Jun 2020 08:30)  HR: 69 (25 Jun 2020 08:30) (68 - 70)  BP: 138/77 (25 Jun 2020 08:30) (138/77 - 153/80)  BP(mean): --  RR: 18 (25 Jun 2020 08:30) (18 - 18)  SpO2: 99% (25 Jun 2020 08:30) (99% - 99%)    PHYSICAL EXAM:  CONSTITUTIONAL: NAD, well-kempt, cachetic  EYES: PERRLA; EOMI, clear conjunctiva and + xanthelasma on sclera  RESPIRATORY: Clear to auscultation bilaterally; no wheezes, crackles, or rhonchi  CARDIOVASCULAR: Regular rate and rhythm, normal S1 and S2, no murmur/rub/gallop; No lower extremity edema  ABDOMEN: +Bowel sounds; nontender to palpation, no rebound/guarding; no hepatosplenomegaly  PSYCH: A+O to person, place, and time; affect appropriate  NEUROLOGY: non-focal, bilateral UE 5/5 on flexion and 4/5 on extension; bilateral LE hip flexion 4/5; R lower leg flexion 2/5; L lower leg 3/5.     Consultant(s) Notes Reviewed:  [x ] YES  [ ] NO  Care Discussed with Consultants/Other Providers [ x] YES  [ ] NO    LABS:                        10.6   9.44  )-----------( 347      ( 25 Jun 2020 10:16 )             32.9     06-25    134<L>  |  97  |  16  ----------------------------<  149<H>  4.2   |  26  |  0.74    Ca    9.4      25 Jun 2020 10:16  Phos  3.1     06-24  Mg     2.0     06-24          CAPILLARY BLOOD GLUCOSE      POCT Blood Glucose.: 161 mg/dL (25 Jun 2020 12:24)  POCT Blood Glucose.: 112 mg/dL (25 Jun 2020 08:55)  POCT Blood Glucose.: 174 mg/dL (24 Jun 2020 21:18)  POCT Blood Glucose.: 147 mg/dL (24 Jun 2020 17:54)  POCT Blood Glucose.: 145 mg/dL (24 Jun 2020 14:14)          RADIOLOGY & ADDITIONAL TESTS:    Imaging Personally Reviewed:  [x ] YES  [ ] NO

## 2020-06-25 NOTE — PROGRESS NOTE ADULT - PROBLEM SELECTOR PLAN 5
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 As above iso FTT with global weakness  - dietician consult  - PT consulted, recommending MARI  - neuro consulted; pending paraneoplastic panel  -will f/u for EMG w/ Dr. Winters

## 2020-06-25 NOTE — PROGRESS NOTE ADULT - ATTENDING COMMENTS
Continuing abx for cystitis and possible renal abscess.   Increase bowel regimen to facilitate bowel movement in prep for CT a/p to re-eval renal abscess.

## 2020-06-26 ENCOUNTER — APPOINTMENT (OUTPATIENT)
Dept: CV DIAGNOSITCS | Facility: HOSPITAL | Age: 65
End: 2020-06-26

## 2020-06-26 LAB
ANION GAP SERPL CALC-SCNC: 10 MMOL/L — SIGNIFICANT CHANGE UP (ref 5–17)
BUN SERPL-MCNC: 18 MG/DL — SIGNIFICANT CHANGE UP (ref 7–23)
CALCIUM SERPL-MCNC: 9.2 MG/DL — SIGNIFICANT CHANGE UP (ref 8.4–10.5)
CHLORIDE SERPL-SCNC: 98 MMOL/L — SIGNIFICANT CHANGE UP (ref 96–108)
CO2 SERPL-SCNC: 25 MMOL/L — SIGNIFICANT CHANGE UP (ref 22–31)
CREAT SERPL-MCNC: 0.8 MG/DL — SIGNIFICANT CHANGE UP (ref 0.5–1.3)
GLUCOSE BLDC GLUCOMTR-MCNC: 107 MG/DL — HIGH (ref 70–99)
GLUCOSE BLDC GLUCOMTR-MCNC: 127 MG/DL — HIGH (ref 70–99)
GLUCOSE BLDC GLUCOMTR-MCNC: 131 MG/DL — HIGH (ref 70–99)
GLUCOSE BLDC GLUCOMTR-MCNC: 145 MG/DL — HIGH (ref 70–99)
GLUCOSE SERPL-MCNC: 119 MG/DL — HIGH (ref 70–99)
HCT VFR BLD CALC: 31.4 % — LOW (ref 39–50)
HGB BLD-MCNC: 10.1 G/DL — LOW (ref 13–17)
MCHC RBC-ENTMCNC: 26.2 PG — LOW (ref 27–34)
MCHC RBC-ENTMCNC: 32.2 GM/DL — SIGNIFICANT CHANGE UP (ref 32–36)
MCV RBC AUTO: 81.3 FL — SIGNIFICANT CHANGE UP (ref 80–100)
NRBC # BLD: 0 /100 WBCS — SIGNIFICANT CHANGE UP (ref 0–0)
PLATELET # BLD AUTO: 361 K/UL — SIGNIFICANT CHANGE UP (ref 150–400)
POTASSIUM SERPL-MCNC: 4.1 MMOL/L — SIGNIFICANT CHANGE UP (ref 3.5–5.3)
POTASSIUM SERPL-SCNC: 4.1 MMOL/L — SIGNIFICANT CHANGE UP (ref 3.5–5.3)
RBC # BLD: 3.86 M/UL — LOW (ref 4.2–5.8)
RBC # FLD: 14.6 % — HIGH (ref 10.3–14.5)
SODIUM SERPL-SCNC: 133 MMOL/L — LOW (ref 135–145)
WBC # BLD: 9.44 K/UL — SIGNIFICANT CHANGE UP (ref 3.8–10.5)
WBC # FLD AUTO: 9.44 K/UL — SIGNIFICANT CHANGE UP (ref 3.8–10.5)

## 2020-06-26 PROCEDURE — 99232 SBSQ HOSP IP/OBS MODERATE 35: CPT | Mod: GC

## 2020-06-26 PROCEDURE — 74018 RADEX ABDOMEN 1 VIEW: CPT | Mod: 26

## 2020-06-26 PROCEDURE — 99232 SBSQ HOSP IP/OBS MODERATE 35: CPT

## 2020-06-26 RX ORDER — LACTULOSE 10 G/15ML
15 SOLUTION ORAL THREE TIMES A DAY
Refills: 0 | Status: COMPLETED | OUTPATIENT
Start: 2020-06-26 | End: 2020-06-27

## 2020-06-26 RX ADMIN — HEPARIN SODIUM 5000 UNIT(S): 5000 INJECTION INTRAVENOUS; SUBCUTANEOUS at 06:18

## 2020-06-26 RX ADMIN — ATORVASTATIN CALCIUM 80 MILLIGRAM(S): 80 TABLET, FILM COATED ORAL at 21:55

## 2020-06-26 RX ADMIN — SERTRALINE 50 MILLIGRAM(S): 25 TABLET, FILM COATED ORAL at 12:57

## 2020-06-26 RX ADMIN — PANTOPRAZOLE SODIUM 40 MILLIGRAM(S): 20 TABLET, DELAYED RELEASE ORAL at 06:17

## 2020-06-26 RX ADMIN — HEPARIN SODIUM 5000 UNIT(S): 5000 INJECTION INTRAVENOUS; SUBCUTANEOUS at 21:55

## 2020-06-26 RX ADMIN — ERTAPENEM SODIUM 120 MILLIGRAM(S): 1 INJECTION, POWDER, LYOPHILIZED, FOR SOLUTION INTRAMUSCULAR; INTRAVENOUS at 14:12

## 2020-06-26 RX ADMIN — HEPARIN SODIUM 5000 UNIT(S): 5000 INJECTION INTRAVENOUS; SUBCUTANEOUS at 14:12

## 2020-06-26 RX ADMIN — BROMOCRIPTINE MESYLATE 5 MILLIGRAM(S): 5 CAPSULE ORAL at 12:56

## 2020-06-26 RX ADMIN — POLYETHYLENE GLYCOL 3350 17 GRAM(S): 17 POWDER, FOR SOLUTION ORAL at 12:57

## 2020-06-26 RX ADMIN — LACTULOSE 15 GRAM(S): 10 SOLUTION ORAL at 21:55

## 2020-06-26 RX ADMIN — Medication 81 MILLIGRAM(S): at 12:56

## 2020-06-26 RX ADMIN — BUPROPION HYDROCHLORIDE 150 MILLIGRAM(S): 150 TABLET, EXTENDED RELEASE ORAL at 12:56

## 2020-06-26 RX ADMIN — Medication 10 MILLIGRAM(S): at 08:29

## 2020-06-26 NOTE — PROGRESS NOTE ADULT - PROBLEM SELECTOR PLAN 1
Pt presented with 2 days of hematuria 2/2 cystitis. 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 received 2 days of cipro as outpatient  - U/A with LE, proteins, moderate blood  - cont invanz day 8. Plan for 2-4 week course. ID following. Awaiting large BM prior to repeat CT A/P to re-assess possible renal abscess. If imaging findings not c/w abscess may need renal biopsy vs cystoscopy  - Increase bowel regimen to facilitate bowel movement  - monitor for fever, trend WBC/Cr   - urology consulted; Pt presented with 2 days of hematuria 2/2 cystitis. 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 received 2 days of cipro as outpatient  - U/A with LE, proteins, moderate blood  - cont invanz day 8. Plan for 2-4 week course. ID following. Awaiting large BM prior to repeat CT A/P to re-assess possible renal abscess. If imaging findings not c/w abscess may need renal biopsy vs cystoscopy  - Increase bowel regimen to facilitate bowel movement  - monitor for fever, trend WBC/Cr   - discussed w/ uro team today Pt presented with 2 days of hematuria 2/2 cystitis. 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 received 2 days of cipro as outpatient  - U/A with LE, proteins, moderate blood  - cont invanz day 8 (6/17-). Plan for 2-4 week course. ID following. Awaiting large BM prior to repeat CT A/P to re-assess possible renal abscess. If imaging findings not c/w abscess may need renal biopsy vs cystoscopy  - Increase bowel regimen to facilitate bowel movement  - monitor for fever, trend WBC/Cr   - discussed w/ uro team today

## 2020-06-26 NOTE — PROVIDER CONTACT NOTE (OTHER) - ASSESSMENT
Pt received this AM with griffith for retention per handoff report. No order for griffith. RN asking for active griffith order

## 2020-06-26 NOTE — PROGRESS NOTE ADULT - ASSESSMENT
65 yo M pmhx DM2 on insulin, HTN, CAD w. LAD stent and restent after thrombosis, depression presenting with 2 days of hematuria 2/2 emphysematous cystitis CT a/p findings of 2.5 cm Left renal abscess. Pt also presenting with 6 months of weight loss/FTT 2/2 neuromuscular disorder (ALS?) vs paraneoplastic vs infectious.

## 2020-06-26 NOTE — PROGRESS NOTE ADULT - SUBJECTIVE AND OBJECTIVE BOX
Patient is a 64y old  Male who presents with a chief complaint of Hematuria; weight loss (26 Jun 2020 09:00)    Being followed by ID for emphysematous cystitis, ?pyelo renal abscess v mass    Interval history:  No acute events      ROS:denies urinary complaints   No cough,SOB,CP  No N/V/abd pain  pt says had BM but per RN no report   No other complaints      Antimicrobials:    ertapenem  IVPB 1000 milliGRAM(s) IV Intermittent every 24 hours    Other medications reviewed    Vital Signs Last 24 Hrs  T(C): 36.6 (06-26-20 @ 07:52), Max: 36.6 (06-26-20 @ 07:52)  T(F): 97.8 (06-26-20 @ 07:52), Max: 97.8 (06-26-20 @ 07:52)  HR: 81 (06-26-20 @ 07:52) (63 - 81)  BP: 121/68 (06-26-20 @ 07:52) (91/54 - 135/77)  BP(mean): --  RR: 18 (06-26-20 @ 07:52) (18 - 18)  SpO2: 95% (06-26-20 @ 07:52) (90% - 99%)    Physical Exam:      cachecxia    HEENT PERRLA EOMI    No oral exudate or erythema    Chest Good AE,CTA    CVS RRR S1 S2 WNl No murmur or rub or gallop    Abd soft BS normal No tenderness no masses  griffith    IV site no erythema tenderness or discharge    CNS AAO X 3 no focal  Lab Data:                          10.1   9.44  )-----------( 361      ( 26 Jun 2020 06:53 )             31.4       06-26    133<L>  |  98  |  18  ----------------------------<  119<H>  4.1   |  25  |  0.80    Ca    9.2      26 Jun 2020 06:53  Phos  3.1     06-24  Mg     2.0     06-24        < from: Xray Abdomen 1 View PORTABLE -Urgent (06.24.20 @ 13:43) >    IMPRESSION:     Contrast opacifies the transverse, descending and sigmoid colon.  Nonobstructive bowel gas pattern.        < end of copied text >

## 2020-06-26 NOTE — PROGRESS NOTE ADULT - SUBJECTIVE AND OBJECTIVE BOX
PROGRESS NOTE:     CONTACT INFO:  Ade Hurley MD  Pager: [phone] [place]    Patient is a 64y old  Male who presents with a chief complaint of Hematuria; weight loss (25 Jun 2020 12:37)      SUBJECTIVE / OVERNIGHT EVENTS:    - No acute events overnight.   - No fevers/chills.  - Patient seen and evaluated at bedside.  - Denies SOB at rest, chest pain, palpitations, abdominal pain, nausea/vomiting    ADDITIONAL REVIEW OF SYSTEMS:    MEDICATIONS  (STANDING):  aspirin enteric coated 81 milliGRAM(s) Oral daily  atorvastatin 80 milliGRAM(s) Oral at bedtime  bromocriptine Capsule 5 milliGRAM(s) Oral daily  buPROPion XL . 150 milliGRAM(s) Oral daily  dextrose 5%. 1000 milliLiter(s) (50 mL/Hr) IV Continuous <Continuous>  dextrose 50% Injectable 12.5 Gram(s) IV Push once  dextrose 50% Injectable 25 Gram(s) IV Push once  dextrose 50% Injectable 25 Gram(s) IV Push once  ertapenem  IVPB 1000 milliGRAM(s) IV Intermittent every 24 hours  heparin   Injectable 5000 Unit(s) SubCutaneous every 8 hours  insulin lispro (HumaLOG) corrective regimen sliding scale   SubCutaneous three times a day before meals  insulin lispro (HumaLOG) corrective regimen sliding scale   SubCutaneous at bedtime  pantoprazole    Tablet 40 milliGRAM(s) Oral before breakfast  polyethylene glycol 3350 17 Gram(s) Oral daily  sertraline 50 milliGRAM(s) Oral daily    MEDICATIONS  (PRN):  dextrose 40% Gel 15 Gram(s) Oral once PRN Blood Glucose LESS THAN 70 milliGRAM(s)/deciliter  glucagon  Injectable 1 milliGRAM(s) IntraMuscular once PRN Glucose LESS THAN 70 milligrams/deciliter      CAPILLARY BLOOD GLUCOSE      POCT Blood Glucose.: 131 mg/dL (26 Jun 2020 08:41)  POCT Blood Glucose.: 158 mg/dL (25 Jun 2020 23:03)  POCT Blood Glucose.: 176 mg/dL (25 Jun 2020 21:47)  POCT Blood Glucose.: 150 mg/dL (25 Jun 2020 16:46)  POCT Blood Glucose.: 161 mg/dL (25 Jun 2020 12:24)    I&O's Summary    25 Jun 2020 07:01  -  26 Jun 2020 07:00  --------------------------------------------------------  IN: 340 mL / OUT: 860 mL / NET: -520 mL        PHYSICAL EXAM:  Vital Signs Last 24 Hrs  T(C): 36.6 (26 Jun 2020 07:52), Max: 36.6 (26 Jun 2020 07:52)  T(F): 97.8 (26 Jun 2020 07:52), Max: 97.8 (26 Jun 2020 07:52)  HR: 81 (26 Jun 2020 07:52) (63 - 81)  BP: 121/68 (26 Jun 2020 07:52) (91/54 - 135/77)  BP(mean): --  RR: 18 (26 Jun 2020 07:52) (18 - 18)  SpO2: 95% (26 Jun 2020 07:52) (90% - 99%)    CONSTITUTIONAL: NAD, well-developed  RESPIRATORY: Normal respiratory effort; lungs are clear to auscultation bilaterally  CARDIOVASCULAR: Regular rate and rhythm, normal S1 and S2, no murmur/rub/gallop; No lower extremity edema; Peripheral pulses are 2+ bilaterally  ABDOMEN: Nontender to palpation, normoactive bowel sounds, no rebound/guarding; No hepatosplenomegaly  MUSCLOSKELETAL: no clubbing or cyanosis of digits; no joint swelling or tenderness to palpation  PSYCH: A+O to person, place, and time; affect appropriate    LABS:                        10.1   9.44  )-----------( 361      ( 26 Jun 2020 06:53 )             31.4     06-26    133<L>  |  98  |  18  ----------------------------<  119<H>  4.1   |  25  |  0.80    Ca    9.2      26 Jun 2020 06:53  Phos  3.1     06-24  Mg     2.0     06-24                  RADIOLOGY & ADDITIONAL TESTS:  Results Reviewed:   Imaging Personally Reviewed:  Electrocardiogram Personally Reviewed:    COORDINATION OF CARE:  Care Discussed with Consultants/Other Providers [Y/N]: Y  Prior or Outpatient Records Reviewed [Y/N]: Y PROGRESS NOTE:     CONTACT INFO:  Ade Hurley MD  Pager: [phone] [place]    Patient is a 64y old  Male who presents with a chief complaint of Hematuria; weight loss (25 Jun 2020 12:37)      SUBJECTIVE / OVERNIGHT EVENTS:    - No acute events overnight.   - No fevers/chills.  - Patient seen and evaluated at bedside.  - Denies SOB at rest, chest pain, palpitations, abdominal pain, nausea/vomiting.  - Has not had a large BM; thus CT A/P being placed on hold.    ADDITIONAL REVIEW OF SYSTEMS: as previously stated; otherwise negative.    MEDICATIONS  (STANDING):  aspirin enteric coated 81 milliGRAM(s) Oral daily  atorvastatin 80 milliGRAM(s) Oral at bedtime  bromocriptine Capsule 5 milliGRAM(s) Oral daily  buPROPion XL . 150 milliGRAM(s) Oral daily  dextrose 5%. 1000 milliLiter(s) (50 mL/Hr) IV Continuous <Continuous>  dextrose 50% Injectable 12.5 Gram(s) IV Push once  dextrose 50% Injectable 25 Gram(s) IV Push once  dextrose 50% Injectable 25 Gram(s) IV Push once  ertapenem  IVPB 1000 milliGRAM(s) IV Intermittent every 24 hours  heparin   Injectable 5000 Unit(s) SubCutaneous every 8 hours  insulin lispro (HumaLOG) corrective regimen sliding scale   SubCutaneous three times a day before meals  insulin lispro (HumaLOG) corrective regimen sliding scale   SubCutaneous at bedtime  pantoprazole    Tablet 40 milliGRAM(s) Oral before breakfast  polyethylene glycol 3350 17 Gram(s) Oral daily  sertraline 50 milliGRAM(s) Oral daily    MEDICATIONS  (PRN):  dextrose 40% Gel 15 Gram(s) Oral once PRN Blood Glucose LESS THAN 70 milliGRAM(s)/deciliter  glucagon  Injectable 1 milliGRAM(s) IntraMuscular once PRN Glucose LESS THAN 70 milligrams/deciliter      CAPILLARY BLOOD GLUCOSE      POCT Blood Glucose.: 131 mg/dL (26 Jun 2020 08:41)  POCT Blood Glucose.: 158 mg/dL (25 Jun 2020 23:03)  POCT Blood Glucose.: 176 mg/dL (25 Jun 2020 21:47)  POCT Blood Glucose.: 150 mg/dL (25 Jun 2020 16:46)  POCT Blood Glucose.: 161 mg/dL (25 Jun 2020 12:24)    I&O's Summary    25 Jun 2020 07:01  -  26 Jun 2020 07:00  --------------------------------------------------------  IN: 340 mL / OUT: 860 mL / NET: -520 mL        PHYSICAL EXAM:  Vital Signs Last 24 Hrs  T(C): 36.6 (26 Jun 2020 07:52), Max: 36.6 (26 Jun 2020 07:52)  T(F): 97.8 (26 Jun 2020 07:52), Max: 97.8 (26 Jun 2020 07:52)  HR: 81 (26 Jun 2020 07:52) (63 - 81)  BP: 121/68 (26 Jun 2020 07:52) (91/54 - 135/77)  BP(mean): --  RR: 18 (26 Jun 2020 07:52) (18 - 18)  SpO2: 95% (26 Jun 2020 07:52) (90% - 99%)    PHYSICAL EXAM:  CONSTITUTIONAL: NAD, well-kempt, cachetic  EYES: PERRLA; EOMI, clear conjunctiva and + xanthelasma on sclera  RESPIRATORY: Clear to auscultation bilaterally; no wheezes, crackles, or rhonchi  CARDIOVASCULAR: Regular rate and rhythm, normal S1 and S2, no murmur/rub/gallop; No lower extremity edema  ABDOMEN: +Bowel sounds; nontender to palpation, no rebound/guarding; no hepatosplenomegaly  PSYCH: A+O to person, place, and time; affect appropriate  NEUROLOGY: non-focal, bilateral UE 5/5 on flexion and 4/5 on extension; bilateral LE hip flexion 4/5; R lower leg flexion 2/5; L lower leg 3/5.     LABS:                        10.1   9.44  )-----------( 361      ( 26 Jun 2020 06:53 )             31.4     06-26    133<L>  |  98  |  18  ----------------------------<  119<H>  4.1   |  25  |  0.80    Ca    9.2      26 Jun 2020 06:53  Phos  3.1     06-24  Mg     2.0     06-24          RADIOLOGY & ADDITIONAL TESTS:  Results Reviewed:   Imaging Personally Reviewed:  Electrocardiogram Personally Reviewed:    COORDINATION OF CARE:  Care Discussed with Consultants/Other Providers [Y/N]: Y  Prior or Outpatient Records Reviewed [Y/N]: Y PROGRESS NOTE:     CONTACT INFO:  Ade Hurley MD  Pager: [phone] [place]    Patient is a 64y old  Male who presents with a chief complaint of Hematuria; weight loss (25 Jun 2020 12:37)      SUBJECTIVE / OVERNIGHT EVENTS:    - No acute events overnight.   - No fevers/chills.  - Patient seen and evaluated at bedside.  - Denies SOB at rest, chest pain, palpitations, abdominal pain, nausea/vomiting.  - Has not had a large BM; thus CT A/P being placed on hold.  - Pt reported having BM, however per nursing not documented.    ADDITIONAL REVIEW OF SYSTEMS: as previously stated; otherwise negative.    MEDICATIONS  (STANDING):  aspirin enteric coated 81 milliGRAM(s) Oral daily  atorvastatin 80 milliGRAM(s) Oral at bedtime  bromocriptine Capsule 5 milliGRAM(s) Oral daily  buPROPion XL . 150 milliGRAM(s) Oral daily  dextrose 5%. 1000 milliLiter(s) (50 mL/Hr) IV Continuous <Continuous>  dextrose 50% Injectable 12.5 Gram(s) IV Push once  dextrose 50% Injectable 25 Gram(s) IV Push once  dextrose 50% Injectable 25 Gram(s) IV Push once  ertapenem  IVPB 1000 milliGRAM(s) IV Intermittent every 24 hours  heparin   Injectable 5000 Unit(s) SubCutaneous every 8 hours  insulin lispro (HumaLOG) corrective regimen sliding scale   SubCutaneous three times a day before meals  insulin lispro (HumaLOG) corrective regimen sliding scale   SubCutaneous at bedtime  pantoprazole    Tablet 40 milliGRAM(s) Oral before breakfast  polyethylene glycol 3350 17 Gram(s) Oral daily  sertraline 50 milliGRAM(s) Oral daily    MEDICATIONS  (PRN):  dextrose 40% Gel 15 Gram(s) Oral once PRN Blood Glucose LESS THAN 70 milliGRAM(s)/deciliter  glucagon  Injectable 1 milliGRAM(s) IntraMuscular once PRN Glucose LESS THAN 70 milligrams/deciliter      CAPILLARY BLOOD GLUCOSE      POCT Blood Glucose.: 131 mg/dL (26 Jun 2020 08:41)  POCT Blood Glucose.: 158 mg/dL (25 Jun 2020 23:03)  POCT Blood Glucose.: 176 mg/dL (25 Jun 2020 21:47)  POCT Blood Glucose.: 150 mg/dL (25 Jun 2020 16:46)  POCT Blood Glucose.: 161 mg/dL (25 Jun 2020 12:24)    I&O's Summary    25 Jun 2020 07:01  -  26 Jun 2020 07:00  --------------------------------------------------------  IN: 340 mL / OUT: 860 mL / NET: -520 mL        PHYSICAL EXAM:  Vital Signs Last 24 Hrs  T(C): 36.6 (26 Jun 2020 07:52), Max: 36.6 (26 Jun 2020 07:52)  T(F): 97.8 (26 Jun 2020 07:52), Max: 97.8 (26 Jun 2020 07:52)  HR: 81 (26 Jun 2020 07:52) (63 - 81)  BP: 121/68 (26 Jun 2020 07:52) (91/54 - 135/77)  BP(mean): --  RR: 18 (26 Jun 2020 07:52) (18 - 18)  SpO2: 95% (26 Jun 2020 07:52) (90% - 99%)    PHYSICAL EXAM:  CONSTITUTIONAL: NAD, cachetic  HEENT: PERRLA; EOMI, clear conjunctiva and + xanthelasma on sclera, very soft voice like a whisper  RESPIRATORY: Clear to auscultation bilaterally; no wheezes, crackles, or rhonchi  CARDIOVASCULAR: Regular rate and rhythm, normal S1 and S2, no murmur/rub/gallop; No lower extremity edema  ABDOMEN: +Bowel sounds; nontender to palpation, no rebound/guarding; no hepatosplenomegaly  PSYCH: A+O to person, place, and time; affect appropriate  NEUROLOGY: non-focal, bilateral UE 5/5 on flexion and 4/5 on extension; bilateral LE hip flexion 4/5; R lower leg flexion 2/5; L lower leg 3/5.     LABS:                        10.1   9.44  )-----------( 361      ( 26 Jun 2020 06:53 )             31.4     06-26    133<L>  |  98  |  18  ----------------------------<  119<H>  4.1   |  25  |  0.80    Ca    9.2      26 Jun 2020 06:53  Phos  3.1     06-24  Mg     2.0     06-24          RADIOLOGY & ADDITIONAL TESTS:  Results Reviewed:   Imaging Personally Reviewed:  Electrocardiogram Personally Reviewed:    COORDINATION OF CARE:  Care Discussed with Consultants/Other Providers [Y/N]: Y  Prior or Outpatient Records Reviewed [Y/N]: Y

## 2020-06-26 NOTE — PROGRESS NOTE ADULT - SUBJECTIVE AND OBJECTIVE BOX
RAHUL VITALE      Patient is a 64y old  Male who presents with a chief complaint of Hematuria; weight loss (25 Jun 2020 09:46)      INTERVAL HPI/OVERNIGHT EVENTS:  No acute events overnight. Pt seen and evaluated at bedside. Pt reports feeling well. Denies SOB. However, no large bowel movement, thus CT A/P has been on hold.     Of note, patient reports having walked without assistance down the nguyen an hr prior to this encounter, but nurse says otherwise.    ADDITIONAL REVIEW OF SYSTEMS: 10 point ROS negative unless listed above    Vital Signs Last 24 Hrs  T(C): 36.6 (26 Jun 2020 07:52), Max: 36.6 (26 Jun 2020 07:52)  T(F): 97.8 (26 Jun 2020 07:52), Max: 97.8 (26 Jun 2020 07:52)  HR: 81 (26 Jun 2020 07:52) (63 - 81)  BP: 121/68 (26 Jun 2020 07:52) (91/54 - 138/77)  BP(mean): --  RR: 18 (26 Jun 2020 07:52) (18 - 18)  SpO2: 95% (26 Jun 2020 07:52) (90% - 99%)      PHYSICAL EXAM:  CONSTITUTIONAL: NAD, well-kempt, cachetic  EYES: PERRLA; EOMI, clear conjunctiva and + xanthelasma on sclera  RESPIRATORY: Clear to auscultation bilaterally; no wheezes, crackles, or rhonchi  CARDIOVASCULAR: Regular rate and rhythm, normal S1 and S2, no murmur/rub/gallop; No lower extremity edema  ABDOMEN: +Bowel sounds; nontender to palpation, no rebound/guarding; no hepatosplenomegaly  PSYCH: A+O to person, place, and time; affect appropriate  NEUROLOGY: non-focal, bilateral UE 5/5 on flexion and 4/5 on extension; bilateral LE hip flexion 4/5; R lower leg flexion 2/5; L lower leg 3/5.     Consultant(s) Notes Reviewed:  [x ] YES  [ ] NO    LABS:                         10.1   9.44  )-----------( 361      ( 26 Jun 2020 06:53 )             31.4     06-25    133<L>  |  98  |  18  ----------------------------<  149<H>  4.1   |  25  |  0.80                  RADIOLOGY & ADDITIONAL TESTS:    Imaging Personally Reviewed:  [x ] YES  [ ] NO

## 2020-06-26 NOTE — PROGRESS NOTE ADULT - PROBLEM SELECTOR PLAN 3
Evaluated by s/s  - reports persistently aspirating, recommending NPO at this time, however pt requesting soft diet. discussed in separate chart note. Reports tolerating some fluid PO.  -aspiration precaution Evaluated by s/s  -tolerating PO, ate well per son  - reports persistently aspirating, s/s recommending NPO at this time, however pt requesting soft diet. discussed in separate chart note. Reports tolerating some fluid PO.  -aspiration precaution

## 2020-06-26 NOTE — PROGRESS NOTE ADULT - PROBLEM SELECTOR PLAN 1
Pt presented with 2 days of hematuria 2/2 cystitis. 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 received 2 days of cipro as outpatient  - U/A with LE, proteins, moderate blood  - cont invanz day 8. Plan for 2-4 week course. ID following. Awaiting large BM prior to repeat CT A/P to re-assess possible renal abscess. If imaging findings not c/w abscess may need renal biopsy vs cystoscopy  - Increase bowel regimen to facilitate bowel movement  - monitor for fever, trend WBC/Cr   - urology consulted;

## 2020-06-26 NOTE — PROGRESS NOTE ADULT - ASSESSMENT
63 yo M pmhx DM2 on insulin, HTN, CAD w. LAD stent/restent after thrombosis, depression presenting with 2 days of hematuria  Patient with no fevers,chills  Leucocytosis as OP  Admitted CT with emphysematous cystitis and renal lesion ? abscess  But also with 80 lb weight loss over 6 months  In hospital minimal leucocytosis, No fevers  Blood and urine cs negative (though did recieve cipro as OP)    ?UTI-cystitis with early renal abscess  ? Malignancy with superinfection  ? 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    B) Renal mass  ? absces  ? malignancy   ? other etiology  imaging and abx plan as above  Check urine cytology    C)Leucocytosis  improved  likely from above  Plan as detailed above    Will tailor plan for ID issues  per course,results.Will defer to primary team on management of other issues.  Assessment, plan and recommendations as detailed above were discussed with the medical/primary  team  Infectious Diseases Service will cover over weekend.  Please call [phone] if issues

## 2020-06-26 NOTE — PROGRESS NOTE ADULT - ATTENDING COMMENTS
Have been unable to repeat CT to evaluate possible renal abscess given continued contrast in the bowel. Ordered mag citrate yesterday for which pt only drank a small amount. Will give suppository vs enema to facilitate BM. After BM would repeat ZXR to ensure contrast has been evacuated from the bowel. Will then need to repeat CT to assess abscess to determine further therapeutic plan.   Continue on Invanz (Day 8 of therapy). Day 10 of total Abx

## 2020-06-26 NOTE — PROGRESS NOTE ADULT - PROBLEM SELECTOR PLAN 8
yes no Hx of CAD, Xanthelasma on physical exam  - cw atorvastatin 80mg    HTN  off antihypertensives

## 2020-06-26 NOTE — PROGRESS NOTE ADULT - PROBLEM SELECTOR PLAN 3
Evaluated by s/s  - reports persistently aspirating, recommended NPO. However pt requesting soft diet. discussed in separate chart note. Reports tolerating some fluid PO and soft feeds.  -aspiration precaution

## 2020-06-27 LAB
ANION GAP SERPL CALC-SCNC: 11 MMOL/L — SIGNIFICANT CHANGE UP (ref 5–17)
BUN SERPL-MCNC: 19 MG/DL — SIGNIFICANT CHANGE UP (ref 7–23)
CALCIUM SERPL-MCNC: 9.5 MG/DL — SIGNIFICANT CHANGE UP (ref 8.4–10.5)
CHLORIDE SERPL-SCNC: 99 MMOL/L — SIGNIFICANT CHANGE UP (ref 96–108)
CO2 SERPL-SCNC: 26 MMOL/L — SIGNIFICANT CHANGE UP (ref 22–31)
CREAT SERPL-MCNC: 0.92 MG/DL — SIGNIFICANT CHANGE UP (ref 0.5–1.3)
GLUCOSE BLDC GLUCOMTR-MCNC: 102 MG/DL — HIGH (ref 70–99)
GLUCOSE BLDC GLUCOMTR-MCNC: 120 MG/DL — HIGH (ref 70–99)
GLUCOSE BLDC GLUCOMTR-MCNC: 120 MG/DL — HIGH (ref 70–99)
GLUCOSE BLDC GLUCOMTR-MCNC: 127 MG/DL — HIGH (ref 70–99)
GLUCOSE SERPL-MCNC: 95 MG/DL — SIGNIFICANT CHANGE UP (ref 70–99)
HCT VFR BLD CALC: 33.3 % — LOW (ref 39–50)
HGB BLD-MCNC: 10.8 G/DL — LOW (ref 13–17)
MCHC RBC-ENTMCNC: 26.3 PG — LOW (ref 27–34)
MCHC RBC-ENTMCNC: 32.4 GM/DL — SIGNIFICANT CHANGE UP (ref 32–36)
MCV RBC AUTO: 81.2 FL — SIGNIFICANT CHANGE UP (ref 80–100)
NRBC # BLD: 0 /100 WBCS — SIGNIFICANT CHANGE UP (ref 0–0)
PLATELET # BLD AUTO: 361 K/UL — SIGNIFICANT CHANGE UP (ref 150–400)
POTASSIUM SERPL-MCNC: 4.1 MMOL/L — SIGNIFICANT CHANGE UP (ref 3.5–5.3)
POTASSIUM SERPL-SCNC: 4.1 MMOL/L — SIGNIFICANT CHANGE UP (ref 3.5–5.3)
PROLACTIN SERPL-MCNC: 10.5 NG/ML — SIGNIFICANT CHANGE UP (ref 4.1–18.4)
RBC # BLD: 4.1 M/UL — LOW (ref 4.2–5.8)
RBC # FLD: 15 % — HIGH (ref 10.3–14.5)
SODIUM SERPL-SCNC: 136 MMOL/L — SIGNIFICANT CHANGE UP (ref 135–145)
WBC # BLD: 8.71 K/UL — SIGNIFICANT CHANGE UP (ref 3.8–10.5)
WBC # FLD AUTO: 8.71 K/UL — SIGNIFICANT CHANGE UP (ref 3.8–10.5)

## 2020-06-27 PROCEDURE — 99232 SBSQ HOSP IP/OBS MODERATE 35: CPT

## 2020-06-27 PROCEDURE — 99233 SBSQ HOSP IP/OBS HIGH 50: CPT

## 2020-06-27 PROCEDURE — 70450 CT HEAD/BRAIN W/O DYE: CPT | Mod: 26

## 2020-06-27 RX ORDER — PIPERACILLIN AND TAZOBACTAM 4; .5 G/20ML; G/20ML
3.38 INJECTION, POWDER, LYOPHILIZED, FOR SOLUTION INTRAVENOUS ONCE
Refills: 0 | Status: COMPLETED | OUTPATIENT
Start: 2020-06-27 | End: 2020-06-27

## 2020-06-27 RX ORDER — PIPERACILLIN AND TAZOBACTAM 4; .5 G/20ML; G/20ML
3.38 INJECTION, POWDER, LYOPHILIZED, FOR SOLUTION INTRAVENOUS EVERY 8 HOURS
Refills: 0 | Status: COMPLETED | OUTPATIENT
Start: 2020-06-27 | End: 2020-06-30

## 2020-06-27 RX ORDER — BUPROPION HYDROCHLORIDE 150 MG/1
100 TABLET, EXTENDED RELEASE ORAL DAILY
Refills: 0 | Status: COMPLETED | OUTPATIENT
Start: 2020-06-27 | End: 2020-06-28

## 2020-06-27 RX ADMIN — PANTOPRAZOLE SODIUM 40 MILLIGRAM(S): 20 TABLET, DELAYED RELEASE ORAL at 05:11

## 2020-06-27 RX ADMIN — PIPERACILLIN AND TAZOBACTAM 25 GRAM(S): 4; .5 INJECTION, POWDER, LYOPHILIZED, FOR SOLUTION INTRAVENOUS at 22:23

## 2020-06-27 RX ADMIN — LACTULOSE 15 GRAM(S): 10 SOLUTION ORAL at 13:29

## 2020-06-27 RX ADMIN — HEPARIN SODIUM 5000 UNIT(S): 5000 INJECTION INTRAVENOUS; SUBCUTANEOUS at 22:22

## 2020-06-27 RX ADMIN — Medication 81 MILLIGRAM(S): at 11:52

## 2020-06-27 RX ADMIN — LACTULOSE 15 GRAM(S): 10 SOLUTION ORAL at 05:10

## 2020-06-27 RX ADMIN — HEPARIN SODIUM 5000 UNIT(S): 5000 INJECTION INTRAVENOUS; SUBCUTANEOUS at 05:11

## 2020-06-27 RX ADMIN — POLYETHYLENE GLYCOL 3350 17 GRAM(S): 17 POWDER, FOR SOLUTION ORAL at 11:52

## 2020-06-27 RX ADMIN — PIPERACILLIN AND TAZOBACTAM 200 GRAM(S): 4; .5 INJECTION, POWDER, LYOPHILIZED, FOR SOLUTION INTRAVENOUS at 13:28

## 2020-06-27 RX ADMIN — HEPARIN SODIUM 5000 UNIT(S): 5000 INJECTION INTRAVENOUS; SUBCUTANEOUS at 13:30

## 2020-06-27 RX ADMIN — ATORVASTATIN CALCIUM 80 MILLIGRAM(S): 80 TABLET, FILM COATED ORAL at 22:23

## 2020-06-27 RX ADMIN — SERTRALINE 50 MILLIGRAM(S): 25 TABLET, FILM COATED ORAL at 11:52

## 2020-06-27 RX ADMIN — BUPROPION HYDROCHLORIDE 100 MILLIGRAM(S): 150 TABLET, EXTENDED RELEASE ORAL at 12:00

## 2020-06-27 RX ADMIN — BROMOCRIPTINE MESYLATE 5 MILLIGRAM(S): 5 CAPSULE ORAL at 11:52

## 2020-06-27 NOTE — CHART NOTE - NSCHARTNOTEFT_GEN_A_CORE
Preliminary report based on initial 30 min of recording.    Normal background with 8.5 Hz posterior dominant rhythm.  No focal or epileptiform abnormalities noted.     Full report to follow 6/28/20.

## 2020-06-27 NOTE — PROGRESS NOTE ADULT - ATTENDING COMMENTS
Patient seen and examined.  Agree with resident note.  Meds, labs and vitals all reviewed.  Patient with episode of lip smacking, atomatisms this morning.  Currently resolved.  Neurology input appreciated.  Patients antibiotics to be switched to agents that don't lower seizure threshold.   Downtitrating Buproprion as tolerated.  EEG and CT Head pending, although stroke unlikely.

## 2020-06-27 NOTE — PROGRESS NOTE ADULT - PROBLEM SELECTOR PLAN 4
Evaluated by s/s  -tolerating PO  - reports persistently aspirating, s/s recommending NPO at this time, however pt requesting soft diet. discussed in separate chart note. Reports tolerating some fluid PO.  -aspiration precaution

## 2020-06-27 NOTE — PROGRESS NOTE ADULT - SUBJECTIVE AND OBJECTIVE BOX
PROGRESS NOTE:     CONTACT INFO:  Ade Hurley MD  Pager: [phone] [place]    Patient is a 64y old  Male who presents with a chief complaint of Hematuria; weight loss (26 Jun 2020 09:08)      SUBJECTIVE / OVERNIGHT EVENTS:    - No acute events overnight.   - No fevers/chills.  - Patient seen and evaluated at bedside.  - Denies SOB at rest, chest pain, palpitations, abdominal pain, nausea/vomiting  -Pt states there are some noises in the pipes.      ADDITIONAL REVIEW OF SYSTEMS:    MEDICATIONS  (STANDING):  aspirin enteric coated 81 milliGRAM(s) Oral daily  atorvastatin 80 milliGRAM(s) Oral at bedtime  bromocriptine Capsule 5 milliGRAM(s) Oral daily  dextrose 5%. 1000 milliLiter(s) (50 mL/Hr) IV Continuous <Continuous>  dextrose 50% Injectable 12.5 Gram(s) IV Push once  dextrose 50% Injectable 25 Gram(s) IV Push once  dextrose 50% Injectable 25 Gram(s) IV Push once  ertapenem  IVPB 1000 milliGRAM(s) IV Intermittent every 24 hours  heparin   Injectable 5000 Unit(s) SubCutaneous every 8 hours  insulin lispro (HumaLOG) corrective regimen sliding scale   SubCutaneous three times a day before meals  insulin lispro (HumaLOG) corrective regimen sliding scale   SubCutaneous at bedtime  lactulose Syrup 15 Gram(s) Oral three times a day  pantoprazole    Tablet 40 milliGRAM(s) Oral before breakfast  polyethylene glycol 3350 17 Gram(s) Oral daily  sertraline 50 milliGRAM(s) Oral daily    MEDICATIONS  (PRN):  dextrose 40% Gel 15 Gram(s) Oral once PRN Blood Glucose LESS THAN 70 milliGRAM(s)/deciliter  glucagon  Injectable 1 milliGRAM(s) IntraMuscular once PRN Glucose LESS THAN 70 milligrams/deciliter      CAPILLARY BLOOD GLUCOSE      POCT Blood Glucose.: 102 mg/dL (27 Jun 2020 08:56)  POCT Blood Glucose.: 107 mg/dL (26 Jun 2020 21:20)  POCT Blood Glucose.: 127 mg/dL (26 Jun 2020 17:15)  POCT Blood Glucose.: 145 mg/dL (26 Jun 2020 12:21)    I&O's Summary    26 Jun 2020 07:01  -  27 Jun 2020 07:00  --------------------------------------------------------  IN: 460 mL / OUT: 1100 mL / NET: -640 mL        PHYSICAL EXAM:  Vital Signs Last 24 Hrs  T(C): 36.6 (27 Jun 2020 08:57), Max: 36.6 (27 Jun 2020 08:57)  T(F): 97.9 (27 Jun 2020 08:57), Max: 97.9 (27 Jun 2020 08:57)  HR: 86 (27 Jun 2020 08:57) (80 - 86)  BP: 128/68 (27 Jun 2020 08:57) (122/73 - 144/79)  BP(mean): --  RR: 18 (27 Jun 2020 08:57) (18 - 18)  SpO2: 97% (27 Jun 2020 08:57) (94% - 98%)    CONSTITUTIONAL: NAD, cachectic  RESPIRATORY: poor respiratory effort; lungs are clear to auscultation bilaterally  CARDIOVASCULAR: Regular rate and rhythm, normal S1 and S2, no murmur/rub/gallop; No lower extremity edema; Peripheral pulses are 2+ bilaterally  ABDOMEN: Nontender to palpation, normoactive bowel sounds, no rebound/guarding; No hepatosplenomegaly  MUSCLOSKELETAL: no clubbing or cyanosis of digits; no joint swelling or tenderness to palpation  PSYCH: A+O to person, place, and time; affect appropriate  Neuro: both pupils reactive, though R sluggish, CNIII, IV, V, VI, VII, IX, X, XI, XII intact. pt could not cooperate to formally assess CN II. UE strength 5/5 bilaterally, LE strength 4/5 bilaterally while interviewing pt he has ~3 episodes of lip smacking and picking of blankets, but stops when his name is called.         LABS:                        10.8   8.71  )-----------( 361      ( 27 Jun 2020 09:13 )             33.3     06-27    136  |  99  |  19  ----------------------------<  95  4.1   |  26  |  0.92    Ca    9.5      27 Jun 2020 09:13                  RADIOLOGY & ADDITIONAL TESTS:  Results Reviewed:   Imaging Personally Reviewed:  Electrocardiogram Personally Reviewed:    COORDINATION OF CARE:  Care Discussed with Consultants/Other Providers [Y/N]: Y  Prior or Outpatient Records Reviewed [Y/N]: Y

## 2020-06-27 NOTE — PROGRESS NOTE ADULT - ATTENDING COMMENTS
64/M with PMH DM2 on insulin, HTN, CAD w. LAD stent/restent after thrombosis, depression. P/w 2 days of hematuria    CT A/P with Emphysematous cystitis with ?left renal abscess.  UCx with 6/17 and 6/20 with NGTD  Patient pending repeat CT A/P to re-evaluate renal lesion to help determine antibiotic duration (delayed as he has not had BM and was retaining contrast from a swallow study)  Developed seizure-like activity and encephalopathy on Ertapenem  Can switch to Zosyn for now (tolerated it earlier in the admission prior to switch to Ertapenem)    Overall, Emphysematous cystitis, left renal abscess, Encephalopathy, Seizure    I will continue to follow. Please feel free to contact me with any further questions.    Gabe Horton M.D.  [facility] Division of Infectious Disease  8AM-5PM: Pager Number [phone]  After Hours (or if no response): Please contact the Infectious Diseases Office at [phone]    The above assessment and plan were discussed with medicine NP

## 2020-06-27 NOTE — CHART NOTE - NSCHARTNOTEFT_GEN_A_CORE
After neurochecks added, notified by RN that pt had R leg weakness, decreased sensation of R face, and PCA observed ~20 sec of eyes rolling back with movement of 4 extremities, unresponsive during that period, then was awake and alert. RN assessed and pt was aox3, not confused.    On arrival pt was awake and alert    Sensation of face equal on both sides  UE strength equal on both sides  R leg weaker than L leg, R leg 3/5, L leg 4/5  No pronator drift  Dysmetria on FNF with both R and L    Vitals were taken and stable.    MAR and Sr. resident present.    A: Pt has no hx of sz, but has prolactinoma and hx of depression.  Potentially provoked sz given that pt is on sz threshold lowering agents (buproprion and ertapenem).    Order CT non con, 24 hour eeg, prolactin level. Will taper buproprion. Will d/w ID regarding abx alternatives.    Discussed with neuro who is already following. Appreciate their recs. They will come see pt today. After neurochecks added, notified by RN that pt had R leg weakness, decreased sensation of R face, and PCA observed ~20 sec of eyes rolling back with movement of 4 extremities, unresponsive during that period, then was awake and alert. RN assessed and pt was aox3, not confused.    On arrival pt was awake and alert    Sensation of face equal on both sides  UE strength equal on both sides  R leg weaker than L leg, R leg 3/5, L leg 4/5  No pronator drift  Dysmetria on FNF with both R and L    Vitals were taken and stable.    MAR and Sr. resident present.    A: Pt has no hx of sz, but has prolactinoma and hx of depression.  Potentially provoked sz given that pt is on sz threshold lowering agents (buproprion and ertapenem. Most likely sz, lower on the ddx is stroke.    Order CT non con, 24 hour eeg, prolactin level. Will taper buproprion. Will d/w ID regarding abx alternatives.    Discussed with neuro who is already following. Appreciate their recs to taper buproprion, onboard  with eeg. They will come see pt today.

## 2020-06-27 NOTE — PROGRESS NOTE ADULT - ASSESSMENT
65 yo M pmhx DM2 on insulin, HTN, CAD w. LAD stent/restent after thrombosis, depression presenting with 2 days of hematuria  Patient with no fevers,chills  Leucocytosis as OP  Admitted CT with emphysematous cystitis and renal lesion ? abscess  But also with 80 lb weight loss over 6 months  In hospital minimal leucocytosis, No fevers  Blood and urine cs negative (though did recieve cipro as OP)    ?UTI-cystitis with early renal abscess  ? Malignancy with superinfection  ? Other etiology    His presentation is not typical and also with the weight loss I am concerned about an underlying process  Have d/w Urology Dr Bravo     RECOMMENDATIONS:  #PENDING RECS. PLEASE WAIT FOR FINAL RECS AFTER DISCUSSION WITH ATTENDING#  A) Emphysematous cystitis, renal mass ? abscess  Follow cultures  Continue Invanz-no growth on cultures-other than 2 days of cipro no other antimicrobials=-could be able to de-escalate further if stable  await repeat CT - if not s/o abscess may need to consider renal biospy vs cystoscopy  If c/w abscess/emphysematous cystitis will need long antimicrobial course : 2-4 weeks though eventual duration will be determined by course, results.    B) Renal mass  ? absces  ? malignancy   ? other etiology  imaging and abx plan as above  Check urine cytology    C)Leucocytosis  improved  likely from above  Plan as detailed above    TRAVIS Morales, MD  Fellow, Infectious Diseases  Pager: [phone]  After 5pm and On weekends: Call [phone] 64/M with PMH DM2 on insulin, HTN, CAD w. LAD stent/restent after thrombosis, depression. P/w 2 days of hematuria  Patient with no fevers,chills. Leucocytosis as OP  Admitted CT with emphysematous cystitis and renal lesion ? abscess  But also with 80 lb weight loss over 6 months  In hospital minimal leucocytosis, No fevers  Blood and urine cs negative (though did recieve cipro as OP)  =======  - D/D: ?UTI-cystitis with early renal abscess v/s ? Malignancy with superinfection v/s ? Other etiology  - pt had seizure-like episode this AM of unclear etiology at this time. Labs and vitals reviewed. CT Head C- negative  - Ertapenem can be associated with lowering of seizure threshold - can switch to alternative abx  - Neurology following, appreciate input    RECOMMENDATIONS:  1. Emphysematous cystitis, renal mass ? abscess  - discontinue Invanz  - start Zosyn 3.375g IV q8h  - await repeat CT - if not s/o abscess may need to consider renal biopsy vs cystoscopy (per primary team - CT on hold as he had retained contrast d/t swallowing study)  - If c/w abscess/emphysematous cystitis will need long antimicrobial course : 2-4 weeks though eventual duration will be determined by course, results.  - Follow cultures  ---------  2. Renal mass v/s ? absces v/s ? malignancy  v/s ? other etiology  - imaging and abx plan as above  - Check urine cytology  ----------  3. Leucocytosis - improved    Recs conveyed to primary team resident    TRAVIS Morales, MD  Fellow, Infectious Diseases  Pager: [phone]  After 5pm and On weekends: Call [phone]

## 2020-06-27 NOTE — PROVIDER CONTACT NOTE (CHANGE IN STATUS NOTIFICATION) - SITUATION
Neuro status change. Sluggish pupil responses B/L. Right leg weakness. Right face dull sense. Seizure-like symptoms (tonic for 20 seconds). Neuro status change. Sluggish pupil responses B/L. Right leg weakness. Right face dull sense.

## 2020-06-27 NOTE — PROGRESS NOTE ADULT - PROBLEM SELECTOR PLAN 1
Pt has hx of prolactinoma, depression. No hx of sz. Observed to have ?focal aware seizure with automatisms, PCA observed ?GTC, however no post-ictal period. Could be provoked sz given that he is on buproprion and ertapenem.  -CT head non con URGENT  -EEG  -f/u labs, prolactin level  -D/w neuro, already following, will see pt today  -Taper buproprion from 150mg XL qd to 100mg SR qd for now: d/w pharm potential regimen 100mg SR qd x3d, 75mg IR qd x3d, will further d/w psych  -psych c/s for buproprion alternatives  -will d/w ID for alternatives to ertapenem

## 2020-06-27 NOTE — PROGRESS NOTE ADULT - SUBJECTIVE AND OBJECTIVE BOX
Follow Up: Concern for emphysematous cystitis, renal abscess    Interval History/ROS:Patient is a 64y old  Male who presents with a chief complaint of Hematuria; weight loss (27 Jun 2020 10:28)  - this AM, pt reported to have ?seizure-like episode      Allergies    No Known Allergies    Intolerances        ANTIMICROBIALS:  ertapenem  IVPB 1000 every 24 hours      OTHER MEDS:  aspirin enteric coated 81 milliGRAM(s) Oral daily  atorvastatin 80 milliGRAM(s) Oral at bedtime  bromocriptine Capsule 5 milliGRAM(s) Oral daily  buPROPion  milliGRAM(s) Oral daily  dextrose 40% Gel 15 Gram(s) Oral once PRN  dextrose 5%. 1000 milliLiter(s) IV Continuous <Continuous>  dextrose 50% Injectable 12.5 Gram(s) IV Push once  dextrose 50% Injectable 25 Gram(s) IV Push once  dextrose 50% Injectable 25 Gram(s) IV Push once  glucagon  Injectable 1 milliGRAM(s) IntraMuscular once PRN  heparin   Injectable 5000 Unit(s) SubCutaneous every 8 hours  insulin lispro (HumaLOG) corrective regimen sliding scale   SubCutaneous three times a day before meals  insulin lispro (HumaLOG) corrective regimen sliding scale   SubCutaneous at bedtime  lactulose Syrup 15 Gram(s) Oral three times a day  pantoprazole    Tablet 40 milliGRAM(s) Oral before breakfast  polyethylene glycol 3350 17 Gram(s) Oral daily  sertraline 50 milliGRAM(s) Oral daily      Vital Signs Last 24 Hrs  T(C): 36.6 (27 Jun 2020 08:57), Max: 36.6 (27 Jun 2020 08:57)  T(F): 97.9 (27 Jun 2020 08:57), Max: 97.9 (27 Jun 2020 08:57)  HR: 86 (27 Jun 2020 08:57) (80 - 86)  BP: 128/68 (27 Jun 2020 08:57) (122/73 - 144/79)  BP(mean): --  RR: 18 (27 Jun 2020 08:57) (18 - 18)  SpO2: 97% (27 Jun 2020 08:57) (94% - 98%)    EXAM:  Constitutional: Not in acute distress  Eyes: No icterus. Pupils b/l reactive to light.  Oral cavity: Clear, no lesions  Neck: No neck vein distension noted  RS: Chest clear to auscultation bilaterally. No wheeze/rhonchi/crepitations.  CVS: S1, S2 heard. Regular rate and rhythm. No murmurs/rubs/gallops.  Abdomen: Soft. No guarding/rigidity/tenderness.  : No acute abnormalities  Extremities: Warm. No pedal edema  Skin: No lesions noted  Vascular: No evidence of phlebitis  Neuro: Alert, oriented to time/place/person                        10.8   8.71  )-----------( 361      ( 27 Jun 2020 09:13 )             33.3       06-27    136  |  99  |  19  ----------------------------<  95  4.1   |  26  |  0.92    Ca    9.5      27 Jun 2020 09:13      RADIOLOGY:  < from: CT Head No Cont (06.27.20 @ 11:22) >  IMPRESSION: Age-appropriate involutional change and microvascular ischemic disease. No significant interval change 11/30/2019  < end of copied text >    < from: Xray Abdomen 1 View PORTABLE -Urgent (06.26.20 @ 16:50) >  IMPRESSION:   Retained oral contrast from a prior study opacifies the transverse colon to the rectum. Nonobstructive bowel gas pattern.  < end of copied text > Follow Up: Concern for emphysematous cystitis, renal abscess    Interval History/ROS:Patient is a 64y old  Male who presents with a chief complaint of Hematuria; weight loss (27 Jun 2020 10:28)  - this AM, pt reported to have ?seizure-like episode  - CT Head negative  - patient assessed, with wife at bedside. AAOX3, but wife still c/o that he is altered. No h/o seizures in past    Allergies    No Known Allergies    Intolerances        ANTIMICROBIALS:  ertapenem  IVPB 1000 every 24 hours      OTHER MEDS:  aspirin enteric coated 81 milliGRAM(s) Oral daily  atorvastatin 80 milliGRAM(s) Oral at bedtime  bromocriptine Capsule 5 milliGRAM(s) Oral daily  buPROPion  milliGRAM(s) Oral daily  dextrose 40% Gel 15 Gram(s) Oral once PRN  dextrose 5%. 1000 milliLiter(s) IV Continuous <Continuous>  dextrose 50% Injectable 12.5 Gram(s) IV Push once  dextrose 50% Injectable 25 Gram(s) IV Push once  dextrose 50% Injectable 25 Gram(s) IV Push once  glucagon  Injectable 1 milliGRAM(s) IntraMuscular once PRN  heparin   Injectable 5000 Unit(s) SubCutaneous every 8 hours  insulin lispro (HumaLOG) corrective regimen sliding scale   SubCutaneous three times a day before meals  insulin lispro (HumaLOG) corrective regimen sliding scale   SubCutaneous at bedtime  lactulose Syrup 15 Gram(s) Oral three times a day  pantoprazole    Tablet 40 milliGRAM(s) Oral before breakfast  polyethylene glycol 3350 17 Gram(s) Oral daily  sertraline 50 milliGRAM(s) Oral daily      Vital Signs Last 24 Hrs  T(C): 36.6 (27 Jun 2020 08:57), Max: 36.6 (27 Jun 2020 08:57)  T(F): 97.9 (27 Jun 2020 08:57), Max: 97.9 (27 Jun 2020 08:57)  HR: 86 (27 Jun 2020 08:57) (80 - 86)  BP: 128/68 (27 Jun 2020 08:57) (122/73 - 144/79)  BP(mean): --  RR: 18 (27 Jun 2020 08:57) (18 - 18)  SpO2: 97% (27 Jun 2020 08:57) (94% - 98%)    EXAM:  Constitutional: Not in acute distress. On RA  Eyes: No icterus. Pupils b/l reactive to light.  Oral cavity: Clear, no lesions  Neck: No neck vein distension noted  RS: Chest clear to auscultation bilaterally. No wheeze/rhonchi/crepitations.  CVS: S1, S2 heard. Regular rate and rhythm. No murmurs/rubs/gallops.  Abdomen: Soft. No guarding/rigidity/tenderness.  : Grewal +  Extremities: Warm. No pedal edema  Skin: No lesions noted  Vascular: No evidence of phlebitis  Neuro: Alert, oriented to time/place/person                        10.8   8.71  )-----------( 361      ( 27 Jun 2020 09:13 )             33.3       06-27    136  |  99  |  19  ----------------------------<  95  4.1   |  26  |  0.92    Ca    9.5      27 Jun 2020 09:13      RADIOLOGY:  < from: CT Head No Cont (06.27.20 @ 11:22) >  IMPRESSION: Age-appropriate involutional change and microvascular ischemic disease. No significant interval change 11/30/2019  < end of copied text >    < from: Xray Abdomen 1 View PORTABLE -Urgent (06.26.20 @ 16:50) >  IMPRESSION:   Retained oral contrast from a prior study opacifies the transverse colon to the rectum. Nonobstructive bowel gas pattern.  < end of copied text > Follow Up: Concern for emphysematous cystitis, renal abscess    Interval History/ROS:Patient is a 64y old  Male who presents with a chief complaint of Hematuria; weight loss (27 Jun 2020 10:28)  - this AM, pt reported to have ?seizure-like episode  - CT Head negative  - patient assessed, with wife at bedside. AAOX3, but wife still c/o that he is altered. No h/o seizures in past    Allergies    No Known Allergies    Intolerances        ANTIMICROBIALS:  ertapenem  IVPB 1000 every 24 hours      OTHER MEDS:  aspirin enteric coated 81 milliGRAM(s) Oral daily  atorvastatin 80 milliGRAM(s) Oral at bedtime  bromocriptine Capsule 5 milliGRAM(s) Oral daily  buPROPion  milliGRAM(s) Oral daily  dextrose 40% Gel 15 Gram(s) Oral once PRN  dextrose 5%. 1000 milliLiter(s) IV Continuous <Continuous>  dextrose 50% Injectable 12.5 Gram(s) IV Push once  dextrose 50% Injectable 25 Gram(s) IV Push once  dextrose 50% Injectable 25 Gram(s) IV Push once  glucagon  Injectable 1 milliGRAM(s) IntraMuscular once PRN  heparin   Injectable 5000 Unit(s) SubCutaneous every 8 hours  insulin lispro (HumaLOG) corrective regimen sliding scale   SubCutaneous three times a day before meals  insulin lispro (HumaLOG) corrective regimen sliding scale   SubCutaneous at bedtime  lactulose Syrup 15 Gram(s) Oral three times a day  pantoprazole    Tablet 40 milliGRAM(s) Oral before breakfast  polyethylene glycol 3350 17 Gram(s) Oral daily  sertraline 50 milliGRAM(s) Oral daily      Vital Signs Last 24 Hrs  T(C): 36.6 (27 Jun 2020 08:57), Max: 36.6 (27 Jun 2020 08:57)  T(F): 97.9 (27 Jun 2020 08:57), Max: 97.9 (27 Jun 2020 08:57)  HR: 86 (27 Jun 2020 08:57) (80 - 86)  BP: 128/68 (27 Jun 2020 08:57) (122/73 - 144/79)  BP(mean): --  RR: 18 (27 Jun 2020 08:57) (18 - 18)  SpO2: 97% (27 Jun 2020 08:57) (94% - 98%)    EXAM:  Constitutional: Not in acute distress. On RA  Eyes: No icterus. Pupils b/l reactive to light.  Oral cavity: Clear, no lesions  Neck: No neck vein distension noted  RS: Chest clear to auscultation bilaterally. No wheeze/rhonchi/crepitations.  CVS: S1, S2 heard. Regular rate and rhythm. No murmurs/rubs/gallops.  Abdomen: Soft. No guarding/rigidity/tenderness.  : Grewal +  Extremities: Warm. No pedal edema  Skin: No lesions noted  Vascular: No evidence of phlebitis  Neuro: Alert, oriented to time/place/person                        10.8   8.71  )-----------( 361      ( 27 Jun 2020 09:13 )             33.3       06-27    136  |  99  |  19  ----------------------------<  95  4.1   |  26  |  0.92    Ca    9.5      27 Jun 2020 09:13      RADIOLOGY:    <The imaging below has been reviewed and visualized by me independently. Findings as detailed in report below>    EXAM:  CT CHEST OC IC                        EXAM:  CT ABDOMEN AND PELVIS OC IC                        PROCEDURE DATE:  06/17/2020    Emphysematous cystitis with left renal abscess.    < from: CT Head No Cont (06.27.20 @ 11:22) >  IMPRESSION: Age-appropriate involutional change and microvascular ischemic disease. No significant interval change 11/30/2019  < end of copied text >    < from: Xray Abdomen 1 View PORTABLE -Urgent (06.26.20 @ 16:50) >  IMPRESSION:   Retained oral contrast from a prior study opacifies the transverse colon to the rectum. Nonobstructive bowel gas pattern.  < end of copied text >

## 2020-06-27 NOTE — PROGRESS NOTE ADULT - PROBLEM SELECTOR PLAN 2
Pt presented with 2 days of hematuria 2/2 cystitis. 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 received 2 days of cipro as outpatient  - U/A with LE, proteins, moderate blood  - Completed 9 of ertapenem. 6/17-6/26.   - Will f/u ID recs to switch ertapenem to new abx given ?sz.   -Abd XR still w/ large amt of contrast after small bm post-suppository yesterday, encouraged eating, will consider an enema  Awaiting large BM prior to repeat CT A/P to re-assess possible renal abscess. If imaging findings not c/w abscess may need renal biopsy vs cystoscopy  - monitor for fever, trend WBC/Cr  -uro following

## 2020-06-27 NOTE — PROGRESS NOTE ADULT - SUBJECTIVE AND OBJECTIVE BOX
SUBJECTIVE/INTERVAL EVENTS:  PT seen and examined at bedside.  Was reported to have a few seconds of lip smacking this AM followed by B/L arm shaking with eyes rolling and LOC several minutes later.  B/L arm shaking episode lasted 20 seconds and PT returned rapidly to his baseline.  PT persistently having R. hemiparesis.     REVIEW OF SYSTEMS : All other systems are negative except as was mentioned in the subjective.    Home Medications:  aspirin 81 mg oral delayed release tablet: 1 tab(s) orally once a day (17 Jun 2020 17:05)  bromocriptine 5 mg oral capsule: 1 cap(s) orally once a day (17 Jun 2020 17:05)  buPROPion 150 mg/24 hours (XL) oral tablet, extended release: 1 tab(s) orally every 24 hours (17 Jun 2020 17:05)  Lipitor 80 mg oral tablet: 1 tab(s) orally once a day (at bedtime) (17 Jun 2020 17:05)  montelukast 10 mg oral tablet: 1 tab(s) orally once a day (17 Jun 2020 17:04)  sertraline 100 mg oral tablet: 1 tab(s) orally once a day (17 Jun 2020 17:05)  Vitamin D3 2000 intl units (50 mcg) oral capsule: 1 cap(s) orally once a day (17 Jun 2020 17:05)    MEDICATIONS  (STANDING):  aspirin enteric coated 81 milliGRAM(s) Oral daily  atorvastatin 80 milliGRAM(s) Oral at bedtime  bromocriptine Capsule 5 milliGRAM(s) Oral daily  buPROPion  milliGRAM(s) Oral daily  dextrose 5%. 1000 milliLiter(s) (50 mL/Hr) IV Continuous <Continuous>  dextrose 50% Injectable 12.5 Gram(s) IV Push once  dextrose 50% Injectable 25 Gram(s) IV Push once  dextrose 50% Injectable 25 Gram(s) IV Push once  ertapenem  IVPB 1000 milliGRAM(s) IV Intermittent every 24 hours  heparin   Injectable 5000 Unit(s) SubCutaneous every 8 hours  insulin lispro (HumaLOG) corrective regimen sliding scale   SubCutaneous three times a day before meals  insulin lispro (HumaLOG) corrective regimen sliding scale   SubCutaneous at bedtime  lactulose Syrup 15 Gram(s) Oral three times a day  pantoprazole    Tablet 40 milliGRAM(s) Oral before breakfast  polyethylene glycol 3350 17 Gram(s) Oral daily  sertraline 50 milliGRAM(s) Oral daily    MEDICATIONS  (PRN):  dextrose 40% Gel 15 Gram(s) Oral once PRN Blood Glucose LESS THAN 70 milliGRAM(s)/deciliter  glucagon  Injectable 1 milliGRAM(s) IntraMuscular once PRN Glucose LESS THAN 70 milligrams/deciliter    OBJECTIVE:  VITAL SIGNS:   Vital Signs Last 24 Hrs  T(C): 36.6 (27 Jun 2020 08:57), Max: 36.6 (27 Jun 2020 08:57)  T(F): 97.9 (27 Jun 2020 08:57), Max: 97.9 (27 Jun 2020 08:57)  HR: 86 (27 Jun 2020 08:57) (80 - 86)  BP: 128/68 (27 Jun 2020 08:57) (122/73 - 144/79)  BP(mean): --  RR: 18 (27 Jun 2020 08:57) (18 - 18)  SpO2: 97% (27 Jun 2020 08:57) (94% - 98%)  General: Cachectic man, appears older than stated age, in no apparent distress including pain  Neurological:  MS: Awake, alert, oriented to person, place, situation, month and year. Normal affect. Follows all commands.  Language: Speech is hypophonic, fluent with good repetition & comprehension  CNs: PERRL (R = 3mm, L = 3mm).  Decreased upgaze.  EOMI no nystagmus.  No facial asymmetry b/l.  Hearing grossly normal (rubbing fingers) b/l. Hypophonic voice.  Neck flexion/extension 5/5 strength.    Motor: Diffusely decreased bulk, worse in RLE. No noticeable tremor, seizure, clonus or fasciculations on current exam.  Moving all extremities within plane of bed but no spontaneous movement against gravity.      LABS AND STUDIES:  CBC                       10.8   8.71  )-----------( 361      ( 27 Jun 2020 09:13 )             33.3   Chem 06-27  136  |  99  |  19  ----------------------------<  95  4.1   |  26  |  0.92    Ca    9.5      27 Jun 2020 09:13

## 2020-06-27 NOTE — PROGRESS NOTE ADULT - ATTENDING COMMENTS
Patient seen and examined. Compared to 6/22/20, more hypophonic, though neck strength is full. Also with mild reduction in upgaze, other CNs intact. Given reported episode of lip smacking and LOC, will need EEG, and brain imaging with MRI next. Followup paraneoplastic panel.

## 2020-06-27 NOTE — PROGRESS NOTE ADULT - ASSESSMENT
65 yo M pmhx prolactinoma, DM2 on insulin, HTN, CAD w. LAD stent and restent after thrombosis, depression presenting with 2 days of hematuria 2/2 cystitis CT a/p findings of 2.5 cm Left renal abscess. Pt also presenting with 6 months of weight loss/FTT 2/2 neuromuscular disorder (ALS?) vs paraneoplastic vs infectious. Today w/ ?provoked sz.

## 2020-06-27 NOTE — PROGRESS NOTE ADULT - ASSESSMENT
Assessment:  64M w/ PMH of DM, HTN, CAD w/ LAD stent, depression, presented w/ hematuria for whom neurology was consulted for 100lb weight loss & diffuse weakness for the past year.  Follows w/ Dr. Winters who was planning on doing an EMG on 7/6. Primary team and family concerned that patient will still be at rehab and will be unable to get the EMG.      06/27/20:  PT now notably hypophonic and with impaired upgaze, had episode of lip-smacking followed by B/L arm shaking episode a/w LOC x 20 sec.  Unclear if this was a seizure or not, though PT was on both Ertapenem and buproprion which can lower seizure threshold.      Impression: Chronic, progressive, RLE monoparesis, diffuse atrophy, 100lb weight loss, flaccid dysarthria, fasciculations suspicious for motor neuron disease now with unclear episodes possibly seizure.  DDx remains proad of possible Flail Leg syndrome, ALS, paraneoplastic motor neuron disease, malignancy.     Plan:   [] Would switch ertapenem and Bupropion to agents less likely to lower seizure threshold if possible.   [] Repeat CT head   [] 24 hr vEEG   [] Dysphagia screen, aspiration and seizure precautions.   [] F/U paraneoplastic [facility] panel   [] EMG as outpatient planned w/ Dr. Winters     PT seen and examined at bedside.  Assessment and plan discussed with the attending, Dr. Florence Wall, DO  PGY-2 Neurology Service

## 2020-06-28 LAB
ANION GAP SERPL CALC-SCNC: 13 MMOL/L — SIGNIFICANT CHANGE UP (ref 5–17)
BUN SERPL-MCNC: 20 MG/DL — SIGNIFICANT CHANGE UP (ref 7–23)
CALCIUM SERPL-MCNC: 9.5 MG/DL — SIGNIFICANT CHANGE UP (ref 8.4–10.5)
CHLORIDE SERPL-SCNC: 98 MMOL/L — SIGNIFICANT CHANGE UP (ref 96–108)
CO2 SERPL-SCNC: 25 MMOL/L — SIGNIFICANT CHANGE UP (ref 22–31)
CREAT SERPL-MCNC: 1.02 MG/DL — SIGNIFICANT CHANGE UP (ref 0.5–1.3)
GLUCOSE BLDC GLUCOMTR-MCNC: 110 MG/DL — HIGH (ref 70–99)
GLUCOSE BLDC GLUCOMTR-MCNC: 127 MG/DL — HIGH (ref 70–99)
GLUCOSE BLDC GLUCOMTR-MCNC: 131 MG/DL — HIGH (ref 70–99)
GLUCOSE BLDC GLUCOMTR-MCNC: 99 MG/DL — SIGNIFICANT CHANGE UP (ref 70–99)
GLUCOSE SERPL-MCNC: 94 MG/DL — SIGNIFICANT CHANGE UP (ref 70–99)
HCT VFR BLD CALC: 34.4 % — LOW (ref 39–50)
HGB BLD-MCNC: 10.8 G/DL — LOW (ref 13–17)
MAGNESIUM SERPL-MCNC: 2.2 MG/DL — SIGNIFICANT CHANGE UP (ref 1.6–2.6)
MCHC RBC-ENTMCNC: 25.9 PG — LOW (ref 27–34)
MCHC RBC-ENTMCNC: 31.4 GM/DL — LOW (ref 32–36)
MCV RBC AUTO: 82.5 FL — SIGNIFICANT CHANGE UP (ref 80–100)
NRBC # BLD: 0 /100 WBCS — SIGNIFICANT CHANGE UP (ref 0–0)
PLATELET # BLD AUTO: 361 K/UL — SIGNIFICANT CHANGE UP (ref 150–400)
POTASSIUM SERPL-MCNC: 4.3 MMOL/L — SIGNIFICANT CHANGE UP (ref 3.5–5.3)
POTASSIUM SERPL-SCNC: 4.3 MMOL/L — SIGNIFICANT CHANGE UP (ref 3.5–5.3)
RBC # BLD: 4.17 M/UL — LOW (ref 4.2–5.8)
RBC # FLD: 15.1 % — HIGH (ref 10.3–14.5)
SODIUM SERPL-SCNC: 136 MMOL/L — SIGNIFICANT CHANGE UP (ref 135–145)
WBC # BLD: 9.03 K/UL — SIGNIFICANT CHANGE UP (ref 3.8–10.5)
WBC # FLD AUTO: 9.03 K/UL — SIGNIFICANT CHANGE UP (ref 3.8–10.5)

## 2020-06-28 PROCEDURE — 95720 EEG PHY/QHP EA INCR W/VEEG: CPT

## 2020-06-28 PROCEDURE — 74018 RADEX ABDOMEN 1 VIEW: CPT | Mod: 26

## 2020-06-28 PROCEDURE — 70551 MRI BRAIN STEM W/O DYE: CPT | Mod: 26

## 2020-06-28 PROCEDURE — 99233 SBSQ HOSP IP/OBS HIGH 50: CPT

## 2020-06-28 RX ADMIN — BROMOCRIPTINE MESYLATE 5 MILLIGRAM(S): 5 CAPSULE ORAL at 14:34

## 2020-06-28 RX ADMIN — Medication 81 MILLIGRAM(S): at 14:33

## 2020-06-28 RX ADMIN — PIPERACILLIN AND TAZOBACTAM 25 GRAM(S): 4; .5 INJECTION, POWDER, LYOPHILIZED, FOR SOLUTION INTRAVENOUS at 14:35

## 2020-06-28 RX ADMIN — HEPARIN SODIUM 5000 UNIT(S): 5000 INJECTION INTRAVENOUS; SUBCUTANEOUS at 21:43

## 2020-06-28 RX ADMIN — POLYETHYLENE GLYCOL 3350 17 GRAM(S): 17 POWDER, FOR SOLUTION ORAL at 14:35

## 2020-06-28 RX ADMIN — PIPERACILLIN AND TAZOBACTAM 25 GRAM(S): 4; .5 INJECTION, POWDER, LYOPHILIZED, FOR SOLUTION INTRAVENOUS at 05:32

## 2020-06-28 RX ADMIN — HEPARIN SODIUM 5000 UNIT(S): 5000 INJECTION INTRAVENOUS; SUBCUTANEOUS at 14:34

## 2020-06-28 RX ADMIN — HEPARIN SODIUM 5000 UNIT(S): 5000 INJECTION INTRAVENOUS; SUBCUTANEOUS at 05:31

## 2020-06-28 RX ADMIN — ATORVASTATIN CALCIUM 80 MILLIGRAM(S): 80 TABLET, FILM COATED ORAL at 21:43

## 2020-06-28 RX ADMIN — SERTRALINE 50 MILLIGRAM(S): 25 TABLET, FILM COATED ORAL at 14:34

## 2020-06-28 RX ADMIN — PANTOPRAZOLE SODIUM 40 MILLIGRAM(S): 20 TABLET, DELAYED RELEASE ORAL at 05:32

## 2020-06-28 RX ADMIN — PIPERACILLIN AND TAZOBACTAM 25 GRAM(S): 4; .5 INJECTION, POWDER, LYOPHILIZED, FOR SOLUTION INTRAVENOUS at 21:43

## 2020-06-28 RX ADMIN — BUPROPION HYDROCHLORIDE 100 MILLIGRAM(S): 150 TABLET, EXTENDED RELEASE ORAL at 14:34

## 2020-06-28 NOTE — PROGRESS NOTE ADULT - PROBLEM SELECTOR PLAN 7
resolved. 2/2 Metabolic encephalopathy  - taper wellbutrin   - cw sertraline   - frequent reorientation

## 2020-06-28 NOTE — PROGRESS NOTE ADULT - ASSESSMENT
63 yo M pmhx prolactinoma, DM2 on insulin, HTN, CAD w. LAD stent and restent after thrombosis, depression presenting with 2 days of hematuria 2/2 cystitis CT a/p findings of 2.5 cm Left renal abscess. Pt also presenting with 6 months of weight loss/FTT 2/2 neuromuscular disorder (ALS?) vs paraneoplastic vs infectious. Course c/b by ? seizure activity.

## 2020-06-28 NOTE — PROGRESS NOTE ADULT - PROBLEM SELECTOR PLAN 1
Pt has hx of prolactinoma, depression. No hx of sz. Observed to have ?focal aware seizure with automatisms, PCA observed ?GTC, however no post-ictal period. Could be provoked sz given that he is on buproprion and ertapenem.  -CT head non con URGENT - no  new findings  -EEG without findings; pt took off leads overnight   -f/u labs, prolactin level  -D/w neuro, already following - iso medications that lower seizure threshold?   -Taper buproprion from 150mg XL qd to 100mg SR qd for now: d/w pharm potential regimen 100mg SR qd x3d, 75mg IR qd x3d, will further d/w psych  - changed ertapenem to zosyn

## 2020-06-28 NOTE — EEG REPORT - NS EEG TEXT BOX
[facility]  Comprehensive Epilepsy Center  Report of Continuous Video EEG    [facility]: [address] , [address], [place] [address], Ph#: [phone]  [facility]: [id number] [place] [id number], Ph#: [phone]  Office: [address] [facility], [facility] [address], [place] [address] Ph#: [phone]    Patient Name: Wm Galarza    Age: 64 year, : 1955  Patient ID: -, MRN #: MRN: [id number], Peña: 8 Elder 811 D  Referring Physician: -  EEG #: [id number]    Study Date: 2020	Start Time: 3:55:08 PM      End Date:   2020	End Time: 08:00:04 AM   (leads off at 04:09a 20)  Study Duration: 15h 41m    Study Information:    EEG Recording Technique:  The patient underwent continuous Video-EEG monitoring, using Telemetry System hardware on the XLTek Digital System. EEG and video data were stored on a computer hard drive with important events saved in digital archive files. The material was reviewed by a physician (electroencephalographer / epileptologist) on a daily basis. Jhonathan and seizure detection algorithms were utilized and reviewed. An EEG Technician attended to the patient, and was available throughout daytime work hours.  The epilepsy center neurologist was available in person or on call 24-hours per day.    EEG Placement and Labeling of Electrodes:  The EEG was performed utilizing 20 channel referential EEG connections (coronal over temporal over parasagittal montage) using all standard 10-20 electrode placements with EKG, with additional electrodes placed in the inferior temporal region using the modified 10-10 montage electrode placements for elective admissions, or if deemed necessary. Recording was at a sampling rate of 256 samples per second per channel. Time synchronized digital video recording was done simultaneously with EEG recording. A low light infrared camera was used for low light recording.     History:  VEEG performed at the bedside  COR: Awake and confused  No HV, No Photic  65 Y/O Male  P/W: AMS, Seizure Evaluation  H/O: DM2, HTN, CAD, Depression, Dyslipidemia, Prolactinoma, Deviated Nasal Septum      Medication	  Zoloft	    Interpretation:    [Abbreviation Key:  PDR=alpha rhythm/posterior dominant rhythm. A-P=anterior posterior gradient.  Amplitude: ‘very low’:<20; ‘low’:20-50; ‘medium’:; ‘high’:>200uV.  Persistence for periodic/rhythmic patterns (% of epoch) ‘rare’:<1%; ‘occasional’:1-10%; ‘frequent’:10-50%; ‘abundant’:50-90%; ‘continuous’:>90%.  Persistence for sporadic discharges: ‘rare’:<1/hr; ‘occasional’:1/min-1/hr; ‘frequent’:>1/min; ‘abundant’:>1/10 sec.  GRDA=generalized rhythmic delta activity, LRDA=lateralized rhythmic delta activity, TIRDA=temporal intermittent rhythmic delta activity, FIRDA=frontal intermittent rhythmic activity. LPD=PLED=lateralized periodic discharges, GPD=generalized periodic discharges, BiPDs=BiPLEDs=bilateral independent periodic epileptiform discharges, SIRPID=stimulus induced rhythmic, periodic, or ictal appearing discharges.  Modifiers: +F=with fast component, +S=with spike component, +R=with rhythmic component.  S-B=burst suppression pattern.  PFA: paroxysmal fast activity. Max=maximal. N1-drowsy, N2-stage II sleep, N3-slow wave sleep.  HV=hyperventilation, PS=photic stimulation]    20-  Startin2020    Daily EEG Visual Analysis  FINDINGS: The background was continuous, spontaneously variable and reactive. During wakefulness, the posterior dominant rhythm consisted of symmetric, well-modulated 8.5 Hz activity, with amplitude to 30 uV, that attenuated to eye opening. Low amplitude frontal beta was noted in wakefulness.      Background Slowing:  Generalized slowing: None was present.  Focal Slowing: None was present.    Sleep Background:  Drowsiness was characterized by fragmentation, attenuation, and slowing of the background activity.    Sleep was characterized by the presence of vertex waves, symmetric spindles, and K-complexes.  Stage II sleep transients were not recorded.    Other Non-Epileptiform Findings:  None were present.    Interictal Epileptiform Activity:   None were present.    Events:  Clinical events: None recorded.  Seizures: None recorded.    Activation Procedures:   Hyperventilation was not performed.    Photic stimulation was not performed.    Artifacts:  Intermittent myogenic and movement artifacts were noted.    ECG:  The heart rate on single channel ECG was predominantly between 80-90 BPM.    EEG Summary / Classification:  Normal EEG in the awake, drowsy and asleep states    EEG Impression / Clinical Correlate:  Normal prolonged EEG study. (leads progressively degraded overnight, finally off head at 4:09a)  No epileptic pattern or seizure seen.      ________________________________________      Avel Hopson MD PhD  Director, Epilepsy Division, [facility]

## 2020-06-28 NOTE — PROGRESS NOTE ADULT - PROBLEM SELECTOR PLAN 5
Hx of 6 months of weight loss, FTT 2/2 neuromuscular disorder (ALS) vs paraneoplastic (less likely due to negative CT findings vs infection (Endocarditis? - less likely due to TTE results, no blood cx, no murmur) vs psychiatric illness  - Physical therapy evaluation recommending MARI  - UTI treatment as above  - continue with Wellbutrin, sertraline   - TTE without new findings  - discussed with Neurologist outpatient Dr. Winters; will need EMG as outpatient. House neurology following  - fu urine cytology - negative for RCC

## 2020-06-28 NOTE — PROGRESS NOTE ADULT - PROBLEM SELECTOR PLAN 2
Pt presented with 2 days of hematuria 2/2 cystitis. 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 received 2 days of cipro as outpatient  - U/A with LE, proteins, moderate blood  - Completed 9 of ertapenem. 6/17-6/26.   - Will f/u ID recs to switch ertapenem to new abx given ?sz --> zosyn   -Abd XR still w/ large amt of contrast after small bm post-suppository yesterday, encouraged eating, will consider an enema  Awaiting large BM prior to repeat CT A/P to re-assess possible renal abscess. If imaging findings not c/w abscess may need renal biopsy vs cystoscopy  - monitor for fever, trend WBC/Cr  -uro following

## 2020-06-28 NOTE — PROGRESS NOTE ADULT - SUBJECTIVE AND OBJECTIVE BOX
Jaziel Isabel, pgy 2  [facility] Team 3  [phone]  After 7pm; page night float    Patient is a 64y old  Male who presents with a chief complaint of Hematuria; weight loss (27 Jun 2020 12:06)      SUBJECTIVE / OVERNIGHT EVENTS:  ADDITIONAL REVIEW OF SYSTEMS:    MEDICATIONS  (STANDING):  aspirin enteric coated 81 milliGRAM(s) Oral daily  atorvastatin 80 milliGRAM(s) Oral at bedtime  bromocriptine Capsule 5 milliGRAM(s) Oral daily  buPROPion  milliGRAM(s) Oral daily  dextrose 5%. 1000 milliLiter(s) (50 mL/Hr) IV Continuous <Continuous>  dextrose 50% Injectable 12.5 Gram(s) IV Push once  dextrose 50% Injectable 25 Gram(s) IV Push once  dextrose 50% Injectable 25 Gram(s) IV Push once  heparin   Injectable 5000 Unit(s) SubCutaneous every 8 hours  insulin lispro (HumaLOG) corrective regimen sliding scale   SubCutaneous three times a day before meals  insulin lispro (HumaLOG) corrective regimen sliding scale   SubCutaneous at bedtime  pantoprazole    Tablet 40 milliGRAM(s) Oral before breakfast  piperacillin/tazobactam IVPB.. 3.375 Gram(s) IV Intermittent every 8 hours  polyethylene glycol 3350 17 Gram(s) Oral daily  sertraline 50 milliGRAM(s) Oral daily    MEDICATIONS  (PRN):  dextrose 40% Gel 15 Gram(s) Oral once PRN Blood Glucose LESS THAN 70 milliGRAM(s)/deciliter  glucagon  Injectable 1 milliGRAM(s) IntraMuscular once PRN Glucose LESS THAN 70 milligrams/deciliter      CAPILLARY BLOOD GLUCOSE      POCT Blood Glucose.: 120 mg/dL (27 Jun 2020 21:05)  POCT Blood Glucose.: 120 mg/dL (27 Jun 2020 17:24)  POCT Blood Glucose.: 127 mg/dL (27 Jun 2020 13:25)  POCT Blood Glucose.: 102 mg/dL (27 Jun 2020 08:56)    I&O's Summary    27 Jun 2020 07:01  -  28 Jun 2020 07:00  --------------------------------------------------------  IN: 420 mL / OUT: 850 mL / NET: -430 mL        PHYSICAL EXAM:  Vital Signs Last 24 Hrs  T(C): 36.7 (27 Jun 2020 23:52), Max: 36.7 (27 Jun 2020 23:52)  T(F): 98 (27 Jun 2020 23:52), Max: 98 (27 Jun 2020 23:52)  HR: 77 (27 Jun 2020 23:52) (77 - 86)  BP: 156/78 (27 Jun 2020 23:52) (121/72 - 156/78)  BP(mean): --  RR: 17 (27 Jun 2020 23:52) (17 - 18)  SpO2: 97% (27 Jun 2020 23:52) (97% - 98%)  CONSTITUTIONAL: NAD, well-developed, well-groomed  EYES: PERRLA; conjunctiva and sclera clear  ENMT: Moist oral mucosa, no pharyngeal injection or exudates; normal dentition  NECK: Supple, no palpable masses; no thyromegaly  RESPIRATORY: Normal respiratory effort; lungs are clear to auscultation bilaterally  CARDIOVASCULAR: Regular rate and rhythm, normal S1 and S2, no murmur/rub/gallop; No lower extremity edema; Peripheral pulses are 2+ bilaterally  ABDOMEN: Nontender to palpation, normoactive bowel sounds, no rebound/guarding; No hepatosplenomegaly  MUSCULOSKELETAL:  Normal gait; no clubbing or cyanosis of digits; no joint swelling or tenderness to palpation  PSYCH: A+O to person, place, and time; affect appropriate  NEUROLOGY: CN 2-12 are intact and symmetric; no gross sensory deficits   SKIN: No rashes; no palpable lesions    LABS:                        10.8   8.71  )-----------( 361      ( 27 Jun 2020 09:13 )             33.3     06-27    136  |  99  |  19  ----------------------------<  95  4.1   |  26  |  0.92    Ca    9.5      27 Jun 2020 09:13                  RADIOLOGY & ADDITIONAL TESTS:  Results Reviewed:   Imaging Personally Reviewed:  Electrocardiogram Personally Reviewed:    COORDINATION OF CARE:  Care Discussed with Consultants/Other Providers [Y/N]:  Prior or Outpatient Records Reviewed [Y/N]: Jaziel Isabel, pgy 2  [facility] Team 3  [phone]  After 7pm; page night float    Patient is a 64y old  Male who presents with a chief complaint of Hematuria; weight loss (27 Jun 2020 12:06)      SUBJECTIVE / OVERNIGHT EVENTS: Pt seen and examined at bedside. Pulled out EMG overnight. Responding to questions this morning though slightly confused, unable to fully participate in interview process  ADDITIONAL REVIEW OF SYSTEMS:  unable to fully assess.     MEDICATIONS  (STANDING):  aspirin enteric coated 81 milliGRAM(s) Oral daily  atorvastatin 80 milliGRAM(s) Oral at bedtime  bromocriptine Capsule 5 milliGRAM(s) Oral daily  buPROPion  milliGRAM(s) Oral daily  dextrose 5%. 1000 milliLiter(s) (50 mL/Hr) IV Continuous <Continuous>  dextrose 50% Injectable 12.5 Gram(s) IV Push once  dextrose 50% Injectable 25 Gram(s) IV Push once  dextrose 50% Injectable 25 Gram(s) IV Push once  heparin   Injectable 5000 Unit(s) SubCutaneous every 8 hours  insulin lispro (HumaLOG) corrective regimen sliding scale   SubCutaneous three times a day before meals  insulin lispro (HumaLOG) corrective regimen sliding scale   SubCutaneous at bedtime  pantoprazole    Tablet 40 milliGRAM(s) Oral before breakfast  piperacillin/tazobactam IVPB.. 3.375 Gram(s) IV Intermittent every 8 hours  polyethylene glycol 3350 17 Gram(s) Oral daily  sertraline 50 milliGRAM(s) Oral daily    MEDICATIONS  (PRN):  dextrose 40% Gel 15 Gram(s) Oral once PRN Blood Glucose LESS THAN 70 milliGRAM(s)/deciliter  glucagon  Injectable 1 milliGRAM(s) IntraMuscular once PRN Glucose LESS THAN 70 milligrams/deciliter      CAPILLARY BLOOD GLUCOSE      POCT Blood Glucose.: 120 mg/dL (27 Jun 2020 21:05)  POCT Blood Glucose.: 120 mg/dL (27 Jun 2020 17:24)  POCT Blood Glucose.: 127 mg/dL (27 Jun 2020 13:25)  POCT Blood Glucose.: 102 mg/dL (27 Jun 2020 08:56)    I&O's Summary    27 Jun 2020 07:01  -  28 Jun 2020 07:00  --------------------------------------------------------  IN: 420 mL / OUT: 850 mL / NET: -430 mL        PHYSICAL EXAM:  Vital Signs Last 24 Hrs  T(C): 36.7 (27 Jun 2020 23:52), Max: 36.7 (27 Jun 2020 23:52)  T(F): 98 (27 Jun 2020 23:52), Max: 98 (27 Jun 2020 23:52)  HR: 77 (27 Jun 2020 23:52) (77 - 86)  BP: 156/78 (27 Jun 2020 23:52) (121/72 - 156/78)  BP(mean): --  RR: 17 (27 Jun 2020 23:52) (17 - 18)  SpO2: 97% (27 Jun 2020 23:52) (97% - 98%)  CONSTITUTIONAL: mild distress cachetic  EYES: PERRLA; EOMI, clear conjunctiva and + xanthelasma on sclera  NECK: Supple, no palpable masses; no thyromegaly  RESPIRATORY: Clear to auscultation bilaterally; no wheezes, crackles, or ronchi  CARDIOVASCULAR: Regular rate and rhythm, normal S1 and S2, no murmur/rub/gallop; No lower extremity edema; Peripheral pulses are 2+ bilaterally  ABDOMEN: +Bowel sounds; nontender to palpation, no rebound/guarding; no hepatosplenomegaly  PSYCH: A+O to person, place, and time; affect appropriate  NEUROLOGY: non-focal, bilateral UE 5/5 on flexion and 4/5 on extension; bilateral LE hip flexion 4/5; R lower leg flexion 2/5; L lower leg 3/5.   SKIN: No rashes; no palpable lesions    LABS:                        10.8   8.71  )-----------( 361      ( 27 Jun 2020 09:13 )             33.3     06-27    136  |  99  |  19  ----------------------------<  95  4.1   |  26  |  0.92    Ca    9.5      27 Jun 2020 09:13    < from: CT Head No Cont (06.27.20 @ 11:22) >  INTERPRETATION:  INDICATIONS:  ?seizure, hx of prolactinoma    TECHNIQUE:  Serial axial images were obtained from the skull base to the vertex without intravenous contrast.    COMPARISON EXAMINATION: 11/13/2019    FINDINGS:    VENTRICLES AND SULCI: Age-appropriate involutional change.  INTRA-AXIAL: Microvascular ischemic changes involving the periventricular and subcortical white matter. Suspicion of a small rightfrontal cortically based infarct which is suggested on the prior as well.  EXTRA-AXIAL:  No mass or collection is seen.  VISUALIZED SINUSES:  Clear.  VISUALIZED MASTOIDS:  Clear.  CALVARIUM:  Normal.  MISCELLANEOUS:  None.    IMPRESSION: Age-appropriate involutional change and microvascular ischemic disease. No significant interval change 11/30/2019    < end of copied text >                RADIOLOGY & ADDITIONAL TESTS:  Results Reviewed:   Imaging Personally Reviewed:  Electrocardiogram Personally Reviewed:    COORDINATION OF CARE:  Care Discussed with Consultants/Other Providers [Y/N]:  Prior or Outpatient Records Reviewed [Y/N]:

## 2020-06-28 NOTE — PROGRESS NOTE ADULT - ATTENDING COMMENTS
Patient seen and examined.  Agree with resident note.  Meds, labs and vitals all reviewed.  Patient with EEG without overt seizure like activity.  CT head with no acute pathology.  Intermittent delirium.  Neurology input appreciated.   Antibiotics changed from Ertapenem to Zosyn, given concern of lowering seizure threshold.   Wellbutrin being tapered for same reason.  Awaiting bowel movement to clear retained contrast prior to further CTAP, which will guide ABX vs interventional actions.

## 2020-06-29 LAB
AMMONIA BLD-MCNC: 33 UMOL/L — SIGNIFICANT CHANGE UP (ref 11–55)
ANION GAP SERPL CALC-SCNC: 13 MMOL/L — SIGNIFICANT CHANGE UP (ref 5–17)
BASE EXCESS BLDV CALC-SCNC: 3.4 MMOL/L — HIGH (ref -2–2)
BUN SERPL-MCNC: 28 MG/DL — HIGH (ref 7–23)
CA-I SERPL-SCNC: 1.25 MMOL/L — SIGNIFICANT CHANGE UP (ref 1.12–1.3)
CALCIUM SERPL-MCNC: 9.7 MG/DL — SIGNIFICANT CHANGE UP (ref 8.4–10.5)
CHLORIDE BLDV-SCNC: 101 MMOL/L — SIGNIFICANT CHANGE UP (ref 96–108)
CHLORIDE SERPL-SCNC: 97 MMOL/L — SIGNIFICANT CHANGE UP (ref 96–108)
CO2 BLDV-SCNC: 30 MMOL/L — SIGNIFICANT CHANGE UP (ref 22–30)
CO2 SERPL-SCNC: 25 MMOL/L — SIGNIFICANT CHANGE UP (ref 22–31)
CREAT SERPL-MCNC: 1.21 MG/DL — SIGNIFICANT CHANGE UP (ref 0.5–1.3)
GAS PNL BLDV: 135 MMOL/L — SIGNIFICANT CHANGE UP (ref 135–145)
GAS PNL BLDV: SIGNIFICANT CHANGE UP
GLUCOSE BLDC GLUCOMTR-MCNC: 111 MG/DL — HIGH (ref 70–99)
GLUCOSE BLDC GLUCOMTR-MCNC: 135 MG/DL — HIGH (ref 70–99)
GLUCOSE BLDC GLUCOMTR-MCNC: 156 MG/DL — HIGH (ref 70–99)
GLUCOSE BLDV-MCNC: 105 MG/DL — HIGH (ref 70–99)
GLUCOSE SERPL-MCNC: 109 MG/DL — HIGH (ref 70–99)
HCO3 BLDV-SCNC: 28 MMOL/L — SIGNIFICANT CHANGE UP (ref 21–29)
HCT VFR BLD CALC: 32.1 % — LOW (ref 39–50)
HCT VFR BLDA CALC: 33 % — LOW (ref 39–50)
HGB BLD CALC-MCNC: 10.8 G/DL — LOW (ref 13–17)
HGB BLD-MCNC: 10.6 G/DL — LOW (ref 13–17)
LACTATE BLDV-MCNC: 1.3 MMOL/L — SIGNIFICANT CHANGE UP (ref 0.7–2)
MAGNESIUM SERPL-MCNC: 2.4 MG/DL — SIGNIFICANT CHANGE UP (ref 1.6–2.6)
MCHC RBC-ENTMCNC: 27.2 PG — SIGNIFICANT CHANGE UP (ref 27–34)
MCHC RBC-ENTMCNC: 33 GM/DL — SIGNIFICANT CHANGE UP (ref 32–36)
MCV RBC AUTO: 82.5 FL — SIGNIFICANT CHANGE UP (ref 80–100)
NRBC # BLD: 0 /100 WBCS — SIGNIFICANT CHANGE UP (ref 0–0)
OTHER CELLS CSF MANUAL: 12 ML/DL — LOW (ref 18–22)
PCO2 BLDV: 47 MMHG — SIGNIFICANT CHANGE UP (ref 35–50)
PH BLDV: 7.4 — SIGNIFICANT CHANGE UP (ref 7.35–7.45)
PHOSPHATE SERPL-MCNC: 3.3 MG/DL — SIGNIFICANT CHANGE UP (ref 2.5–4.5)
PLATELET # BLD AUTO: 301 K/UL — SIGNIFICANT CHANGE UP (ref 150–400)
PO2 BLDV: 53 MMHG — HIGH (ref 25–45)
POTASSIUM BLDV-SCNC: 4.2 MMOL/L — SIGNIFICANT CHANGE UP (ref 3.5–5.3)
POTASSIUM SERPL-MCNC: 4.3 MMOL/L — SIGNIFICANT CHANGE UP (ref 3.5–5.3)
POTASSIUM SERPL-SCNC: 4.3 MMOL/L — SIGNIFICANT CHANGE UP (ref 3.5–5.3)
RBC # BLD: 3.89 M/UL — LOW (ref 4.2–5.8)
RBC # FLD: 15.9 % — HIGH (ref 10.3–14.5)
SAO2 % BLDV: 82 % — SIGNIFICANT CHANGE UP (ref 67–88)
SODIUM SERPL-SCNC: 135 MMOL/L — SIGNIFICANT CHANGE UP (ref 135–145)
WBC # BLD: 9.02 K/UL — SIGNIFICANT CHANGE UP (ref 3.8–10.5)
WBC # FLD AUTO: 9.02 K/UL — SIGNIFICANT CHANGE UP (ref 3.8–10.5)

## 2020-06-29 PROCEDURE — 99232 SBSQ HOSP IP/OBS MODERATE 35: CPT

## 2020-06-29 PROCEDURE — 99233 SBSQ HOSP IP/OBS HIGH 50: CPT | Mod: GC

## 2020-06-29 PROCEDURE — 74018 RADEX ABDOMEN 1 VIEW: CPT | Mod: 26

## 2020-06-29 RX ORDER — SODIUM CHLORIDE 9 MG/ML
1000 INJECTION INTRAMUSCULAR; INTRAVENOUS; SUBCUTANEOUS
Refills: 0 | Status: DISCONTINUED | OUTPATIENT
Start: 2020-06-29 | End: 2020-06-30

## 2020-06-29 RX ORDER — MINERAL OIL
133 OIL (ML) MISCELLANEOUS ONCE
Refills: 0 | Status: COMPLETED | OUTPATIENT
Start: 2020-06-29 | End: 2020-06-29

## 2020-06-29 RX ADMIN — SERTRALINE 50 MILLIGRAM(S): 25 TABLET, FILM COATED ORAL at 11:55

## 2020-06-29 RX ADMIN — PIPERACILLIN AND TAZOBACTAM 25 GRAM(S): 4; .5 INJECTION, POWDER, LYOPHILIZED, FOR SOLUTION INTRAVENOUS at 13:53

## 2020-06-29 RX ADMIN — Medication 81 MILLIGRAM(S): at 11:54

## 2020-06-29 RX ADMIN — PANTOPRAZOLE SODIUM 40 MILLIGRAM(S): 20 TABLET, DELAYED RELEASE ORAL at 06:12

## 2020-06-29 RX ADMIN — Medication 133 MILLILITER(S): at 21:59

## 2020-06-29 RX ADMIN — SODIUM CHLORIDE 75 MILLILITER(S): 9 INJECTION INTRAMUSCULAR; INTRAVENOUS; SUBCUTANEOUS at 12:36

## 2020-06-29 RX ADMIN — PIPERACILLIN AND TAZOBACTAM 25 GRAM(S): 4; .5 INJECTION, POWDER, LYOPHILIZED, FOR SOLUTION INTRAVENOUS at 21:59

## 2020-06-29 RX ADMIN — HEPARIN SODIUM 5000 UNIT(S): 5000 INJECTION INTRAVENOUS; SUBCUTANEOUS at 22:00

## 2020-06-29 RX ADMIN — HEPARIN SODIUM 5000 UNIT(S): 5000 INJECTION INTRAVENOUS; SUBCUTANEOUS at 06:12

## 2020-06-29 RX ADMIN — HEPARIN SODIUM 5000 UNIT(S): 5000 INJECTION INTRAVENOUS; SUBCUTANEOUS at 13:53

## 2020-06-29 RX ADMIN — ATORVASTATIN CALCIUM 80 MILLIGRAM(S): 80 TABLET, FILM COATED ORAL at 22:00

## 2020-06-29 RX ADMIN — PIPERACILLIN AND TAZOBACTAM 25 GRAM(S): 4; .5 INJECTION, POWDER, LYOPHILIZED, FOR SOLUTION INTRAVENOUS at 06:11

## 2020-06-29 RX ADMIN — BROMOCRIPTINE MESYLATE 5 MILLIGRAM(S): 5 CAPSULE ORAL at 11:55

## 2020-06-29 RX ADMIN — POLYETHYLENE GLYCOL 3350 17 GRAM(S): 17 POWDER, FOR SOLUTION ORAL at 11:55

## 2020-06-29 NOTE — PROGRESS NOTE ADULT - SUBJECTIVE AND OBJECTIVE BOX
Jaziel Isabel, pgy 2  [facility]  [phone]  After 7pm; page night float    Patient is a 64y old  Male who presents with a chief complaint of Hematuria; weight loss (27 Jun 2020 12:06)      SUBJECTIVE / OVERNIGHT EVENTS: Pt seen and examined at bedside. Pulled out EMG overnight. Responding to questions this morning though slightly confused, unable to fully participate in interview process  ADDITIONAL REVIEW OF SYSTEMS:  unable to fully assess.     MEDICATIONS  (STANDING):  aspirin enteric coated 81 milliGRAM(s) Oral daily  atorvastatin 80 milliGRAM(s) Oral at bedtime  bromocriptine Capsule 5 milliGRAM(s) Oral daily  buPROPion  milliGRAM(s) Oral daily  dextrose 5%. 1000 milliLiter(s) (50 mL/Hr) IV Continuous <Continuous>  dextrose 50% Injectable 12.5 Gram(s) IV Push once  dextrose 50% Injectable 25 Gram(s) IV Push once  dextrose 50% Injectable 25 Gram(s) IV Push once  heparin   Injectable 5000 Unit(s) SubCutaneous every 8 hours  insulin lispro (HumaLOG) corrective regimen sliding scale   SubCutaneous three times a day before meals  insulin lispro (HumaLOG) corrective regimen sliding scale   SubCutaneous at bedtime  pantoprazole    Tablet 40 milliGRAM(s) Oral before breakfast  piperacillin/tazobactam IVPB.. 3.375 Gram(s) IV Intermittent every 8 hours  polyethylene glycol 3350 17 Gram(s) Oral daily  sertraline 50 milliGRAM(s) Oral daily    MEDICATIONS  (PRN):  dextrose 40% Gel 15 Gram(s) Oral once PRN Blood Glucose LESS THAN 70 milliGRAM(s)/deciliter  glucagon  Injectable 1 milliGRAM(s) IntraMuscular once PRN Glucose LESS THAN 70 milligrams/deciliter      CAPILLARY BLOOD GLUCOSE      POCT Blood Glucose.: 120 mg/dL (27 Jun 2020 21:05)  POCT Blood Glucose.: 120 mg/dL (27 Jun 2020 17:24)  POCT Blood Glucose.: 127 mg/dL (27 Jun 2020 13:25)  POCT Blood Glucose.: 102 mg/dL (27 Jun 2020 08:56)    I&O's Summary    27 Jun 2020 07:01  -  28 Jun 2020 07:00  --------------------------------------------------------  IN: 420 mL / OUT: 850 mL / NET: -430 mL        PHYSICAL EXAM:  Vital Signs Last 24 Hrs  T(C): 36.7 (27 Jun 2020 23:52), Max: 36.7 (27 Jun 2020 23:52)  T(F): 98 (27 Jun 2020 23:52), Max: 98 (27 Jun 2020 23:52)  HR: 77 (27 Jun 2020 23:52) (77 - 86)  BP: 156/78 (27 Jun 2020 23:52) (121/72 - 156/78)  BP(mean): --  RR: 17 (27 Jun 2020 23:52) (17 - 18)  SpO2: 97% (27 Jun 2020 23:52) (97% - 98%)  CONSTITUTIONAL: mild distress cachetic  EYES: PERRLA; EOMI, clear conjunctiva and + xanthelasma on sclera  NECK: Supple, no palpable masses; no thyromegaly  RESPIRATORY: Clear to auscultation bilaterally; no wheezes, crackles, or ronchi  CARDIOVASCULAR: Regular rate and rhythm, normal S1 and S2, no murmur/rub/gallop; No lower extremity edema; Peripheral pulses are 2+ bilaterally  ABDOMEN: +Bowel sounds; nontender to palpation, no rebound/guarding; no hepatosplenomegaly  PSYCH: A+O to person, place, and time; affect appropriate  NEUROLOGY: non-focal, bilateral UE 5/5 on flexion and 4/5 on extension; bilateral LE hip flexion 4/5; R lower leg flexion 2/5; L lower leg 3/5.   SKIN: No rashes; no palpable lesions    LABS:                        10.8   8.71  )-----------( 361      ( 27 Jun 2020 09:13 )             33.3     06-27    136  |  99  |  19  ----------------------------<  95  4.1   |  26  |  0.92    Ca    9.5      27 Jun 2020 09:13    < from: CT Head No Cont (06.27.20 @ 11:22) >  INTERPRETATION:  INDICATIONS:  ?seizure, hx of prolactinoma    TECHNIQUE:  Serial axial images were obtained from the skull base to the vertex without intravenous contrast.    COMPARISON EXAMINATION: 11/13/2019    FINDINGS:    VENTRICLES AND SULCI: Age-appropriate involutional change.  INTRA-AXIAL: Microvascular ischemic changes involving the periventricular and subcortical white matter. Suspicion of a small rightfrontal cortically based infarct which is suggested on the prior as well.  EXTRA-AXIAL:  No mass or collection is seen.  VISUALIZED SINUSES:  Clear.  VISUALIZED MASTOIDS:  Clear.  CALVARIUM:  Normal.  MISCELLANEOUS:  None.    IMPRESSION: Age-appropriate involutional change and microvascular ischemic disease. No significant interval change 11/30/2019    < end of copied text >                RADIOLOGY & ADDITIONAL TESTS:  Results Reviewed:   Imaging Personally Reviewed:  Electrocardiogram Personally Reviewed:    COORDINATION OF CARE:  Care Discussed with Consultants/Other Providers [Y/N]:  Prior or Outpatient Records Reviewed [Y/N]: Jaziel Isabel, pgy 2  [facility] 3  [phone]  After 7pm; page night float    Patient is a 64y old  Male who presents with a chief complaint of Hematuria; weight loss (27 Jun 2020 12:06)      SUBJECTIVE / OVERNIGHT EVENTS: Pt seen and examined at bedside. He was having significant difficulty responding to questions this morning and unable to fully participate in interview process. During interview this morning, pt was found to have at least 3x episdoes of lip smacking, rolling of eyes, and tonic jerky movements lasting several seconds. Immediately after these episodes pt responds to question but it is very hard to hear him due to hypophonism.    ADDITIONAL REVIEW OF SYSTEMS:  unable to fully assess.     MEDICATIONS  (STANDING):  aspirin enteric coated 81 milliGRAM(s) Oral daily  atorvastatin 80 milliGRAM(s) Oral at bedtime  bromocriptine Capsule 5 milliGRAM(s) Oral daily  buPROPion  milliGRAM(s) Oral daily  dextrose 5%. 1000 milliLiter(s) (50 mL/Hr) IV Continuous <Continuous>  dextrose 50% Injectable 12.5 Gram(s) IV Push once  dextrose 50% Injectable 25 Gram(s) IV Push once  dextrose 50% Injectable 25 Gram(s) IV Push once  heparin   Injectable 5000 Unit(s) SubCutaneous every 8 hours  insulin lispro (HumaLOG) corrective regimen sliding scale   SubCutaneous three times a day before meals  insulin lispro (HumaLOG) corrective regimen sliding scale   SubCutaneous at bedtime  pantoprazole    Tablet 40 milliGRAM(s) Oral before breakfast  piperacillin/tazobactam IVPB.. 3.375 Gram(s) IV Intermittent every 8 hours  polyethylene glycol 3350 17 Gram(s) Oral daily  sertraline 50 milliGRAM(s) Oral daily    MEDICATIONS  (PRN):  dextrose 40% Gel 15 Gram(s) Oral once PRN Blood Glucose LESS THAN 70 milliGRAM(s)/deciliter  glucagon  Injectable 1 milliGRAM(s) IntraMuscular once PRN Glucose LESS THAN 70 milligrams/deciliter      CAPILLARY BLOOD GLUCOSE      POCT Blood Glucose.: 120 mg/dL (27 Jun 2020 21:05)  POCT Blood Glucose.: 120 mg/dL (27 Jun 2020 17:24)  POCT Blood Glucose.: 127 mg/dL (27 Jun 2020 13:25)  POCT Blood Glucose.: 102 mg/dL (27 Jun 2020 08:56)    I&O's Summary    28 Jun 2020 07:01  -  29 Jun 2020 07:00  --------------------------------------------------------  IN: 720 mL / OUT: 1200 mL / NET: -480 mL      PHYSICAL EXAM:  Vital Signs Last 24 Hrs  T(C): 37.1 (29 Jun 2020 07:49), Max: 37.1 (29 Jun 2020 07:49)  T(F): 98.7 (29 Jun 2020 07:49), Max: 98.7 (29 Jun 2020 07:49)  HR: 99 (29 Jun 2020 07:49) (56 - 99)  BP: 126/70 (29 Jun 2020 07:49) (123/87 - 127/72)  BP(mean): --  RR: 18 (29 Jun 2020 07:49) (18 - 18)  SpO2: 96% (29 Jun 2020 07:49) (94% - 99%)    CONSTITUTIONAL: NAD, cachetic, found diagonally slanted on bed  EYES: PERRLA; EOMI, clear conjunctiva and + xanthelasma on sclera  RESPIRATORY:  could not assess due to patient unable to cooperate   CARDIOVASCULAR: Regular rate and rhythm, normal S1 and S2, no murmur/rub/gallop; No lower extremity edema; Peripheral pulses are 2+ bilaterally  ABDOMEN: +Bowel sounds; nontender to palpation, no rebound/guarding; no hepatosplenomegaly  PSYCH: A+O to person, place, and time; flat affect   NEUROLOGY: non-focal, bilateral UE 5/5 on flexion and 4/5 on extension; bilateral LE hip flexion 4/5; R lower leg flexion 2/5; L lower leg 3/5.   SKIN: No rashes; no palpable lesions    LABS:                        10.6   9.02  )-----------( 301      ( 29 Jun 2020 06:41 )             32.1       06-29    135  |  97  |  28  ----------------------------<  109  4.3   |  25  |  1.21    Ca    9.7      29 Jun 2020 06:40      < from: CT Head No Cont (06.27.20 @ 11:22) >  INTERPRETATION:  INDICATIONS:  ?seizure, hx of prolactinoma    TECHNIQUE:  Serial axial images were obtained from the skull base to the vertex without intravenous contrast.    COMPARISON EXAMINATION: 11/13/2019    FINDINGS:    VENTRICLES AND SULCI: Age-appropriate involutional change.  INTRA-AXIAL: Microvascular ischemic changes involving the periventricular and subcortical white matter. Suspicion of a small rightfrontal cortically based infarct which is suggested on the prior as well.  EXTRA-AXIAL:  No mass or collection is seen.  VISUALIZED SINUSES:  Clear.  VISUALIZED MASTOIDS:  Clear.  CALVARIUM:  Normal.  MISCELLANEOUS:  None.    IMPRESSION: Age-appropriate involutional change and microvascular ischemic disease. No significant interval change 11/30/2019    < end of copied text >        RADIOLOGY & ADDITIONAL TESTS:  Results Reviewed:   Imaging Personally Reviewed:  Electrocardiogram Personally Reviewed:    COORDINATION OF CARE:  Care Discussed with Consultants/Other Providers [Y/N]:  Prior or Outpatient Records Reviewed [Y/N]: Jaziel Isabel, pgy 2  [facility] 3  [phone]  After 7pm; page night float    Patient is a 64y old  Male who presents with a chief complaint of Hematuria; weight loss (27 Jun 2020 12:06)      SUBJECTIVE / OVERNIGHT EVENTS: Pt seen and examined at bedside. He was having significant difficulty responding to questions this morning and unable to fully participate in interview process. During interview this morning, pt was found to have at least 3x episdoes of lip smacking, rolling of eyes, and tonic jerky movements lasting several seconds. Immediately after these episodes pt responds to question but it is very hard to hear him due to hypophonism.    ADDITIONAL REVIEW OF SYSTEMS:  unable to fully assess.     MEDICATIONS  (STANDING):  aspirin enteric coated 81 milliGRAM(s) Oral daily  atorvastatin 80 milliGRAM(s) Oral at bedtime  bromocriptine Capsule 5 milliGRAM(s) Oral daily  buPROPion  milliGRAM(s) Oral daily  dextrose 5%. 1000 milliLiter(s) (50 mL/Hr) IV Continuous <Continuous>  dextrose 50% Injectable 12.5 Gram(s) IV Push once  dextrose 50% Injectable 25 Gram(s) IV Push once  dextrose 50% Injectable 25 Gram(s) IV Push once  heparin   Injectable 5000 Unit(s) SubCutaneous every 8 hours  insulin lispro (HumaLOG) corrective regimen sliding scale   SubCutaneous three times a day before meals  insulin lispro (HumaLOG) corrective regimen sliding scale   SubCutaneous at bedtime  pantoprazole    Tablet 40 milliGRAM(s) Oral before breakfast  piperacillin/tazobactam IVPB.. 3.375 Gram(s) IV Intermittent every 8 hours  polyethylene glycol 3350 17 Gram(s) Oral daily  sertraline 50 milliGRAM(s) Oral daily    MEDICATIONS  (PRN):  dextrose 40% Gel 15 Gram(s) Oral once PRN Blood Glucose LESS THAN 70 milliGRAM(s)/deciliter  glucagon  Injectable 1 milliGRAM(s) IntraMuscular once PRN Glucose LESS THAN 70 milligrams/deciliter      CAPILLARY BLOOD GLUCOSE      POCT Blood Glucose.: 120 mg/dL (27 Jun 2020 21:05)  POCT Blood Glucose.: 120 mg/dL (27 Jun 2020 17:24)  POCT Blood Glucose.: 127 mg/dL (27 Jun 2020 13:25)  POCT Blood Glucose.: 102 mg/dL (27 Jun 2020 08:56)    I&O's Summary    28 Jun 2020 07:01  -  29 Jun 2020 07:00  --------------------------------------------------------  IN: 720 mL / OUT: 1200 mL / NET: -480 mL      PHYSICAL EXAM:  Vital Signs Last 24 Hrs  T(C): 37.1 (29 Jun 2020 07:49), Max: 37.1 (29 Jun 2020 07:49)  T(F): 98.7 (29 Jun 2020 07:49), Max: 98.7 (29 Jun 2020 07:49)  HR: 99 (29 Jun 2020 07:49) (56 - 99)  BP: 126/70 (29 Jun 2020 07:49) (123/87 - 127/72)  BP(mean): --  RR: 18 (29 Jun 2020 07:49) (18 - 18)  SpO2: 96% (29 Jun 2020 07:49) (94% - 99%)    CONSTITUTIONAL: NAD, cachetic, found diagonally slanted on bed  EYES: PERRLA; EOMI, clear conjunctiva and + xanthelasma on sclera  RESPIRATORY:  could not assess due to patient unable to cooperate   CARDIOVASCULAR: Regular rate and rhythm, normal S1 and S2, no murmur/rub/gallop; No lower extremity edema; Peripheral pulses are 2+ bilaterally  ABDOMEN: +Bowel sounds; nontender to palpation, no rebound/guarding; no hepatosplenomegaly  PSYCH: A+O to person, place, and time; flat affect   NEUROLOGY: non-focal, bilateral UE 5/5 on flexion and 4/5 on extension; bilateral LE hip flexion 4/5; R lower leg flexion 2/5; L lower leg 3/5.   SKIN: No rashes; no palpable lesions    LABS:                        10.6   9.02  )-----------( 301      ( 29 Jun 2020 06:41 )             32.1       06-29    135  |  97  |  28  ----------------------------<  109  4.3   |  25  |  1.21    Ca    9.7      29 Jun 2020 06:40          RADIOLOGY & ADDITIONAL TESTS:  Results Reviewed:   Imaging Personally Reviewed:  Electrocardiogram Personally Reviewed:    COORDINATION OF CARE:  Care Discussed with Consultants/Other Providers [Y/N]:  Prior or Outpatient Records Reviewed [Y/N]:

## 2020-06-29 NOTE — PROGRESS NOTE ADULT - ASSESSMENT
65 yo M pmhx DM2 on insulin, HTN, CAD w. LAD stent/restent after thrombosis, depression presenting with 2 days of hematuria  Patient with no fevers,chills  Leucocytosis as OP  Admitted CT with emphysematous cystitis and renal lesion ? abscess  But also with 80 lb weight loss over 6 months  In hospital minimal leucocytosis, No fevers  Blood and urine cs negative (though did recieve cipro as OP)    ?UTI-cystitis with early renal abscess  ? Malignancy with superinfection  ? Other etiology  Now also ? 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    B) Renal mass  ? absces  ? malignancy   ? other etiology  imaging and abx plan as above  Check urine cytology    C)Leucocytosis  improved  likely from above  Plan as detailed above    D) seizures ?  delirium  MRI as above  any beta lactam could lower seizure threshold- however given negative Cx and possible abscess/emphysematous cystitis-options are limited  neuro input noted     Will tailor plan for ID issues  per course,results.Will defer to primary team on management of other issues.  Assessment, plan and recommendations as detailed above were discussed with the medical/primary  team  Will Follow.  Beeper [phone] [facility] [id number].   Wknd/afterhours/No response-[phone] or Fellow on call

## 2020-06-29 NOTE — PROGRESS NOTE ADULT - SUBJECTIVE AND OBJECTIVE BOX
SUBJECTIVE/INTERVAL EVENTS:  PT seen and examined at bedside.  Lip smacking and eye rolling still reported. Unremarkable EEG. Patient comfortable. Reports that he removed the EEG because it was hot. States that he has no new complaints. Hypophonic.     REVIEW OF SYSTEMS : All other systems are negative except as was mentioned in the subjective.    MEDICATIONS  (STANDING):  aspirin enteric coated 81 milliGRAM(s) Oral daily  atorvastatin 80 milliGRAM(s) Oral at bedtime  bromocriptine Capsule 5 milliGRAM(s) Oral daily  dextrose 5%. 1000 milliLiter(s) (50 mL/Hr) IV Continuous <Continuous>  dextrose 50% Injectable 12.5 Gram(s) IV Push once  dextrose 50% Injectable 25 Gram(s) IV Push once  dextrose 50% Injectable 25 Gram(s) IV Push once  heparin   Injectable 5000 Unit(s) SubCutaneous every 8 hours  insulin lispro (HumaLOG) corrective regimen sliding scale   SubCutaneous three times a day before meals  insulin lispro (HumaLOG) corrective regimen sliding scale   SubCutaneous at bedtime  pantoprazole    Tablet 40 milliGRAM(s) Oral before breakfast  piperacillin/tazobactam IVPB.. 3.375 Gram(s) IV Intermittent every 8 hours  polyethylene glycol 3350 17 Gram(s) Oral daily  sertraline 50 milliGRAM(s) Oral daily  sodium chloride 0.9%. 1000 milliLiter(s) (75 mL/Hr) IV Continuous <Continuous>    MEDICATIONS  (PRN):  dextrose 40% Gel 15 Gram(s) Oral once PRN Blood Glucose LESS THAN 70 milliGRAM(s)/deciliter  glucagon  Injectable 1 milliGRAM(s) IntraMuscular once PRN Glucose LESS THAN 70 milligrams/deciliter    Vital Signs Last 24 Hrs  T(C): 37.1 (29 Jun 2020 07:49), Max: 37.1 (29 Jun 2020 07:49)  T(F): 98.7 (29 Jun 2020 07:49), Max: 98.7 (29 Jun 2020 07:49)  HR: 99 (29 Jun 2020 07:49) (56 - 99)  BP: 126/70 (29 Jun 2020 07:49) (123/87 - 127/72)  BP(mean): --  RR: 18 (29 Jun 2020 07:49) (18 - 18)  SpO2: 96% (29 Jun 2020 07:49) (94% - 99%)    General: Cachectic man, appears older than stated age, in no apparent distress including pain  Neurological:  MS: Awake, alert, oriented to person, place, situation, month and year. Normal affect. Follows all commands. Poor attention to left.   Language: Speech is hypophonic, fluent with good repetition & comprehension  CNs: Poor eye contact, PERRL (R = 3mm, L = 3mm).  Decreased upgaze.  EOMI no nystagmus.  No facial asymmetry b/l.  Hearing grossly normal (rubbing fingers) b/l. Hypophonic voice.  Neck flexion/extension 5/5 strength.    Motor: Diffusely decreased bulk, worse in RLE/RUE w/ some rigidity. Tremulousness throughout, worse w/ intention. (+) mild Asterixis. Moving all extremities within plane of bed but no spontaneous movement against gravity.      LABS AND STUDIES:  LABS:                        10.6   9.02  )-----------( 301      ( 29 Jun 2020 06:41 )             32.1     29 Jun 2020 06:40    135    |  97     |  28     ----------------------------<  109    4.3     |  25     |  1.21     Ca    9.7        29 Jun 2020 06:40  Phos  3.3       29 Jun 2020 06:40  Mg     2.4       29 Jun 2020 06:40      Radiology:  < from: MR Head No Cont (06.28.20 @ 17:24) >  IMPRESSION:  No major stroke or structural lesion identified. Recommend repeat when the patient is able to better cooperate or with anesthesia.    NIDIA DESAI M.D., ATTENDING RADIOLOGIST  This document has been electronically signed. Jun 28 2020  5:28PM  < end of copied text >    EEG:  EEG Summary / Classification:  Normal EEG in the awake, drowsy and asleep states    EEG Impression / Clinical Correlate:  Normal prolonged EEG study. (leads progressively degraded overnight, finally off head at 4:09a)  No epileptic pattern or seizure seen.    Study Date: 6/27/2020	Start Time: 3:55:08 PM      End Date:   6/28/2020	End Time: 08:00:04 AM   (leads off at 04:09a 6/28/20)  Study Duration: 15h 41m  ________________________________________      Avel Hopson MD PhD  Director, Epilepsy Division, [facility]

## 2020-06-29 NOTE — CHART NOTE - NSCHARTNOTEFT_GEN_A_CORE
Nutrition Follow Up Note  Patient seen for: malnutrition follow up    Chart reviewed, events noted. r/o seizure, pending repeat MRI    Source: RN, electronic medical record (pt unable to participate in RD interview secondary to mental status)    Diet : Soft + 2 ensure enlive/daily    GI: no acute GI distress noted, last BM  per flow sheet    PO intake : 25% po intake x 1 meal on  and x1 meal on  noted, decline from previous 50% po intake at meals noted. Suspect po intake impacted by worsened mental status. However, pt is noted with 75%of lunch tray consumed this afternoon- lunch tray observed at bedside. RN reports pt wife assisted pt with meal this afternoon. Pt only drank a few sips of 1 ensure enlive today so far. multiple closed ensure enlive observed at beside.    Regarding po intake tolerance: Per internal medicine, pt "reports persistently aspirating, s/s recommending NPO at this time, however pt requesting soft diet." Similarly, noted in SLP eval on : "Discussed recommendations with patient who stated he does not wish to pursue alternative means of nutrition (e.g., PEG) despite risks/complications of aspiration."     Source for PO intake: electronic medical record, observation, RN    Daily Weight in kG: no new wt to assess  51.6 (-24), Weight in k.8 (-); noted wt change, questionable accuracy, would continue to monitor     Pertinent Medications: MEDICATIONS  (STANDING):  aspirin enteric coated 81 milliGRAM(s) Oral daily  atorvastatin 80 milliGRAM(s) Oral at bedtime  bromocriptine Capsule 5 milliGRAM(s) Oral daily  dextrose 5%. 1000 milliLiter(s) (50 mL/Hr) IV Continuous <Continuous>  dextrose 50% Injectable 12.5 Gram(s) IV Push once  dextrose 50% Injectable 25 Gram(s) IV Push once  dextrose 50% Injectable 25 Gram(s) IV Push once  heparin   Injectable 5000 Unit(s) SubCutaneous every 8 hours  insulin lispro (HumaLOG) corrective regimen sliding scale   SubCutaneous three times a day before meals  insulin lispro (HumaLOG) corrective regimen sliding scale   SubCutaneous at bedtime  pantoprazole    Tablet 40 milliGRAM(s) Oral before breakfast  piperacillin/tazobactam IVPB.. 3.375 Gram(s) IV Intermittent every 8 hours  polyethylene glycol 3350 17 Gram(s) Oral daily  sertraline 50 milliGRAM(s) Oral daily  sodium chloride 0.9%. 1000 milliLiter(s) (75 mL/Hr) IV Continuous <Continuous>    MEDICATIONS  (PRN):  dextrose 40% Gel 15 Gram(s) Oral once PRN Blood Glucose LESS THAN 70 milliGRAM(s)/deciliter  glucagon  Injectable 1 milliGRAM(s) IntraMuscular once PRN Glucose LESS THAN 70 milligrams/deciliter    Pertinent Labs:  @ 06:40: Na 135, BUN 28<H>, Cr 1.21, <H>, K+ 4.3, Phos 3.3, Mg 2.4, Alk Phos --, ALT/SGPT --, AST/SGOT --, HbA1c --    Finger Sticks:  POCT Blood Glucose.: 111 mg/dL ( @ 07:46)  POCT Blood Glucose.: 110 mg/dL ( @ 21:42)  POCT Blood Glucose.: 127 mg/dL ( @ 18:32)      Skin per nursing documentation: no pressure injuries noted  Edema: none noted    Estimated Needs:   [x] no change since previous assessment  [ ] recalculated:     Previous Nutrition Diagnosis: Malnutrition, Moderate  Nutrition Diagnosis is: ongoing, being addressed with oral nutrition supplement at this time, encouragement and assistance at meals as able  Nutrition care plan in progress.    New Nutrition Diagnosis: n/a    Recommend  1) Continue to provide current diet order, no therapeutic diet restrictions indicated at this time. Texture/po diet deferred to team.  2) Consider decreasing oral nutrition supplementation to Ensure Enlive once daily (provides additional 350kcal, 20g protein) and monitor if pt needs more than 1/daily.  3) Continue to provide encouragement and assistance at meals as needed.  4) Recommend address GOC regarding alternate means of nutrition support if indicated. Pt declined alternate means of nutrition support earlier in hospital stay.    Monitoring and Evaluation:     Continue to monitor Nutritional intake, Tolerance to diet prescription, weights, labs, skin integrity    RD remains available upon request and will follow up per protocol. Guillermina Solorio RD, CDN Pager: [phone]

## 2020-06-29 NOTE — PROGRESS NOTE ADULT - PROBLEM SELECTOR PLAN 4
Evaluated by s/s  -tolerating PO  - reports persistently aspirating, s/s recommending NPO at this time, however pt requesting soft diet. discussed in separate chart note. Reports tolerating some fluid PO.  -aspiration precaution Evaluated by s/s  - reports persistently aspirating, s/s recommending NPO at this time, however pt requesting soft diet. discussed in separate chart note. Reports tolerating some fluid PO.  -aspiration precaution

## 2020-06-29 NOTE — PROGRESS NOTE ADULT - PROBLEM SELECTOR PLAN 2
Pt presented with 2 days of hematuria 2/2 cystitis. 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 received 2 days of cipro as outpatient  - U/A with LE, proteins, moderate blood  - Completed 9 of ertapenem. 6/17-6/26.   - Will f/u ID recs to switch ertapenem to new abx given ?sz --> zosyn   -Abd XR still w/ large amt of contrast after small bm post-suppository yesterday, encouraged eating, will consider an enema  Awaiting large BM prior to repeat CT A/P to re-assess possible renal abscess. If imaging findings not c/w abscess may need renal biopsy vs cystoscopy  - monitor for fever, trend WBC/Cr  -uro following Pt presented with 2 days of hematuria 2/2 cystitis. 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 received 2 days of cipro as outpatient  - U/A with LE, proteins, moderate blood  - Completed 9 of ertapenem. 6/17-6/26. Switched to zosyn  - Will f/u ID recs on antbx  -Abd XR still w/ large amt of contrast after small bm post-suppository yesterday, encouraged eating, will consider an enema  Awaiting large BM prior to repeat CT A/P to re-assess possible renal abscess. If imaging findings not c/w abscess may need renal biopsy vs cystoscopy  - monitor for fever, trend WBC/Cr  -uro following Pt presented with 2 days of hematuria 2/2 cystitis. 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 received 2 days of cipro as outpatient  - U/A with LE, proteins, moderate blood  - Completed 9 of ertapenem. 6/17-6/26. Switched to zosyn  - Will f/u ID recs on antbx  -after bm last night, plan for abd XR   -Awaiting large BM prior to repeat CT A/P to re-assess possible renal abscess. If imaging findings not c/w abscess may need renal biopsy vs cystoscopy  - monitor for fever, trend WBC/Cr  - uro following

## 2020-06-29 NOTE — PROGRESS NOTE ADULT - PROBLEM SELECTOR PLAN 6
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 As above iso FTT with global weakness  - dietician consult  - PT consulted, recommending MARI  - neuro consulted; pending paraneoplastic panel  -will f/u for EMG w/ Dr. 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 As above iso FTT with global weakness  - dietician consult  - PT consulted, recommending MARI  - neuro consulted; pending paraneoplastic panel  - will f/u for EMG w/ Dr. Winters on July 7th

## 2020-06-29 NOTE — PROGRESS NOTE ADULT - PROBLEM SELECTOR PLAN 1
Pt has hx of prolactinoma, depression. No hx of sz. Observed to have ?focal aware seizure with automatisms, PCA observed ?GTC, however no post-ictal period. Could be provoked sz given that he is on buproprion and ertapenem.  -CT head non con URGENT - no  new findings  -EEG without findings; pt took off leads overnight   -f/u labs, prolactin level  -D/w neuro, already following - iso medications that lower seizure threshold?   -Taper buproprion from 150mg XL qd to 100mg SR qd for now: d/w pharm potential regimen 100mg SR qd x3d, 75mg IR qd x3d, will further d/w psych  - changed ertapenem to zosyn Pt has hx of prolactinoma, depression. No hx of sz. Observed to have ?focal aware seizure with automatisms, PCA observed ?GTC, however no post-ictal period. Could be provoked sz given that he is on buproprion and ertapenem.  -CT head non con URGENT - no  new findings  -EEG without findings; pt took off leads during overnight monitoring   -f/u labs, prolactin level  -D/w neuro, already following - iso medications that lower seizure threshold?   -Taper buproprion from 150mg XL qd to 100mg SR qd for now: d/w pharm potential regimen 100mg SR qd x3d, 75mg IR qd x3d, will further d/w psych  - changed ertapenem to zosyn on 6/27; day 3 of Zosyn Pt has hx of prolactinoma, depression. No hx of sz. Observed to have ?focal aware seizure with automatisms, PCA observed ?GTC, however no post-ictal period. Could be provoked sz given that he was on buproprion and ertapenem.  -CT head non con URGENT - no new findings  -EEG without findings; pt took off leads during overnight monitoring   -f/u labs, prolactin level  -D/w neuro, already following - iso medications that lower seizure threshold?   -Taper buproprion from 150mg XL qd to 100mg SR qd for now: d/w pharm potential regimen 100mg SR qd x3d, 75mg IR qd x3d, will further d/w psych  - changed ertapenem to zosyn on 6/27; day 3 of Zosyn  - may need repeat MRI with IVP lorazepam sedation as per radiology Pt has hx of prolactinoma, depression. No hx of sz. Observed to have ?focal aware seizure with automatisms, PCA observed ?GTC, however no post-ictal period. Could be provoked sz given that he was on buproprion and ertapenem.  -CT head non con URGENT - no new findings  -EEG without findings; pt took off leads during overnight monitoring   -f/u labs, prolactin level  -D/w neuro, already following - iso medications that lower seizure threshold?   - d/c-ed buproprion; dc-ed ertapenem  - changed ertapenem to zosyn on 6/27; day 3 of Zosyn  - may need repeat MRI with IVP lorazepam sedation as per radiology

## 2020-06-29 NOTE — PROGRESS NOTE ADULT - ASSESSMENT
65 yo M pmhx prolactinoma, DM2 on insulin, HTN, CAD w. LAD stent and restent after thrombosis, depression presenting with 2 days of hematuria 2/2 cystitis CT a/p findings of 2.5 cm Left renal abscess. Pt also presenting with 6 months of weight loss/FTT 2/2 neuromuscular disorder (ALS?) vs paraneoplastic vs infectious. Course c/b by ? seizure activity.

## 2020-06-29 NOTE — PROGRESS NOTE ADULT - PROBLEM SELECTOR PLAN 7
resolved. 2/2 Metabolic encephalopathy  - taper wellbutrin   - cw sertraline   - frequent reorientation resolved. 2/2 Metabolic encephalopathy  - dc-edwellbutrin   - c/w sertraline   - frequent reorientation

## 2020-06-29 NOTE — PROGRESS NOTE ADULT - SUBJECTIVE AND OBJECTIVE BOX
Patient is a 64y old  Male who presents with a chief complaint of Hematuria; weight loss (29 Jun 2020 08:02)    Being followed by ID for emphysematous cystitis-? renal abscess/mass    Interval history:? seizures over weekedn-abx changed   when seen awake-answers queries but hard to understand at times   No other acute events      ROS:denies any pain  denies any complaints   No cough,SOB,CP  No N/V/D  No abd pain  No urinary complaints  No HA  No joint or limb pain  No other complaints      Antimicrobials:    piperacillin/tazobactam IVPB.. 3.375 Gram(s) IV Intermittent every 8 hours      other medications reviewed    Vital Signs Last 24 Hrs  T(C): 37.1 (06-29-20 @ 07:49), Max: 37.1 (06-29-20 @ 07:49)  T(F): 98.7 (06-29-20 @ 07:49), Max: 98.7 (06-29-20 @ 07:49)  HR: 99 (06-29-20 @ 07:49) (56 - 99)  BP: 126/70 (06-29-20 @ 07:49) (123/87 - 127/72)  BP(mean): --  RR: 18 (06-29-20 @ 07:49) (18 - 18)  SpO2: 96% (06-29-20 @ 07:49) (94% - 99%)    Physical Exam:      cachecxia    HEENT PERRLA EOMI    No oral exudate or erythema    Chest Good AE,CTA    CVS RRR S1 S2 WNl No murmur or rub or gallop    Abd soft BS normal No tenderness no masses  griffith    IV site no erythema tenderness or discharge    CNS as above    Lab Data:                          10.6   9.02  )-----------( 301      ( 29 Jun 2020 06:41 )             32.1       06-29    135  |  97  |  28<H>  ----------------------------<  109<H>  4.3   |  25  |  1.21    Ca    9.7      29 Jun 2020 06:40  Phos  3.3     06-29  Mg     2.4     06-29            < from: MR Head No Cont (06.28.20 @ 17:24) >  IMPRESSION:  No major stroke or structural lesion identified. Recommend repeat when the patient is able to better cooperate or with anesthesia.          < end of copied text >                Imaging studies(films) independently reviewed.Findings as detailed in report above

## 2020-06-29 NOTE — PROGRESS NOTE ADULT - ASSESSMENT
Assessment:  64M w/ PMH of DM, HTN, CAD w/ LAD stent, depression, presented w/ hematuria for whom neurology was consulted for 100lb weight loss & diffuse weakness for the past year.  Follows w/ Dr. Winters who was planning on doing an EMG on 7/6. Primary team and family concerned that patient will still be at rehab and will be unable to get the EMG.      06/27/20:  PT now notably hypophonic and with impaired upgaze, had episode of lip-smacking followed by B/L arm shaking episode a/w LOC x 20 sec.  Unclear if this was a seizure or not, though PT was on both Ertapenem and buproprion which can lower seizure threshold.      Impression: Chronic, progressive, RLE monoparesis, diffuse atrophy, 100lb weight loss, flaccid dysarthria, fasciculations suspicious for motor neuron disease now with unclear episodes possibly seizure.  DDx remains proad of possible Flail Leg syndrome, ALS, paraneoplastic motor neuron disease, malignancy.     Plan:   [] Patient pull off 24h vEEG d/t discomfort. 15.75hrs recorded. Monitor off EEG/AEDs for now. Will reexamine in the morning.   [x] Would switch ertapenem and Bupropion to agents less likely to lower seizure threshold if possible.   [x] Repeat CT head   [] F/U paraneoplastic [facility] panel   [] EMG as outpatient planned w/ Dr. Winters Assessment:  64M w/ PMH of DM, HTN, CAD w/ LAD stent, depression, presented w/ hematuria for whom neurology was consulted for 100lb weight loss & diffuse weakness for the past year.  Follows w/ Dr. Winters who was planning on doing an EMG on 7/6. Primary team and family concerned that patient will still be at rehab and will be unable to get the EMG.      06/27/20:  PT now notably hypophonic and with impaired upgaze, had episode of lip-smacking followed by B/L arm shaking episode a/w LOC x 20 sec.  Unclear if this was a seizure or not, though PT was on both Ertapenem and buproprion which can lower seizure threshold.      Impression: Chronic, progressive, RLE monoparesis, diffuse atrophy, 100lb weight loss, flaccid dysarthria, fasciculations suspicious for motor neuron disease now with unclear episodes possibly seizure.  DDx remains proad of possible Flail Leg syndrome, ALS, paraneoplastic motor neuron disease, malignancy.     Plan:   [] Patient pull off 24h vEEG d/t discomfort. 15.75hrs recorded. Monitor off EEG/AEDs for now. Will reexamine in the morning.   [x] Would switch ertapenem and Bupropion to agents less likely to lower seizure threshold if possible.   [x] Repeat CT head   [] F/U paraneoplastic panel   [] EMG as outpatient planned w/ Dr. Winters Assessment:  64M w/ PMH of DM, HTN, CAD w/ LAD stent, depression, presented w/ hematuria for whom neurology was consulted for 100lb weight loss & diffuse weakness for the past year.  Follows w/ Dr. Winters who was planning on doing an EMG on 7/6. Primary team and family concerned that patient will still be at rehab and will be unable to get the EMG.      06/27/20:  PT now notably hypophonic and with impaired upgaze, had episode of lip-smacking followed by B/L arm shaking episode a/w LOC x 20 sec.  Unclear if this was a seizure or not, though PT was on both Ertapenem and buproprion which can lower seizure threshold.      Impression: Chronic, progressive, RLE monoparesis, diffuse atrophy, 100lb weight loss, flaccid dysarthria, fasciculations suspicious for motor neuron disease now with unclear episodes possibly seizure.  DDx remains proad of possible Flail Leg syndrome, ALS, paraneoplastic motor neuron disease, malignancy.     Plan:   [] Patient pull off 24h vEEG d/t discomfort. 15.75hrs recorded. Monitor off EEG/AEDs for now. Will reexamine in the morning.   [x] Would switch ertapenem and Bupropion to agents less likely to lower seizure threshold if possible.   [x] Repeat CT head   [] F/U paraneoplastic panel   [] EMG as outpatient planned w/ Dr. Winters   ADDITIONAL RECOMMENDATIONS 6/29:  [] Start Thiamine 500mg q8 (ordered)  [] Labs: B1, HIV (ordered)

## 2020-06-29 NOTE — PROVIDER CONTACT NOTE (OTHER) - RECOMMENDATIONS
placement of new electrodes
MD made aware about hospital policy where intravenous route is considered as moderate sedation  thus qualified ACLS trained professional must accompany patient to diagnostic procedure

## 2020-06-29 NOTE — PROGRESS NOTE ADULT - ATTENDING COMMENTS
Patient seen and examined, case d/w house staff.  Neuro follow up regarding possible underlying ALS vs paraneoplastic syndrome.  Continue abx as per ID for cystitis, possible renal abscess, will need to re-image area.

## 2020-06-29 NOTE — PROGRESS NOTE ADULT - PROBLEM SELECTOR PLAN 3
- Repeat CT after BM   - ID consulted   - c/w antibiotics as above   - iso DM - Repeat CT A/P w/wo contx after large BM   - ID consulted   - c/w antibiotics as above   - iso DM

## 2020-06-30 LAB
ALBUMIN SERPL ELPH-MCNC: 3.7 G/DL — SIGNIFICANT CHANGE UP (ref 3.3–5)
ALP SERPL-CCNC: 127 U/L — HIGH (ref 40–120)
ALT FLD-CCNC: 38 U/L — SIGNIFICANT CHANGE UP (ref 10–45)
ANION GAP SERPL CALC-SCNC: 14 MMOL/L — SIGNIFICANT CHANGE UP (ref 5–17)
AST SERPL-CCNC: 59 U/L — HIGH (ref 10–40)
BILIRUB DIRECT SERPL-MCNC: 0.2 MG/DL — SIGNIFICANT CHANGE UP (ref 0–0.2)
BILIRUB INDIRECT FLD-MCNC: 0.3 MG/DL — SIGNIFICANT CHANGE UP (ref 0.2–1)
BILIRUB SERPL-MCNC: 0.5 MG/DL — SIGNIFICANT CHANGE UP (ref 0.2–1.2)
BUN SERPL-MCNC: 24 MG/DL — HIGH (ref 7–23)
CALCIUM SERPL-MCNC: 9.3 MG/DL — SIGNIFICANT CHANGE UP (ref 8.4–10.5)
CHLORIDE SERPL-SCNC: 99 MMOL/L — SIGNIFICANT CHANGE UP (ref 96–108)
CO2 SERPL-SCNC: 24 MMOL/L — SIGNIFICANT CHANGE UP (ref 22–31)
CREAT SERPL-MCNC: 1.15 MG/DL — SIGNIFICANT CHANGE UP (ref 0.5–1.3)
GLUCOSE BLDC GLUCOMTR-MCNC: 131 MG/DL — HIGH (ref 70–99)
GLUCOSE BLDC GLUCOMTR-MCNC: 147 MG/DL — HIGH (ref 70–99)
GLUCOSE BLDC GLUCOMTR-MCNC: 164 MG/DL — HIGH (ref 70–99)
GLUCOSE BLDC GLUCOMTR-MCNC: 87 MG/DL — SIGNIFICANT CHANGE UP (ref 70–99)
GLUCOSE SERPL-MCNC: 119 MG/DL — HIGH (ref 70–99)
HCT VFR BLD CALC: 30.6 % — LOW (ref 39–50)
HGB BLD-MCNC: 9.7 G/DL — LOW (ref 13–17)
MAGNESIUM SERPL-MCNC: 2.2 MG/DL — SIGNIFICANT CHANGE UP (ref 1.6–2.6)
MCHC RBC-ENTMCNC: 26.4 PG — LOW (ref 27–34)
MCHC RBC-ENTMCNC: 31.7 GM/DL — LOW (ref 32–36)
MCV RBC AUTO: 83.2 FL — SIGNIFICANT CHANGE UP (ref 80–100)
NRBC # BLD: 0 /100 WBCS — SIGNIFICANT CHANGE UP (ref 0–0)
PHOSPHATE SERPL-MCNC: 3.5 MG/DL — SIGNIFICANT CHANGE UP (ref 2.5–4.5)
PLATELET # BLD AUTO: 267 K/UL — SIGNIFICANT CHANGE UP (ref 150–400)
POTASSIUM SERPL-MCNC: 3.8 MMOL/L — SIGNIFICANT CHANGE UP (ref 3.5–5.3)
POTASSIUM SERPL-SCNC: 3.8 MMOL/L — SIGNIFICANT CHANGE UP (ref 3.5–5.3)
PROT SERPL-MCNC: 7.3 G/DL — SIGNIFICANT CHANGE UP (ref 6–8.3)
RBC # BLD: 3.68 M/UL — LOW (ref 4.2–5.8)
RBC # FLD: 15.8 % — HIGH (ref 10.3–14.5)
SODIUM SERPL-SCNC: 137 MMOL/L — SIGNIFICANT CHANGE UP (ref 135–145)
WBC # BLD: 8.22 K/UL — SIGNIFICANT CHANGE UP (ref 3.8–10.5)
WBC # FLD AUTO: 8.22 K/UL — SIGNIFICANT CHANGE UP (ref 3.8–10.5)

## 2020-06-30 PROCEDURE — 76770 US EXAM ABDO BACK WALL COMP: CPT | Mod: 26

## 2020-06-30 PROCEDURE — 99233 SBSQ HOSP IP/OBS HIGH 50: CPT

## 2020-06-30 PROCEDURE — 99232 SBSQ HOSP IP/OBS MODERATE 35: CPT | Mod: GC

## 2020-06-30 PROCEDURE — 99233 SBSQ HOSP IP/OBS HIGH 50: CPT | Mod: GC

## 2020-06-30 RX ORDER — THIAMINE MONONITRATE (VIT B1) 100 MG
500 TABLET ORAL EVERY 8 HOURS
Refills: 0 | Status: DISCONTINUED | OUTPATIENT
Start: 2020-06-30 | End: 2020-07-09

## 2020-06-30 RX ORDER — PIPERACILLIN AND TAZOBACTAM 4; .5 G/20ML; G/20ML
3.38 INJECTION, POWDER, LYOPHILIZED, FOR SOLUTION INTRAVENOUS EVERY 8 HOURS
Refills: 0 | Status: DISCONTINUED | OUTPATIENT
Start: 2020-06-30 | End: 2020-07-01

## 2020-06-30 RX ORDER — CLONAZEPAM 1 MG
0.5 TABLET ORAL EVERY 8 HOURS
Refills: 0 | Status: DISCONTINUED | OUTPATIENT
Start: 2020-06-30 | End: 2020-07-01

## 2020-06-30 RX ADMIN — BROMOCRIPTINE MESYLATE 5 MILLIGRAM(S): 5 CAPSULE ORAL at 12:04

## 2020-06-30 RX ADMIN — Medication 105 MILLIGRAM(S): at 22:04

## 2020-06-30 RX ADMIN — PIPERACILLIN AND TAZOBACTAM 25 GRAM(S): 4; .5 INJECTION, POWDER, LYOPHILIZED, FOR SOLUTION INTRAVENOUS at 06:10

## 2020-06-30 RX ADMIN — PIPERACILLIN AND TAZOBACTAM 25 GRAM(S): 4; .5 INJECTION, POWDER, LYOPHILIZED, FOR SOLUTION INTRAVENOUS at 14:28

## 2020-06-30 RX ADMIN — Medication 105 MILLIGRAM(S): at 14:24

## 2020-06-30 RX ADMIN — HEPARIN SODIUM 5000 UNIT(S): 5000 INJECTION INTRAVENOUS; SUBCUTANEOUS at 22:04

## 2020-06-30 RX ADMIN — POLYETHYLENE GLYCOL 3350 17 GRAM(S): 17 POWDER, FOR SOLUTION ORAL at 12:04

## 2020-06-30 RX ADMIN — Medication 81 MILLIGRAM(S): at 12:04

## 2020-06-30 RX ADMIN — PANTOPRAZOLE SODIUM 40 MILLIGRAM(S): 20 TABLET, DELAYED RELEASE ORAL at 06:10

## 2020-06-30 RX ADMIN — HEPARIN SODIUM 5000 UNIT(S): 5000 INJECTION INTRAVENOUS; SUBCUTANEOUS at 06:10

## 2020-06-30 RX ADMIN — PIPERACILLIN AND TAZOBACTAM 25 GRAM(S): 4; .5 INJECTION, POWDER, LYOPHILIZED, FOR SOLUTION INTRAVENOUS at 23:04

## 2020-06-30 RX ADMIN — HEPARIN SODIUM 5000 UNIT(S): 5000 INJECTION INTRAVENOUS; SUBCUTANEOUS at 14:25

## 2020-06-30 RX ADMIN — SERTRALINE 50 MILLIGRAM(S): 25 TABLET, FILM COATED ORAL at 12:04

## 2020-06-30 RX ADMIN — Medication 0.5 MILLIGRAM(S): at 14:28

## 2020-06-30 NOTE — PROGRESS NOTE ADULT - PROBLEM SELECTOR PLAN 7
resolved. 2/2 Metabolic encephalopathy  - dc-edwellbutrin   - c/w sertraline   - frequent reorientation

## 2020-06-30 NOTE — PROGRESS NOTE ADULT - ASSESSMENT
63 yo M pmhx DM2 on insulin, HTN, CAD w. LAD stent/restent after thrombosis, depression presenting with 2 days of hematuria  Patient with no fevers,chills  Leucocytosis as OP  Admitted CT with emphysematous cystitis and renal lesion ? abscess  But also with 80 lb weight loss over 6 months  In hospital minimal leucocytosis, No fevers  Blood and urine cs negative (though did recieve cipro as OP)    ?UTI-cystitis with early renal abscess  ? Malignancy with superinfection  ? Other etiology  Now also ? seizures    His presentation is not typical and also with the weight loss I am concerned about an underlying process  Have d/w Urology Dr Bravo     Would rec:    A) Emphysematous cystitis, renal mass ? abscess  Follow cultures  Continue zosyn for now -no growth on cultures-other than 2 days of cipro no other antimicrobials=-will de-escalate further if stable  await repeat CT - could consider ultrasound if CT still not possible due to retained contrast-if not s/o abscess may need to consider renal biospy vs cystoscopy  If c/w abscess/emphysematous cystitis will need long antimicrobial course : 2-4 weeks though eventual duration will be determined by course, results.    B) Renal mass  ? absces  ? malignancy   ? other etiology  imaging and abx plan as above  Check urine cytology    C)Leucocytosis  improved  likely from above  Plan as detailed above    D) seizures ?  delirium  MRI as above  any beta lactam could lower seizure threshold- however given negative Cx and possible abscess/emphysematous cystitis-options are limited  neuro input noted     Will tailor plan for ID issues  per course,results.Will defer to primary team on management of other issues.  Assessment, plan and recommendations as detailed above were discussed with the medical/primary  team-and Dr Castillo   Will Follow.  Beeper [phone] [facility] [id number].   Wknd/afterhours/No response-[phone] or Fellow on call

## 2020-06-30 NOTE — PROGRESS NOTE ADULT - PROBLEM SELECTOR PLAN 4
Evaluated by s/s  - reports persistently aspirating, s/s recommending NPO at this time, however pt requesting soft diet. discussed in separate chart note. Reports tolerating some fluid PO.  -aspiration precaution Evaluated by s/s  - reports persistently aspirating, s/s recommending NPO at this time, however pt requesting soft diet. discussed in separate chart note.   Currently tolerating some fluid and soft feeds PO.  -aspiration precaution

## 2020-06-30 NOTE — PROGRESS NOTE ADULT - ATTENDING COMMENTS
Pt seen and examined this morning. Pt initially assessed by outside neurologist for fasciculations and concern for ALS. Pt admitted for abnormal posturing and stiffness concerning for seizures. EEG performed for 16 hrs, with events captured negative for epileptic activity.   Pt on exam have choreoathetotic movements, with dyskinesias of the mouth, intermittent dystonia of the arms and neck, muscle fasciculations, and opisthotonus.   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 Pt currently being worked up by medicine team for a renal mass.     - Anti-Ri paraneoplastic panel pending.      -Will clarify hx further from family and to also rule out genetic movement disorders and FTD- ALS subtype.   - Will also likely need LP to rule out CJD and also to send encephalitis panel from CSF.

## 2020-06-30 NOTE — PROGRESS NOTE ADULT - PROBLEM SELECTOR PLAN 6
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 As above iso FTT with global weakness  - dietician consult  - PT consulted, recommending MARI  - neuro consulted; pending paraneoplastic panel  - will f/u for EMG w/ Dr. Winters on July 7th

## 2020-06-30 NOTE — PROGRESS NOTE ADULT - PROBLEM SELECTOR PLAN 1
Pt has hx of prolactinoma, depression. No hx of sz. Observed to have ?focal aware seizure with automatisms, PCA observed ?GTC, however no post-ictal period. Could be provoked sz given that he was on buproprion and ertapenem.  -CT head non con URGENT - no new findings  -EEG without findings; pt took off leads during overnight monitoring   -f/u labs, prolactin level  -D/w neuro, already following - iso medications that lower seizure threshold?   - d/c-ed buproprion; dc-ed ertapenem  - changed ertapenem to zosyn on 6/27; day 3 of Zosyn  - may need repeat MRI with IVP lorazepam sedation as per radiology Pt has hx of prolactinoma, depression. No hx of sz. Observed to have ?focal aware seizure with automatisms, PCA observed ?GTC, however no post-ictal period. Could be provoked sz given that he was on buproprion and ertapenem.  -CT head non con URGENT - no new findings  -EEG without findings; pt took off leads during overnight monitoring   -f/u labs, prolactin level pending  -D/w neuro, already following - iso medications that lower seizure threshold   - d/c-ed buproprion; dc-ed ertapenem  - changed ertapenem to zosyn on 6/27; day 4 of Zosyn  - may need repeat MRI with IVP lorazepam sedation as per radiology Pt has hx of prolactinoma, depression. No hx of sz. Observed to have ?focal aware seizure with automatisms, PCA observed ?GTC, however no post-ictal period. Could be provoked sz given that he was on buproprion and ertapenem.  -CT head non con URGENT - no new findings  -EEG without findings; pt took off leads during overnight monitoring   -f/u labs, prolactin level pending  -D/w neuro, already following - iso medications that lower seizure threshold   - d/c-ed buproprion; dc-ed ertapenem  - neuro started thiamine 500mg q8 yesterday; ordered HIV, B1 tests  - changed ertapenem to zosyn on 6/27; day 4 of Zosyn  - may need repeat MRI with IVP lorazepam sedation as per radiology Pt has hx of prolactinoma, depression. No hx of sz. Observed to have ?focal aware seizure with automatisms, PCA observed ?GTC, however no post-ictal period. Could be provoked sz given that he was on buproprion and ertapenem.  -CT head non con URGENT - no new findings  -EEG without findings; pt took off leads during overnight monitoring   -f/u labs, prolactin level pending  -D/w neuro, already following - iso medications that lower seizure threshold   - d/c-ed buproprion; dc-ed ertapenem  - neuro started thiamine 500mg q8 on 6/29; ordered HIV, B1 tests  - changed ertapenem to zosyn on 6/27; day 4 of Zosyn  - may need repeat MRI with IVP lorazepam sedation as per radiology Pt has hx of prolactinoma, depression. No hx of sz. Observed to have ?focal aware seizure with automatisms, PCA observed ?GTC, however no post-ictal period. Could be provoked sz given that he was on buproprion and ertapenem.  -CT head non con URGENT - no new findings  -EEG without findings; pt took off leads during overnight monitoring   -f/u labs, prolactin level pending  -D/w neuro, already following - iso medications that lower seizure threshold   - d/c-ed buproprion; dc-ed ertapenem  - neuro started thiamine 500mg q8 on 6/29; ordered HIV, B1 tests  - changed ertapenem to zosyn on 6/27; day 4 of Zosyn  - Clonazepam 0.5mg q8 started 6/30  - may need repeat MRI with IVP lorazepam sedation as per radiology

## 2020-06-30 NOTE — PROGRESS NOTE ADULT - SUBJECTIVE AND OBJECTIVE BOX
***INCOMPLETE NOTE***    Patient is a 64y old  Male who presents with a chief complaint of Hematuria; weight loss (27 Jun 2020 12:06)      SUBJECTIVE / OVERNIGHT EVENTS: Pt seen and examined at bedside. He was having significant difficulty responding to questions this morning and unable to fully participate in interview process. During interview this morning, pt was found to have at least 3x episdoes of lip smacking, rolling of eyes, and tonic jerky movements lasting several seconds. Immediately after these episodes pt responds to question but it is very hard to hear him due to hypophonism.    ADDITIONAL REVIEW OF SYSTEMS:  unable to fully assess.     MEDICATIONS  (STANDING):  aspirin enteric coated 81 milliGRAM(s) Oral daily  atorvastatin 80 milliGRAM(s) Oral at bedtime  bromocriptine Capsule 5 milliGRAM(s) Oral daily  buPROPion  milliGRAM(s) Oral daily  dextrose 5%. 1000 milliLiter(s) (50 mL/Hr) IV Continuous <Continuous>  dextrose 50% Injectable 12.5 Gram(s) IV Push once  dextrose 50% Injectable 25 Gram(s) IV Push once  dextrose 50% Injectable 25 Gram(s) IV Push once  heparin   Injectable 5000 Unit(s) SubCutaneous every 8 hours  insulin lispro (HumaLOG) corrective regimen sliding scale   SubCutaneous three times a day before meals  insulin lispro (HumaLOG) corrective regimen sliding scale   SubCutaneous at bedtime  pantoprazole    Tablet 40 milliGRAM(s) Oral before breakfast  piperacillin/tazobactam IVPB.. 3.375 Gram(s) IV Intermittent every 8 hours  polyethylene glycol 3350 17 Gram(s) Oral daily  sertraline 50 milliGRAM(s) Oral daily    MEDICATIONS  (PRN):  dextrose 40% Gel 15 Gram(s) Oral once PRN Blood Glucose LESS THAN 70 milliGRAM(s)/deciliter  glucagon  Injectable 1 milliGRAM(s) IntraMuscular once PRN Glucose LESS THAN 70 milligrams/deciliter      CAPILLARY BLOOD GLUCOSE      POCT Blood Glucose.: 120 mg/dL (27 Jun 2020 21:05)  POCT Blood Glucose.: 120 mg/dL (27 Jun 2020 17:24)  POCT Blood Glucose.: 127 mg/dL (27 Jun 2020 13:25)  POCT Blood Glucose.: 102 mg/dL (27 Jun 2020 08:56)    I&O's Summary    28 Jun 2020 07:01  -  29 Jun 2020 07:00  --------------------------------------------------------  IN: 720 mL / OUT: 1200 mL / NET: -480 mL      PHYSICAL EXAM:  Vital Signs Last 24 Hrs  T(C): 37.1 (29 Jun 2020 07:49), Max: 37.1 (29 Jun 2020 07:49)  T(F): 98.7 (29 Jun 2020 07:49), Max: 98.7 (29 Jun 2020 07:49)  HR: 99 (29 Jun 2020 07:49) (56 - 99)  BP: 126/70 (29 Jun 2020 07:49) (123/87 - 127/72)  BP(mean): --  RR: 18 (29 Jun 2020 07:49) (18 - 18)  SpO2: 96% (29 Jun 2020 07:49) (94% - 99%)    CONSTITUTIONAL: NAD, cachetic, found diagonally slanted on bed  EYES: PERRLA; EOMI, clear conjunctiva and + xanthelasma on sclera  RESPIRATORY:  could not assess due to patient unable to cooperate   CARDIOVASCULAR: Regular rate and rhythm, normal S1 and S2, no murmur/rub/gallop; No lower extremity edema; Peripheral pulses are 2+ bilaterally  ABDOMEN: +Bowel sounds; nontender to palpation, no rebound/guarding; no hepatosplenomegaly  PSYCH: A+O to person, place, and time; flat affect   NEUROLOGY: non-focal, bilateral UE 5/5 on flexion and 4/5 on extension; bilateral LE hip flexion 4/5; R lower leg flexion 2/5; L lower leg 3/5.   SKIN: No rashes; no palpable lesions    LABS:                        10.6   9.02  )-----------( 301      ( 29 Jun 2020 06:41 )             32.1       06-29    135  |  97  |  28  ----------------------------<  109  4.3   |  25  |  1.21    Ca    9.7      29 Jun 2020 06:40          RADIOLOGY & ADDITIONAL TESTS:  Results Reviewed:   Imaging Personally Reviewed:  Electrocardiogram Personally Reviewed:    COORDINATION OF CARE:  Care Discussed with Consultants/Other Providers [Y/N]:  Prior or Outpatient Records Reviewed [Y/N]: ***INCOMPLETE NOTE***    Patient is a 64y old  Male who presents with a chief complaint of Hematuria; weight loss (27 Jun 2020 12:06)      SUBJECTIVE / OVERNIGHT EVENTS: Pt seen and examined at bedside. He was once again having significant difficulty responding to questions this morning and unable to fully participate in interview process. During interview this morning, pt was found to have at least multiple brief, frequent, intermittent episodes of lip smacking, rolling of eyes, and tonic jerky movements lasting several seconds. Immediately after these episodes pt responds to question but it is very difficult to hear him due to significant hypophonism.      ADDITIONAL REVIEW OF SYSTEMS:  unable to fully assess.     MEDICATIONS  (STANDING):  aspirin enteric coated 81 milliGRAM(s) Oral daily  atorvastatin 80 milliGRAM(s) Oral at bedtime  bromocriptine Capsule 5 milliGRAM(s) Oral daily  buPROPion  milliGRAM(s) Oral daily  dextrose 5%. 1000 milliLiter(s) (50 mL/Hr) IV Continuous <Continuous>  dextrose 50% Injectable 12.5 Gram(s) IV Push once  dextrose 50% Injectable 25 Gram(s) IV Push once  dextrose 50% Injectable 25 Gram(s) IV Push once  heparin   Injectable 5000 Unit(s) SubCutaneous every 8 hours  insulin lispro (HumaLOG) corrective regimen sliding scale   SubCutaneous three times a day before meals  insulin lispro (HumaLOG) corrective regimen sliding scale   SubCutaneous at bedtime  pantoprazole    Tablet 40 milliGRAM(s) Oral before breakfast  piperacillin/tazobactam IVPB.. 3.375 Gram(s) IV Intermittent every 8 hours  polyethylene glycol 3350 17 Gram(s) Oral daily  sertraline 50 milliGRAM(s) Oral daily    MEDICATIONS  (PRN):  dextrose 40% Gel 15 Gram(s) Oral once PRN Blood Glucose LESS THAN 70 milliGRAM(s)/deciliter  glucagon  Injectable 1 milliGRAM(s) IntraMuscular once PRN Glucose LESS THAN 70 milligrams/deciliter      CAPILLARY BLOOD GLUCOSE      POCT Blood Glucose.: 120 mg/dL (27 Jun 2020 21:05)  POCT Blood Glucose.: 120 mg/dL (27 Jun 2020 17:24)  POCT Blood Glucose.: 127 mg/dL (27 Jun 2020 13:25)  POCT Blood Glucose.: 102 mg/dL (27 Jun 2020 08:56)    I&O's Summary    28 Jun 2020 07:01  -  29 Jun 2020 07:00  --------------------------------------------------------  IN: 720 mL / OUT: 1200 mL / NET: -480 mL      PHYSICAL EXAM:  Vital Signs Last 24 Hrs  T(C): 37.1 (29 Jun 2020 07:49), Max: 37.1 (29 Jun 2020 07:49)  T(F): 98.7 (29 Jun 2020 07:49), Max: 98.7 (29 Jun 2020 07:49)  HR: 99 (29 Jun 2020 07:49) (56 - 99)  BP: 126/70 (29 Jun 2020 07:49) (123/87 - 127/72)  BP(mean): --  RR: 18 (29 Jun 2020 07:49) (18 - 18)  SpO2: 96% (29 Jun 2020 07:49) (94% - 99%)    CONSTITUTIONAL: NAD, cachetic, found diagonally slanted on bed  EYES: PERRLA; EOMI, clear conjunctiva and + xanthelasma on sclera  RESPIRATORY:  could not assess due to patient unable to cooperate   CARDIOVASCULAR: Regular rate and rhythm, normal S1 and S2, no murmur/rub/gallop; No lower extremity edema; Peripheral pulses are 2+ bilaterally  ABDOMEN: +Bowel sounds; nontender to palpation, no rebound/guarding; no hepatosplenomegaly  PSYCH: A+O to person, place, and time; flat affect   NEUROLOGY: non-focal, bilateral UE 5/5 on flexion and 4/5 on extension; bilateral LE hip flexion 4/5; R lower leg flexion 2/5; L lower leg 3/5.   SKIN: No rashes; no palpable lesions    LABS:                        10.6   9.02  )-----------( 301      ( 29 Jun 2020 06:41 )             32.1       06-29    135  |  97  |  28  ----------------------------<  109  4.3   |  25  |  1.21    Ca    9.7      29 Jun 2020 06:40          RADIOLOGY & ADDITIONAL TESTS:  Results Reviewed:   Imaging Personally Reviewed:  Electrocardiogram Personally Reviewed:    COORDINATION OF CARE:  Care Discussed with Consultants/Other Providers [Y/N]:  Prior or Outpatient Records Reviewed [Y/N]: ***INCOMPLETE NOTE***    Patient is a 64y old  Male who presents with a chief complaint of Hematuria; weight loss (27 Jun 2020 12:06)      SUBJECTIVE / OVERNIGHT EVENTS: Pt seen and examined at bedside. He was once again having significant difficulty responding to questions this morning and unable to fully participate in interview process. During interview this morning, pt was found to have at least multiple brief, frequent, intermittent episodes of lip smacking, rolling of eyes, and tonic jerky movements lasting several seconds. Immediately after these episodes pt responds to question but it is very difficult to hear him due to significant hypophonism.      ADDITIONAL REVIEW OF SYSTEMS:  unable to fully assess.     MEDICATIONS  (STANDING):  aspirin enteric coated 81 milliGRAM(s) Oral daily  atorvastatin 80 milliGRAM(s) Oral at bedtime  bromocriptine Capsule 5 milliGRAM(s) Oral daily  buPROPion  milliGRAM(s) Oral daily  dextrose 5%. 1000 milliLiter(s) (50 mL/Hr) IV Continuous <Continuous>  dextrose 50% Injectable 12.5 Gram(s) IV Push once  dextrose 50% Injectable 25 Gram(s) IV Push once  dextrose 50% Injectable 25 Gram(s) IV Push once  heparin   Injectable 5000 Unit(s) SubCutaneous every 8 hours  insulin lispro (HumaLOG) corrective regimen sliding scale   SubCutaneous three times a day before meals  insulin lispro (HumaLOG) corrective regimen sliding scale   SubCutaneous at bedtime  pantoprazole    Tablet 40 milliGRAM(s) Oral before breakfast  piperacillin/tazobactam IVPB.. 3.375 Gram(s) IV Intermittent every 8 hours  polyethylene glycol 3350 17 Gram(s) Oral daily  sertraline 50 milliGRAM(s) Oral daily    MEDICATIONS  (PRN):  dextrose 40% Gel 15 Gram(s) Oral once PRN Blood Glucose LESS THAN 70 milliGRAM(s)/deciliter  glucagon  Injectable 1 milliGRAM(s) IntraMuscular once PRN Glucose LESS THAN 70 milligrams/deciliter      CAPILLARY BLOOD GLUCOSE      POCT Blood Glucose.: 120 mg/dL (27 Jun 2020 21:05)  POCT Blood Glucose.: 120 mg/dL (27 Jun 2020 17:24)  POCT Blood Glucose.: 127 mg/dL (27 Jun 2020 13:25)  POCT Blood Glucose.: 102 mg/dL (27 Jun 2020 08:56)    I&O's Summary    28 Jun 2020 07:01  -  29 Jun 2020 07:00  --------------------------------------------------------  IN: 720 mL / OUT: 1200 mL / NET: -480 mL      PHYSICAL EXAM:  Vital Signs Last 24 Hrs  T(C): 36.5 (30 Jun 2020 08:33), Max: 36.5 (30 Jun 2020 08:33)  T(F): 97.7 (30 Jun 2020 08:33), Max: 97.7 (30 Jun 2020 08:33)  HR: 55 (30 Jun 2020 08:33) (55 - 75)  BP: 121/73 (30 Jun 2020 08:33) (121/58 - 121/73)  BP(mean): --  RR: 18 (30 Jun 2020 08:33) (18 - 18)  SpO2: 97% (30 Jun 2020 08:33) (97% - 98%)    CONSTITUTIONAL: NAD, cachetic, found diagonally slanted on bed  EYES: PERRLA; EOMI, clear conjunctiva and + xanthelasma on sclera  RESPIRATORY:  could not assess due to patient unable to cooperate   CARDIOVASCULAR: Regular rate and rhythm, normal S1 and S2, no murmur/rub/gallop; No lower extremity edema; Peripheral pulses are 2+ bilaterally  ABDOMEN: +Bowel sounds; nontender to palpation, no rebound/guarding; no hepatosplenomegaly  PSYCH: A+O to person, place, and time; flat affect   NEUROLOGY: non-focal, bilateral UE 5/5 on flexion and 4/5 on extension; bilateral LE hip flexion 4/5; R lower leg flexion 2/5; L lower leg 3/5.   SKIN: No rashes; no palpable lesions    LABS:                        9.7   8.22  )-----------( 301      ( 30 Jun 2020 10:41 )             30.6       06-30    137  |  99  |  24  ----------------------------<  119  3.8   |  24  |  1.15    Ca    9.3      30 Jun 2020 10:42          RADIOLOGY & ADDITIONAL TESTS:  Results Reviewed:   Imaging Personally Reviewed:  Electrocardiogram Personally Reviewed:    COORDINATION OF CARE:  Care Discussed with Consultants/Other Providers [Y/N]:  Prior or Outpatient Records Reviewed [Y/N]:

## 2020-06-30 NOTE — PROGRESS NOTE ADULT - PROBLEM SELECTOR PLAN 2
Pt presented with 2 days of hematuria 2/2 cystitis. 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 received 2 days of cipro as outpatient  - U/A with LE, proteins, moderate blood  - Completed 9 of ertapenem. 6/17-6/26. Switched to zosyn  - Will f/u ID recs on antbx  -after bm last night, plan for abd XR   -Awaiting large BM prior to repeat CT A/P to re-assess possible renal abscess. If imaging findings not c/w abscess may need renal biopsy vs cystoscopy  - monitor for fever, trend WBC/Cr  - uro following Pt presented with 2 days of hematuria 2/2 cystitis. 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 received 2 days of cipro as outpatient  - U/A with LE, proteins, moderate blood  - Completed 9 days of ertapenem. 6/17-6/26. Switched to zosyn  - Will f/u ID recs on antbx  -Pt had large BM yesterday but abd XR still showed contx   -Awaiting another large BM prior to repeat CT A/P to re-assess possible renal abscess. If imaging findings not c/w abscess may need renal biopsy vs cystoscopy  - monitor for fever, trend WBC/Cr  - uro following Pt presented with 2 days of hematuria 2/2 cystitis. 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 received 2 days of cipro as outpatient  - U/A with LE, proteins, moderate blood  - Completed 9 days of ertapenem. 6/17-6/26. Switched to zosyn  - Will f/u ID recs on antbx  -Pt had large BM yesterday but abd XR still showed contx   -will consider enema today and await another large BM prior to repeat CT A/P to re-assess possible renal abscess. If imaging findings not c/w abscess may need renal biopsy vs cystoscopy  - monitor for fever, trend WBC/Cr  - uro following Pt presented with 2 days of hematuria 2/2 cystitis. 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 received 2 days of cipro as outpatient  - U/A with LE, proteins, moderate blood  - Completed 9 days of ertapenem. 6/17-6/26. Switched to zosyn  - Will f/u ID recs on antbx  -Pt had large BM yesterday but abd XR still showed contx   -received enema on 6/29   - await another large BM prior to repeat CT A/P to re-assess possible renal abscess. If imaging findings not c/w abscess may need renal biopsy vs cystoscopy  - monitor for fever, trend WBC/Cr  - uro following

## 2020-06-30 NOTE — PROGRESS NOTE ADULT - ASSESSMENT
Assessment:  64M w/ PMH of DM, HTN, CAD w/ LAD stent, depression, admitted for 2 days of hematuria 2/2 cystitis. Neuro consult for worsening RLE weakness & muscle wasting, with progression to other extremities, and 100lb weight loss for the past year. Pt continues to be hypophonic with impaired upgaze and periodic episodes of lip-smacking and choreiform movements of the extremities during which pt is alert and follows simple commands. No seizure activity on EEG.      Impression: Chronic, progressive RLE monoparesis with diffuse atrophy, 100lb weight loss, flaccid dysarthria, choreiform movements suggestive of athetosis and neck contraction with head turn to the left suggestive of dystonia. DDx remains broad: Flail Leg syndrome, ALS, paraneoplastic motor neuron disease, malignancy, frontotemporal dementia ALS variant, and CJD.    Plan:   - Patient being monitored off EEG/AEDs for now. Conscious and alert during episodic movements and lip smacking-  [x] switch ertapenem and bupropion to agents less likely to lower seizure threshold if possible  [x] Repeat CT head   [] F/U paraneoplastic panel (anti RI ab pending)  [] EMG as outpatient planned w/ Dr. Winters (7/6)  ADDITIONAL RECOMMENDATIONS 6/29:  [x] Start Thiamine 500mg q8 (ordered)  [] Labs: B1, HIV (ordered)- pending

## 2020-06-30 NOTE — PROGRESS NOTE ADULT - ATTENDING COMMENTS
Patient seen and examined, case d/w house staff, neuro service as well as ID.  Still with po contrast on AXR, so for now will further eval renal lesion (?abscess) with am US.  Low dose Klonopin as per neuro, consider LP for further evaluation of movement disorder.

## 2020-06-30 NOTE — PROGRESS NOTE ADULT - SUBJECTIVE AND OBJECTIVE BOX
SUBJECTIVE/INTERVAL EVENTS:  PT seen and examined at bedside.  Lip smacking and eye rolling observed at bedside, followed by head turning to the left and body positioning to the right. Patient was conscious and responsive during these movements (could follow simple commands). Continues to be hypophonic.     REVIEW OF SYSTEMS : All other systems are negative except as was mentioned in the subjective.    MEDICATIONS  (STANDING):  aspirin enteric coated 81 milliGRAM(s) Oral daily  atorvastatin 80 milliGRAM(s) Oral at bedtime  bromocriptine Capsule 5 milliGRAM(s) Oral daily  clonazePAM  Tablet 0.5 milliGRAM(s) Oral every 8 hours  dextrose 5%. 1000 milliLiter(s) (50 mL/Hr) IV Continuous <Continuous>  dextrose 50% Injectable 12.5 Gram(s) IV Push once  dextrose 50% Injectable 25 Gram(s) IV Push once  dextrose 50% Injectable 25 Gram(s) IV Push once  heparin   Injectable 5000 Unit(s) SubCutaneous every 8 hours  insulin lispro (HumaLOG) corrective regimen sliding scale   SubCutaneous three times a day before meals  insulin lispro (HumaLOG) corrective regimen sliding scale   SubCutaneous at bedtime  pantoprazole    Tablet 40 milliGRAM(s) Oral before breakfast  piperacillin/tazobactam IVPB.. 3.375 Gram(s) IV Intermittent every 8 hours  polyethylene glycol 3350 17 Gram(s) Oral daily  sertraline 50 milliGRAM(s) Oral daily  thiamine IVPB 500 milliGRAM(s) IV Intermittent every 8 hours    MEDICATIONS  (PRN):  dextrose 40% Gel 15 Gram(s) Oral once PRN Blood Glucose LESS THAN 70 milliGRAM(s)/deciliter  glucagon  Injectable 1 milliGRAM(s) IntraMuscular once PRN Glucose LESS THAN 70 milligrams/deciliter      Vital Signs Last 24 Hrs  T(C): 36.5 (30 Jun 2020 08:33), Max: 36.5 (30 Jun 2020 08:33)  T(F): 97.7 (30 Jun 2020 08:33), Max: 97.7 (30 Jun 2020 08:33)  HR: 55 (30 Jun 2020 08:33) (55 - 75)  BP: 121/73 (30 Jun 2020 08:33) (121/58 - 121/73)  BP(mean): --  RR: 18 (30 Jun 2020 08:33) (18 - 18)  SpO2: 97% (30 Jun 2020 08:33) (97% - 98%)    General: Cachectic man, appears older than stated age, in no apparent distress including pain    Neurological:  MS: Awake, alert, oriented to person, place, situation, month and year. Normal affect. Follows simple commands.   Language: Speech is hypophonic, fluent   CNs: Poor eye contact, PERRL (R = 3mm, L = 3mm).  Decreased upgaze.  EOMI no nystagmus.  No facial asymmetry b/l.  Hearing grossly normal (rubbing fingers) b/l. Hypophonic voice.  Neck flexion/extension 5/5 strength.    Motor: Diffusely decreased bulk, worse in RLE/RUE w/ some rigidity. Tremulousness throughout, worse w/ intention. (+) mild Asterixis. Movement of all extremities within plane of bed but no spontaneous movement against gravity. During exam began head turning to the left with body positioning to the right of his bed. +lip smacking and contraction of neck muscles    LABS AND STUDIES:  LABS:             CBC Full  -  ( 30 Jun 2020 10:41 )  WBC Count : 8.22 K/uL  RBC Count : 3.68 M/uL  Hemoglobin : 9.7 g/dL  Hematocrit : 30.6 %  Platelet Count - Automated : 267 K/uL  Mean Cell Volume : 83.2 fl  Mean Cell Hemoglobin : 26.4 pg  Mean Cell Hemoglobin Concentration : 31.7 gm/dL  Auto Neutrophil # : x  Auto Lymphocyte # : x  Auto Monocyte # : x  Auto Eosinophil # : x  Auto Basophil # : x  Auto Neutrophil % : x  Auto Lymphocyte % : x  Auto Monocyte % : x  Auto Eosinophil % : x  Auto Basophil % : x    06-30    137  |  99  |  24<H>  ----------------------------<  119<H>  3.8   |  24  |  1.15    Ca    9.3      30 Jun 2020 10:42  Phos  3.5     06-30  Mg     2.2     06-30    Radiology:  < from: MR Head No Cont (06.28.20 @ 17:24) >  IMPRESSION:  No major stroke or structural lesion identified. Recommend repeat when the patient is able to better cooperate or with anesthesia.    NIDIA DESAI M.D., ATTENDING RADIOLOGIST  This document has been electronically signed. Jun 28 2020  5:28PM  < end of copied text >    EEG:      EEG Summary / Classification:  Normal EEG in the awake, drowsy and asleep states    EEG Impression / Clinical Correlate:  Normal prolonged EEG study. (leads progressively degraded overnight, finally off head at 4:09a)  No epileptic pattern or seizure seen.    Study Date: 6/27/2020	Start Time: 3:55:08 PM      End Date:   6/28/2020	End Time: 08:00:04 AM   (leads off at 04:09a 6/28/20)  Study Duration: 15h 41m  ________________________________________      Avel Hopson MD PhD  Director, Epilepsy Division, [facility]

## 2020-06-30 NOTE — PROGRESS NOTE ADULT - SUBJECTIVE AND OBJECTIVE BOX
Patient is a 64y old  Male who presents with a chief complaint of Hematuria; weight loss (30 Jun 2020 07:03)    Being followed by ID for emphysematous cystitis, ? renal abscess    Interval history:awake-answers some queries  No acute events      ROS:denies any pain though no reliable ROS obtainable   No cough,SOB,CP  No N/V/D./abd pain  No other complaints      Antimicrobials:    piperacillin/tazobactam IVPB.. 3.375 Gram(s) IV Intermittent every 8 hours    Other medications reviewed    Vital Signs Last 24 Hrs  T(C): 36.5 (06-30-20 @ 08:33), Max: 36.5 (06-30-20 @ 08:33)  T(F): 97.7 (06-30-20 @ 08:33), Max: 97.7 (06-30-20 @ 08:33)  HR: 55 (06-30-20 @ 08:33) (55 - 75)  BP: 121/73 (06-30-20 @ 08:33) (121/58 - 121/73)  BP(mean): --  RR: 18 (06-30-20 @ 08:33) (18 - 18)  SpO2: 97% (06-30-20 @ 08:33) (97% - 98%)    Physical Exam:    cachecxia    HEENT PERRLA EOMI    No oral exudate or erythema    Chest Good AE,CTA    CVS RRR S1 S2 WNl No murmur or rub or gallop    Abd soft BS normal No tenderness no masses  griffith    IV site no erythema tenderness or discharge    CNS as above  Lab Data:                          9.7    8.22  )-----------( 267      ( 30 Jun 2020 10:41 )             30.6       06-30    137  |  99  |  24<H>  ----------------------------<  119<H>  3.8   |  24  |  1.15    Ca    9.3      30 Jun 2020 10:42  Phos  3.5     06-30  Mg     2.2     06-30        < from: Xray Abdomen 1 View PORTABLE -Urgent (06.29.20 @ 12:52) >  IMPRESSION:     No change of the oral contrast opacifying the descending and sigmoid colon and the rectum.  Nonobstructive bowel gas pattern.            < end of copied text >

## 2020-07-01 DIAGNOSIS — R56.9 UNSPECIFIED CONVULSIONS: ICD-10-CM

## 2020-07-01 LAB
ALBUMIN SERPL ELPH-MCNC: 3.2 G/DL — LOW (ref 3.3–5)
ALP SERPL-CCNC: 109 U/L — SIGNIFICANT CHANGE UP (ref 40–120)
ALT FLD-CCNC: 29 U/L — SIGNIFICANT CHANGE UP (ref 10–45)
ANION GAP SERPL CALC-SCNC: 12 MMOL/L — SIGNIFICANT CHANGE UP (ref 5–17)
AST SERPL-CCNC: 40 U/L — SIGNIFICANT CHANGE UP (ref 10–40)
BILIRUB SERPL-MCNC: 0.5 MG/DL — SIGNIFICANT CHANGE UP (ref 0.2–1.2)
BUN SERPL-MCNC: 17 MG/DL — SIGNIFICANT CHANGE UP (ref 7–23)
CALCIUM SERPL-MCNC: 9.2 MG/DL — SIGNIFICANT CHANGE UP (ref 8.4–10.5)
CHLORIDE SERPL-SCNC: 100 MMOL/L — SIGNIFICANT CHANGE UP (ref 96–108)
CO2 SERPL-SCNC: 23 MMOL/L — SIGNIFICANT CHANGE UP (ref 22–31)
CREAT SERPL-MCNC: 1.1 MG/DL — SIGNIFICANT CHANGE UP (ref 0.5–1.3)
GLUCOSE BLDC GLUCOMTR-MCNC: 121 MG/DL — HIGH (ref 70–99)
GLUCOSE BLDC GLUCOMTR-MCNC: 141 MG/DL — HIGH (ref 70–99)
GLUCOSE BLDC GLUCOMTR-MCNC: 163 MG/DL — HIGH (ref 70–99)
GLUCOSE BLDC GLUCOMTR-MCNC: 183 MG/DL — HIGH (ref 70–99)
GLUCOSE SERPL-MCNC: 125 MG/DL — HIGH (ref 70–99)
HCT VFR BLD CALC: 27.8 % — LOW (ref 39–50)
HGB BLD-MCNC: 8.9 G/DL — LOW (ref 13–17)
MAGNESIUM SERPL-MCNC: 2.2 MG/DL — SIGNIFICANT CHANGE UP (ref 1.6–2.6)
MCHC RBC-ENTMCNC: 26.3 PG — LOW (ref 27–34)
MCHC RBC-ENTMCNC: 32 GM/DL — SIGNIFICANT CHANGE UP (ref 32–36)
MCV RBC AUTO: 82.2 FL — SIGNIFICANT CHANGE UP (ref 80–100)
NRBC # BLD: 0 /100 WBCS — SIGNIFICANT CHANGE UP (ref 0–0)
PHOSPHATE SERPL-MCNC: 2.8 MG/DL — SIGNIFICANT CHANGE UP (ref 2.5–4.5)
PLATELET # BLD AUTO: 238 K/UL — SIGNIFICANT CHANGE UP (ref 150–400)
POTASSIUM SERPL-MCNC: 3.4 MMOL/L — LOW (ref 3.5–5.3)
POTASSIUM SERPL-SCNC: 3.4 MMOL/L — LOW (ref 3.5–5.3)
PROT SERPL-MCNC: 6 G/DL — SIGNIFICANT CHANGE UP (ref 6–8.3)
RBC # BLD: 3.38 M/UL — LOW (ref 4.2–5.8)
RBC # FLD: 16 % — HIGH (ref 10.3–14.5)
SODIUM SERPL-SCNC: 135 MMOL/L — SIGNIFICANT CHANGE UP (ref 135–145)
WBC # BLD: 7.71 K/UL — SIGNIFICANT CHANGE UP (ref 3.8–10.5)
WBC # FLD AUTO: 7.71 K/UL — SIGNIFICANT CHANGE UP (ref 3.8–10.5)

## 2020-07-01 PROCEDURE — 99232 SBSQ HOSP IP/OBS MODERATE 35: CPT | Mod: GC

## 2020-07-01 PROCEDURE — 74177 CT ABD & PELVIS W/CONTRAST: CPT | Mod: 26

## 2020-07-01 PROCEDURE — 99233 SBSQ HOSP IP/OBS HIGH 50: CPT | Mod: GC

## 2020-07-01 PROCEDURE — 74018 RADEX ABDOMEN 1 VIEW: CPT | Mod: 26

## 2020-07-01 PROCEDURE — 99232 SBSQ HOSP IP/OBS MODERATE 35: CPT

## 2020-07-01 RX ORDER — CLONAZEPAM 1 MG
0.25 TABLET ORAL ONCE
Refills: 0 | Status: DISCONTINUED | OUTPATIENT
Start: 2020-07-01 | End: 2020-07-01

## 2020-07-01 RX ORDER — CLONAZEPAM 1 MG
0.25 TABLET ORAL EVERY 8 HOURS
Refills: 0 | Status: DISCONTINUED | OUTPATIENT
Start: 2020-07-01 | End: 2020-07-08

## 2020-07-01 RX ORDER — MINERAL OIL
133 OIL (ML) MISCELLANEOUS ONCE
Refills: 0 | Status: COMPLETED | OUTPATIENT
Start: 2020-07-01 | End: 2020-07-01

## 2020-07-01 RX ORDER — POTASSIUM CHLORIDE 20 MEQ
20 PACKET (EA) ORAL ONCE
Refills: 0 | Status: COMPLETED | OUTPATIENT
Start: 2020-07-01 | End: 2020-07-01

## 2020-07-01 RX ORDER — CEFTRIAXONE 500 MG/1
1000 INJECTION, POWDER, FOR SOLUTION INTRAMUSCULAR; INTRAVENOUS EVERY 24 HOURS
Refills: 0 | Status: COMPLETED | OUTPATIENT
Start: 2020-07-01 | End: 2020-07-07

## 2020-07-01 RX ADMIN — PANTOPRAZOLE SODIUM 40 MILLIGRAM(S): 20 TABLET, DELAYED RELEASE ORAL at 05:33

## 2020-07-01 RX ADMIN — Medication 1: at 13:32

## 2020-07-01 RX ADMIN — BROMOCRIPTINE MESYLATE 5 MILLIGRAM(S): 5 CAPSULE ORAL at 15:15

## 2020-07-01 RX ADMIN — HEPARIN SODIUM 5000 UNIT(S): 5000 INJECTION INTRAVENOUS; SUBCUTANEOUS at 05:27

## 2020-07-01 RX ADMIN — Medication 105 MILLIGRAM(S): at 15:16

## 2020-07-01 RX ADMIN — POLYETHYLENE GLYCOL 3350 17 GRAM(S): 17 POWDER, FOR SOLUTION ORAL at 15:36

## 2020-07-01 RX ADMIN — Medication 81 MILLIGRAM(S): at 15:15

## 2020-07-01 RX ADMIN — Medication 105 MILLIGRAM(S): at 05:23

## 2020-07-01 RX ADMIN — PIPERACILLIN AND TAZOBACTAM 25 GRAM(S): 4; .5 INJECTION, POWDER, LYOPHILIZED, FOR SOLUTION INTRAVENOUS at 06:56

## 2020-07-01 RX ADMIN — CEFTRIAXONE 100 MILLIGRAM(S): 500 INJECTION, POWDER, FOR SOLUTION INTRAMUSCULAR; INTRAVENOUS at 15:36

## 2020-07-01 RX ADMIN — HEPARIN SODIUM 5000 UNIT(S): 5000 INJECTION INTRAVENOUS; SUBCUTANEOUS at 15:15

## 2020-07-01 RX ADMIN — ATORVASTATIN CALCIUM 80 MILLIGRAM(S): 80 TABLET, FILM COATED ORAL at 21:16

## 2020-07-01 RX ADMIN — Medication 0.25 MILLIGRAM(S): at 15:15

## 2020-07-01 RX ADMIN — Medication 20 MILLIEQUIVALENT(S): at 09:04

## 2020-07-01 RX ADMIN — Medication 133 MILLILITER(S): at 17:58

## 2020-07-01 RX ADMIN — Medication 0.25 MILLIGRAM(S): at 17:58

## 2020-07-01 RX ADMIN — Medication 0.5 MILLIGRAM(S): at 05:33

## 2020-07-01 RX ADMIN — Medication 105 MILLIGRAM(S): at 21:16

## 2020-07-01 RX ADMIN — HEPARIN SODIUM 5000 UNIT(S): 5000 INJECTION INTRAVENOUS; SUBCUTANEOUS at 21:16

## 2020-07-01 RX ADMIN — SERTRALINE 50 MILLIGRAM(S): 25 TABLET, FILM COATED ORAL at 15:36

## 2020-07-01 NOTE — PROGRESS NOTE ADULT - SUBJECTIVE AND OBJECTIVE BOX
Patient is a 64y old  Male who presents with a chief complaint of Hematuria; weight loss (01 Jul 2020 07:09)    Being followed by ID for ?renal abscess, emphysematous cystitis    Interval history:1 Bm but small volume yesterday   deneis any complaints but hypophonioc  No acute events      ROS:  No cough,SOB,CP  No N/V/D./abd pain  No other complaints      Antimicrobials:    piperacillin/tazobactam IVPB.. 3.375 Gram(s) IV Intermittent every 8 hours    Other medications reviewed    Vital Signs Last 24 Hrs  T(C): 36.9 (07-01-20 @ 08:40), Max: 37.2 (07-01-20 @ 01:08)  T(F): 98.5 (07-01-20 @ 08:40), Max: 98.9 (07-01-20 @ 01:08)  HR: 71 (07-01-20 @ 08:40) (70 - 85)  BP: 114/66 (07-01-20 @ 08:40) (109/59 - 114/66)  BP(mean): --  RR: 18 (07-01-20 @ 08:40) (18 - 18)  SpO2: 98% (07-01-20 @ 08:40) (96% - 99%)    Physical Exam:      cachecxia    HEENT PERRLA EOMI    No oral exudate or erythema    Chest Good AE,CTA    CVS RRR S1 S2 WNl No murmur or rub or gallop    Abd soft BS normal No tenderness no masses  griffith    IV site no erythema tenderness or discharge    CNS as above    Lab Data:                          8.9    7.71  )-----------( 238      ( 01 Jul 2020 07:18 )             27.8       07-01    135  |  100  |  17  ----------------------------<  125<H>  3.4<L>   |  23  |  1.10    Ca    9.2      01 Jul 2020 07:18  Phos  2.8     07-01  Mg     2.2     07-01    TPro  6.0  /  Alb  3.2<L>  /  TBili  0.5  /  DBili  x   /  AST  40  /  ALT  29  /  AlkPhos  109  07-01            < from: US Kidney and Bladder (06.30.20 @ 14:15) >  IMPRESSION:     No hydronephrosis.    No evidence of left renal abscess as seen on prior CT 6/17/2020.          < end of copied text >

## 2020-07-01 NOTE — PROGRESS NOTE ADULT - ATTENDING COMMENTS
Pt[s movements were much improved today. As per primary team will likely decrease dose of Klonopin to 0.25mg due to over sedation. Will plan for LP tomorrow.

## 2020-07-01 NOTE — PROGRESS NOTE ADULT - PROBLEM SELECTOR PLAN 4
Evaluated by s/s  - reports persistently aspirating, s/s recommending NPO at this time, however pt requesting soft diet. discussed in separate chart note.   Currently tolerating some fluid and soft feeds PO.  -aspiration precaution Evaluated by s/s  Currently tolerating some fluid and soft feeds PO.  -aspiration precaution

## 2020-07-01 NOTE — PROGRESS NOTE ADULT - SUBJECTIVE AND OBJECTIVE BOX
***INCOMPLETE NOTE***    Patient is a 64y old  Male who presents with a chief complaint of Hematuria; weight loss (27 Jun 2020 12:06)      SUBJECTIVE / OVERNIGHT EVENTS: Pt seen and examined at bedside. He was once again having significant difficulty responding to questions this morning and unable to fully participate in interview process. During interview this morning, pt was found to have at least multiple brief, frequent, intermittent episodes of lip smacking, rolling of eyes, and tonic jerky movements lasting several seconds. Immediately after these episodes pt responds to question but it is very difficult to hear him due to significant hypophonism.      ADDITIONAL REVIEW OF SYSTEMS:  unable to fully assess.     MEDICATIONS  (STANDING):  aspirin enteric coated 81 milliGRAM(s) Oral daily  atorvastatin 80 milliGRAM(s) Oral at bedtime  bromocriptine Capsule 5 milliGRAM(s) Oral daily  buPROPion  milliGRAM(s) Oral daily  dextrose 5%. 1000 milliLiter(s) (50 mL/Hr) IV Continuous <Continuous>  dextrose 50% Injectable 12.5 Gram(s) IV Push once  dextrose 50% Injectable 25 Gram(s) IV Push once  dextrose 50% Injectable 25 Gram(s) IV Push once  heparin   Injectable 5000 Unit(s) SubCutaneous every 8 hours  insulin lispro (HumaLOG) corrective regimen sliding scale   SubCutaneous three times a day before meals  insulin lispro (HumaLOG) corrective regimen sliding scale   SubCutaneous at bedtime  pantoprazole    Tablet 40 milliGRAM(s) Oral before breakfast  piperacillin/tazobactam IVPB.. 3.375 Gram(s) IV Intermittent every 8 hours  polyethylene glycol 3350 17 Gram(s) Oral daily  sertraline 50 milliGRAM(s) Oral daily    MEDICATIONS  (PRN):  dextrose 40% Gel 15 Gram(s) Oral once PRN Blood Glucose LESS THAN 70 milliGRAM(s)/deciliter  glucagon  Injectable 1 milliGRAM(s) IntraMuscular once PRN Glucose LESS THAN 70 milligrams/deciliter      CAPILLARY BLOOD GLUCOSE      POCT Blood Glucose.: 120 mg/dL (27 Jun 2020 21:05)  POCT Blood Glucose.: 120 mg/dL (27 Jun 2020 17:24)  POCT Blood Glucose.: 127 mg/dL (27 Jun 2020 13:25)  POCT Blood Glucose.: 102 mg/dL (27 Jun 2020 08:56)    I&O's Summary    28 Jun 2020 07:01  -  29 Jun 2020 07:00  --------------------------------------------------------  IN: 720 mL / OUT: 1200 mL / NET: -480 mL      PHYSICAL EXAM:  Vital Signs Last 24 Hrs  T(C): 36.5 (30 Jun 2020 08:33), Max: 36.5 (30 Jun 2020 08:33)  T(F): 97.7 (30 Jun 2020 08:33), Max: 97.7 (30 Jun 2020 08:33)  HR: 55 (30 Jun 2020 08:33) (55 - 75)  BP: 121/73 (30 Jun 2020 08:33) (121/58 - 121/73)  BP(mean): --  RR: 18 (30 Jun 2020 08:33) (18 - 18)  SpO2: 97% (30 Jun 2020 08:33) (97% - 98%)    CONSTITUTIONAL: NAD, cachetic, found diagonally slanted on bed  EYES: PERRLA; EOMI, clear conjunctiva and + xanthelasma on sclera  RESPIRATORY:  could not assess due to patient unable to cooperate   CARDIOVASCULAR: Regular rate and rhythm, normal S1 and S2, no murmur/rub/gallop; No lower extremity edema; Peripheral pulses are 2+ bilaterally  ABDOMEN: +Bowel sounds; nontender to palpation, no rebound/guarding; no hepatosplenomegaly  PSYCH: A+O to person, place, and time; flat affect   NEUROLOGY: non-focal, bilateral UE 5/5 on flexion and 4/5 on extension; bilateral LE hip flexion 4/5; R lower leg flexion 2/5; L lower leg 3/5.   SKIN: No rashes; no palpable lesions    LABS:                        9.7   8.22  )-----------( 301      ( 30 Jun 2020 10:41 )             30.6       06-30    137  |  99  |  24  ----------------------------<  119  3.8   |  24  |  1.15    Ca    9.3      30 Jun 2020 10:42          RADIOLOGY & ADDITIONAL TESTS:  Results Reviewed:   Imaging Personally Reviewed:  Electrocardiogram Personally Reviewed:    COORDINATION OF CARE:  Care Discussed with Consultants/Other Providers [Y/N]:  Prior or Outpatient Records Reviewed [Y/N]: Patient is a 64y old  Male who presented with a chief complaint of Hematuria; weight loss     SUBJECTIVE / OVERNIGHT EVENTS: Pt seen and examined at bedside. Patient was lethargic this morning, and no longer exhibiting seizure-like movements.    ADDITIONAL REVIEW OF SYSTEMS:  unable to fully assess.     PHYSICAL EXAM:  Vital Signs Last 24 Hrs  T(C): 36.9 (07-01-20 @ 08:40)  T(F): 98.5 (07-01-20 @ 08:40), Max: 98.9 (07-01-20 @ 01:08)  HR: 71 (07-01-20 @ 08:40) (70 - 85)  BP: 114/66 (07-01-20 @ 08:40)  BP(mean): --  RR: 18 (07-01-20 @ 08:40) (18 - 18)  SpO2: 98% (07-01-20 @ 08:40) (96% - 99%)  Wt(kg): --    06-30 @ 07:01  -  07-01 @ 07:00  --------------------------------------------------------  IN: 400 mL / OUT: 900 mL / NET: -500 mL    07-01 @ 07:01  -  07-01 @ 15:28  --------------------------------------------------------  IN: 100 mL / OUT: 600 mL / NET: -500 mL      CONSTITUTIONAL: NAD, cachetic. ***  EYES: PERRLA; EOMI, clear conjunctiva and + xanthelasma on sclera  RESPIRATORY:  could not assess due to patient unable to cooperate   CARDIOVASCULAR: Regular rate and rhythm, No lower extremity edema;   ABDOMEN: +Bowel sounds; nontender to palpation   NEUROLOGY: non-focal, bilateral UE 5/5 on flexion and 4/5 on extension; bilateral LE hip flexion 4/5; R lower leg flexion 2/5; L lower leg 3/5.   SKIN: No rashes; no palpable lesions

## 2020-07-01 NOTE — PROGRESS NOTE ADULT - ASSESSMENT
64y Male pmhx DM2 on insulin, HTN, CAD w. LAD stent/restent after thrombosis, depression. 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    Plan  - Continue IV Abx  - Urine/blood cultures negative (likely due to pre-admission abx)  - Once patient having regular bowel movements, can remove griffith and give trial of void  - Patient will need repeat CT scan at and end of antibiotic course to reassess renal phlegmon/abscess    Discussed with Dr Bravo

## 2020-07-01 NOTE — PROGRESS NOTE ADULT - PROBLEM SELECTOR PLAN 3
- Repeat CT A/P w/wo contx after large BM   - ID consulted   - c/w antibiotics as above   - iso DM -Seizure-like activity ceased today, likely 2/2 Klonopin 0.5 mg dosing yesterday.  -Will continue to monitor.  -LP tomorrow (7/2)

## 2020-07-01 NOTE — PROGRESS NOTE ADULT - PROBLEM SELECTOR PLAN 2
Pt presented with 2 days of hematuria 2/2 cystitis. 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 received 2 days of cipro as outpatient  - U/A with LE, proteins, moderate blood  - Completed 9 days of ertapenem. 6/17-6/26. Switched to zosyn  - Will f/u ID recs on antbx  -Pt had large BM yesterday but abd XR still showed contx   -received enema on 6/29   - await another large BM prior to repeat CT A/P to re-assess possible renal abscess. If imaging findings not c/w abscess may need renal biopsy vs cystoscopy  - monitor for fever, trend WBC/Cr  - uro following Pt presented with 2 days of hematuria 2/2 cystitis. 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  - received enema on 6/29   - ultrasound shows resolution of renal abscess.  - monitor for fever, trend WBC/Cr  - uro following

## 2020-07-01 NOTE — PROGRESS NOTE ADULT - SUBJECTIVE AND OBJECTIVE BOX
UROLOGY DAILY PROGRESS NOTE:     Subjective:    patient feels well. No complaints.  Tolerating griffith catheter.  Says he is having regular BMs.    Objective:    NAD, awake and alert  Respirations nonlabored  Abdomen soft, nontender, nondistended  Griffith urine clear    MEDICATIONS  (STANDING):  aspirin enteric coated 81 milliGRAM(s) Oral daily  atorvastatin 80 milliGRAM(s) Oral at bedtime  bromocriptine Capsule 5 milliGRAM(s) Oral daily  clonazePAM  Tablet 0.25 milliGRAM(s) Oral every 8 hours  dextrose 5%. 1000 milliLiter(s) (50 mL/Hr) IV Continuous <Continuous>  dextrose 50% Injectable 12.5 Gram(s) IV Push once  dextrose 50% Injectable 25 Gram(s) IV Push once  dextrose 50% Injectable 25 Gram(s) IV Push once  heparin   Injectable 5000 Unit(s) SubCutaneous every 8 hours  insulin lispro (HumaLOG) corrective regimen sliding scale   SubCutaneous three times a day before meals  insulin lispro (HumaLOG) corrective regimen sliding scale   SubCutaneous at bedtime  pantoprazole    Tablet 40 milliGRAM(s) Oral before breakfast  piperacillin/tazobactam IVPB.. 3.375 Gram(s) IV Intermittent every 8 hours  polyethylene glycol 3350 17 Gram(s) Oral daily  sertraline 50 milliGRAM(s) Oral daily  thiamine IVPB 500 milliGRAM(s) IV Intermittent every 8 hours    MEDICATIONS  (PRN):  dextrose 40% Gel 15 Gram(s) Oral once PRN Blood Glucose LESS THAN 70 milliGRAM(s)/deciliter  glucagon  Injectable 1 milliGRAM(s) IntraMuscular once PRN Glucose LESS THAN 70 milligrams/deciliter      Vital Signs Last 24 Hrs  T(C): 36.9 (2020 08:40), Max: 37.2 (2020 01:08)  T(F): 98.5 (2020 08:40), Max: 98.9 (2020 01:08)  HR: 71 (2020 08:40) (70 - 85)  BP: 114/66 (2020 08:40) (109/59 - 114/66)  BP(mean): --  RR: 18 (2020 08:40) (18 - 18)  SpO2: 98% (2020 08:40) (96% - 99%)    I&O's Detail    2020 07:01  -  2020 07:00  --------------------------------------------------------  IN:    IV PiggyBack: 400 mL  Total IN: 400 mL    OUT:    Ureteral Catheter: 900 mL  Total OUT: 900 mL    Total NET: -500 mL          Daily     Daily Weight in k.1 (2020 08:40)    LABS:                        8.9    7.71  )-----------( 238      ( 2020 07:18 )             27.8     07-01    135  |  100  |  17  ----------------------------<  125<H>  3.4<L>   |  23  |  1.10    Ca    9.2      2020 07:18  Phos  2.8     07-01  Mg     2.2         TPro  6.0  /  Alb  3.2<L>  /  TBili  0.5  /  DBili  x   /  AST  40  /  ALT  29  /  AlkPhos  109  07-01

## 2020-07-01 NOTE — CHART NOTE - NSCHARTNOTEFT_GEN_A_CORE
Nutrition Follow Up Note  Patient seen for: Malnutrition follow up. Chart reviewed, events noted. r/o seizure, neurology following.     Source: Pt, RN    Diet : Soft diet with Ensure enlive 2 daily     Patient reports: No N+V, last BM yesterday per RN, no overt difficulty chewing/swallowing with soft textured foods-noted S/P MBS recommended NPO with non-oral nutrition, pt declined and wished to continue with oral diet with understanding of risks     PO intake : Pt reports he is eating fine however per discussion with RN pt with very poor appetite and will eat bites of his meals, yesterday worse than today however for breakfast RN estimates pt ate 10 bites. Pt reports he gets full quickly and will get tired with chewing, pt declined RD offer to downgrade diet texture to mechanical soft or puree, pt prefers to continue soft diet for now. Pt has not been regularly drinking ensure shakes, RN reports pt had 25% of 1 shake but did enjoy chocolate pudding yesterday, receptive to ensure pudding.         Weight: Recent  lbs, 129.8 lbs (6/24), 119.2 lbs (7/1), weight decreased but consistent with UBW range, continue to monitor.     Pertinent Medications: MEDICATIONS  (STANDING):  aspirin enteric coated 81 milliGRAM(s) Oral daily  atorvastatin 80 milliGRAM(s) Oral at bedtime  bromocriptine Capsule 5 milliGRAM(s) Oral daily  clonazePAM  Tablet 0.25 milliGRAM(s) Oral every 8 hours  dextrose 5%. 1000 milliLiter(s) (50 mL/Hr) IV Continuous <Continuous>  dextrose 50% Injectable 12.5 Gram(s) IV Push once  dextrose 50% Injectable 25 Gram(s) IV Push once  dextrose 50% Injectable 25 Gram(s) IV Push once  heparin   Injectable 5000 Unit(s) SubCutaneous every 8 hours  insulin lispro (HumaLOG) corrective regimen sliding scale   SubCutaneous three times a day before meals  insulin lispro (HumaLOG) corrective regimen sliding scale   SubCutaneous at bedtime  pantoprazole    Tablet 40 milliGRAM(s) Oral before breakfast  piperacillin/tazobactam IVPB.. 3.375 Gram(s) IV Intermittent every 8 hours  polyethylene glycol 3350 17 Gram(s) Oral daily  sertraline 50 milliGRAM(s) Oral daily  thiamine IVPB 500 milliGRAM(s) IV Intermittent every 8 hours    MEDICATIONS  (PRN):  dextrose 40% Gel 15 Gram(s) Oral once PRN Blood Glucose LESS THAN 70 milliGRAM(s)/deciliter  glucagon  Injectable 1 milliGRAM(s) IntraMuscular once PRN Glucose LESS THAN 70 milligrams/deciliter    Pertinent Labs: 07-01 @ 07:18: Na 135, BUN 17, Cr 1.10, <H>, K+ 3.4<L>, Phos 2.8, Mg 2.2, Alk Phos 109, ALT/SGPT 29, AST/SGOT 40, HbA1c --    Finger Sticks:  POCT Blood Glucose.: 121 mg/dL (07-01 @ 08:54)  POCT Blood Glucose.: 164 mg/dL (06-30 @ 21:35)  POCT Blood Glucose.: 147 mg/dL (06-30 @ 17:33)  POCT Blood Glucose.: 131 mg/dL (06-30 @ 13:01)      Skin per nursing documentation: free of pressure injury   Edema: none    Estimated Needs:   [x ] no change since previous assessment  [ ] recalculated:     Previous Nutrition Diagnosis: Moderate malnutrition   Nutrition Diagnosis is: ongoing, addressed with supplements     New Nutrition Diagnosis:  Related to:    As evidenced by:      Interventions:     Recommend  1) Diet texture deferred to medical team and pt/family wishes-currently soft, continue ensure enlive 2 daily, recommend ensure pudding 2 daily as well to further optimize po intake, pt with type 2 DM however would continue liberalized diet at this time.   2) Continue to encourage po intake, provide assistance with meals as needed, and obtain/honor food preferences as able-will provide chocolate ice cream for lunch and dinner    Monitoring and Evaluation:     Continue to monitor Nutritional intake, Tolerance to diet prescription, weights, labs, skin integrity    RD remains available upon request and will follow up per protocol    Mireya Morrison R.D., [facility], Pager #[phone] Nutrition Follow Up Note  Patient seen for: Malnutrition follow up. Chart reviewed, events noted. r/o seizure, neurology following.     Source: Pt, RN, pt noted to be A&O x 2, hypophonic    Diet : Soft diet with Ensure enlive 2 daily     Patient reports: No N+V, last BM yesterday per RN, no overt difficulty chewing/swallowing with soft textured foods-noted S/P MBS recommended NPO with non-oral nutrition, pt declined and wished to continue with oral diet with understanding of risks     PO intake : Pt reports he is eating fine however per discussion with RN pt with very poor appetite and will eat bites of his meals, yesterday worse than today however for breakfast RN estimates pt ate 10 bites. Pt reports he gets full quickly and will get tired with chewing, pt declined RD offer to downgrade diet texture to mechanical soft or puree, pt prefers to continue soft diet for now. Pt has not been regularly drinking ensure shakes, RN reports pt had 25% of 1 shake but did enjoy chocolate pudding yesterday, receptive to ensure pudding.         Weight: Recent  lbs, 129.8 lbs (6/24), 119.2 lbs (7/1), weight decreased but consistent with UBW range, continue to monitor.     Pertinent Medications: MEDICATIONS  (STANDING):  aspirin enteric coated 81 milliGRAM(s) Oral daily  atorvastatin 80 milliGRAM(s) Oral at bedtime  bromocriptine Capsule 5 milliGRAM(s) Oral daily  clonazePAM  Tablet 0.25 milliGRAM(s) Oral every 8 hours  dextrose 5%. 1000 milliLiter(s) (50 mL/Hr) IV Continuous <Continuous>  dextrose 50% Injectable 12.5 Gram(s) IV Push once  dextrose 50% Injectable 25 Gram(s) IV Push once  dextrose 50% Injectable 25 Gram(s) IV Push once  heparin   Injectable 5000 Unit(s) SubCutaneous every 8 hours  insulin lispro (HumaLOG) corrective regimen sliding scale   SubCutaneous three times a day before meals  insulin lispro (HumaLOG) corrective regimen sliding scale   SubCutaneous at bedtime  pantoprazole    Tablet 40 milliGRAM(s) Oral before breakfast  piperacillin/tazobactam IVPB.. 3.375 Gram(s) IV Intermittent every 8 hours  polyethylene glycol 3350 17 Gram(s) Oral daily  sertraline 50 milliGRAM(s) Oral daily  thiamine IVPB 500 milliGRAM(s) IV Intermittent every 8 hours    MEDICATIONS  (PRN):  dextrose 40% Gel 15 Gram(s) Oral once PRN Blood Glucose LESS THAN 70 milliGRAM(s)/deciliter  glucagon  Injectable 1 milliGRAM(s) IntraMuscular once PRN Glucose LESS THAN 70 milligrams/deciliter    Pertinent Labs: 07-01 @ 07:18: Na 135, BUN 17, Cr 1.10, <H>, K+ 3.4<L>, Phos 2.8, Mg 2.2, Alk Phos 109, ALT/SGPT 29, AST/SGOT 40, HbA1c --    Finger Sticks:  POCT Blood Glucose.: 121 mg/dL (07-01 @ 08:54)  POCT Blood Glucose.: 164 mg/dL (06-30 @ 21:35)  POCT Blood Glucose.: 147 mg/dL (06-30 @ 17:33)  POCT Blood Glucose.: 131 mg/dL (06-30 @ 13:01)      Skin per nursing documentation: free of pressure injury   Edema: none    Estimated Needs:   [x ] no change since previous assessment  [ ] recalculated:     Previous Nutrition Diagnosis: Moderate malnutrition   Nutrition Diagnosis is: ongoing, addressed with supplements     New Nutrition Diagnosis:  Related to:    As evidenced by:      Interventions:     Recommend  1) Diet texture deferred to medical team and pt/family wishes-currently soft, continue ensure enlive 2 daily, recommend ensure pudding 2 daily as well to further optimize po intake, pt with type 2 DM however would continue liberalized diet at this time.   2) Continue to encourage po intake, provide assistance with meals as needed, and obtain/honor food preferences as able-will provide chocolate ice cream for lunch and dinner    Monitoring and Evaluation:     Continue to monitor Nutritional intake, Tolerance to diet prescription, weights, labs, skin integrity    RD remains available upon request and will follow up per protocol    Mireya Morrison R.D., [facility], Pager #[phone]

## 2020-07-01 NOTE — PROGRESS NOTE ADULT - ASSESSMENT
63 yo M pmhx DM2 on insulin, HTN, CAD w. LAD stent/restent after thrombosis, depression presenting with 2 days of hematuria  Patient with no fevers,chills  Leucocytosis as OP  Admitted CT with emphysematous cystitis and renal lesion ? abscess  But also with 80 lb weight loss over 6 months  In hospital minimal leucocytosis, No fevers  Blood and urine cs negative (though did recieve cipro as OP)    ?UTI-cystitis with early renal abscess  ? Malignancy with superinfection  ? Other etiology  Now also ? seizures    His presentation is not typical and also with the weight loss I am concerned about an underlying process  Have d/w Urology Dr Bravo     Would rec:    A) Emphysematous cystitis, renal mass ? abscess  US with no abscess  will de-escalate coverage to ceftriaxone  CT when contrast passes-may need cysto/biopsy depending on results     B) Renal mass  ? absces  ? malignancy   ? other etiology  imaging and abx plan as above  urine cytology was negative     C)Leucocytosis  improved  likely from above  Plan as detailed above    D) seizures ?  delirium  MRI as above  any beta lactam could lower seizure threshold- however given negative Cx and possible abscess/emphysematous cystitis-options are limited  neuro input noted     Will tailor plan for ID issues  per course,results.Will defer to primary team on management of other issues.  Assessment, plan and recommendations as detailed above were discussed with the medical/primary  team-and Dr Castillo   Will Follow.  Beeper [phone] [facility] [id number].   Wknd/afterhours/No response-[phone] or Fellow on call

## 2020-07-01 NOTE — PROGRESS NOTE ADULT - SUBJECTIVE AND OBJECTIVE BOX
SUBJECTIVE/INTERVAL EVENTS:  PT seen and examined at bedside. Pt was started on clonazepam yesterday; nurse noted that pt was very lethargic and held his dose at 10 pm last night. This morning, pt appeared drowsy (haven been given clonazepam recently). No irregular movements were observed. Continues to be hypophonic.     REVIEW OF SYSTEMS : All other systems are negative except as was mentioned in the subjective.    MEDICATIONS  (STANDING):  aspirin enteric coated 81 milliGRAM(s) Oral daily  atorvastatin 80 milliGRAM(s) Oral at bedtime  bromocriptine Capsule 5 milliGRAM(s) Oral daily  clonazePAM  Tablet 0.25 milliGRAM(s) Oral every 8 hours  dextrose 5%. 1000 milliLiter(s) (50 mL/Hr) IV Continuous <Continuous>  dextrose 50% Injectable 12.5 Gram(s) IV Push once  dextrose 50% Injectable 25 Gram(s) IV Push once  dextrose 50% Injectable 25 Gram(s) IV Push once  heparin   Injectable 5000 Unit(s) SubCutaneous every 8 hours  insulin lispro (HumaLOG) corrective regimen sliding scale   SubCutaneous three times a day before meals  insulin lispro (HumaLOG) corrective regimen sliding scale   SubCutaneous at bedtime  pantoprazole    Tablet 40 milliGRAM(s) Oral before breakfast  piperacillin/tazobactam IVPB.. 3.375 Gram(s) IV Intermittent every 8 hours  polyethylene glycol 3350 17 Gram(s) Oral daily  sertraline 50 milliGRAM(s) Oral daily  thiamine IVPB 500 milliGRAM(s) IV Intermittent every 8 hours    MEDICATIONS  (PRN):  dextrose 40% Gel 15 Gram(s) Oral once PRN Blood Glucose LESS THAN 70 milliGRAM(s)/deciliter  glucagon  Injectable 1 milliGRAM(s) IntraMuscular once PRN Glucose LESS THAN 70 milligrams/deciliter    Vital Signs Last 24 Hrs  T(C): 36.9 (01 Jul 2020 08:40), Max: 37.2 (01 Jul 2020 01:08)  T(F): 98.5 (01 Jul 2020 08:40), Max: 98.9 (01 Jul 2020 01:08)  HR: 71 (01 Jul 2020 08:40) (70 - 85)  BP: 114/66 (01 Jul 2020 08:40) (109/59 - 114/66)  BP(mean): --  RR: 18 (01 Jul 2020 08:40) (18 - 18)  SpO2: 98% (01 Jul 2020 08:40) (96% - 99%)    General: Cachectic man, appears older than stated age, in no apparent distress including pain    Neurological:  MS: Awake, drowsy, oriented to person, place, situation. Said date was Nov 23, 2022. Normal affect. Follows simple commands.   Language: Speech is hypophonic, fluent   CNs: Poor eye contact, PERRL (R = 3mm, L = 3mm).  Decreased upgaze.  EOMI no nystagmus.  No facial asymmetry b/l.  Hypophonic voice.  Neck flexion/extension 5/5 strength.    Motor: Diffusely decreased bulk, worse in RLE/RUE w/ some rigidity. Tremulousness throughout, worse w/ intention. 4-/5 strength LUE, 3/5 RUE. 3/5 LLE, 4-/5 RLE. No abnormal movements or lip smacking observed.    LABS AND STUDIES:  LABS:             CBC Full  -  ( 01 Jul 2020 07:18 )  WBC Count : 7.71 K/uL  RBC Count : 3.38 M/uL  Hemoglobin : 8.9 g/dL  Hematocrit : 27.8 %  Platelet Count - Automated : 238 K/uL  Mean Cell Volume : 82.2 fl  Mean Cell Hemoglobin : 26.3 pg  Mean Cell Hemoglobin Concentration : 32.0 gm/dL  Auto Neutrophil # : x  Auto Lymphocyte # : x  Auto Monocyte # : x  Auto Eosinophil # : x  Auto Basophil # : x  Auto Neutrophil % : x  Auto Lymphocyte % : x  Auto Monocyte % : x  Auto Eosinophil % : x  Auto Basophil % : x    07-01    135  |  100  |  17  ----------------------------<  125<H>  3.4<L>   |  23  |  1.10    Ca    9.2      01 Jul 2020 07:18  Phos  2.8     07-01  Mg     2.2     07-01    TPro  6.0  /  Alb  3.2<L>  /  TBili  0.5  /  DBili  x   /  AST  40  /  ALT  29  /  AlkPhos  109  07-01      Radiology:  < from: MR Head No Cont (06.28.20 @ 17:24) >  IMPRESSION:  No major stroke or structural lesion identified. Recommend repeat when the patient is able to better cooperate or with anesthesia.    NIDIA DESAI M.D., ATTENDING RADIOLOGIST  This document has been electronically signed. Jun 28 2020  5:28PM  < end of copied text >    EEG:      EEG Summary / Classification:  Normal EEG in the awake, drowsy and asleep states    EEG Impression / Clinical Correlate:  Normal prolonged EEG study. (leads progressively degraded overnight, finally off head at 4:09a)  No epileptic pattern or seizure seen.    Study Date: 6/27/2020	Start Time: 3:55:08 PM      End Date:   6/28/2020	End Time: 08:00:04 AM   (leads off at 04:09a 6/28/20)  Study Duration: 15h 41m  ________________________________________      Avel Hopson MD PhD  Director, Epilepsy Division, [facility] SUBJECTIVE/INTERVAL EVENTS:  PT seen and examined at bedside. Pt was started on clonazepam yesterday; nurse noted that pt was very lethargic and held his dose at 10 pm last night. This morning, pt appeared drowsy (haven been given clonazepam recently). No irregular movements were observed. Continues to be hypophonic.     REVIEW OF SYSTEMS : All other systems are negative except as was mentioned in the subjective.    MEDICATIONS  (STANDING):  aspirin enteric coated 81 milliGRAM(s) Oral daily  atorvastatin 80 milliGRAM(s) Oral at bedtime  bromocriptine Capsule 5 milliGRAM(s) Oral daily  clonazePAM  Tablet 0.25 milliGRAM(s) Oral every 8 hours  dextrose 5%. 1000 milliLiter(s) (50 mL/Hr) IV Continuous <Continuous>  dextrose 50% Injectable 12.5 Gram(s) IV Push once  dextrose 50% Injectable 25 Gram(s) IV Push once  dextrose 50% Injectable 25 Gram(s) IV Push once  heparin   Injectable 5000 Unit(s) SubCutaneous every 8 hours  insulin lispro (HumaLOG) corrective regimen sliding scale   SubCutaneous three times a day before meals  insulin lispro (HumaLOG) corrective regimen sliding scale   SubCutaneous at bedtime  pantoprazole    Tablet 40 milliGRAM(s) Oral before breakfast  piperacillin/tazobactam IVPB.. 3.375 Gram(s) IV Intermittent every 8 hours  polyethylene glycol 3350 17 Gram(s) Oral daily  sertraline 50 milliGRAM(s) Oral daily  thiamine IVPB 500 milliGRAM(s) IV Intermittent every 8 hours    MEDICATIONS  (PRN):  dextrose 40% Gel 15 Gram(s) Oral once PRN Blood Glucose LESS THAN 70 milliGRAM(s)/deciliter  glucagon  Injectable 1 milliGRAM(s) IntraMuscular once PRN Glucose LESS THAN 70 milligrams/deciliter    Vital Signs Last 24 Hrs  T(C): 36.9 (01 Jul 2020 08:40), Max: 37.2 (01 Jul 2020 01:08)  T(F): 98.5 (01 Jul 2020 08:40), Max: 98.9 (01 Jul 2020 01:08)  HR: 71 (01 Jul 2020 08:40) (70 - 85)  BP: 114/66 (01 Jul 2020 08:40) (109/59 - 114/66)  BP(mean): --  RR: 18 (01 Jul 2020 08:40) (18 - 18)  SpO2: 98% (01 Jul 2020 08:40) (96% - 99%)    General: Cachectic man, appears older than stated age, in no apparent distress including pain    Neurological:  MS: Awake, drowsy, oriented to person, place, situation. Said date was Nov 23, 2022. Normal affect. Follows simple commands.   Language: Speech is hypophonic, fluent   CNs: Poor eye contact, PERRL (R = 3mm, L = 3mm).  Decreased upgaze.  EOMI no nystagmus.  No facial asymmetry b/l.  Hypophonic voice.  Neck flexion/extension 5/5 strength.    Motor: Diffusely decreased bulk, worse in RLE/RUE w/ some rigidity. Tremulousness improved, mild today. 4-/5 strength LUE, 3/5 RUE. 3/5 LLE, 4-/5 RLE. No abnormal movements or lip smacking observed.    LABS AND STUDIES:  LABS:             CBC Full  -  ( 01 Jul 2020 07:18 )  WBC Count : 7.71 K/uL  RBC Count : 3.38 M/uL  Hemoglobin : 8.9 g/dL  Hematocrit : 27.8 %  Platelet Count - Automated : 238 K/uL  Mean Cell Volume : 82.2 fl  Mean Cell Hemoglobin : 26.3 pg  Mean Cell Hemoglobin Concentration : 32.0 gm/dL  Auto Neutrophil # : x  Auto Lymphocyte # : x  Auto Monocyte # : x  Auto Eosinophil # : x  Auto Basophil # : x  Auto Neutrophil % : x  Auto Lymphocyte % : x  Auto Monocyte % : x  Auto Eosinophil % : x  Auto Basophil % : x    07-01    135  |  100  |  17  ----------------------------<  125<H>  3.4<L>   |  23  |  1.10    Ca    9.2      01 Jul 2020 07:18  Phos  2.8     07-01  Mg     2.2     07-01    TPro  6.0  /  Alb  3.2<L>  /  TBili  0.5  /  DBili  x   /  AST  40  /  ALT  29  /  AlkPhos  109  07-01      Radiology:  < from: MR Head No Cont (06.28.20 @ 17:24) >  IMPRESSION:  No major stroke or structural lesion identified. Recommend repeat when the patient is able to better cooperate or with anesthesia.    NIDIA DESAI M.D., ATTENDING RADIOLOGIST  This document has been electronically signed. Jun 28 2020  5:28PM  < end of copied text >    EEG:      EEG Summary / Classification:  Normal EEG in the awake, drowsy and asleep states    EEG Impression / Clinical Correlate:  Normal prolonged EEG study. (leads progressively degraded overnight, finally off head at 4:09a)  No epileptic pattern or seizure seen.    Study Date: 6/27/2020	Start Time: 3:55:08 PM      End Date:   6/28/2020	End Time: 08:00:04 AM   (leads off at 04:09a 6/28/20)  Study Duration: 15h 41m  ________________________________________      Avel Hopson MD PhD  Director, Epilepsy Division, [facility]

## 2020-07-01 NOTE — PROGRESS NOTE ADULT - ATTENDING COMMENTS
Patient seen and examined, case d/w house staff, neuro service as well as ID.  Plan for CT a/p to further evaluate resolution of renal abscess, eval for possible malignancy.  LP planned for tomorrow.

## 2020-07-01 NOTE — PROGRESS NOTE ADULT - ASSESSMENT
65 yo M pmhx prolactinoma, DM2 on insulin, HTN, CAD w. LAD stent and restent after thrombosis, depression presenting with 2 days of hematuria 2/2 cystitis CT a/p findings of 2.5 cm Left renal abscess. Pt also presenting with 6 months of weight loss/FTT 2/2 neuromuscular disorder (ALS?) vs paraneoplastic vs infectious. Course c/b by ? seizure activity. 63 yo M pmhx prolactinoma, DM2 on insulin, HTN, CAD w. LAD stent and restent after thrombosis, depression presenting with 2 days of hematuria 2/2 cystitis CT a/p findings of 2.5 cm Left renal abscess. Pt also presenting with 6 months of weight loss/FTT 2/2 neuromuscular disorder (?ALS) vs paraneoplastic vs infectious. Course c/b by seizure-like activity.

## 2020-07-01 NOTE — PROGRESS NOTE ADULT - PROBLEM SELECTOR PLAN 1
Pt has hx of prolactinoma, depression. No hx of sz. Observed to have ?focal aware seizure with automatisms, PCA observed ?GTC, however no post-ictal period. Could be provoked sz given that he was on buproprion and ertapenem.  -CT head non con URGENT - no new findings  -EEG without findings; pt took off leads during overnight monitoring   -f/u labs, prolactin level pending  -D/w neuro, already following - iso medications that lower seizure threshold   - d/c-ed buproprion; dc-ed ertapenem  - neuro started thiamine 500mg q8 on 6/29; ordered HIV, B1 tests  - changed ertapenem to zosyn on 6/27; day 4 of Zosyn  - Clonazepam 0.5mg q8 started 6/30  - may need repeat MRI with IVP lorazepam sedation as per radiology - abx changed to CTX 1 g QD. Zosyn d/c'd  - Will disimpact, then CT A/P w/wo contx tonight (7/1).

## 2020-07-01 NOTE — PROGRESS NOTE ADULT - ASSESSMENT
Assessment:  64M w/ PMH of DM, HTN, CAD w/ LAD stent, depression, admitted for 2 days of hematuria 2/2 cystitis. Neuro consult for worsening RLE weakness & muscle wasting, with progression to other extremities, and 100lb weight loss for the past year. Pt continues to be hypophonic with impaired upgaze and periodic episodes of lip-smacking and choreiform movements of the extremities during which pt is alert and follows simple commands. No seizure activity on EEG. Following clonazapem administration, no abnormal movements were observed.    Impression: Chronic, progressive RLE monoparesis with diffuse atrophy, 100lb weight loss, flaccid dysarthria, choreiform movements suggestive of athetosis and neck contraction with head turn to the left suggestive of dystonia. DDx remains broad: Flail Leg syndrome, ALS, paraneoplastic motor neuron disease, malignancy, frontotemporal dementia ALS variant, and CJD.    Plan:   - Patient being monitored off EEG/AEDs for now. Conscious and alert during previous episodic movements and lip smacking-  [x] switch ertapenem and bupropion to agents less likely to lower seizure threshold if possible  [x] Repeat CT head   [] F/U paraneoplastic panel (anti RI ab pending)  [] LP tomorrow for possible paraneoplastic disease  [] EMG as outpatient planned w/ Dr. Winters (7/6)    ADDITIONAL RECOMMENDATIONS:  [x] Start Thiamine 500mg q8 (ordered)  [] Labs: B1, HIV (ordered)- pending  [] speech eval  [x] soft diet for dysphagia

## 2020-07-02 LAB
ANION GAP SERPL CALC-SCNC: 10 MMOL/L — SIGNIFICANT CHANGE UP (ref 5–17)
APPEARANCE CSF: ABNORMAL
APPEARANCE CSF: CLEAR — SIGNIFICANT CHANGE UP
APPEARANCE SPUN FLD: COLORLESS — SIGNIFICANT CHANGE UP
APPEARANCE SPUN FLD: COLORLESS — SIGNIFICANT CHANGE UP
BUN SERPL-MCNC: 16 MG/DL — SIGNIFICANT CHANGE UP (ref 7–23)
CALCIUM SERPL-MCNC: 9.2 MG/DL — SIGNIFICANT CHANGE UP (ref 8.4–10.5)
CHLORIDE SERPL-SCNC: 99 MMOL/L — SIGNIFICANT CHANGE UP (ref 96–108)
CO2 SERPL-SCNC: 23 MMOL/L — SIGNIFICANT CHANGE UP (ref 22–31)
COLOR CSF: ABNORMAL
COLOR CSF: SIGNIFICANT CHANGE UP
CREAT SERPL-MCNC: 1.05 MG/DL — SIGNIFICANT CHANGE UP (ref 0.5–1.3)
CSF PCR RESULT: SIGNIFICANT CHANGE UP
EOSINOPHIL # CSF: 1 % — SIGNIFICANT CHANGE UP
GLUCOSE BLDC GLUCOMTR-MCNC: 131 MG/DL — HIGH (ref 70–99)
GLUCOSE BLDC GLUCOMTR-MCNC: 146 MG/DL — HIGH (ref 70–99)
GLUCOSE BLDC GLUCOMTR-MCNC: 147 MG/DL — HIGH (ref 70–99)
GLUCOSE BLDC GLUCOMTR-MCNC: 159 MG/DL — HIGH (ref 70–99)
GLUCOSE CSF-MCNC: 90 MG/DL — HIGH (ref 40–70)
GLUCOSE SERPL-MCNC: 136 MG/DL — HIGH (ref 70–99)
GRAM STN FLD: SIGNIFICANT CHANGE UP
HCT VFR BLD CALC: 30.7 % — LOW (ref 39–50)
HGB BLD-MCNC: 9.7 G/DL — LOW (ref 13–17)
LABORATORY COMMENT REPORT: SIGNIFICANT CHANGE UP
LDH CSF L TO P-CCNC: 30 U/L — SIGNIFICANT CHANGE UP
LDH FLD-CCNC: 30 U/L — SIGNIFICANT CHANGE UP
LYMPHOCYTES # CSF: 52 % — SIGNIFICANT CHANGE UP (ref 40–80)
LYMPHOCYTES # CSF: 73 % — SIGNIFICANT CHANGE UP (ref 40–80)
MAGNESIUM SERPL-MCNC: 2.2 MG/DL — SIGNIFICANT CHANGE UP (ref 1.6–2.6)
MCHC RBC-ENTMCNC: 26.3 PG — LOW (ref 27–34)
MCHC RBC-ENTMCNC: 31.6 GM/DL — LOW (ref 32–36)
MCV RBC AUTO: 83.2 FL — SIGNIFICANT CHANGE UP (ref 80–100)
MONOS+MACROS NFR CSF: 11 % — LOW (ref 15–45)
MONOS+MACROS NFR CSF: 4 % — LOW (ref 15–45)
NEUTROPHILS # CSF: 15 % — HIGH (ref 0–6)
NEUTROPHILS # CSF: 44 % — HIGH (ref 0–6)
NRBC # BLD: 0 /100 WBCS — SIGNIFICANT CHANGE UP (ref 0–0)
NRBC NFR CSF: 1 /UL — SIGNIFICANT CHANGE UP (ref 0–5)
NRBC NFR CSF: 1 /UL — SIGNIFICANT CHANGE UP (ref 0–5)
PHOSPHATE SERPL-MCNC: 2.1 MG/DL — LOW (ref 2.5–4.5)
PLATELET # BLD AUTO: 232 K/UL — SIGNIFICANT CHANGE UP (ref 150–400)
POTASSIUM SERPL-MCNC: 3.7 MMOL/L — SIGNIFICANT CHANGE UP (ref 3.5–5.3)
POTASSIUM SERPL-SCNC: 3.7 MMOL/L — SIGNIFICANT CHANGE UP (ref 3.5–5.3)
PROT CSF-MCNC: 132 MG/DL — HIGH (ref 15–45)
RBC # BLD: 3.69 M/UL — LOW (ref 4.2–5.8)
RBC # CSF: 3250 /UL — HIGH (ref 0–0)
RBC # CSF: 810 /UL — HIGH (ref 0–0)
RBC # FLD: 16.5 % — HIGH (ref 10.3–14.5)
SODIUM SERPL-SCNC: 132 MMOL/L — LOW (ref 135–145)
SOURCE HSV 1/2: SIGNIFICANT CHANGE UP
SPECIMEN SOURCE: SIGNIFICANT CHANGE UP
TUBE TYPE: SIGNIFICANT CHANGE UP
TUBE TYPE: SIGNIFICANT CHANGE UP
WBC # BLD: 7.2 K/UL — SIGNIFICANT CHANGE UP (ref 3.8–10.5)
WBC # FLD AUTO: 7.2 K/UL — SIGNIFICANT CHANGE UP (ref 3.8–10.5)

## 2020-07-02 PROCEDURE — 99233 SBSQ HOSP IP/OBS HIGH 50: CPT | Mod: GC

## 2020-07-02 PROCEDURE — 99232 SBSQ HOSP IP/OBS MODERATE 35: CPT

## 2020-07-02 PROCEDURE — 88188 FLOWCYTOMETRY/READ 9-15: CPT

## 2020-07-02 RX ORDER — TRIHEXYPHENIDYL HCL 2 MG
2 TABLET ORAL ONCE
Refills: 0 | Status: COMPLETED | OUTPATIENT
Start: 2020-07-03 | End: 2020-07-03

## 2020-07-02 RX ORDER — LIDOCAINE HCL 20 MG/ML
20 VIAL (ML) INJECTION ONCE
Refills: 0 | Status: COMPLETED | OUTPATIENT
Start: 2020-07-02 | End: 2020-07-02

## 2020-07-02 RX ORDER — TRIHEXYPHENIDYL HCL 2 MG
1 TABLET ORAL ONCE
Refills: 0 | Status: COMPLETED | OUTPATIENT
Start: 2020-07-02 | End: 2020-07-02

## 2020-07-02 RX ORDER — SODIUM,POTASSIUM PHOSPHATES 278-250MG
1 POWDER IN PACKET (EA) ORAL ONCE
Refills: 0 | Status: COMPLETED | OUTPATIENT
Start: 2020-07-02 | End: 2020-07-02

## 2020-07-02 RX ADMIN — ATORVASTATIN CALCIUM 80 MILLIGRAM(S): 80 TABLET, FILM COATED ORAL at 22:09

## 2020-07-02 RX ADMIN — Medication 81 MILLIGRAM(S): at 13:19

## 2020-07-02 RX ADMIN — HEPARIN SODIUM 5000 UNIT(S): 5000 INJECTION INTRAVENOUS; SUBCUTANEOUS at 13:24

## 2020-07-02 RX ADMIN — Medication 1 MILLIGRAM(S): at 13:19

## 2020-07-02 RX ADMIN — Medication 20 MILLILITER(S): at 16:26

## 2020-07-02 RX ADMIN — POLYETHYLENE GLYCOL 3350 17 GRAM(S): 17 POWDER, FOR SOLUTION ORAL at 13:20

## 2020-07-02 RX ADMIN — Medication 1 PACKET(S): at 13:19

## 2020-07-02 RX ADMIN — CEFTRIAXONE 100 MILLIGRAM(S): 500 INJECTION, POWDER, FOR SOLUTION INTRAMUSCULAR; INTRAVENOUS at 15:23

## 2020-07-02 RX ADMIN — Medication 1: at 08:56

## 2020-07-02 RX ADMIN — Medication 0.25 MILLIGRAM(S): at 22:11

## 2020-07-02 RX ADMIN — Medication 105 MILLIGRAM(S): at 05:47

## 2020-07-02 RX ADMIN — Medication 0.25 MILLIGRAM(S): at 05:57

## 2020-07-02 RX ADMIN — PANTOPRAZOLE SODIUM 40 MILLIGRAM(S): 20 TABLET, DELAYED RELEASE ORAL at 05:57

## 2020-07-02 RX ADMIN — HEPARIN SODIUM 5000 UNIT(S): 5000 INJECTION INTRAVENOUS; SUBCUTANEOUS at 05:49

## 2020-07-02 RX ADMIN — Medication 105 MILLIGRAM(S): at 22:09

## 2020-07-02 RX ADMIN — HEPARIN SODIUM 5000 UNIT(S): 5000 INJECTION INTRAVENOUS; SUBCUTANEOUS at 22:09

## 2020-07-02 RX ADMIN — SERTRALINE 50 MILLIGRAM(S): 25 TABLET, FILM COATED ORAL at 13:20

## 2020-07-02 RX ADMIN — Medication 0.25 MILLIGRAM(S): at 13:24

## 2020-07-02 RX ADMIN — Medication 105 MILLIGRAM(S): at 13:32

## 2020-07-02 RX ADMIN — BROMOCRIPTINE MESYLATE 5 MILLIGRAM(S): 5 CAPSULE ORAL at 13:20

## 2020-07-02 NOTE — PROGRESS NOTE ADULT - ASSESSMENT
65 yo M pmhx DM2 on insulin, HTN, CAD w. LAD stent/restent after thrombosis, depression presenting with 2 days of hematuria  Patient with no fevers,chills  Leucocytosis as OP  Admitted CT with emphysematous cystitis and renal lesion ? abscess  But also with 80 lb weight loss over 6 months  In hospital minimal leucocytosis, No fevers  Blood and urine cs negative (though did recieve cipro as OP)  s/p 2 weeks of IV abx  repeat CT with resolution of abscess and much improved custitis-no intramural air  Given this would continue current abx but can de-escalate to po soon.  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  delirium  hypophonation/wasting   Neuro input noted  for LP  monitor for s/s any other infectious process      Will tailor plan for ID issues  per course,results.Will defer to primary team on management of other issues.  Assessment, plan and recommendations as detailed above were discussed with the medical/primary  team-and Dr Castillo   Infectious Diseases Service will cover over next 3 days   Please call [phone] if issues

## 2020-07-02 NOTE — PROGRESS NOTE ADULT - SUBJECTIVE AND OBJECTIVE BOX
UROLOGY DAILY PROGRESS NOTE:     Subjective:    No events overnight.   Still with griffith catheter.    Objective:    NAD, awake and alert  Respirations nonlabored  Abdomen soft, nontender, nondistended  Griffith urine clear    MEDICATIONS  (STANDING):  aspirin enteric coated 81 milliGRAM(s) Oral daily  atorvastatin 80 milliGRAM(s) Oral at bedtime  bromocriptine Capsule 5 milliGRAM(s) Oral daily  cefTRIAXone   IVPB 1000 milliGRAM(s) IV Intermittent every 24 hours  clonazePAM  Tablet 0.25 milliGRAM(s) Oral every 8 hours  dextrose 5%. 1000 milliLiter(s) (50 mL/Hr) IV Continuous <Continuous>  dextrose 50% Injectable 12.5 Gram(s) IV Push once  dextrose 50% Injectable 25 Gram(s) IV Push once  dextrose 50% Injectable 25 Gram(s) IV Push once  heparin   Injectable 5000 Unit(s) SubCutaneous every 8 hours  insulin lispro (HumaLOG) corrective regimen sliding scale   SubCutaneous three times a day before meals  insulin lispro (HumaLOG) corrective regimen sliding scale   SubCutaneous at bedtime  pantoprazole    Tablet 40 milliGRAM(s) Oral before breakfast  polyethylene glycol 3350 17 Gram(s) Oral daily  sertraline 50 milliGRAM(s) Oral daily  thiamine IVPB 500 milliGRAM(s) IV Intermittent every 8 hours    MEDICATIONS  (PRN):  dextrose 40% Gel 15 Gram(s) Oral once PRN Blood Glucose LESS THAN 70 milliGRAM(s)/deciliter  glucagon  Injectable 1 milliGRAM(s) IntraMuscular once PRN Glucose LESS THAN 70 milligrams/deciliter      Vital Signs Last 24 Hrs  T(C): 36.9 (02 Jul 2020 07:29), Max: 37.3 (01 Jul 2020 23:43)  T(F): 98.4 (02 Jul 2020 07:29), Max: 99.1 (01 Jul 2020 23:43)  HR: 78 (02 Jul 2020 07:29) (78 - 90)  BP: 112/68 (02 Jul 2020 07:29) (112/68 - 137/70)  BP(mean): --  RR: 18 (02 Jul 2020 07:29) (17 - 18)  SpO2: 98% (02 Jul 2020 07:29) (97% - 98%)    I&O's Detail    01 Jul 2020 07:01  -  02 Jul 2020 07:00  --------------------------------------------------------  IN:    IV PiggyBack: 100 mL    Oral Fluid: 300 mL  Total IN: 400 mL    OUT:    Ureteral Catheter: 1200 mL  Total OUT: 1200 mL    Total NET: -800 mL          Daily     Daily     LABS:                        9.7    7.20  )-----------( 232      ( 02 Jul 2020 07:07 )             30.7     07-02    132<L>  |  99  |  16  ----------------------------<  136<H>  3.7   |  23  |  1.05    Ca    9.2      02 Jul 2020 07:07  Phos  2.1     07-02  Mg     2.2     07-02    TPro  6.0  /  Alb  3.2<L>  /  TBili  0.5  /  DBili  x   /  AST  40  /  ALT  29  /  AlkPhos  109  07-01

## 2020-07-02 NOTE — PROGRESS NOTE ADULT - PROBLEM SELECTOR PLAN 6
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 As above iso FTT with global weakness  - dietician consult  - PT consulted, recommending MARI  - neuro consulted; pending paraneoplastic panel  - will f/u for EMG w/ Dr. Winters on July 7th 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 As above iso FTT with global weakness  - dietician consult  - PT consulted, recommending MARI  - neuro consulted; pending paraneoplastic panel; plan for LP today.  - will f/u for EMG w/ Dr. Winters on July 7th

## 2020-07-02 NOTE — PROGRESS NOTE ADULT - PROBLEM SELECTOR PLAN 1
- abx changed to CTX 1 g QD. Zosyn d/c'd  - Will disimpact, then CT A/P w/wo contx tonight (7/1). - abx changed to CTX 1 g QD on 7/1. Zosyn d/c'd  - Preliminary CT A/P w/wo contx shows interval resolution of left renal abscess - abx changed to CTX 1 g QD on 7/1. Zosyn d/c'd  - CT A/P w/wo contx shows interval resolution of left renal abscess

## 2020-07-02 NOTE — PROGRESS NOTE ADULT - PROBLEM SELECTOR PLAN 2
Pt presented with 2 days of hematuria 2/2 cystitis. 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  - received enema on 6/29   - ultrasound shows resolution of renal abscess.  - monitor for fever, trend WBC/Cr  - uro following Pt presented with 2 days of hematuria 2/2 cystitis. 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  - ultrasound shows resolution of renal abscess.  - monitor for fever, trend WBC/Cr  - uro following

## 2020-07-02 NOTE — PROGRESS NOTE ADULT - ASSESSMENT
65 yo M pmhx prolactinoma, DM2 on insulin, HTN, CAD w. LAD stent and restent after thrombosis, depression presenting with 2 days of hematuria 2/2 cystitis CT a/p findings of 2.5 cm Left renal abscess. Pt also presenting with 6 months of weight loss/FTT 2/2 neuromuscular disorder (?ALS) vs paraneoplastic vs infectious. Course c/b by seizure-like activity. 65 yo M pmhx prolactinoma, DM2 on insulin, HTN, CAD w. LAD stent and restent after thrombosis, depression presenting with 2 days of hematuria 2/2 cystitis CT a/p findings of 2.5 cm Left renal abscess now resolving. Pt also presenting with 6 months of weight loss/FTT 2/2 neuromuscular disorder (?ALS) vs paraneoplastic vs infectious. Course c/b by seizure-like activity.

## 2020-07-02 NOTE — PROGRESS NOTE ADULT - SUBJECTIVE AND OBJECTIVE BOX
SUBJECTIVE/INTERVAL EVENTS:  No acute events overnight. Pt seen and examined at bedside. Pt's dose of clonazepam was lowered to .25 mg yesterday; one dose held at night. This morning, nurse noted irregular movement of his left arm and then administered clonazepam which seemed to alleviate the movement. When seen by neuro team, pt appeared more awake than yesterday but was still dozing off intermittently.  Mild movement of left arm was observed. Continues to be hypophonic.     REVIEW OF SYSTEMS : All other systems are negative except as was mentioned in the subjective.    MEDICATIONS  (STANDING):  aspirin enteric coated 81 milliGRAM(s) Oral daily  atorvastatin 80 milliGRAM(s) Oral at bedtime  bromocriptine Capsule 5 milliGRAM(s) Oral daily  cefTRIAXone   IVPB 1000 milliGRAM(s) IV Intermittent every 24 hours  clonazePAM  Tablet 0.25 milliGRAM(s) Oral every 8 hours  dextrose 5%. 1000 milliLiter(s) (50 mL/Hr) IV Continuous <Continuous>  dextrose 50% Injectable 12.5 Gram(s) IV Push once  dextrose 50% Injectable 25 Gram(s) IV Push once  dextrose 50% Injectable 25 Gram(s) IV Push once  heparin   Injectable 5000 Unit(s) SubCutaneous every 8 hours  insulin lispro (HumaLOG) corrective regimen sliding scale   SubCutaneous three times a day before meals  insulin lispro (HumaLOG) corrective regimen sliding scale   SubCutaneous at bedtime  pantoprazole    Tablet 40 milliGRAM(s) Oral before breakfast  polyethylene glycol 3350 17 Gram(s) Oral daily  potassium phosphate / sodium phosphate Powder (PHOS-NaK) 1 Packet(s) Oral once  sertraline 50 milliGRAM(s) Oral daily  thiamine IVPB 500 milliGRAM(s) IV Intermittent every 8 hours  trihexyphenidyl 1 milliGRAM(s) Oral once    MEDICATIONS  (PRN):  dextrose 40% Gel 15 Gram(s) Oral once PRN Blood Glucose LESS THAN 70 milliGRAM(s)/deciliter  glucagon  Injectable 1 milliGRAM(s) IntraMuscular once PRN Glucose LESS THAN 70 milligrams/deciliter    Vital Signs Last 24 Hrs  T(C): 36.9 (02 Jul 2020 07:29), Max: 37.3 (01 Jul 2020 23:43)  T(F): 98.4 (02 Jul 2020 07:29), Max: 99.1 (01 Jul 2020 23:43)  HR: 78 (02 Jul 2020 07:29) (60 - 90)  BP: 112/68 (02 Jul 2020 07:29) (107/66 - 137/70)  BP(mean): --  RR: 18 (02 Jul 2020 07:29) (17 - 18)  SpO2: 98% (02 Jul 2020 07:29) (96% - 98%)    General: Cachectic man, appears older than stated age, in no apparent distress including pain. Diagonally oriented on the bed with head towards the left end and feet almost hanging off the right end.     Neurological:  MS: Awake, less drowsy than yesterday, oriented to person, place, date and situation. Normal affect. Follows simple commands.   Language: Speech is hypophonic, fluent   CNs: Poor eye contact, PERRL (R = 3mm, L = 3mm).  Decreased upgaze.  EOMI no nystagmus.  No facial asymmetry b/l.  Hypophonic voice.  Neck flexion/extension 5/5 strength.    Motor: Diffusely decreased bulk, worse in RLE/RUE w/ some rigidity. Mild tremulousness. 4/5 strength LUE, 4/5 RUE. 3/5 LLE, 4-/5 RLE. Fasciculations and irregular movement of left arm observed. No lip smacking observed.    LABS AND STUDIES:  LABS:             CBC Full  -  ( 02 Jul 2020 07:07 )  WBC Count : 7.20 K/uL  RBC Count : 3.69 M/uL  Hemoglobin : 9.7 g/dL  Hematocrit : 30.7 %  Platelet Count - Automated : 232 K/uL  Mean Cell Volume : 83.2 fl  Mean Cell Hemoglobin : 26.3 pg  Mean Cell Hemoglobin Concentration : 31.6 gm/dL  Auto Neutrophil # : x  Auto Lymphocyte # : x  Auto Monocyte # : x  Auto Eosinophil # : x  Auto Basophil # : x  Auto Neutrophil % : x  Auto Lymphocyte % : x  Auto Monocyte % : x  Auto Eosinophil % : x  Auto Basophil % : x    07-02    132<L>  |  99  |  16  ----------------------------<  136<H>  3.7   |  23  |  1.05    Ca    9.2      02 Jul 2020 07:07  Phos  2.1     07-02  Mg     2.2     07-02    TPro  6.0  /  Alb  3.2<L>  /  TBili  0.5  /  DBili  x   /  AST  40  /  ALT  29  /  AlkPhos  109  07-01    CAPILLARY BLOOD GLUCOSE      POCT Blood Glucose.: 159 mg/dL (02 Jul 2020 08:09)  POCT Blood Glucose.: 183 mg/dL (01 Jul 2020 21:43)  POCT Blood Glucose.: 141 mg/dL (01 Jul 2020 17:42)  POCT Blood Glucose.: 163 mg/dL (01 Jul 2020 12:57)      Radiology:  < from: MR Head No Cont (06.28.20 @ 17:24) >  IMPRESSION:  No major stroke or structural lesion identified. Recommend repeat when the patient is able to better cooperate or with anesthesia.    NIDIA DESAI M.D., ATTENDING RADIOLOGIST  This document has been electronically signed. Jun 28 2020  5:28PM  < end of copied text >    EEG:      EEG Summary / Classification:  Normal EEG in the awake, drowsy and asleep states    EEG Impression / Clinical Correlate:  Normal prolonged EEG study. (leads progressively degraded overnight, finally off head at 4:09a)  No epileptic pattern or seizure seen.    Study Date: 6/27/2020	Start Time: 3:55:08 PM      End Date:   6/28/2020	End Time: 08:00:04 AM   (leads off at 04:09a 6/28/20)  Study Duration: 15h 41m  ________________________________________      Avel Hopson MD PhD  Director, Epilepsy Division, [facility]

## 2020-07-02 NOTE — PROGRESS NOTE ADULT - ATTENDING COMMENTS
Patient seen and examined, case d/w house staff, neuro service as well as ID.  Plan for LP today to further w/u potential neurologic cause of his presentation, weight loss, muscle atrophy, hypophonia, spastic involuntary movements.

## 2020-07-02 NOTE — PROGRESS NOTE ADULT - ASSESSMENT
64y Male pmhx DM2 on insulin, HTN, CAD w. LAD stent/restent after thrombosis, depression. 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    Repeat CT with resolution of left renal abscess and resolution of bladder air    Plan  - Continue antibiotics; appreciate ID recs  - Urine/blood cultures negative (likely due to pre-admission abx)  - Once patient having regular bowel movements, can remove griffith and give trial of void  - Hematuria likely 2/2 to UTI with only 10 RBCs in urine specimen. Patient can followup as outpatient for a repeat urinalysis to assess for persistent of microscopic hematuria with resolution of UTI.  - No further workup or intervention needed while inpatient  - If patient eventually fails trial of void, can discharge home with griffith catheter and flomax and followup as outpatient for repeat trial of void     [facility]  [address] [place] [address], [address] [address] [phone]

## 2020-07-02 NOTE — PROGRESS NOTE ADULT - SUBJECTIVE AND OBJECTIVE BOX
***INCOMPLETE NOTE***    Patient is a 64y old  Male who presented with a chief complaint of Hematuria; weight loss     SUBJECTIVE / OVERNIGHT EVENTS: Pt seen and examined at bedside. Patient was lethargic this morning, and no longer exhibiting seizure-like movements.    ADDITIONAL REVIEW OF SYSTEMS:  unable to fully assess.     PHYSICAL EXAM:  Vital Signs Last 24 Hrs  T(C): 36.9 (07-01-20 @ 08:40)  T(F): 98.5 (07-01-20 @ 08:40), Max: 98.9 (07-01-20 @ 01:08)  HR: 71 (07-01-20 @ 08:40) (70 - 85)  BP: 114/66 (07-01-20 @ 08:40)  BP(mean): --  RR: 18 (07-01-20 @ 08:40) (18 - 18)  SpO2: 98% (07-01-20 @ 08:40) (96% - 99%)  Wt(kg): --    06-30 @ 07:01  -  07-01 @ 07:00  --------------------------------------------------------  IN: 400 mL / OUT: 900 mL / NET: -500 mL    07-01 @ 07:01  -  07-01 @ 15:28  --------------------------------------------------------  IN: 100 mL / OUT: 600 mL / NET: -500 mL      CONSTITUTIONAL: NAD, cachetic. ***  EYES: PERRLA; EOMI, clear conjunctiva and + xanthelasma on sclera  RESPIRATORY:  could not assess due to patient unable to cooperate   CARDIOVASCULAR: Regular rate and rhythm, No lower extremity edema;   ABDOMEN: +Bowel sounds; nontender to palpation   NEUROLOGY: non-focal, bilateral UE 5/5 on flexion and 4/5 on extension; bilateral LE hip flexion 4/5; R lower leg flexion 2/5; L lower leg 3/5.   SKIN: No rashes; no palpable lesions Patient is a 64y old  Male who presented with a chief complaint of Hematuria; weight loss     SUBJECTIVE / OVERNIGHT EVENTS: No acute events overnight. Pt seen and examined at bedside. Patient reports feeling better and can speak audibly with significantly less hypophonia. Denies SOB, CP, palpitations, n/v/d. Per nurse, he had a small BM yesterday.     Of note, an episode of lip smacking and RLE automatism was noted at least once during encounter and lasted several seconds. Pt inquired on whether or not insurance covered his planned LP.     ADDITIONAL REVIEW OF SYSTEMS: As previously noted; otherwise negative.    PHYSICAL EXAM:  Vital Signs Last 24 Hrs  T(C): 36.9 (02 Jul 2020 07:29), Max: 37.3 (01 Jul 2020 23:43)  T(F): 98.4 (02 Jul 2020 07:29), Max: 99.1 (01 Jul 2020 23:43)  HR: 78 (02 Jul 2020 07:29) (60 - 90)  BP: 112/68 (02 Jul 2020 07:29) (107/66 - 137/70)  BP(mean): --  RR: 18 (02 Jul 2020 07:29) (17 - 18)  SpO2: 98% (02 Jul 2020 07:29) (96% - 98%)      06-30 @ 07:01  -  07-01 @ 07:00  --------------------------------------------------------  IN: 400 mL / OUT: 900 mL / NET: -500 mL    07-01 @ 07:01 - 07-01 @ 15:28  --------------------------------------------------------  IN: 100 mL / OUT: 600 mL / NET: -500 mL      CONSTITUTIONAL: NAD, cachetic. ***  EYES: PERRLA; EOMI, clear conjunctiva and + xanthelasma on sclera  RESPIRATORY:  CTAB, no wheezes, crackles or ronchi appreciated  CARDIOVASCULAR: Regular rate and rhythm, normal s1 and s2; no murmurs/rubs/gallops appreciated. No lower extremity edema;   ABDOMEN: +Bowel sounds; nontender to palpation, abdomen was non-distended and appeared sunken.   NEUROLOGY: non-focal, bilateral UE 5/5 on flexion//RUE 5/5 vs LUE 4/5 on extension; bilateral LE hip flexion 4/5; R lower leg flexion 2/5; L lower leg 2/5.  SKIN: No rashes; no palpable lesions Patient is a 64y old  Male who presented with a chief complaint of Hematuria; weight loss     SUBJECTIVE / OVERNIGHT EVENTS: No acute events overnight. Pt seen and examined at bedside. Patient reports feeling better and can speak audibly with significantly less hypophonia. Denies SOB, CP, palpitations, n/v/d. Per nurse, he had a small BM yesterday.     Of note, an episode of lip smacking and RLE automatism was noted at least once during encounter and lasted several seconds. Pt inquired on whether or not insurance covered his planned LP.     ADDITIONAL REVIEW OF SYSTEMS: As previously noted; otherwise negative.    PHYSICAL EXAM:  Vital Signs Last 24 Hrs  T(C): 36.9 (02 Jul 2020 07:29), Max: 37.3 (01 Jul 2020 23:43)  T(F): 98.4 (02 Jul 2020 07:29), Max: 99.1 (01 Jul 2020 23:43)  HR: 78 (02 Jul 2020 07:29) (60 - 90)  BP: 112/68 (02 Jul 2020 07:29) (107/66 - 137/70)  BP(mean): --  RR: 18 (02 Jul 2020 07:29) (17 - 18)  SpO2: 98% (02 Jul 2020 07:29) (96% - 98%)      06-30 @ 07:01  -  07-01 @ 07:00  --------------------------------------------------------  IN: 400 mL / OUT: 900 mL / NET: -500 mL    07-01 @ 07:01 - 07-01 @ 15:28  --------------------------------------------------------  IN: 100 mL / OUT: 600 mL / NET: -500 mL      CONSTITUTIONAL: NAD, cachetic.  EYES: PERRLA; EOMI, clear conjunctiva and + xanthelasma on sclera  NECK: +limited ROM of neck (to the right)  RESPIRATORY:  CTAB, no wheezes, crackles or rhonchi appreciated  CARDIOVASCULAR: Regular rate and rhythm, normal s1 and s2; no murmurs/rubs/gallops appreciated. No lower extremity edema;   ABDOMEN: +Bowel sounds; nontender to palpation, abdomen was non-distended and appeared sunken.   NEUROLOGY: non-focal, bilateral UE 5/5 on flexion//RUE 5/5 vs LUE 4/5 on extension; bilateral LE hip flexion 4/5; R lower leg flexion 2/5; L lower leg 2/5.  SKIN: No rashes; no palpable lesions

## 2020-07-02 NOTE — PROGRESS NOTE ADULT - PROBLEM SELECTOR PLAN 7
resolved. 2/2 Metabolic encephalopathy  - dc-ed wellbutrin   - c/w sertraline   - frequent reorientation

## 2020-07-02 NOTE — PROGRESS NOTE ADULT - PROBLEM SELECTOR PLAN 3
-Seizure-like activity ceased today, likely 2/2 Klonopin 0.5 mg dosing yesterday.  -Will continue to monitor.  -LP tomorrow (7/2) -Seizure-like activity ceased on 7/1 likely 2/2 Klonopin 0.5 mg dosing yesterday.  -Will continue to monitor.  -Plan for LP today -Seizure-like activity ceased on 7/1 likely 2/2 Klonopin 0.25 mg dosing yesterday.  -Will continue to monitor.  -Plan for LP today

## 2020-07-02 NOTE — PROGRESS NOTE ADULT - SUBJECTIVE AND OBJECTIVE BOX
Patient is a 64y old  Male who presents with a chief complaint of Hematuria; weight loss (02 Jul 2020 11:36)    Being followed by ID for renal abscess, emphysematous cystitis    Interval history:still with weakness hypophonation  answers some queries  denies any pain   had CT  No acute events      ROS:  No cough,SOB,CP  No N/V/D./abd pain  No other complaints      Antimicrobials:    cefTRIAXone   IVPB 1000 milliGRAM(s) IV Intermittent every 24 hours    Other medications reviewed    Vital Signs Last 24 Hrs  T(C): 36.9 (07-02-20 @ 07:29), Max: 37.3 (07-01-20 @ 23:43)  T(F): 98.4 (07-02-20 @ 07:29), Max: 99.1 (07-01-20 @ 23:43)  HR: 78 (07-02-20 @ 07:29) (60 - 90)  BP: 112/68 (07-02-20 @ 07:29) (107/66 - 137/70)  BP(mean): --  RR: 18 (07-02-20 @ 07:29) (17 - 18)  SpO2: 98% (07-02-20 @ 07:29) (96% - 98%)    Physical Exam:        cachecxia    HEENT PERRLA EOMI    No oral exudate or erythema    Chest Good AE,CTA    CVS RRR S1 S2 WNl No murmur or rub or gallop    Abd soft BS normal No tenderness no masses  griffith    IV site no erythema tenderness or discharge    CNS as above  Lab Data:                          9.7    7.20  )-----------( 232      ( 02 Jul 2020 07:07 )             30.7       07-02    132<L>  |  99  |  16  ----------------------------<  136<H>  3.7   |  23  |  1.05    Ca    9.2      02 Jul 2020 07:07  Phos  2.1     07-02  Mg     2.2     07-02    TPro  6.0  /  Alb  3.2<L>  /  TBili  0.5  /  DBili  x   /  AST  40  /  ALT  29  /  AlkPhos  109  07-01            < from: CT Abdomen and Pelvis w/ IV Cont (07.01.20 @ 18:05) >    IMPRESSION:     Resolving left renal abscess/pyelonephritis.            < end of copied text >  < from: CT Abdomen and Pelvis w/ IV Cont (07.01.20 @ 18:05) >  KIDNEYS/URETERS: An ill-defined hypoattenuating area in the left renal lower pole is decreased. There is minimal perirenal fat stranding and fluid, decreased. Additional left renal hypodensities too small to characterize are unchanged.  BLADDER: The bladder contains Griffith catheter with associated air. Intramural air is resolved.  REPRODUCTIVE ORGANS: Prostate within normal limits.      < end of copied text >

## 2020-07-02 NOTE — PROGRESS NOTE ADULT - ASSESSMENT
Assessment:  64M w/ PMH of DM, HTN, CAD w/ LAD stent, depression, admitted for 2 days of hematuria 2/2 cystitis. Neuro consult for worsening RLE weakness & muscle wasting, with progression to other extremities, and 100lb weight loss for the past year. Pt continues to be hypophonic with impaired upgaze and periodic episodes of lip-smacking and choreiform movements of the extremities during which pt is alert and follows simple commands. No seizure activity on EEG. Following clonazapem administration, no abnormal movements were observed.    Impression: Chronic, progressive RLE monoparesis with diffuse atrophy, 100lb weight loss, flaccid dysarthria, choreiform movements suggestive of athetosis and neck contraction with head turn to the left suggestive of dystonia. DDx remains broad: Flail Leg syndrome, ALS, paraneoplastic motor neuron disease, malignancy, frontotemporal dementia ALS variant, and CJD.    Plan:   - Patient being monitored off EEG/AEDs for now. Conscious and alert during past episodes of irregular movements and lip smacking.  [x] switch ertapenem and bupropion to agents less likely to lower seizure threshold if possible  [x] Repeat CT head  [x] on clonazepam 0.25 mg q8h. Pt appears to be tolerating the dose. Still drowsy but more alert than yesterday.   [x] Add Artane (trihexyphenidyl) today with starting dose of 1 mg PO for 1 day, then 2 mg daily, increase by 2 mg every 3-5 days  [] F/U paraneoplastic panel (anti RI ab pending)  [] LP today for possible paraneoplastic disease  [] consider EMG during this admission. Was originally planned outpatient w/ Dr. Winters (7/6)    ADDITIONAL RECOMMENDATIONS:  [x] Start Thiamine 500mg q8 (ordered)  [] Labs: B1, HIV (ordered)- pending  [] speech eval  [x] soft diet for dysphagia Assessment:  64M w/ PMH of DM, HTN, CAD w/ LAD stent, depression, admitted for 2 days of hematuria 2/2 cystitis. Neuro consult for worsening RLE weakness & muscle wasting, with progression to other extremities, and 100lb weight loss for the past year. Pt continues to be hypophonic with impaired upgaze. Mild irregular movement of LUE observed today. No report of lip-smacking and choreiform movements of the extremities as had been observed prior. No seizure activity on EEG. Adjusted clonazapem dose appears to be adequate as pt is not as lethargic as yesterday. Will add artane and re- assess tomorrow.     Impression: Chronic, progressive RLE monoparesis with diffuse atrophy, 100lb weight loss, flaccid dysarthria, choreiform movements suggestive of athetosis and neck contraction with head turn to the left suggestive of dystonia. DDx remains broad: Flail Leg syndrome, ALS, paraneoplastic motor neuron disease, malignancy, frontotemporal dementia ALS variant, and CJD.    Plan:   - Patient being monitored off EEG/AEDs for now. Conscious and alert during past episodes of lip smacking and irregular movements suggestive of chorea, athetosis, and dystonia.  [x] switch ertapenem and bupropion to agents less likely to lower seizure threshold if possible  [x] Repeat CT head  [x] on clonazepam 0.25 mg q8h. Pt appears to be tolerating the dose. Still drowsy but more alert than yesterday.   [x] Add Artane (trihexyphenidyl) today with starting dose of 1 mg PO for 1 day, then 2 mg daily, increase by 2 mg every 3-5 days  [] F/U paraneoplastic panel (anti RI ab pending)  [] LP today for possible paraneoplastic disease  [] consider EMG during this admission. Was originally planned outpatient w/ Dr. Winters (7/6)    ADDITIONAL RECOMMENDATIONS:  [x] Start Thiamine 500mg q8 (ordered)  [] Labs: B1, HIV (ordered)- pending  [] speech eval  [x] soft diet for dysphagia

## 2020-07-03 LAB
ANION GAP SERPL CALC-SCNC: 12 MMOL/L — SIGNIFICANT CHANGE UP (ref 5–17)
BASOPHILS # BLD AUTO: 0.03 K/UL — SIGNIFICANT CHANGE UP (ref 0–0.2)
BASOPHILS NFR BLD AUTO: 0.5 % — SIGNIFICANT CHANGE UP (ref 0–2)
BUN SERPL-MCNC: 17 MG/DL — SIGNIFICANT CHANGE UP (ref 7–23)
CALCIUM SERPL-MCNC: 9 MG/DL — SIGNIFICANT CHANGE UP (ref 8.4–10.5)
CHLORIDE SERPL-SCNC: 101 MMOL/L — SIGNIFICANT CHANGE UP (ref 96–108)
CO2 SERPL-SCNC: 23 MMOL/L — SIGNIFICANT CHANGE UP (ref 22–31)
CREAT SERPL-MCNC: 1.04 MG/DL — SIGNIFICANT CHANGE UP (ref 0.5–1.3)
EOSINOPHIL # BLD AUTO: 0.11 K/UL — SIGNIFICANT CHANGE UP (ref 0–0.5)
EOSINOPHIL NFR BLD AUTO: 1.7 % — SIGNIFICANT CHANGE UP (ref 0–6)
GLUCOSE BLDC GLUCOMTR-MCNC: 121 MG/DL — HIGH (ref 70–99)
GLUCOSE BLDC GLUCOMTR-MCNC: 130 MG/DL — HIGH (ref 70–99)
GLUCOSE BLDC GLUCOMTR-MCNC: 144 MG/DL — HIGH (ref 70–99)
GLUCOSE BLDC GLUCOMTR-MCNC: 162 MG/DL — HIGH (ref 70–99)
GLUCOSE SERPL-MCNC: 114 MG/DL — HIGH (ref 70–99)
HCT VFR BLD CALC: 29 % — LOW (ref 39–50)
HGB BLD-MCNC: 9.1 G/DL — LOW (ref 13–17)
IMM GRANULOCYTES NFR BLD AUTO: 0.6 % — SIGNIFICANT CHANGE UP (ref 0–1.5)
LYMPHOCYTES # BLD AUTO: 0.97 K/UL — LOW (ref 1–3.3)
LYMPHOCYTES # BLD AUTO: 15.1 % — SIGNIFICANT CHANGE UP (ref 13–44)
MAGNESIUM SERPL-MCNC: 2 MG/DL — SIGNIFICANT CHANGE UP (ref 1.6–2.6)
MCHC RBC-ENTMCNC: 26.1 PG — LOW (ref 27–34)
MCHC RBC-ENTMCNC: 31.4 GM/DL — LOW (ref 32–36)
MCV RBC AUTO: 83.1 FL — SIGNIFICANT CHANGE UP (ref 80–100)
MONOCYTES # BLD AUTO: 0.82 K/UL — SIGNIFICANT CHANGE UP (ref 0–0.9)
MONOCYTES NFR BLD AUTO: 12.8 % — SIGNIFICANT CHANGE UP (ref 2–14)
NEUTROPHILS # BLD AUTO: 4.44 K/UL — SIGNIFICANT CHANGE UP (ref 1.8–7.4)
NEUTROPHILS NFR BLD AUTO: 69.3 % — SIGNIFICANT CHANGE UP (ref 43–77)
NRBC # BLD: 0 /100 WBCS — SIGNIFICANT CHANGE UP (ref 0–0)
PHOSPHATE SERPL-MCNC: 2.2 MG/DL — LOW (ref 2.5–4.5)
PLATELET # BLD AUTO: 211 K/UL — SIGNIFICANT CHANGE UP (ref 150–400)
POTASSIUM SERPL-MCNC: 3.5 MMOL/L — SIGNIFICANT CHANGE UP (ref 3.5–5.3)
POTASSIUM SERPL-SCNC: 3.5 MMOL/L — SIGNIFICANT CHANGE UP (ref 3.5–5.3)
RBC # BLD: 3.49 M/UL — LOW (ref 4.2–5.8)
RBC # FLD: 16.6 % — HIGH (ref 10.3–14.5)
SARS-COV-2 RNA SPEC QL NAA+PROBE: SIGNIFICANT CHANGE UP
SODIUM SERPL-SCNC: 136 MMOL/L — SIGNIFICANT CHANGE UP (ref 135–145)
WBC # BLD: 6.41 K/UL — SIGNIFICANT CHANGE UP (ref 3.8–10.5)
WBC # FLD AUTO: 6.41 K/UL — SIGNIFICANT CHANGE UP (ref 3.8–10.5)

## 2020-07-03 PROCEDURE — 99233 SBSQ HOSP IP/OBS HIGH 50: CPT | Mod: GC

## 2020-07-03 RX ORDER — SODIUM CHLORIDE 9 MG/ML
1000 INJECTION, SOLUTION INTRAVENOUS
Refills: 0 | Status: DISCONTINUED | OUTPATIENT
Start: 2020-07-03 | End: 2020-07-04

## 2020-07-03 RX ORDER — SODIUM,POTASSIUM PHOSPHATES 278-250MG
1 POWDER IN PACKET (EA) ORAL ONCE
Refills: 0 | Status: COMPLETED | OUTPATIENT
Start: 2020-07-03 | End: 2020-07-03

## 2020-07-03 RX ADMIN — Medication 1: at 13:16

## 2020-07-03 RX ADMIN — Medication 81 MILLIGRAM(S): at 13:17

## 2020-07-03 RX ADMIN — Medication 0.25 MILLIGRAM(S): at 21:58

## 2020-07-03 RX ADMIN — Medication 105 MILLIGRAM(S): at 05:54

## 2020-07-03 RX ADMIN — Medication 105 MILLIGRAM(S): at 22:01

## 2020-07-03 RX ADMIN — POLYETHYLENE GLYCOL 3350 17 GRAM(S): 17 POWDER, FOR SOLUTION ORAL at 13:17

## 2020-07-03 RX ADMIN — CEFTRIAXONE 100 MILLIGRAM(S): 500 INJECTION, POWDER, FOR SOLUTION INTRAMUSCULAR; INTRAVENOUS at 16:30

## 2020-07-03 RX ADMIN — Medication 2 MILLIGRAM(S): at 04:18

## 2020-07-03 RX ADMIN — HEPARIN SODIUM 5000 UNIT(S): 5000 INJECTION INTRAVENOUS; SUBCUTANEOUS at 05:54

## 2020-07-03 RX ADMIN — Medication 105 MILLIGRAM(S): at 15:09

## 2020-07-03 RX ADMIN — HEPARIN SODIUM 5000 UNIT(S): 5000 INJECTION INTRAVENOUS; SUBCUTANEOUS at 15:14

## 2020-07-03 RX ADMIN — SERTRALINE 50 MILLIGRAM(S): 25 TABLET, FILM COATED ORAL at 13:17

## 2020-07-03 RX ADMIN — BROMOCRIPTINE MESYLATE 5 MILLIGRAM(S): 5 CAPSULE ORAL at 13:17

## 2020-07-03 RX ADMIN — Medication 0.25 MILLIGRAM(S): at 05:54

## 2020-07-03 RX ADMIN — Medication 1 PACKET(S): at 13:17

## 2020-07-03 RX ADMIN — PANTOPRAZOLE SODIUM 40 MILLIGRAM(S): 20 TABLET, DELAYED RELEASE ORAL at 05:54

## 2020-07-03 RX ADMIN — ATORVASTATIN CALCIUM 80 MILLIGRAM(S): 80 TABLET, FILM COATED ORAL at 21:58

## 2020-07-03 RX ADMIN — HEPARIN SODIUM 5000 UNIT(S): 5000 INJECTION INTRAVENOUS; SUBCUTANEOUS at 21:58

## 2020-07-03 RX ADMIN — Medication 0.25 MILLIGRAM(S): at 15:13

## 2020-07-03 NOTE — PROGRESS NOTE ADULT - PROBLEM SELECTOR PLAN 1
- abx changed to CTX 1 g QD on 7/1. Zosyn d/c'd  - CT A/P w/wo contx shows interval resolution of left renal abscess

## 2020-07-03 NOTE — PROGRESS NOTE ADULT - ASSESSMENT
65 yo M pmhx prolactinoma, DM2 on insulin, HTN, CAD w. LAD stent and restent after thrombosis, depression presenting with 2 days of hematuria 2/2 cystitis CT a/p findings of 2.5 cm Left renal abscess now resolving. Pt also presenting with 6 months of weight loss/FTT 2/2 neuromuscular disorder (?ALS) vs paraneoplastic vs infectious. Course c/b by seizure-like activity. 65 yo M pmhx prolactinoma, DM2 on insulin, HTN, CAD w. 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 Pt also presenting with 6 months of weight loss/FTT 2/2 neuromuscular disorder (?ALS) vs paraneoplastic vs infectious. Course c/b by seizure-like activity. At this time, we are following the results of the LP. Additional history was gathered from family and spouse that seems suggestive for ALS. We will await an in-hospital EMG.

## 2020-07-03 NOTE — PROGRESS NOTE ADULT - ATTENDING COMMENTS
Patient seen and examined, case d/w house staff, urology, neuro service as well as ID.  s/p LP 7/2, gram stain negative so far, high glucose and protein, additional tests still pending for cause of his presentation, weight loss, muscle atrophy, hypophonia, spastic involuntary movements.  Story consistent with ALS or other neurological disorder.   follow up neuro  c/w ceftriaxone to complete 7 day course for pyelonephritis/renal abscess - resolving on CT from 7/1.  Consider TOV in upcoming days if moving bowels.

## 2020-07-03 NOTE — PROGRESS NOTE ADULT - PROBLEM SELECTOR PLAN 6
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 As above iso FTT with global weakness  - dietician consult  - PT consulted, recommending MARI  - neuro consulted; pending paraneoplastic panel; plan for LP today.  - will f/u for EMG w/ Dr. Winters on July 7th

## 2020-07-03 NOTE — PROGRESS NOTE ADULT - SUBJECTIVE AND OBJECTIVE BOX
***INCOMPLETE NOTE***    Bandar Jernigan MD PGY-1  Internal Medicine  Pager [phone] / [phone]    Patient is a 64y old  Male who presented with a chief complaint of Hematuria; weight loss     SUBJECTIVE / OVERNIGHT EVENTS: No acute events overnight. Pt seen and examined at bedside. Patient reports feeling better and can speak audibly with significantly less hypophonia. Denies SOB, CP, palpitations, n/v/d. Per nurse, he had a small BM yesterday.     Of note, an episode of lip smacking and RLE automatism was noted at least once during encounter and lasted several seconds. Pt inquired on whether or not insurance covered his planned LP.     ADDITIONAL REVIEW OF SYSTEMS: As previously noted; otherwise negative.    PHYSICAL EXAM:  Vital Signs Last 24 Hrs  T(C): 36.9 (02 Jul 2020 07:29), Max: 37.3 (01 Jul 2020 23:43)  T(F): 98.4 (02 Jul 2020 07:29), Max: 99.1 (01 Jul 2020 23:43)  HR: 78 (02 Jul 2020 07:29) (60 - 90)  BP: 112/68 (02 Jul 2020 07:29) (107/66 - 137/70)  BP(mean): --  RR: 18 (02 Jul 2020 07:29) (17 - 18)  SpO2: 98% (02 Jul 2020 07:29) (96% - 98%)      06-30 @ 07:01 - 07-01 @ 07:00  --------------------------------------------------------  IN: 400 mL / OUT: 900 mL / NET: -500 mL    07-01 @ 07:01 - 07-01 @ 15:28  --------------------------------------------------------  IN: 100 mL / OUT: 600 mL / NET: -500 mL      CONSTITUTIONAL: NAD, cachetic.  EYES: PERRLA; EOMI, clear conjunctiva and + xanthelasma on sclera  NECK: +limited ROM of neck (to the right)  RESPIRATORY:  CTAB, no wheezes, crackles or rhonchi appreciated  CARDIOVASCULAR: Regular rate and rhythm, normal s1 and s2; no murmurs/rubs/gallops appreciated. No lower extremity edema;   ABDOMEN: +Bowel sounds; nontender to palpation, abdomen was non-distended and appeared sunken.   NEUROLOGY: non-focal, bilateral UE 5/5 on flexion//RUE 5/5 vs LUE 4/5 on extension; bilateral LE hip flexion 4/5; R lower leg flexion 2/5; L lower leg 2/5.  SKIN: No rashes; no palpable lesions Bandar Jernigan MD PGY-1  Internal Medicine  Pager [phone] / [phone]    Patient is a 64y old  Male who presented with a chief complaint of Hematuria; weight loss     SUBJECTIVE / OVERNIGHT EVENTS:  -NAEON.  -Patient remains hypophonic at today's encounter. He was coherent. He did not endorse any new symptoms.   -Yesterday, I spoke with patient's daughter-in-law (Holger's wife, Shyla.) She was able to add to the patient's history. She reports that symptoms began last summer and progressively worsened. The symptoms started with difficulty walking due to lower extremity weakness / lack of control. This was accompanied by onset of difficulty with speech. His tremors also started and progressively worsened over the last year. Patient did not have the wide-amplitude tremors like he had during this hospital course (i.e., the seizure-like activity exhibited earlier this week).    ADDITIONAL REVIEW OF SYSTEMS: As previously noted; otherwise negative.    PHYSICAL EXAM:  Vital Signs Last 24 Hrs  T(C): 36.9 (07-03-20 @ 16:22)  T(F): 98.4 (07-03-20 @ 16:22), Max: 98.4 (07-03-20 @ 16:22)  HR: 72 (07-03-20 @ 16:22) (72 - 81)  BP: 116/64 (07-03-20 @ 16:22)  BP(mean): --  RR: 16 (07-03-20 @ 16:22) (16 - 18)  SpO2: 99% (07-03-20 @ 16:22) (96% - 99%)  Wt(kg): --    07-02 @ 07:01  -  07-03 @ 07:00  --------------------------------------------------------  IN: 360 mL / OUT: 550 mL / NET: -190 mL    07-03 @ 07:01  -  07-03 @ 20:39  --------------------------------------------------------  IN: 236 mL / OUT: 250 mL / NET: -14 mL      CONSTITUTIONAL: NAD, cachetic.  EYES: PERRLA; EOMI, clear conjunctiva and + xanthelasma on sclera  NECK: +limited ROM of neck (to the right)  RESPIRATORY:  CTAB, no wheezes, crackles or rhonchi appreciated  CARDIOVASCULAR: Regular rate and rhythm, normal s1 and s2; no murmurs/rubs/gallops appreciated. No lower extremity edema;   ABDOMEN: +Bowel sounds; nontender to palpation, abdomen was non-distended and appeared sunken.   NEUROLOGY: non-focal, bilateral UE 5/5 on flexion//RUE 5/5 vs LUE 4/5 on extension; bilateral LE hip flexion 4/5; R lower leg flexion 2/5; L lower leg 2/5.  SKIN: No rashes; no palpable lesions                          9.1    6.41  )-----------( 211      ( 03 Jul 2020 06:11 )             29.0       07-03    136  |  101  |  17  ----------------------------<  114<H>  3.5   |  23  |  1.04    Ca    9.0      03 Jul 2020 06:11  Phos  2.2     07-03  Mg     2.0     07-03

## 2020-07-03 NOTE — PROGRESS NOTE ADULT - PROBLEM SELECTOR PLAN 3
-Seizure-like activity ceased on 7/1 likely 2/2 Klonopin 0.25 mg dosing yesterday.  -Will continue to monitor.  -Plan for LP today -Seizure-like activity ceased on 7/1 likely 2/2 Klonopin 0.25 mg dosing yesterday.  -Will continue to monitor.  -LP yesterday -- f/u results. Negative results at this time (7/3).

## 2020-07-03 NOTE — PROGRESS NOTE ADULT - PROBLEM SELECTOR PLAN 2
Pt presented with 2 days of hematuria 2/2 cystitis. 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  - ultrasound shows resolution of renal abscess.  - monitor for fever, trend WBC/Cr  - uro following Pt presented with 2 days of hematuria 2/2 cystitis. 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  - ultrasound shows resolution of renal abscess.  - monitor for fever, trend WBC/Cr  - uro following  - monitoring UOP (7/3) - if low trend continues, will replete with NS.

## 2020-07-03 NOTE — CHART NOTE - NSCHARTNOTEFT_GEN_A_CORE
Nutrition Follow Up Note  Patient seen for: calorie count     Interim events noted, chart reviewed. Pt continues to be followed by Neurology. Pt c FTT. Noted pt remains on PO diet per pt preference.     Source: pt, RN     Diet : Diet, Soft:   Supplement Feeding Modality:  Oral  Ensure Enlive Cans or Servings Per Day:  1       Frequency:  Two Times a day (06-25-20 @ 12:01)    Patient reports:    Daily Weight: UBW of 120 pounds; 6/24: 129.8 pounds and 7/1: 119.2 pounds, would continue to monitor     Pertinent Medications: MEDICATIONS  (STANDING):  aspirin enteric coated 81 milliGRAM(s) Oral daily  atorvastatin 80 milliGRAM(s) Oral at bedtime  bromocriptine Capsule 5 milliGRAM(s) Oral daily  cefTRIAXone   IVPB 1000 milliGRAM(s) IV Intermittent every 24 hours  clonazePAM  Tablet 0.25 milliGRAM(s) Oral every 8 hours  dextrose 5%. 1000 milliLiter(s) (50 mL/Hr) IV Continuous <Continuous>  dextrose 50% Injectable 12.5 Gram(s) IV Push once  dextrose 50% Injectable 25 Gram(s) IV Push once  dextrose 50% Injectable 25 Gram(s) IV Push once  heparin   Injectable 5000 Unit(s) SubCutaneous every 8 hours  insulin lispro (HumaLOG) corrective regimen sliding scale   SubCutaneous three times a day before meals  insulin lispro (HumaLOG) corrective regimen sliding scale   SubCutaneous at bedtime  pantoprazole    Tablet 40 milliGRAM(s) Oral before breakfast  polyethylene glycol 3350 17 Gram(s) Oral daily  potassium phosphate / sodium phosphate Powder (PHOS-NaK) 1 Packet(s) Oral once  sertraline 50 milliGRAM(s) Oral daily  thiamine IVPB 500 milliGRAM(s) IV Intermittent every 8 hours    MEDICATIONS  (PRN):  dextrose 40% Gel 15 Gram(s) Oral once PRN Blood Glucose LESS THAN 70 milliGRAM(s)/deciliter  glucagon  Injectable 1 milliGRAM(s) IntraMuscular once PRN Glucose LESS THAN 70 milligrams/deciliter    Pertinent Labs: 07-03 @ 06:11: Na 136, BUN 17, Cr 1.04, <H>, K+ 3.5, Phos 2.2<L>, Mg 2.0, Alk Phos --, ALT/SGPT --, AST/SGOT --, HbA1c --    Finger Sticks:  POCT Blood Glucose.: 121 mg/dL (07-03 @ 07:51)  POCT Blood Glucose.: 146 mg/dL (07-02 @ 21:27)  POCT Blood Glucose.: 131 mg/dL (07-02 @ 17:09)  POCT Blood Glucose.: 147 mg/dL (07-02 @ 12:23)      Skin per nursing documentation: no pressure injuries   Edema: none at this time     Estimated Needs:   [x] no change since previous assessment      Previous Nutrition Diagnosis: moderate malnutrition   Nutrition Diagnosis continues, care plan in progress     New Nutrition Diagnosis: none at this time       Recommend  1) Continue c current diet as per pt request.   2) Continue w/ Ensure Enlive.   3) Encouraged intake as tolerated- nutrient dense meals and snacks.     Monitoring and Evaluation:     Continue to monitor Nutritional intake, Tolerance to diet prescription, weights, labs, skin integrity    RD remains available upon request and will follow up per protocol  Belkis Lopez MS RD CDN [facility],  #[phone] Nutrition Follow Up Note  Patient seen for: calorie count     Interim events noted, chart reviewed. Pt continues to be followed by Neurology. Pt c FTT. Noted pt remains on PO diet per pt preference. As per team, plan for calorie count to assess for how much pt is currently consuming.     Source: pt, RN    Diet : Diet, Soft:   Supplement Feeding Modality:  Oral  Ensure Enlive Cans or Servings Per Day:  1       Frequency:  Two Times a day (06-25-20 @ 12:01)    Patient hypophonic, barely able to hear pt at this time, states he is trying to eat, has been taking Ensure Enlive at times, likes chocolate milk. As per RN and PCA, pt was able to have some doss and half of his [place] Toast this morning and drank a whole 8 ounces of chocolate milk. Per RN, pt was able to eat a little better when son visited yesterday, spouse plans on visiting today.     Daily Weight: UBW of 120 pounds; 6/24: 129.8 pounds and 7/1: 119.2 pounds, would continue to monitor     Pertinent Medications: MEDICATIONS  (STANDING):  aspirin enteric coated 81 milliGRAM(s) Oral daily  atorvastatin 80 milliGRAM(s) Oral at bedtime  bromocriptine Capsule 5 milliGRAM(s) Oral daily  cefTRIAXone   IVPB 1000 milliGRAM(s) IV Intermittent every 24 hours  clonazePAM  Tablet 0.25 milliGRAM(s) Oral every 8 hours  dextrose 5%. 1000 milliLiter(s) (50 mL/Hr) IV Continuous <Continuous>  dextrose 50% Injectable 12.5 Gram(s) IV Push once  dextrose 50% Injectable 25 Gram(s) IV Push once  dextrose 50% Injectable 25 Gram(s) IV Push once  heparin   Injectable 5000 Unit(s) SubCutaneous every 8 hours  insulin lispro (HumaLOG) corrective regimen sliding scale   SubCutaneous three times a day before meals  insulin lispro (HumaLOG) corrective regimen sliding scale   SubCutaneous at bedtime  pantoprazole    Tablet 40 milliGRAM(s) Oral before breakfast  polyethylene glycol 3350 17 Gram(s) Oral daily  potassium phosphate / sodium phosphate Powder (PHOS-NaK) 1 Packet(s) Oral once  sertraline 50 milliGRAM(s) Oral daily  thiamine IVPB 500 milliGRAM(s) IV Intermittent every 8 hours    MEDICATIONS  (PRN):  dextrose 40% Gel 15 Gram(s) Oral once PRN Blood Glucose LESS THAN 70 milliGRAM(s)/deciliter  glucagon  Injectable 1 milliGRAM(s) IntraMuscular once PRN Glucose LESS THAN 70 milligrams/deciliter    Pertinent Labs: 07-03 @ 06:11: Na 136, BUN 17, Cr 1.04, <H>, K+ 3.5, Phos 2.2<L>, Mg 2.0, Alk Phos --, ALT/SGPT --, AST/SGOT --, HbA1c --    Finger Sticks:  POCT Blood Glucose.: 121 mg/dL (07-03 @ 07:51)  POCT Blood Glucose.: 146 mg/dL (07-02 @ 21:27)  POCT Blood Glucose.: 131 mg/dL (07-02 @ 17:09)  POCT Blood Glucose.: 147 mg/dL (07-02 @ 12:23)      Skin per nursing documentation: no pressure injuries   Edema: none at this time     Estimated Needs:   [x] no change since previous assessment      Previous Nutrition Diagnosis: moderate malnutrition   Nutrition Diagnosis continues, care plan in progress, to improve as pt willing to accept more PO     New Nutrition Diagnosis: none at this time       Recommend  1) Continue c current diet as per pt request.   2) Continue w/ Ensure Enlive.   3) Encouraged intake as tolerated- nutrient dense meals and snacks. Reviewed importance of intake c pt and plan for calorie count.   4) Calorie count initiated, RN, PCA and pt aware. RD to follow up c results of calorie count.     Monitoring and Evaluation:     Continue to monitor Nutritional intake, Tolerance to diet prescription, weights, labs, skin integrity    RD remains available upon request and will follow up per protocol  Belkis Lopez MS RD CDN [facility],  #[phone]

## 2020-07-04 LAB
ALBUMIN SERPL ELPH-MCNC: 3.6 G/DL — SIGNIFICANT CHANGE UP (ref 3.3–5)
ALP SERPL-CCNC: 121 U/L — HIGH (ref 40–120)
ALT FLD-CCNC: 36 U/L — SIGNIFICANT CHANGE UP (ref 10–45)
ANION GAP SERPL CALC-SCNC: 7 MMOL/L — SIGNIFICANT CHANGE UP (ref 5–17)
AST SERPL-CCNC: 35 U/L — SIGNIFICANT CHANGE UP (ref 10–40)
BILIRUB SERPL-MCNC: 0.5 MG/DL — SIGNIFICANT CHANGE UP (ref 0.2–1.2)
BUN SERPL-MCNC: 18 MG/DL — SIGNIFICANT CHANGE UP (ref 7–23)
CALCIUM SERPL-MCNC: 9.4 MG/DL — SIGNIFICANT CHANGE UP (ref 8.4–10.5)
CHLORIDE SERPL-SCNC: 100 MMOL/L — SIGNIFICANT CHANGE UP (ref 96–108)
CK SERPL-CCNC: 33 U/L — SIGNIFICANT CHANGE UP (ref 30–200)
CO2 SERPL-SCNC: 27 MMOL/L — SIGNIFICANT CHANGE UP (ref 22–31)
CREAT SERPL-MCNC: 0.97 MG/DL — SIGNIFICANT CHANGE UP (ref 0.5–1.3)
GLUCOSE BLDC GLUCOMTR-MCNC: 133 MG/DL — HIGH (ref 70–99)
GLUCOSE BLDC GLUCOMTR-MCNC: 149 MG/DL — HIGH (ref 70–99)
GLUCOSE BLDC GLUCOMTR-MCNC: 150 MG/DL — HIGH (ref 70–99)
GLUCOSE BLDC GLUCOMTR-MCNC: 184 MG/DL — HIGH (ref 70–99)
GLUCOSE SERPL-MCNC: 120 MG/DL — HIGH (ref 70–99)
HCT VFR BLD CALC: 30.2 % — LOW (ref 39–50)
HGB BLD-MCNC: 9.6 G/DL — LOW (ref 13–17)
MAGNESIUM SERPL-MCNC: 2 MG/DL — SIGNIFICANT CHANGE UP (ref 1.6–2.6)
MCHC RBC-ENTMCNC: 26.2 PG — LOW (ref 27–34)
MCHC RBC-ENTMCNC: 31.8 GM/DL — LOW (ref 32–36)
MCV RBC AUTO: 82.5 FL — SIGNIFICANT CHANGE UP (ref 80–100)
NRBC # BLD: 0 /100 WBCS — SIGNIFICANT CHANGE UP (ref 0–0)
PHOSPHATE SERPL-MCNC: 3.1 MG/DL — SIGNIFICANT CHANGE UP (ref 2.5–4.5)
PLATELET # BLD AUTO: 208 K/UL — SIGNIFICANT CHANGE UP (ref 150–400)
POTASSIUM SERPL-MCNC: 3.5 MMOL/L — SIGNIFICANT CHANGE UP (ref 3.5–5.3)
POTASSIUM SERPL-SCNC: 3.5 MMOL/L — SIGNIFICANT CHANGE UP (ref 3.5–5.3)
PROT SERPL-MCNC: 7.1 G/DL — SIGNIFICANT CHANGE UP (ref 6–8.3)
RBC # BLD: 3.66 M/UL — LOW (ref 4.2–5.8)
RBC # FLD: 16.4 % — HIGH (ref 10.3–14.5)
SODIUM SERPL-SCNC: 134 MMOL/L — LOW (ref 135–145)
VIT B1 SERPL-MCNC: 113.2 NMOL/L — SIGNIFICANT CHANGE UP (ref 66.5–200)
WBC # BLD: 6.56 K/UL — SIGNIFICANT CHANGE UP (ref 3.8–10.5)
WBC # FLD AUTO: 6.56 K/UL — SIGNIFICANT CHANGE UP (ref 3.8–10.5)

## 2020-07-04 PROCEDURE — 99233 SBSQ HOSP IP/OBS HIGH 50: CPT | Mod: GC

## 2020-07-04 PROCEDURE — 99233 SBSQ HOSP IP/OBS HIGH 50: CPT

## 2020-07-04 PROCEDURE — 99232 SBSQ HOSP IP/OBS MODERATE 35: CPT

## 2020-07-04 RX ADMIN — POLYETHYLENE GLYCOL 3350 17 GRAM(S): 17 POWDER, FOR SOLUTION ORAL at 12:14

## 2020-07-04 RX ADMIN — Medication 105 MILLIGRAM(S): at 06:14

## 2020-07-04 RX ADMIN — HEPARIN SODIUM 5000 UNIT(S): 5000 INJECTION INTRAVENOUS; SUBCUTANEOUS at 22:05

## 2020-07-04 RX ADMIN — BROMOCRIPTINE MESYLATE 5 MILLIGRAM(S): 5 CAPSULE ORAL at 12:14

## 2020-07-04 RX ADMIN — Medication 0.25 MILLIGRAM(S): at 22:06

## 2020-07-04 RX ADMIN — Medication 0.25 MILLIGRAM(S): at 06:14

## 2020-07-04 RX ADMIN — HEPARIN SODIUM 5000 UNIT(S): 5000 INJECTION INTRAVENOUS; SUBCUTANEOUS at 14:56

## 2020-07-04 RX ADMIN — ATORVASTATIN CALCIUM 80 MILLIGRAM(S): 80 TABLET, FILM COATED ORAL at 22:06

## 2020-07-04 RX ADMIN — Medication 105 MILLIGRAM(S): at 22:06

## 2020-07-04 RX ADMIN — PANTOPRAZOLE SODIUM 40 MILLIGRAM(S): 20 TABLET, DELAYED RELEASE ORAL at 06:15

## 2020-07-04 RX ADMIN — CEFTRIAXONE 100 MILLIGRAM(S): 500 INJECTION, POWDER, FOR SOLUTION INTRAMUSCULAR; INTRAVENOUS at 14:55

## 2020-07-04 RX ADMIN — SERTRALINE 50 MILLIGRAM(S): 25 TABLET, FILM COATED ORAL at 12:14

## 2020-07-04 RX ADMIN — HEPARIN SODIUM 5000 UNIT(S): 5000 INJECTION INTRAVENOUS; SUBCUTANEOUS at 06:15

## 2020-07-04 RX ADMIN — Medication 105 MILLIGRAM(S): at 14:56

## 2020-07-04 RX ADMIN — Medication 81 MILLIGRAM(S): at 12:14

## 2020-07-04 RX ADMIN — Medication 0.25 MILLIGRAM(S): at 12:17

## 2020-07-04 NOTE — PROGRESS NOTE ADULT - ASSESSMENT
65 yo M pmhx DM2 on insulin, HTN, CAD w. LAD stent/restent after thrombosis, depression presenting with 2 days of hematuria  Patient with no fevers,chills  Leucocytosis as OP  Admitted CT with emphysematous cystitis and renal lesion ? abscess  But also with 80 lb weight loss over 6 months  In hospital minimal leucocytosis, No fevers  Blood and urine cs negative (though did recieve cipro as OP)  s/p 2 weeks of IV abx  repeat CT with resolution of abscess and much improved custitis-no intramural air  Given this would continue current abx but can de-escalate to po soon.  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  delirium  hypophonation/wasting   Neuro input noted  LP with elevated protein-No cell count-PCR and Cx negative-NOT s/o any acute infection  will defer to neuro/primary team       Will tailor plan for ID issues  per course,results.Will defer to primary team on management of other issues.  Assessment, plan and recommendations as detailed above were discussed with the medical/primary  team  Infectious Diseases Service will cover over weekend   Please call [phone] if issues

## 2020-07-04 NOTE — PROGRESS NOTE ADULT - PROBLEM SELECTOR PLAN 3
-Seizure-like activity ceased on 7/1 likely 2/2 Klonopin 0.25 mg dosing yesterday.  -Will continue to monitor.  -LP yesterday -- f/u results. Negative results at this time (7/3). -Seizure-like activity ceased on 7/1 likely 2/2 Klonopin 0.25 mg dosing  -Will continue to monitor.  -LP yesterday -- f/u results. Negative results at this time (7/4).

## 2020-07-04 NOTE — PROGRESS NOTE ADULT - SUBJECTIVE AND OBJECTIVE BOX
Patient seen and examined.  Asked to evaluate patient and provide opinion as to progressive muscle weakness.  Patient is a poor historian, seems confused but endorses that the left leg first became weak about one year ago and he has slowly progressed to have bilateral LE weakness and UE weakness.  He has some painful muscle cramps.  He denies SOB or trouble breathing or eating.      MEDICATIONS  (STANDING):  aspirin enteric coated 81 milliGRAM(s) Oral daily  atorvastatin 80 milliGRAM(s) Oral at bedtime  bromocriptine Capsule 5 milliGRAM(s) Oral daily  cefTRIAXone   IVPB 1000 milliGRAM(s) IV Intermittent every 24 hours  clonazePAM  Tablet 0.25 milliGRAM(s) Oral every 8 hours  dextrose 5%. 1000 milliLiter(s) (50 mL/Hr) IV Continuous <Continuous>  dextrose 50% Injectable 12.5 Gram(s) IV Push once  dextrose 50% Injectable 25 Gram(s) IV Push once  dextrose 50% Injectable 25 Gram(s) IV Push once  heparin   Injectable 5000 Unit(s) SubCutaneous every 8 hours  insulin lispro (HumaLOG) corrective regimen sliding scale   SubCutaneous three times a day before meals  insulin lispro (HumaLOG) corrective regimen sliding scale   SubCutaneous at bedtime  lactated ringers. 1000 milliLiter(s) (100 mL/Hr) IV Continuous <Continuous>  pantoprazole    Tablet 40 milliGRAM(s) Oral before breakfast  polyethylene glycol 3350 17 Gram(s) Oral daily  sertraline 50 milliGRAM(s) Oral daily  thiamine IVPB 500 milliGRAM(s) IV Intermittent every 8 hours        Vital Signs Last 24 Hrs  T(C): 37.1 (04 Jul 2020 00:37), Max: 37.1 (04 Jul 2020 00:37)  T(F): 98.8 (04 Jul 2020 00:37), Max: 98.8 (04 Jul 2020 00:37)  HR: 72 (04 Jul 2020 00:37) (72 - 75)  BP: 144/73 (04 Jul 2020 00:37) (116/64 - 144/73)  BP(mean): --  RR: 18 (04 Jul 2020 00:37) (16 - 18)  SpO2: 100% (04 Jul 2020 00:37) (98% - 100%)        Constitutional: awake and alert.  HEENT: PERRLA   Neck: Supple.  Respiratory: Breath sounds are clear bilaterally  Cardiovascular: S1 and S2, regular / irregular rhythm  Gastrointestinal: soft, nontender  Extremities:  no edema  Vascular: Carotid Bruit - no  Musculoskeletal: no joint swelling/tenderness, no abnormal movements  Skin: No rashes    Neurological exam:  Mental status: alert, follows commands but oriented only to person   CN: PERRL, EOMI, VFF, facial sensation normal, no NLFD, tongue midline with ?left lateral fascics, Palate moves equally, SCM equal bilaterally  Motor: moves all extrems, LLE foot DF 4/5, right DF 4+/5, hip flex 4/5, shoulder abd 4/5, right  4/5, left  4+/5, elbow flex 4/5, fascics noted left arm, abdomen and both thighs, left gastroc. Atrophy of thenars bilateral, intercostals, faraz-scap muscles and thigh muscles.  Tone increased RLE more then LLE and RUE more than LUE  Sens: Intact to light touch, JPS, vib   Reflexes: symmetric 1+, left toe up  Coord:  No FNFA, dysmetria, STACIE intact   Gait/Balance: Not tested      Labs:                        9.1    6.41  )-----------( 211      ( 03 Jul 2020 06:11 )             29.0     07-03    136  |  101  |  17  ----------------------------<  114<H>  3.5   |  23  |  1.04    Ca    9.0      03 Jul 2020 06:11  Phos  2.2     07-03  Mg     2.0     07-03      A/P:   Patient's h/o progressive weakness starting left foot and now involving all four limbs in one year, with atrophy, fascics and left Babinski sign along with the faraz-scap atrophy all are strong indicators of motor neuron disease.    The increased tone and upgoing toe would likely preclude a demyelinating neuropathy, along with the lack of sensory features.  Would check CPK which might be elevated given the lower motor neuron component of his presentation.  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      Will d/w neuro team.

## 2020-07-04 NOTE — PROGRESS NOTE ADULT - SUBJECTIVE AND OBJECTIVE BOX
Patient is a 64y old  Male who presents with a chief complaint of Hematuria; weight loss (04 Jul 2020 07:45)    Being followed by ID for renal abscess, emphysematous cyswtitis    Interval history:asleep when seen-arousable but lethargic -but per RN was fully awake earlier and answering queries appropriately   No acute events      ROS:  No observed cough,SOB,CP  No N/V/D./abd pain  No other complaints      Antimicrobials:    cefTRIAXone   IVPB 1000 milliGRAM(s) IV Intermittent every 24 hours    Other medications reviewed    Vital Signs Last 24 Hrs  T(C): 37.1 (07-04-20 @ 00:37), Max: 37.1 (07-04-20 @ 00:37)  T(F): 98.8 (07-04-20 @ 00:37), Max: 98.8 (07-04-20 @ 00:37)  HR: 72 (07-04-20 @ 00:37) (72 - 72)  BP: 144/73 (07-04-20 @ 00:37) (116/64 - 144/73)  BP(mean): --  RR: 18 (07-04-20 @ 00:37) (16 - 18)  SpO2: 100% (07-04-20 @ 00:37) (99% - 100%)    Physical Exam:        cachecxia    HEENT PERRLA EOMI    No oral exudate or erythema    Chest Good AE,CTA    CVS RRR S1 S2 WNl No murmur or rub or gallop    Abd soft BS normal No tenderness no masses  griffith    IV site no erythema tenderness or discharge    CNS as above  Lab Data:                          9.6    6.56  )-----------( 208      ( 04 Jul 2020 07:08 )             30.2       07-04    134<L>  |  100  |  18  ----------------------------<  120<H>  3.5   |  27  |  0.97    Ca    9.4      04 Jul 2020 07:08  Phos  3.1     07-04  Mg     2.0     07-04    TPro  7.1  /  Alb  3.6  /  TBili  0.5  /  DBili  x   /  AST  35  /  ALT  36  /  AlkPhos  121<H>  07-04        Culture - CSF with Gram Stain (collected 02 Jul 2020 18:58)  Source: .CSF CSF  Gram Stain (02 Jul 2020 20:26):    polymorphonuclear leukocytes seen    No organisms seen    by cytocentrifuge  Preliminary Report (03 Jul 2020 16:28):    No growth          CSF PCR Panel (07.02.20 @ 19:53)    CSF PCR Result: NotDete: The meningitis/encephalitis (ME) panel (CSFPCR) is a PCR based assay that  screens for: Escherichia coli; Haemophilus influenzae; Listeria  monocytogenes; Neisseria meningitidis; Streptococcus agalactiae;  Streptococcus pneumoniae; CMV; Enterovirus; HSV-1; HSV-2; Human  herpesvirus 6; Parechovirus; VZV and Cryptococcus. Result should be  interpreted in context of clinical presentation, imaging and other lab  tests. Positive predictive value may be lower in patients with normal CSF  chemistry and cell count.        Herpes Simplex Virus 1/2 PCR, CSF (07.02.20 @ 19:53)    Source HSV 1/2: CSF    HSV 1/2 PCR: NotDete: HSV1/2 PCR assay is a real-time PCR test that detects and differentiates  HSV-1 and/or HSV-2 DNA in CSF (Vitreous Fluid). The results of this test  should be interpreted with consideration of all clinical and laboratory  findings.          Protein, CSF (07.02.20 @ 15:12)    Protein, CSF: 132 mg/dL      Glucose, CSF (07.02.20 @ 15:12)    Glucose, CSF: 90 mg/dL    Cerebrospinal Fluid Cell Count-1 (07.02.20 @ 15:03)    CSF Lymphocytes: 73 %    CSF Appearance: Clear    CSF Color: No Color    Total Nucleated Cell Count, CSF: 1 /uL    CSF Segmented Neutrophils: 15: Differential is based on 75 cells counted after cytocentrifugation. %    CSF Monocytes/Macrophages: 11 %      < from: CT Abdomen and Pelvis w/ IV Cont (07.01.20 @ 18:05) >  IMPRESSION:     Resolving left renal abscess/pyelonephritis.          < end of copied text >

## 2020-07-04 NOTE — PROGRESS NOTE ADULT - ATTENDING COMMENTS
Patient seen and examined, case d/w house staff. Patient in bed confused, denies pain, seems to be hallucinating but not in distress.  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  Emphysematous cystitis, renal mass ? abscess. Continue ceftriaxone for now but can de-escalate to po ceftin in next few days. Total 3-4 week course given above presentation. Consider TOV. Patient seen and examined, case d/w house staff. Patient in bed confused, denies pain, seems to be hallucinating but not in distress.  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  Emphysematous cystitis, renal mass ? abscess. Continue ceftriaxone for now but can de-escalate to po ceftin in next few days. Total 3-4 week course given above presentation. Consider TOV, 2 BMs 7/3

## 2020-07-04 NOTE — PROGRESS NOTE ADULT - ASSESSMENT
65 yo M pmhx prolactinoma, DM2 on insulin, HTN, CAD w. 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 Pt also presenting with 6 months of weight loss/FTT 2/2 neuromuscular disorder (?ALS) vs paraneoplastic vs infectious. Course c/b by seizure-like activity. At this time, we are following the results of the LP. Additional history was gathered from family and spouse that seems suggestive for ALS. We will await an in-hospital EMG. 63 yo M pmhx prolactinoma, DM2 on insulin, HTN, CAD w. 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 Pt also presenting with 6 months of weight loss/FTT 2/2 neuromuscular disorder (?ALS) vs paraneoplastic vs infectious. Course c/b by seizure-like activity. At this time, we are following the results of the LP. Additional history was gathered from family and spouse that seems suggestive for ALS. 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 Plan: neurocog will see patient 7/5

## 2020-07-04 NOTE — PROGRESS NOTE ADULT - PROBLEM SELECTOR PLAN 2
Pt presented with 2 days of hematuria 2/2 cystitis. 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  - ultrasound shows resolution of renal abscess.  - monitor for fever, trend WBC/Cr  - uro following  - monitoring UOP (7/3) - if low trend continues, will replete with NS. Pt presented with 2 days of hematuria 2/2 cystitis. 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  - ultrasound shows resolution of renal abscess.  - monitor for fever, trend WBC/Cr  - uro following  - cw monitoring UOP (7/4)

## 2020-07-04 NOTE — PROGRESS NOTE ADULT - SUBJECTIVE AND OBJECTIVE BOX
***INCOMPLETE NOTE***    Bandar Jernigan MD PGY-1  Internal Medicine  Pager [phone] / [phone]    Patient is a 64y old  Male who presented with a chief complaint of Hematuria; weight loss     SUBJECTIVE / OVERNIGHT EVENTS: ***  -NAEON.  -Patient remains hypophonic at today's encounter. He was coherent. He did not endorse any new symptoms.   -Yesterday, I spoke with patient's daughter-in-law (Holger's wife, Shyla.) She was able to add to the patient's history. She reports that symptoms began last summer and progressively worsened. The symptoms started with difficulty walking due to lower extremity weakness / lack of control. This was accompanied by onset of difficulty with speech. His tremors also started and progressively worsened over the last year. Patient did not have the wide-amplitude tremors like he had during this hospital course (i.e., the seizure-like activity exhibited earlier this week).    ADDITIONAL REVIEW OF SYSTEMS: As previously noted; otherwise negative.    PHYSICAL EXAM:  Vital Signs Last 24 Hrs  T(C): 37.1 (07-04-20 @ 00:37)  T(F): 98.8 (07-04-20 @ 00:37), Max: 98.8 (07-04-20 @ 00:37)  HR: 72 (07-04-20 @ 00:37) (72 - 75)  BP: 144/73 (07-04-20 @ 00:37)  BP(mean): --  RR: 18 (07-04-20 @ 00:37) (16 - 18)  SpO2: 100% (07-04-20 @ 00:37) (98% - 100%)  Wt(kg): --    07-03 @ 07:01  -  07-04 @ 07:00  --------------------------------------------------------  IN: 436 mL / OUT: 500 mL / NET: -64 mL      ***  CONSTITUTIONAL: NAD, cachetic.  EYES: PERRLA; EOMI, clear conjunctiva and + xanthelasma on sclera  NECK: +limited ROM of neck (to the right)  RESPIRATORY:  CTAB, no wheezes, crackles or rhonchi appreciated  CARDIOVASCULAR: Regular rate and rhythm, normal s1 and s2; no murmurs/rubs/gallops appreciated. No lower extremity edema;   ABDOMEN: +Bowel sounds; nontender to palpation, abdomen was non-distended and appeared sunken.   NEUROLOGY: non-focal, bilateral UE 5/5 on flexion//RUE 5/5 vs LUE 4/5 on extension; bilateral LE hip flexion 4/5; R lower leg flexion 2/5; L lower leg 2/5.  SKIN: No rashes; no palpable lesions      Complete Blood Count in AM (07.04.20 @ 07:08)    Nucleated RBC: 0 /100 WBCs    WBC Count: 6.56 K/uL    RBC Count: 3.66 M/uL    Hemoglobin: 9.6 g/dL    Hematocrit: 30.2 %    Mean Cell Volume: 82.5 fl    Mean Cell Hemoglobin: 26.2 pg    Mean Cell Hemoglobin Conc: 31.8 gm/dL    Red Cell Distrib Width: 16.4 %    Platelet Count - Automated: 208 K/uL    Comprehensive Metabolic Panel in AM (07.04.20 @ 07:08)    Sodium, Serum: 134 mmol/L    Potassium, Serum: 3.5 mmol/L    Chloride, Serum: 100 mmol/L    Carbon Dioxide, Serum: 27 mmol/L    Anion Gap, Serum: 7 mmol/L    Blood Urea Nitrogen, Serum: 18 mg/dL    Creatinine, Serum: 0.97 mg/dL    Glucose, Serum: 120 mg/dL    Calcium, Total Serum: 9.4 mg/dL    Protein Total, Serum: 7.1 g/dL    Albumin, Serum: 3.6 g/dL    Bilirubin Total, Serum: 0.5 mg/dL    Alkaline Phosphatase, Serum: 121 U/L    Aspartate Aminotransferase (AST/SGOT): 35 U/L    Alanine Aminotransferase (ALT/SGPT): 36 U/L    Magnesium, Serum in AM (07.04.20 @ 07:08)    Magnesium, Serum: 2.0 mg/dL    Phosphorus Level, Serum in AM (07.04.20 @ 07:08)    Phosphorus Level, Serum: 3.1 mg/dL Bandar Jernigan MD PGY-1  Internal Medicine  Pager [phone] / [phone]    Patient is a 64y old  Male who presented with a chief complaint of Hematuria; weight loss     SUBJECTIVE / OVERNIGHT EVENTS:   -NAEON.  -Pt's presentation this morning was notable for hallucinations (experienced a startled jump-scare to no visual stimuli).   -Pt seemed less able to communicate or concentrate this morning.    ADDITIONAL REVIEW OF SYSTEMS: As previously noted; otherwise negative.    PHYSICAL EXAM:  Vital Signs Last 24 Hrs  T(C): 37.1 (07-04-20 @ 00:37)  T(F): 98.8 (07-04-20 @ 00:37), Max: 98.8 (07-04-20 @ 00:37)  HR: 72 (07-04-20 @ 00:37) (72 - 75)  BP: 144/73 (07-04-20 @ 00:37)  BP(mean): --  RR: 18 (07-04-20 @ 00:37) (16 - 18)  SpO2: 100% (07-04-20 @ 00:37) (98% - 100%)  Wt(kg): --    07-03 @ 07:01  -  07-04 @ 07:00  --------------------------------------------------------  IN: 436 mL / OUT: 500 mL / NET: -64 mL      CONSTITUTIONAL: NAD, cachetic.  EYES: PERRLA; EOMI, clear conjunctiva and + xanthelasma on sclera  RESPIRATORY:  CTAB, no wheezes, crackles or rhonchi appreciated  CARDIOVASCULAR: Regular rate; no murmurs/rubs/gallops appreciated. No lower extremity edema;   ABDOMEN: +Bowel sounds; nontender to palpation, abdomen was non-distended and appeared sunken.   NEUROLOGY: non-focal, move all extremities, tone increased in extremities R>L, +Babinski, blinks to threat, but does not protect/close his eyes to bright pen light (unlike his reaction in the past), PERRL.  SKIN: No rashes; no palpable lesions      Complete Blood Count in AM (07.04.20 @ 07:08)    Nucleated RBC: 0 /100 WBCs    WBC Count: 6.56 K/uL    RBC Count: 3.66 M/uL    Hemoglobin: 9.6 g/dL    Hematocrit: 30.2 %    Mean Cell Volume: 82.5 fl    Mean Cell Hemoglobin: 26.2 pg    Mean Cell Hemoglobin Conc: 31.8 gm/dL    Red Cell Distrib Width: 16.4 %    Platelet Count - Automated: 208 K/uL    Comprehensive Metabolic Panel in AM (07.04.20 @ 07:08)    Sodium, Serum: 134 mmol/L    Potassium, Serum: 3.5 mmol/L    Chloride, Serum: 100 mmol/L    Carbon Dioxide, Serum: 27 mmol/L    Anion Gap, Serum: 7 mmol/L    Blood Urea Nitrogen, Serum: 18 mg/dL    Creatinine, Serum: 0.97 mg/dL    Glucose, Serum: 120 mg/dL    Calcium, Total Serum: 9.4 mg/dL    Protein Total, Serum: 7.1 g/dL    Albumin, Serum: 3.6 g/dL    Bilirubin Total, Serum: 0.5 mg/dL    Alkaline Phosphatase, Serum: 121 U/L    Aspartate Aminotransferase (AST/SGOT): 35 U/L    Alanine Aminotransferase (ALT/SGPT): 36 U/L    Magnesium, Serum in AM (07.04.20 @ 07:08)    Magnesium, Serum: 2.0 mg/dL    Phosphorus Level, Serum in AM (07.04.20 @ 07:08)    Phosphorus Level, Serum: 3.1 mg/dL

## 2020-07-05 LAB
ANION GAP SERPL CALC-SCNC: 8 MMOL/L — SIGNIFICANT CHANGE UP (ref 5–17)
BUN SERPL-MCNC: 15 MG/DL — SIGNIFICANT CHANGE UP (ref 7–23)
CALCIUM SERPL-MCNC: 9 MG/DL — SIGNIFICANT CHANGE UP (ref 8.4–10.5)
CHLORIDE SERPL-SCNC: 103 MMOL/L — SIGNIFICANT CHANGE UP (ref 96–108)
CO2 SERPL-SCNC: 26 MMOL/L — SIGNIFICANT CHANGE UP (ref 22–31)
CREAT SERPL-MCNC: 0.87 MG/DL — SIGNIFICANT CHANGE UP (ref 0.5–1.3)
CULTURE RESULTS: NO GROWTH — SIGNIFICANT CHANGE UP
GLUCOSE BLDC GLUCOMTR-MCNC: 152 MG/DL — HIGH (ref 70–99)
GLUCOSE BLDC GLUCOMTR-MCNC: 160 MG/DL — HIGH (ref 70–99)
GLUCOSE BLDC GLUCOMTR-MCNC: 169 MG/DL — HIGH (ref 70–99)
GLUCOSE BLDC GLUCOMTR-MCNC: 185 MG/DL — HIGH (ref 70–99)
GLUCOSE SERPL-MCNC: 151 MG/DL — HIGH (ref 70–99)
HCT VFR BLD CALC: 28.4 % — LOW (ref 39–50)
HGB BLD-MCNC: 9.1 G/DL — LOW (ref 13–17)
MAGNESIUM SERPL-MCNC: 2 MG/DL — SIGNIFICANT CHANGE UP (ref 1.6–2.6)
MCHC RBC-ENTMCNC: 26.5 PG — LOW (ref 27–34)
MCHC RBC-ENTMCNC: 32 GM/DL — SIGNIFICANT CHANGE UP (ref 32–36)
MCV RBC AUTO: 82.6 FL — SIGNIFICANT CHANGE UP (ref 80–100)
NRBC # BLD: 0 /100 WBCS — SIGNIFICANT CHANGE UP (ref 0–0)
PHOSPHATE SERPL-MCNC: 3.3 MG/DL — SIGNIFICANT CHANGE UP (ref 2.5–4.5)
PLATELET # BLD AUTO: 214 K/UL — SIGNIFICANT CHANGE UP (ref 150–400)
POTASSIUM SERPL-MCNC: 3.3 MMOL/L — LOW (ref 3.5–5.3)
POTASSIUM SERPL-SCNC: 3.3 MMOL/L — LOW (ref 3.5–5.3)
RBC # BLD: 3.44 M/UL — LOW (ref 4.2–5.8)
RBC # FLD: 16.6 % — HIGH (ref 10.3–14.5)
SODIUM SERPL-SCNC: 137 MMOL/L — SIGNIFICANT CHANGE UP (ref 135–145)
SPECIMEN SOURCE: SIGNIFICANT CHANGE UP
WBC # BLD: 5.8 K/UL — SIGNIFICANT CHANGE UP (ref 3.8–10.5)
WBC # FLD AUTO: 5.8 K/UL — SIGNIFICANT CHANGE UP (ref 3.8–10.5)

## 2020-07-05 PROCEDURE — 99233 SBSQ HOSP IP/OBS HIGH 50: CPT

## 2020-07-05 PROCEDURE — 99233 SBSQ HOSP IP/OBS HIGH 50: CPT | Mod: GC

## 2020-07-05 RX ORDER — POTASSIUM CHLORIDE 20 MEQ
40 PACKET (EA) ORAL ONCE
Refills: 0 | Status: COMPLETED | OUTPATIENT
Start: 2020-07-05 | End: 2020-07-05

## 2020-07-05 RX ADMIN — POLYETHYLENE GLYCOL 3350 17 GRAM(S): 17 POWDER, FOR SOLUTION ORAL at 12:12

## 2020-07-05 RX ADMIN — PANTOPRAZOLE SODIUM 40 MILLIGRAM(S): 20 TABLET, DELAYED RELEASE ORAL at 05:40

## 2020-07-05 RX ADMIN — ATORVASTATIN CALCIUM 80 MILLIGRAM(S): 80 TABLET, FILM COATED ORAL at 21:33

## 2020-07-05 RX ADMIN — Medication 1: at 12:13

## 2020-07-05 RX ADMIN — Medication 0.25 MILLIGRAM(S): at 05:39

## 2020-07-05 RX ADMIN — Medication 1: at 08:02

## 2020-07-05 RX ADMIN — Medication 81 MILLIGRAM(S): at 12:12

## 2020-07-05 RX ADMIN — Medication 105 MILLIGRAM(S): at 13:46

## 2020-07-05 RX ADMIN — HEPARIN SODIUM 5000 UNIT(S): 5000 INJECTION INTRAVENOUS; SUBCUTANEOUS at 13:46

## 2020-07-05 RX ADMIN — Medication 0.25 MILLIGRAM(S): at 21:31

## 2020-07-05 RX ADMIN — BROMOCRIPTINE MESYLATE 5 MILLIGRAM(S): 5 CAPSULE ORAL at 12:12

## 2020-07-05 RX ADMIN — Medication 40 MILLIEQUIVALENT(S): at 13:46

## 2020-07-05 RX ADMIN — Medication 0.25 MILLIGRAM(S): at 13:46

## 2020-07-05 RX ADMIN — HEPARIN SODIUM 5000 UNIT(S): 5000 INJECTION INTRAVENOUS; SUBCUTANEOUS at 05:40

## 2020-07-05 RX ADMIN — CEFTRIAXONE 100 MILLIGRAM(S): 500 INJECTION, POWDER, FOR SOLUTION INTRAMUSCULAR; INTRAVENOUS at 15:53

## 2020-07-05 RX ADMIN — Medication 105 MILLIGRAM(S): at 21:32

## 2020-07-05 RX ADMIN — Medication 105 MILLIGRAM(S): at 05:39

## 2020-07-05 RX ADMIN — SERTRALINE 50 MILLIGRAM(S): 25 TABLET, FILM COATED ORAL at 12:12

## 2020-07-05 RX ADMIN — HEPARIN SODIUM 5000 UNIT(S): 5000 INJECTION INTRAVENOUS; SUBCUTANEOUS at 21:32

## 2020-07-05 RX ADMIN — Medication 1: at 16:58

## 2020-07-05 NOTE — PROGRESS NOTE ADULT - ATTENDING COMMENTS
I performed a history and physical examination of the patient and discussed the management of the patient with the resident. I reviewed the resident's note and agree with the documented findings and plan of care with the following additions/exceptions.    dos 20    chart reviewed. he was admitted  with hematuria and outpatient labs showed elevated wbc and then referred to ed when had fever and more weakness.  he got abx for renal abscess.  neurology consulted for eval of his more subacute/chronic condition of significant weight loss and limb weakness and speech changes. Dr. Winters has been following him and he was planned to have an emg soon.  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    csf  1w 810 r     noted to have lip smacking, arm shaking, eye rolling. unclear if it was seizure, but was in context of ertapenem and welbutrin  eeg  normal  hct 20 reviewed- is without any focal disproportionate FT atrophy   mri 20 reviewed- too limited by motion   our team noted he had choreoathetotic mvmts and dsykinesias and dystonia. started klonopin and following day mvmts noted to have resolved, which made them question how likely it was for him to have had an organic mvmt disorder vs maybe stereotypies or functional cause.    today he was too groggy after being woken from sleep to participate easily in cognitive testing  he was very hypophonic. on exam he was alert, had appropriate social graces with good eye contact. . oriented to year, month wrong saying Aug, knew he was in the hospital and generally why.   for letter-number sequencing tasks he was able to understand task, in broken up attempt he was able to do span of 4   with troey care dilemma he replied he would pull the switch to divert train to running over 1 person in order to save 4 people from being run over, he said he would alert the 1 man on the track to move  with footbridge dilemma he said he wouldn't throw the man over the bridge to save the other 4 people because "his life is worth something".   I didn't feel a sense of disconnection. I couldn't tell if there was concrete thinking in the limited exam. I couldn't tell if there was a lack of concern over his condition or if there was apathy in this limited exam.    summary: in this limited exam I don't see e/o FTD. with regard to mgmt if he were to have FTD, unfortunately there are no fda approved treatments for ftd and no disease modifying therapies. SSRIs are often tried to help with symptoms and he happens to already be on zoloft.     given his grogginess this morning would limit medications that induce drowsiness    Dr. Caba is planning to do inpatient emg this week

## 2020-07-05 NOTE — PROGRESS NOTE ADULT - SUBJECTIVE AND OBJECTIVE BOX
SUBJECTIVE:   Patient was seen and examined at bedside. He was falling asleep mid-sentence and was had to be woken up frequently to be assessed. He is a poor historian, however demonstrated some insight as to why he was in the hospital.    INTERVAL HISTORY:  Inpatient vs outpatient EMG to be discussed with Dr. Caba. Limited neurocognitive testing completed on 7/5/20 for further evaluation of suspected FTD.    PAST MEDICAL & SURGICAL HISTORY:  HLD (hyperlipidemia)  Type 2 diabetes mellitus  Hypertension  Prolactinoma  Deviated nasal septum  Dyslipidemia  History of tonsillectomy  H/O nasal septoplasty    FAMILY HISTORY:  Family history of Alzheimer's disease  Family history of Hodgkins disease    SOCIAL HISTORY:   Lives with: Wife, two children ages 29 and 33    MEDICATIONS (HOME):  Home Medications:  aspirin 81 mg oral delayed release tablet: 1 tab(s) orally once a day (17 Jun 2020 17:05)  bromocriptine 5 mg oral capsule: 1 cap(s) orally once a day (17 Jun 2020 17:05)  buPROPion 150 mg/24 hours (XL) oral tablet, extended release: 1 tab(s) orally every 24 hours (17 Jun 2020 17:05)  Lipitor 80 mg oral tablet: 1 tab(s) orally once a day (at bedtime) (17 Jun 2020 17:05)  montelukast 10 mg oral tablet: 1 tab(s) orally once a day (17 Jun 2020 17:04)  sertraline 100 mg oral tablet: 1 tab(s) orally once a day (17 Jun 2020 17:05)  Vitamin D3 2000 intl units (50 mcg) oral capsule: 1 cap(s) orally once a day (17 Jun 2020 17:05)    MEDICATIONS  (STANDING):  aspirin enteric coated 81 milliGRAM(s) Oral daily  atorvastatin 80 milliGRAM(s) Oral at bedtime  bromocriptine Capsule 5 milliGRAM(s) Oral daily  cefTRIAXone   IVPB 1000 milliGRAM(s) IV Intermittent every 24 hours  clonazePAM  Tablet 0.25 milliGRAM(s) Oral every 8 hours  dextrose 5%. 1000 milliLiter(s) (50 mL/Hr) IV Continuous <Continuous>  dextrose 50% Injectable 12.5 Gram(s) IV Push once  dextrose 50% Injectable 25 Gram(s) IV Push once  dextrose 50% Injectable 25 Gram(s) IV Push once  heparin   Injectable 5000 Unit(s) SubCutaneous every 8 hours  insulin lispro (HumaLOG) corrective regimen sliding scale   SubCutaneous three times a day before meals  insulin lispro (HumaLOG) corrective regimen sliding scale   SubCutaneous at bedtime  pantoprazole    Tablet 40 milliGRAM(s) Oral before breakfast  polyethylene glycol 3350 17 Gram(s) Oral daily  potassium chloride    Tablet ER 40 milliEquivalent(s) Oral once  sertraline 50 milliGRAM(s) Oral daily  thiamine IVPB 500 milliGRAM(s) IV Intermittent every 8 hours    MEDICATIONS  (PRN):  dextrose 40% Gel 15 Gram(s) Oral once PRN Blood Glucose LESS THAN 70 milliGRAM(s)/deciliter  glucagon  Injectable 1 milliGRAM(s) IntraMuscular once PRN Glucose LESS THAN 70 milligrams/deciliter    ALLERGIES/INTOLERANCES:  No Known Allergies      VITALS & EXAMINATION:  Vital Signs Last 24 Hrs  T(C): 36.4 (05 Jul 2020 08:33), Max: 36.8 (04 Jul 2020 16:35)  T(F): 97.6 (05 Jul 2020 08:33), Max: 98.3 (04 Jul 2020 16:35)  HR: 66 (05 Jul 2020 08:33) (66 - 71)  BP: 130/71 (05 Jul 2020 08:33) (122/64 - 130/71)  BP(mean): --  RR: 18 (05 Jul 2020 08:33) (18 - 18)  SpO2: 99% (05 Jul 2020 08:33) (99% - 100%)    General: Cachectic appearing male in no acute distress. Diffuse muscle wasting noted, including left pectoralis.  Head: Normocephalic & atraumatic.  Respiratory: Patent airway. All lung fields are clear to auscultation bilaterally.  Extremities: No cyanosis or edema.  Skin: No rashes.    Neurological (>12):  Mental status: Awake, alert, oriented to person, place (hospital), year, president. He said August for month. Able to tell us who he lives with, how long he has been  to his wife, children's ages and occupations. Normal affect. Follows commands.    Neurocognitive: See above. In addition, patient could participate in short-term memory/sequence test and morality questions.    Language: Speech is fluent, voice is very hypophonic.     CNs: PERRLA. VFF, EOMI no nystagmus, no diplopia. V1-3 intact to LT/pinprick, well developed masseter muscles b/l. No facial asymmetry b/l, full eye closure strength b/l. Hearing grossly normal (rubbing fingers) b/l. Symmetric palate elevation in midline. Gag reflex deferred. Head turning & shoulder shrug intact b/l. Tongue midline, normal movements, no atrophy.    Motor: Able to move all extremities. Diffuse muscle wasting, L>R thenars, L pectoralis, scapulas bilaterally, intercostals, thigh muscles. No noticeable tremor. Tone increased R>L throughout. Fasciculations noted of left psoterior arm and left gastroc. Moves all extremities. R  4/5, left  4/5, elbow flexion 4/5 bilaterally, shoulder abduction 4/5 bilaterally. LLE foot dorsiflexion 4/5, right DF 4+/5, hip flexion 4/5 bilaterally    Sensation: Intact to light touch and pinprick bilaterally    Coordination: No dysmetria.     Reflexes:  Brachials symmetric 1+ bilaterally, R patellar 3+, L patellar 2+     Babinski: left toe upgoing, right toe mute    Gait: Not tested, bedrest.    LABORATORY:  CBC                       9.1    5.80  )-----------( 214      ( 05 Jul 2020 05:59 )             28.4     Chem 07-05    137  |  103  |  15  ----------------------------<  151<H>  3.3<L>   |  26  |  0.87    Ca    9.0      05 Jul 2020 06:00  Phos  3.3     07-05  Mg     2.0     07-05    TPro  7.1  /  Alb  3.6  /  TBili  0.5  /  DBili  x   /  AST  35  /  ALT  36  /  AlkPhos  121<H>  07-04    LFTs LIVER FUNCTIONS - ( 04 Jul 2020 07:08 )  Alb: 3.6 g/dL / Pro: 7.1 g/dL / ALK PHOS: 121 U/L / ALT: 36 U/L / AST: 35 U/L / GGT: x SUBJECTIVE:   Patient was seen and examined at bedside. He was falling asleep mid-sentence and had to be woken up frequently to be assessed. He is a poor historian, however demonstrated some insight as to why he was in the hospital today. Patient was minimally able to participate in neurocognitive exam. No abnormal movements/shaking noted.    INTERVAL HISTORY:  Inpatient vs outpatient EMG to be discussed with Dr. Caba. Limited neurocognitive testing completed on 7/5/20 for further evaluation of suspected FTD.    PAST MEDICAL & SURGICAL HISTORY:  HLD (hyperlipidemia)  Type 2 diabetes mellitus  Hypertension  Prolactinoma  Deviated nasal septum  Dyslipidemia  History of tonsillectomy  H/O nasal septoplasty    FAMILY HISTORY:  Family history of Alzheimer's disease  Family history of Hodgkins disease    SOCIAL HISTORY:   Lives with: Wife, two children ages 29 and 33    MEDICATIONS (HOME):  Home Medications:  aspirin 81 mg oral delayed release tablet: 1 tab(s) orally once a day (17 Jun 2020 17:05)  bromocriptine 5 mg oral capsule: 1 cap(s) orally once a day (17 Jun 2020 17:05)  buPROPion 150 mg/24 hours (XL) oral tablet, extended release: 1 tab(s) orally every 24 hours (17 Jun 2020 17:05)  Lipitor 80 mg oral tablet: 1 tab(s) orally once a day (at bedtime) (17 Jun 2020 17:05)  montelukast 10 mg oral tablet: 1 tab(s) orally once a day (17 Jun 2020 17:04)  sertraline 100 mg oral tablet: 1 tab(s) orally once a day (17 Jun 2020 17:05)  Vitamin D3 2000 intl units (50 mcg) oral capsule: 1 cap(s) orally once a day (17 Jun 2020 17:05)    MEDICATIONS  (STANDING):  aspirin enteric coated 81 milliGRAM(s) Oral daily  atorvastatin 80 milliGRAM(s) Oral at bedtime  bromocriptine Capsule 5 milliGRAM(s) Oral daily  cefTRIAXone   IVPB 1000 milliGRAM(s) IV Intermittent every 24 hours  clonazePAM  Tablet 0.25 milliGRAM(s) Oral every 8 hours  dextrose 5%. 1000 milliLiter(s) (50 mL/Hr) IV Continuous <Continuous>  dextrose 50% Injectable 12.5 Gram(s) IV Push once  dextrose 50% Injectable 25 Gram(s) IV Push once  dextrose 50% Injectable 25 Gram(s) IV Push once  heparin   Injectable 5000 Unit(s) SubCutaneous every 8 hours  insulin lispro (HumaLOG) corrective regimen sliding scale   SubCutaneous three times a day before meals  insulin lispro (HumaLOG) corrective regimen sliding scale   SubCutaneous at bedtime  pantoprazole    Tablet 40 milliGRAM(s) Oral before breakfast  polyethylene glycol 3350 17 Gram(s) Oral daily  potassium chloride    Tablet ER 40 milliEquivalent(s) Oral once  sertraline 50 milliGRAM(s) Oral daily  thiamine IVPB 500 milliGRAM(s) IV Intermittent every 8 hours    MEDICATIONS  (PRN):  dextrose 40% Gel 15 Gram(s) Oral once PRN Blood Glucose LESS THAN 70 milliGRAM(s)/deciliter  glucagon  Injectable 1 milliGRAM(s) IntraMuscular once PRN Glucose LESS THAN 70 milligrams/deciliter    ALLERGIES/INTOLERANCES:  No Known Allergies      VITALS & EXAMINATION:  Vital Signs Last 24 Hrs  T(C): 36.4 (05 Jul 2020 08:33), Max: 36.8 (04 Jul 2020 16:35)  T(F): 97.6 (05 Jul 2020 08:33), Max: 98.3 (04 Jul 2020 16:35)  HR: 66 (05 Jul 2020 08:33) (66 - 71)  BP: 130/71 (05 Jul 2020 08:33) (122/64 - 130/71)  BP(mean): --  RR: 18 (05 Jul 2020 08:33) (18 - 18)  SpO2: 99% (05 Jul 2020 08:33) (99% - 100%)    General: Cachectic appearing male in no acute distress. Diffuse muscle wasting noted, including left pectoralis.  Head: Normocephalic & atraumatic.  Respiratory: Patent airway. All lung fields are clear to auscultation bilaterally.  Extremities: No cyanosis or edema.  Skin: No rashes.    Neurological (>12):  Mental status: Awake, alert, oriented to person, place (hospital), year, president. He said August for month. He is able to state who he lives with, how long he has been  to his wife, children's ages and occupations. Normal affect. Follows commands.    Neurocognitive: See above. In addition, patient could participate in short-term memory/sequence test and morality questions.    Language: Speech is fluent, voice is very hypophonic.     CNs: PERRLA. VFF, EOMI no nystagmus, no diplopia. V1-3 intact to LT/pinprick, well developed masseter muscles b/l. No facial asymmetry b/l, full eye closure strength b/l. Hearing grossly normal (rubbing fingers) b/l. Symmetric palate elevation in midline. Gag reflex deferred. Head turning & shoulder shrug intact b/l. Tongue midline, normal movements, no atrophy.    Motor: Able to move all extremities. Diffuse muscle wasting, L>R thenars, L pectoralis, scapulas bilaterally, intercostals, thigh muscles. No noticeable tremor. Tone increased R>L throughout. Fasciculations noted of left posterior arm and left gastroc. R  4/5, left  4/5, elbow flexion 4/5 bilaterally, shoulder abduction 4/5 bilaterally. LLE foot dorsiflexion 4/5, right DF 4+/5, hip flexion 4/5 bilaterally    Sensation: Intact to light touch and pinprick bilaterally    Coordination: No dysmetria.     Reflexes:  Brachials symmetric 1+ bilaterally, R patellar brisk compared to left 3+, L patellar 2+     Babinski: Left toe upgoing, right toe mute    Gait: Not tested, bedrest.    LABORATORY:  CBC                       9.1    5.80  )-----------( 214      ( 05 Jul 2020 05:59 )             28.4     Chem 07-05    137  |  103  |  15  ----------------------------<  151<H>  3.3<L>   |  26  |  0.87    Ca    9.0      05 Jul 2020 06:00  Phos  3.3     07-05  Mg     2.0     07-05    TPro  7.1  /  Alb  3.6  /  TBili  0.5  /  DBili  x   /  AST  35  /  ALT  36  /  AlkPhos  121<H>  07-04    LFTs LIVER FUNCTIONS - ( 04 Jul 2020 07:08 )  Alb: 3.6 g/dL / Pro: 7.1 g/dL / ALK PHOS: 121 U/L / ALT: 36 U/L / AST: 35 U/L / GGT: x

## 2020-07-05 NOTE — PROGRESS NOTE ADULT - PROBLEM SELECTOR PLAN 3
-Seizure-like activity ceased on 7/1 likely 2/2 Klonopin 0.25 mg dosing  -Will continue to monitor.  -LP yesterday -- f/u results. Negative results at this time (7/4).

## 2020-07-05 NOTE — PROGRESS NOTE ADULT - ATTENDING COMMENTS
Patient seen and examined, case d/w house staff. Patient in bed confused, denies pain, not in distress.  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  Chronic, progressive RLE monoparesis with diffuse atrophy, 100lb weight loss, flaccid dysarthria, choreiform movements suggestive of athetosis and neck contraction, and dystonia. DDx paraneoplastic motor neuron disease, ALS/frontotemporal dementia ALS variant. Currently undergoing workup for motor neuron disease, with possible component of demyelinating neuropathy consider features on exam. Pending LP.  c/w clonazepam 0.25 mg q8h. Pt appears to be tolerating the dose. Could consider restarting Artane (trihexyphenidyl) if tremors reoccur. F/U paraneoplastic panel (anti RI ab pending); F/U LP results: glucose 90, protein 132; consider EMG during this admission     Emphysematous cystitis, renal mass ? abscess. Continue ceftriaxone for now but can de-escalate to po ceftin in next few days. Total 3-4 week course given above presentation. Consider TOV tomorrow      [x] on clonazepam 0.25 mg q8h. Pt appears to be tolerating the dose  [ ] Could consider restarting Artane (trihexyphenidyl) if tremors reoccur  [] F/U paraneoplastic panel (anti RI ab pending)  [] F/U LP results: glucose 90, protein 132  [] consider EMG during this admission     [] Labs: B1, HIV (ordered)- pending  [] speech eval  [x] soft diet for dysphagia Patient seen and examined, case d/w house staff. Patient in bed confused, denies pain, not in distress.  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  Chronic, progressive RLE monoparesis with diffuse atrophy, 100lb weight loss, flaccid dysarthria, choreiform movements suggestive of athetosis and neck contraction, and dystonia. DDx paraneoplastic motor neuron disease, ALS/frontotemporal dementia ALS variant. Currently undergoing workup for motor neuron disease, with possible component of demyelinating neuropathy consider features on exam. Pending LP.  c/w clonazepam 0.25 mg q8h. Pt appears to be tolerating the dose. Could consider restarting Artane (trihexyphenidyl) if tremors reoccur. F/U paraneoplastic panel (anti RI ab pending); F/U LP results: glucose 90, protein 132; consider EMG during this admission; check thiamine level.     Emphysematous cystitis, renal mass ? abscess. Continue ceftriaxone for now but can de-escalate to po ceftin in next few days. Total 3-4 week course given above presentation. Consider TOV tomorrow      [x] on clonazepam 0.25 mg q8h. Pt appears to be tolerating the dose  [ ] Could consider restarting Artane (trihexyphenidyl) if tremors reoccur  [] F/U paraneoplastic panel (anti RI ab pending)  [] F/U LP results: glucose 90, protein 132  [] consider EMG during this admission     [] Labs: B1, HIV (ordered)- pending  [] speech eval  [x] soft diet for dysphagia

## 2020-07-05 NOTE — PROGRESS NOTE ADULT - ASSESSMENT
Assessment:  64M w/ PMH of DM, HTN, CAD w/ LAD stent, depression, admitted for 2 days of hematuria 2/2 cystitis. Neuro consult for worsening RLE weakness & muscle wasting, with progression to other extremities, and 100lb weight loss for the past year. Pt continues to be hypophonic with impaired upgaze. No report of irregular movements overnight. Patient was continued on Klonazepam 0.25mg q8h. Lumbar puncture done     Impression:   Chronic, progressive RLE monoparesis with diffuse atrophy, 100lb weight loss, flaccid dysarthria, choreiform movements suggestive of athetosis and neck contraction, and dystonia. DDx paraneoplastic motor neuron disease, ALS/frontotemporal dementia ALS variant. Currently undergoing workup for motor neuron disease, with possible component of demyelinating neuropathy consider features on exam. Pending LP.    Plan:  [x] switch ertapenem and bupropion to agents less likely to lower seizure threshold if possible  [x] Repeat CT head  [x] on clonazepam 0.25 mg q8h. Pt appears to be tolerating the dose  [ ] Could consider restarting Artane (trihexyphenidyl) if tremors reoccur  [] F/U paraneoplastic panel (anti RI ab pending)  [] F/U LP results: glucose 90, protein 132  [] consider EMG during this admission     [] Labs: B1, HIV (ordered)- pending  [] speech eval  [x] soft diet for dysphagia    Thank you for involving us in the care of this patient. Assessment:  64M w/ PMH of DM, HTN, CAD w/ LAD stent, depression, admitted for 2 days of hematuria 2/2 cystitis. Neuro consult for worsening RLE weakness & muscle wasting, with progression to other extremities, and 100lb weight loss for the past year. Pt continues to be hypophonic with impaired upgaze. No report of irregular movements overnight. Patient was continued on Klonazepam 0.25mg q8h. Lumbar puncture done     Impression:   Chronic, progressive RLE monoparesis with diffuse atrophy, 100lb weight loss, flaccid dysarthria, choreiform movements suggestive of athetosis and neck contraction, and dystonia. DDx paraneoplastic motor neuron disease, ALS/frontotemporal dementia ALS variant. Currently undergoing workup for motor neuron disease, with possible component of demyelinating neuropathy consider features on exam. LP done 7/2/20, pending paraneoplastic panel to rule out malignant etiology, though low suspicion.    Plan:  -On clonazepam 0.25 mg q8h. Pt appears to be tolerating the dose  -Could consider restarting Artane (trihexyphenidyl) if tremors reoccur  -F/U paraneoplastic panel   -LP results appreciated, coincides with inflammatory picture  -Dr. Caba following, pending inpatient vs outpatient EMG

## 2020-07-05 NOTE — PROGRESS NOTE ADULT - PROBLEM SELECTOR PLAN 2
Pt presented with 2 days of hematuria 2/2 cystitis. 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  - ultrasound shows resolution of renal abscess.  - monitor for fever, trend WBC/Cr  - uro following  - cw monitoring UOP (7/4)

## 2020-07-05 NOTE — PROGRESS NOTE ADULT - SUBJECTIVE AND OBJECTIVE BOX
Eliot Grant PGY-2  Internal Medicine  Pager [phone]    Patient is a 64y old  Male who presented with a chief complaint of Hematuria; weight loss     SUBJECTIVE / OVERNIGHT EVENTS:   -No events overnight. Poor historian    ADDITIONAL REVIEW OF SYSTEMS: As previously noted; otherwise negative.    PHYSICAL EXAM:  Vital Signs Last 24 Hrs  T(C): 36.4 (05 Jul 2020 08:33), Max: 36.8 (04 Jul 2020 16:35)  T(F): 97.6 (05 Jul 2020 08:33), Max: 98.3 (04 Jul 2020 16:35)  HR: 66 (05 Jul 2020 08:33) (66 - 71)  BP: 130/71 (05 Jul 2020 08:33) (122/64 - 130/71)  BP(mean): --  RR: 18 (05 Jul 2020 08:33) (18 - 18)  SpO2: 99% (05 Jul 2020 08:33) (99% - 100%)    I&O's Summary    04 Jul 2020 07:01  -  05 Jul 2020 07:00  --------------------------------------------------------  IN: 440 mL / OUT: 1350 mL / NET: -910 mL          CONSTITUTIONAL: NAD, cachetic.  EYES: PERRLA; EOMI, clear conjunctiva and + xanthelasma on sclera  RESPIRATORY:  CTAB, no wheezes, crackles or rhonchi appreciated  CARDIOVASCULAR: Regular rate; no murmurs/rubs/gallops appreciated. No lower extremity edema;   ABDOMEN: +Bowel sounds; nontender to palpation, abdomen was non-distended and appeared sunken.   NEUROLOGY: non-focal, move all extremities, tone increased in extremities R>L, +Babinski, blinks to threat, but does not protect/close his eyes to bright pen light (unlike his reaction in the past), PERRL.  SKIN: No rashes; no palpable lesions      Complete Blood Count in AM (07.04.20 @ 07:08)    Nucleated RBC: 0 /100 WBCs    WBC Count: 6.56 K/uL    RBC Count: 3.66 M/uL    Hemoglobin: 9.6 g/dL    Hematocrit: 30.2 %    Mean Cell Volume: 82.5 fl    Mean Cell Hemoglobin: 26.2 pg    Mean Cell Hemoglobin Conc: 31.8 gm/dL    Red Cell Distrib Width: 16.4 %    Platelet Count - Automated: 208 K/uL    Comprehensive Metabolic Panel in AM (07.04.20 @ 07:08)    Sodium, Serum: 134 mmol/L    Potassium, Serum: 3.5 mmol/L    Chloride, Serum: 100 mmol/L    Carbon Dioxide, Serum: 27 mmol/L    Anion Gap, Serum: 7 mmol/L    Blood Urea Nitrogen, Serum: 18 mg/dL    Creatinine, Serum: 0.97 mg/dL    Glucose, Serum: 120 mg/dL    Calcium, Total Serum: 9.4 mg/dL    Protein Total, Serum: 7.1 g/dL    Albumin, Serum: 3.6 g/dL    Bilirubin Total, Serum: 0.5 mg/dL    Alkaline Phosphatase, Serum: 121 U/L    Aspartate Aminotransferase (AST/SGOT): 35 U/L    Alanine Aminotransferase (ALT/SGPT): 36 U/L    Magnesium, Serum in AM (07.04.20 @ 07:08)    Magnesium, Serum: 2.0 mg/dL    Phosphorus Level, Serum in AM (07.04.20 @ 07:08)    Phosphorus Level, Serum: 3.1 mg/dL

## 2020-07-05 NOTE — PROGRESS NOTE ADULT - ASSESSMENT
65 yo M pmhx prolactinoma, DM2 on insulin, HTN, CAD w. 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 Pt also presenting with 6 months of weight loss/FTT 2/2 neuromuscular disorder (?ALS) vs paraneoplastic vs infectious. Course c/b by seizure-like activity. At this time, we are following the results of the LP. Additional history was gathered from family and spouse that seems suggestive for ALS. 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 Plan: neurocog will see patient 7/5

## 2020-07-06 LAB
ANION GAP SERPL CALC-SCNC: 9 MMOL/L — SIGNIFICANT CHANGE UP (ref 5–17)
BUN SERPL-MCNC: 23 MG/DL — SIGNIFICANT CHANGE UP (ref 7–23)
CALCIUM SERPL-MCNC: 8.9 MG/DL — SIGNIFICANT CHANGE UP (ref 8.4–10.5)
CHLORIDE SERPL-SCNC: 106 MMOL/L — SIGNIFICANT CHANGE UP (ref 96–108)
CO2 SERPL-SCNC: 26 MMOL/L — SIGNIFICANT CHANGE UP (ref 22–31)
CREAT SERPL-MCNC: 1.11 MG/DL — SIGNIFICANT CHANGE UP (ref 0.5–1.3)
GLUCOSE BLDC GLUCOMTR-MCNC: 165 MG/DL — HIGH (ref 70–99)
GLUCOSE BLDC GLUCOMTR-MCNC: 170 MG/DL — HIGH (ref 70–99)
GLUCOSE BLDC GLUCOMTR-MCNC: 174 MG/DL — HIGH (ref 70–99)
GLUCOSE BLDC GLUCOMTR-MCNC: 196 MG/DL — HIGH (ref 70–99)
GLUCOSE SERPL-MCNC: 178 MG/DL — HIGH (ref 70–99)
HCT VFR BLD CALC: 26.8 % — LOW (ref 39–50)
HGB BLD-MCNC: 9 G/DL — LOW (ref 13–17)
HIV 2 PROVIRAL DNA SERPL QL NAA+PROBE: SIGNIFICANT CHANGE UP
MAGNESIUM SERPL-MCNC: 1.9 MG/DL — SIGNIFICANT CHANGE UP (ref 1.6–2.6)
MCHC RBC-ENTMCNC: 28.5 PG — SIGNIFICANT CHANGE UP (ref 27–34)
MCHC RBC-ENTMCNC: 33.6 GM/DL — SIGNIFICANT CHANGE UP (ref 32–36)
MCV RBC AUTO: 84.8 FL — SIGNIFICANT CHANGE UP (ref 80–100)
NRBC # BLD: 0 /100 WBCS — SIGNIFICANT CHANGE UP (ref 0–0)
PARANEOPLASTIC AB PNL SER: SIGNIFICANT CHANGE UP
PHOSPHATE SERPL-MCNC: 2.5 MG/DL — SIGNIFICANT CHANGE UP (ref 2.5–4.5)
PLATELET # BLD AUTO: 232 K/UL — SIGNIFICANT CHANGE UP (ref 150–400)
POTASSIUM SERPL-MCNC: 3.6 MMOL/L — SIGNIFICANT CHANGE UP (ref 3.5–5.3)
POTASSIUM SERPL-SCNC: 3.6 MMOL/L — SIGNIFICANT CHANGE UP (ref 3.5–5.3)
RBC # BLD: 3.16 M/UL — LOW (ref 4.2–5.8)
RBC # FLD: 17.6 % — HIGH (ref 10.3–14.5)
SODIUM SERPL-SCNC: 141 MMOL/L — SIGNIFICANT CHANGE UP (ref 135–145)
TM INTERPRETATION: SIGNIFICANT CHANGE UP
WBC # BLD: 7.8 K/UL — SIGNIFICANT CHANGE UP (ref 3.8–10.5)
WBC # FLD AUTO: 7.8 K/UL — SIGNIFICANT CHANGE UP (ref 3.8–10.5)

## 2020-07-06 PROCEDURE — 99232 SBSQ HOSP IP/OBS MODERATE 35: CPT

## 2020-07-06 PROCEDURE — 99233 SBSQ HOSP IP/OBS HIGH 50: CPT | Mod: GC

## 2020-07-06 RX ADMIN — Medication 1: at 12:35

## 2020-07-06 RX ADMIN — Medication 81 MILLIGRAM(S): at 11:21

## 2020-07-06 RX ADMIN — CEFTRIAXONE 100 MILLIGRAM(S): 500 INJECTION, POWDER, FOR SOLUTION INTRAMUSCULAR; INTRAVENOUS at 14:36

## 2020-07-06 RX ADMIN — Medication 1: at 08:46

## 2020-07-06 RX ADMIN — Medication 105 MILLIGRAM(S): at 21:34

## 2020-07-06 RX ADMIN — HEPARIN SODIUM 5000 UNIT(S): 5000 INJECTION INTRAVENOUS; SUBCUTANEOUS at 13:29

## 2020-07-06 RX ADMIN — Medication 1: at 17:57

## 2020-07-06 RX ADMIN — Medication 0.25 MILLIGRAM(S): at 21:34

## 2020-07-06 RX ADMIN — HEPARIN SODIUM 5000 UNIT(S): 5000 INJECTION INTRAVENOUS; SUBCUTANEOUS at 05:46

## 2020-07-06 RX ADMIN — Medication 0.25 MILLIGRAM(S): at 05:46

## 2020-07-06 RX ADMIN — ATORVASTATIN CALCIUM 80 MILLIGRAM(S): 80 TABLET, FILM COATED ORAL at 21:33

## 2020-07-06 RX ADMIN — Medication 105 MILLIGRAM(S): at 13:29

## 2020-07-06 RX ADMIN — PANTOPRAZOLE SODIUM 40 MILLIGRAM(S): 20 TABLET, DELAYED RELEASE ORAL at 05:46

## 2020-07-06 RX ADMIN — Medication 105 MILLIGRAM(S): at 05:46

## 2020-07-06 RX ADMIN — HEPARIN SODIUM 5000 UNIT(S): 5000 INJECTION INTRAVENOUS; SUBCUTANEOUS at 21:34

## 2020-07-06 RX ADMIN — BROMOCRIPTINE MESYLATE 5 MILLIGRAM(S): 5 CAPSULE ORAL at 11:22

## 2020-07-06 RX ADMIN — SERTRALINE 50 MILLIGRAM(S): 25 TABLET, FILM COATED ORAL at 11:21

## 2020-07-06 RX ADMIN — Medication 0.25 MILLIGRAM(S): at 13:28

## 2020-07-06 RX ADMIN — POLYETHYLENE GLYCOL 3350 17 GRAM(S): 17 POWDER, FOR SOLUTION ORAL at 11:21

## 2020-07-06 NOTE — PROVIDER CONTACT NOTE (OTHER) - SITUATION
Order for post void residual bladder scan. Pt has not voided since the griffith was taken out at 14:00

## 2020-07-06 NOTE — PROGRESS NOTE ADULT - ASSESSMENT
65 yo M pmhx DM2 on insulin, HTN, CAD w. LAD stent/restent after thrombosis, depression presenting with 2 days of hematuria  Patient with no fevers,chills  Leucocytosis as OP  Admitted CT with emphysematous cystitis and renal lesion ? abscess  But also with 80 lb weight loss over 6 months  In hospital minimal leucocytosis, No fevers  Blood and urine cs negative (though did recieve cipro as OP)  s/p 2 weeks of IV abx  repeat CT with resolution of abscess and much improved custitis-no intramural air  Given this would continue current abx but can de-escalate to po soon.  Will defer to urology on any further workup for malignancy    Would rec:    A) Emphysematous cystitis, renal mass ? abscess  Ct results as above  Continue ceftriaxone for now but can de-escalate to po ceftin soon   To continue for another 7 days       B) Renal mass  resolved  urine cytology was negative  will defer to urology on any further workup given weight loss and hematuria    C) seizures ?  delirium  hypophonation/wasting   Neuro input noted  LP with elevated protein-No cell count-PCR and Cx negative-NOT s/o any acute infection  will defer to neuro/primary team       Will tailor plan for ID issues  per course,results.Will defer to primary team on management of other issues.  Assessment, plan and recommendations as detailed above were discussed with the medical/primary  team-Dr gracia    Will Follow.  Beeper [phone] [facility] [id number].   Wknd/afterhours/No response-[phone] or Fellow on call

## 2020-07-06 NOTE — PROGRESS NOTE ADULT - PROBLEM SELECTOR PLAN 6
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 As above iso FTT with global weakness  - dietician consult  - PT consulted, recommending MARI  - neuro consulted; pending paraneoplastic panel; plan for LP today.  - will f/u for EMG w/ Dr. Winters on July 7th 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 As above iso FTT with global weakness  - dietician consult  - PT consulted, recommending MARI  - neuro consulted; pending paraneoplastic panel.  - will f/u for EMG w/ Dr. Caba? 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 As above iso FTT with global weakness  - dietician consult  - PT consulted, recommending MARI  - neuro consulted; pending paraneoplastic panel.  - Inpatient vs outpatient EMG to be discussed with Dr. Caba 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 As above iso FTT with global weakness  - dietician consult  - PT consulted, recommending MARI  - neuro consulted; paraneoplastic panel negative.  - Inpatient vs outpatient EMG to be discussed with Dr. Caba 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 As above iso FTT with global weakness  - PT consulted, recommending MARI  - neuro consulted; paraneoplastic panel negative.  - Inpatient vs outpatient EMG to be discussed with Dr. Caba

## 2020-07-06 NOTE — PROGRESS NOTE ADULT - SUBJECTIVE AND OBJECTIVE BOX
*INCOMPLETE NOTE*    Bandar Jernigan MD PGY-1  Internal Medicine  Pager [phone] / [phone]    Patient is a 64y old  Male who presents with a chief complaint of Hematuria; weight loss (05 Jul 2020 09:36)      SUBJECTIVE / OVERNIGHT EVENTS:  - No acute events overnight.   - No fevers/chills.  - Patient seen and evaluated at bedside.  - Denies SOB at rest, chest pain, palpitations, abdominal pain, nausea/vomiting    ADDITIONAL REVIEW OF SYSTEMS:    CONSTITUTIONAL: No weakness, fevers or chills  EYES/ENT: No visual changes;  No vertigo or throat pain.  NECK: No pain or stiffness.  RESPIRATORY: No cough, wheezing, hemoptysis; No shortness of breath.  CARDIOVASCULAR: No chest pain or palpitations  GASTROINTESTINAL: No abdominal pain. No nausea, vomiting, diarrhea, constipation, or melena.  GENITOURINARY: No dysuria, frequency or hematuria  NEUROLOGICAL: No numbness or weakness  SKIN: No itching, burning, rashes, or lesions   All other review of systems is negative unless indicated above.    MEDICATIONS  (STANDING):  aspirin enteric coated 81 milliGRAM(s) Oral daily  atorvastatin 80 milliGRAM(s) Oral at bedtime  bromocriptine Capsule 5 milliGRAM(s) Oral daily  cefTRIAXone   IVPB 1000 milliGRAM(s) IV Intermittent every 24 hours  clonazePAM  Tablet 0.25 milliGRAM(s) Oral every 8 hours  dextrose 5%. 1000 milliLiter(s) (50 mL/Hr) IV Continuous <Continuous>  dextrose 50% Injectable 12.5 Gram(s) IV Push once  dextrose 50% Injectable 25 Gram(s) IV Push once  dextrose 50% Injectable 25 Gram(s) IV Push once  heparin   Injectable 5000 Unit(s) SubCutaneous every 8 hours  insulin lispro (HumaLOG) corrective regimen sliding scale   SubCutaneous three times a day before meals  insulin lispro (HumaLOG) corrective regimen sliding scale   SubCutaneous at bedtime  pantoprazole    Tablet 40 milliGRAM(s) Oral before breakfast  polyethylene glycol 3350 17 Gram(s) Oral daily  sertraline 50 milliGRAM(s) Oral daily  thiamine IVPB 500 milliGRAM(s) IV Intermittent every 8 hours    MEDICATIONS  (PRN):  dextrose 40% Gel 15 Gram(s) Oral once PRN Blood Glucose LESS THAN 70 milliGRAM(s)/deciliter  glucagon  Injectable 1 milliGRAM(s) IntraMuscular once PRN Glucose LESS THAN 70 milligrams/deciliter      CAPILLARY BLOOD GLUCOSE      POCT Blood Glucose.: 152 mg/dL (05 Jul 2020 21:08)  POCT Blood Glucose.: 185 mg/dL (05 Jul 2020 16:45)  POCT Blood Glucose.: 160 mg/dL (05 Jul 2020 11:49)  POCT Blood Glucose.: 169 mg/dL (05 Jul 2020 07:48)    I&O's Summary    05 Jul 2020 07:01  -  06 Jul 2020 07:00  --------------------------------------------------------  IN: 450 mL / OUT: 500 mL / NET: -50 mL        PHYSICAL EXAM:  Vital Signs Last 24 Hrs  T(C): 36.7 (05 Jul 2020 23:32), Max: 36.7 (05 Jul 2020 23:32)  T(F): 98.1 (05 Jul 2020 23:32), Max: 98.1 (05 Jul 2020 23:32)  HR: 92 (05 Jul 2020 23:32) (66 - 92)  BP: 134/73 (05 Jul 2020 23:32) (121/71 - 134/73)  BP(mean): --  RR: 18 (05 Jul 2020 23:32) (18 - 18)  SpO2: 99% (05 Jul 2020 23:32) (98% - 99%)    CONSTITUTIONAL: NAD, well-developed  RESPIRATORY: Normal respiratory effort; lungs are clear to auscultation bilaterally  CARDIOVASCULAR: Regular rate, normal S1 and S2, no murmur/rub/gallop; No lower extremity edema; Peripheral pulses are 2+ bilaterally  ABDOMEN: Nontender to palpation, normoactive bowel sounds, no rebound/guarding; No hepatosplenomegaly  MUSCLOSKELETAL: no cyanosis of digits; no joint swelling or tenderness to palpation, full strength all extremities.  NEURO: No focal deficits, CN II-XII grossly intact b/l; sensation to light touch intact in all extremities, gait intact.  PSYCH: A+O to person, place, and time; affect appropriate.    LABS:                        9.1    5.80  )-----------( 214      ( 05 Jul 2020 05:59 )             28.4     07-05    137  |  103  |  15  ----------------------------<  151<H>  3.3<L>   |  26  |  0.87    Ca    9.0      05 Jul 2020 06:00  Phos  3.3     07-05  Mg     2.0     07-05                  RADIOLOGY & ADDITIONAL TESTS:  Results Reviewed:   Imaging Personally Reviewed:  Electrocardiogram Personally Reviewed:    COORDINATION OF CARE:  Care Discussed with Consultants/Other Providers [Y/N]:   Prior or Outpatient Records Reviewed [Y/N]: Patient is a 64y old  Male who presents with a chief complaint of Hematuria; weight loss (05 Jul 2020 09:36)      SUBJECTIVE / OVERNIGHT EVENTS:   - No acute events overnight; patient seen and evaluated at bedside.   - Pt is remarkably much more alert, significant resolution of hypophonism; and reports "feeling more energy"  - No seizure-like activity encountered  - No fevers/chills   - Denies SOB at rest, chest pain, palpitations, abdominal pain, nausea/vomiting.    ADDITIONAL REVIEW OF SYSTEMS: as previously noted; otherwise negative,    CONSTITUTIONAL: NAD; appears alert and comfortable.   EYES/ENT: No visual changes;  No vertigo or throat pain.  NECK: No pain or stiffness.  RESPIRATORY: No cough, wheezing, hemoptysis; No shortness of breath.  CARDIOVASCULAR: No chest pain or palpitations  GASTROINTESTINAL: No abdominal pain. No nausea, vomiting, diarrhea, constipation, or melena.  GENITOURINARY: No dysuria, frequency or hematuria  NEUROLOGICAL: No numbness or weakness  SKIN: No itching, burning, rashes, or lesions   All other review of systems is negative unless indicated above.    MEDICATIONS  (STANDING):  aspirin enteric coated 81 milliGRAM(s) Oral daily  atorvastatin 80 milliGRAM(s) Oral at bedtime  bromocriptine Capsule 5 milliGRAM(s) Oral daily  cefTRIAXone   IVPB 1000 milliGRAM(s) IV Intermittent every 24 hours  clonazePAM  Tablet 0.25 milliGRAM(s) Oral every 8 hours  dextrose 5%. 1000 milliLiter(s) (50 mL/Hr) IV Continuous <Continuous>  dextrose 50% Injectable 12.5 Gram(s) IV Push once  dextrose 50% Injectable 25 Gram(s) IV Push once  dextrose 50% Injectable 25 Gram(s) IV Push once  heparin   Injectable 5000 Unit(s) SubCutaneous every 8 hours  insulin lispro (HumaLOG) corrective regimen sliding scale   SubCutaneous three times a day before meals  insulin lispro (HumaLOG) corrective regimen sliding scale   SubCutaneous at bedtime  pantoprazole    Tablet 40 milliGRAM(s) Oral before breakfast  polyethylene glycol 3350 17 Gram(s) Oral daily  sertraline 50 milliGRAM(s) Oral daily  thiamine IVPB 500 milliGRAM(s) IV Intermittent every 8 hours    MEDICATIONS  (PRN):  dextrose 40% Gel 15 Gram(s) Oral once PRN Blood Glucose LESS THAN 70 milliGRAM(s)/deciliter  glucagon  Injectable 1 milliGRAM(s) IntraMuscular once PRN Glucose LESS THAN 70 milligrams/deciliter      CAPILLARY BLOOD GLUCOSE      POCT Blood Glucose.: 152 mg/dL (05 Jul 2020 21:08)  POCT Blood Glucose.: 185 mg/dL (05 Jul 2020 16:45)  POCT Blood Glucose.: 160 mg/dL (05 Jul 2020 11:49)  POCT Blood Glucose.: 169 mg/dL (05 Jul 2020 07:48)    I&O's Summary    05 Jul 2020 07:01  -  06 Jul 2020 07:00  --------------------------------------------------------  IN: 450 mL / OUT: 500 mL / NET: -50 mL        PHYSICAL EXAM:  Vital Signs Last 24 Hrs  T(C): 36.7 (05 Jul 2020 23:32), Max: 36.7 (05 Jul 2020 23:32)  T(F): 98.1 (05 Jul 2020 23:32), Max: 98.1 (05 Jul 2020 23:32)  HR: 92 (05 Jul 2020 23:32) (66 - 92)  BP: 134/73 (05 Jul 2020 23:32) (121/71 - 134/73)  BP(mean): --  RR: 18 (05 Jul 2020 23:32) (18 - 18)  SpO2: 99% (05 Jul 2020 23:32) (98% - 99%)    CONSTITUTIONAL: NAD; alert  RESPIRATORY: preserved respiratory effort; lungs are clear to auscultation bilaterally; no wheezes, crackles, or ronchi  CARDIOVASCULAR: Regular rate, normal S1 and S2, no murmur/rub/gallop; No lower extremity edema; Peripheral radial pulses are 2+ bilaterally  ABDOMEN: Nontender to palpation, normoactive bowel sounds, no rebound/guarding; No hepatosplenomegaly  MUSCLOSKELETAL: no cyanosis of digits; no joint swelling or tenderness to palpation, full strength all extremities.  NEURO: non-focal, bilateral UE 5/5 on flexion and extension; R hip flexion 4/5 and L hip flexion 5/5; R lower leg flexion 2/5; L lower leg 4/5.  PSYCH: A+O to person, place, and time; affect appropriate.    LABS:                        9.1    5.80  )-----------( 214      ( 05 Jul 2020 05:59 )             28.4     07-05    137  |  103  |  15  ----------------------------<  151<H>  3.3<L>   |  26  |  0.87    Ca    9.0      05 Jul 2020 06:00  Phos  3.3     07-05  Mg     2.0     07-05                  RADIOLOGY & ADDITIONAL TESTS:  Results Reviewed:   Imaging Personally Reviewed:  Electrocardiogram Personally Reviewed:    COORDINATION OF CARE:  Care Discussed with Consultants/Other Providers [Y/N]:   Prior or Outpatient Records Reviewed [Y/N]: Patient is a 64y old  Male who presents with a chief complaint of Hematuria; weight loss (05 Jul 2020 09:36)      SUBJECTIVE / OVERNIGHT EVENTS:   - No acute events overnight; patient seen and evaluated at bedside.   - Pt is remarkably much more alert, significant resolution of hypophonism; and reports "feeling more energy"  - No seizure-like activity encountered  - No fevers/chills   - Denies SOB at rest, chest pain, palpitations, abdominal pain, nausea/vomiting.    ADDITIONAL REVIEW OF SYSTEMS: as previously noted; otherwise negative,    CONSTITUTIONAL: NAD; appears alert and comfortable.   EYES/ENT: No visual changes;  No vertigo or throat pain.  NECK: No pain or stiffness.  RESPIRATORY: No cough, wheezing, hemoptysis; No shortness of breath.  CARDIOVASCULAR: No chest pain or palpitations  GASTROINTESTINAL: No abdominal pain. No nausea, vomiting, diarrhea, constipation, or melena.  GENITOURINARY: No dysuria, frequency or hematuria  NEUROLOGICAL: No numbness or weakness  SKIN: No itching, burning, rashes, or lesions   All other review of systems is negative unless indicated above.    MEDICATIONS  (STANDING):  aspirin enteric coated 81 milliGRAM(s) Oral daily  atorvastatin 80 milliGRAM(s) Oral at bedtime  bromocriptine Capsule 5 milliGRAM(s) Oral daily  cefTRIAXone   IVPB 1000 milliGRAM(s) IV Intermittent every 24 hours  clonazePAM  Tablet 0.25 milliGRAM(s) Oral every 8 hours  dextrose 5%. 1000 milliLiter(s) (50 mL/Hr) IV Continuous <Continuous>  dextrose 50% Injectable 12.5 Gram(s) IV Push once  dextrose 50% Injectable 25 Gram(s) IV Push once  dextrose 50% Injectable 25 Gram(s) IV Push once  heparin   Injectable 5000 Unit(s) SubCutaneous every 8 hours  insulin lispro (HumaLOG) corrective regimen sliding scale   SubCutaneous three times a day before meals  insulin lispro (HumaLOG) corrective regimen sliding scale   SubCutaneous at bedtime  pantoprazole    Tablet 40 milliGRAM(s) Oral before breakfast  polyethylene glycol 3350 17 Gram(s) Oral daily  sertraline 50 milliGRAM(s) Oral daily  thiamine IVPB 500 milliGRAM(s) IV Intermittent every 8 hours    MEDICATIONS  (PRN):  dextrose 40% Gel 15 Gram(s) Oral once PRN Blood Glucose LESS THAN 70 milliGRAM(s)/deciliter  glucagon  Injectable 1 milliGRAM(s) IntraMuscular once PRN Glucose LESS THAN 70 milligrams/deciliter      CAPILLARY BLOOD GLUCOSE      POCT Blood Glucose.: 152 mg/dL (05 Jul 2020 21:08)  POCT Blood Glucose.: 185 mg/dL (05 Jul 2020 16:45)  POCT Blood Glucose.: 160 mg/dL (05 Jul 2020 11:49)  POCT Blood Glucose.: 169 mg/dL (05 Jul 2020 07:48)    I&O's Summary    05 Jul 2020 07:01  -  06 Jul 2020 07:00  --------------------------------------------------------  IN: 450 mL / OUT: 500 mL / NET: -50 mL    06 Jul 2020 07:01  -  06 Jul 2020 11:49  --------------------------------------------------------  IN: 180 mL / OUT: 150 mL / NET: 30 mL        PHYSICAL EXAM:  Vital Signs Last 24 Hrs  T(C): 36.6 (06 Jul 2020 09:34), Max: 36.7 (05 Jul 2020 23:32)  T(F): 97.8 (06 Jul 2020 09:34), Max: 98.1 (05 Jul 2020 23:32)  HR: 84 (06 Jul 2020 09:34) (75 - 92)  BP: 132/70 (06 Jul 2020 09:34) (121/71 - 134/73)  BP(mean): --  RR: 18 (06 Jul 2020 09:34) (18 - 18)  SpO2: 98% (06 Jul 2020 09:34) (98% - 99%)    CONSTITUTIONAL: NAD; alert  RESPIRATORY: preserved respiratory effort; lungs are clear to auscultation bilaterally; no wheezes, crackles, or ronchi  CARDIOVASCULAR: Regular rate, normal S1 and S2, no murmur/rub/gallop; No lower extremity edema; Peripheral radial pulses are 2+ bilaterally  ABDOMEN: Nontender to palpation, normoactive bowel sounds, no rebound/guarding; No hepatosplenomegaly  MUSCLOSKELETAL: no cyanosis of digits; no joint swelling or tenderness to palpation, full strength all extremities.  NEURO: non-focal, bilateral UE 5/5 on flexion and extension; R hip flexion 4/5 and L hip flexion 5/5; R lower leg flexion 2/5; L lower leg 4/5.  PSYCH: A+O to person, place, and time; affect appropriate.    LABS:                          9.1    5.80  )-----------( 214      ( 05 Jul 2020 05:59 )             28.4     07-05    137  |  103  |  15  ----------------------------<  151<H>  3.3<L>   |  26  |  0.87    Ca    9.0      05 Jul 2020 06:00  Phos  3.3     07-05  Mg     2.0     07-05                  RADIOLOGY & ADDITIONAL TESTS:  Results Reviewed:   Imaging Personally Reviewed:  Electrocardiogram Personally Reviewed:    COORDINATION OF CARE:  Care Discussed with Consultants/Other Providers [Y/N]:   Prior or Outpatient Records Reviewed [Y/N]: Patient is a 64y old  Male who presents with a chief complaint of Hematuria; weight loss (05 Jul 2020 09:36)      SUBJECTIVE / OVERNIGHT EVENTS:   - No acute events overnight; patient seen and evaluated at bedside.   - Pt is remarkably much more alert, significant resolution of hypophonism; and reports "feeling more energy"  - No seizure-like activity encountered  - No fevers/chills   - Denies SOB at rest, chest pain, palpitations, abdominal pain, nausea/vomiting.      MEDICATIONS  (STANDING):  aspirin enteric coated 81 milliGRAM(s) Oral daily  atorvastatin 80 milliGRAM(s) Oral at bedtime  bromocriptine Capsule 5 milliGRAM(s) Oral daily  cefTRIAXone   IVPB 1000 milliGRAM(s) IV Intermittent every 24 hours  clonazePAM  Tablet 0.25 milliGRAM(s) Oral every 8 hours  dextrose 5%. 1000 milliLiter(s) (50 mL/Hr) IV Continuous <Continuous>  dextrose 50% Injectable 12.5 Gram(s) IV Push once  dextrose 50% Injectable 25 Gram(s) IV Push once  dextrose 50% Injectable 25 Gram(s) IV Push once  heparin   Injectable 5000 Unit(s) SubCutaneous every 8 hours  insulin lispro (HumaLOG) corrective regimen sliding scale   SubCutaneous three times a day before meals  insulin lispro (HumaLOG) corrective regimen sliding scale   SubCutaneous at bedtime  pantoprazole    Tablet 40 milliGRAM(s) Oral before breakfast  polyethylene glycol 3350 17 Gram(s) Oral daily  sertraline 50 milliGRAM(s) Oral daily  thiamine IVPB 500 milliGRAM(s) IV Intermittent every 8 hours    MEDICATIONS  (PRN):  dextrose 40% Gel 15 Gram(s) Oral once PRN Blood Glucose LESS THAN 70 milliGRAM(s)/deciliter  glucagon  Injectable 1 milliGRAM(s) IntraMuscular once PRN Glucose LESS THAN 70 milligrams/deciliter      CAPILLARY BLOOD GLUCOSE      POCT Blood Glucose.: 152 mg/dL (05 Jul 2020 21:08)  POCT Blood Glucose.: 185 mg/dL (05 Jul 2020 16:45)  POCT Blood Glucose.: 160 mg/dL (05 Jul 2020 11:49)  POCT Blood Glucose.: 169 mg/dL (05 Jul 2020 07:48)    I&O's Summary    05 Jul 2020 07:01  -  06 Jul 2020 07:00  --------------------------------------------------------  IN: 450 mL / OUT: 500 mL / NET: -50 mL    06 Jul 2020 07:01  -  06 Jul 2020 11:49  --------------------------------------------------------  IN: 180 mL / OUT: 150 mL / NET: 30 mL        PHYSICAL EXAM:  Vital Signs Last 24 Hrs  T(C): 36.6 (06 Jul 2020 09:34), Max: 36.7 (05 Jul 2020 23:32)  T(F): 97.8 (06 Jul 2020 09:34), Max: 98.1 (05 Jul 2020 23:32)  HR: 84 (06 Jul 2020 09:34) (75 - 92)  BP: 132/70 (06 Jul 2020 09:34) (121/71 - 134/73)  BP(mean): --  RR: 18 (06 Jul 2020 09:34) (18 - 18)  SpO2: 98% (06 Jul 2020 09:34) (98% - 99%)    CONSTITUTIONAL: NAD; alert  RESPIRATORY: preserved respiratory effort; lungs are clear to auscultation bilaterally; no wheezes, crackles, or ronchi  CARDIOVASCULAR: Regular rate, normal S1 and S2, no murmur/rub/gallop; No lower extremity edema; Peripheral radial pulses are 2+ bilaterally  ABDOMEN: Nontender to palpation, normoactive bowel sounds, no rebound/guarding; No hepatosplenomegaly  MUSCLOSKELETAL: no cyanosis of digits; no joint swelling or tenderness to palpation, full strength all extremities.  NEURO: non-focal, bilateral UE 5/5 on flexion and extension; R hip flexion 4/5 and L hip flexion 5/5; R lower leg flexion 2/5; L lower leg 4/5.  PSYCH: A+O to person, place, and time; affect appropriate.    LABS:                          9.1    5.80  )-----------( 214      ( 05 Jul 2020 05:59 )             28.4     07-05    137  |  103  |  15  ----------------------------<  151<H>  3.3<L>   |  26  |  0.87    Ca    9.0      05 Jul 2020 06:00  Phos  3.3     07-05  Mg     2.0     07-05                  RADIOLOGY & ADDITIONAL TESTS:  Results Reviewed:   Imaging Personally Reviewed:  Electrocardiogram Personally Reviewed:    COORDINATION OF CARE:  Care Discussed with Consultants/Other Providers [Y/N]:   Prior or Outpatient Records Reviewed [Y/N]:

## 2020-07-06 NOTE — PROGRESS NOTE ADULT - ASSESSMENT
65 yo M pmhx prolactinoma, DM2 on insulin, HTN, CAD w. 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 Pt also presenting with 6 months of weight loss/FTT 2/2 neuromuscular disorder (?ALS) vs paraneoplastic vs infectious. Course c/b by seizure-like activity. At this time, we are following the results of the LP. Additional history was gathered from family and spouse that seems suggestive for ALS. 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 Plan: neurocog will see patient 7/5 65 yo M pmhx prolactinoma, DM2 on insulin, HTN, CAD w. 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 Pt also presenting with 6 months of weight loss/FTT 2/2 neuromuscular disorder (?ALS) vs paraneoplastic vs infectious. Course c/b by seizure-like activity. At this time, we are following the results of the LP. Additional history was gathered from family and spouse that seems suggestive for ALS. Neurology suggests diagnosis of FTD-ALS. 63 yo M pmhx prolactinoma, DM2 on insulin, HTN, CAD w. 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 Pt also presenting with 6 months of weight loss/FTT 2/2 neuromuscular disorder (?ALS) vs paraneoplastic vs infectious. Course c/b by seizure-like activity. At this time, we are following the results of the LP. Additional history was gathered from family and spouse that seems suggestive for ALS. Neurology suggests diagnosis of FTD-ALS. Will f/u with Neurology re: scheduling of inpatient EMG.

## 2020-07-06 NOTE — PROGRESS NOTE ADULT - PROBLEM SELECTOR PLAN 5
Hx of 6 months of weight loss, FTT 2/2 neuromuscular disorder (ALS) vs paraneoplastic (less likely due to negative CT findings vs infection (Endocarditis? - less likely due to TTE results, no blood cx, no murmur) vs psychiatric illness  - Physical therapy evaluation recommending MARI  - UTI treatment as above  - continue with Wellbutrin, sertraline   - TTE without new findings  - discussed with Neurologist outpatient Dr. Winters; will need EMG as outpatient. House neurology following  - fu urine cytology - negative for RCC Hx of 6 months of weight loss, FTT 2/2 neuromuscular disorder (ALS) vs paraneoplastic (less likely due to negative CT findings vs infection (Endocarditis? - less likely due to TTE results, no blood cx, no murmur) vs psychiatric illness  - Physical therapy evaluation recommending MARI  - UTI treatment as above  - continue with Wellbutrin, sertraline   - TTE without new findings  - discussed with Neurologist outpatient for EMG w/ Dr. Winters; will need EMG and  will consider Dr. Caba in house neuro .   - House neurology following  - fu urine cytology - negative for RCC Hx of 6 months of weight loss, FTT 2/2 neuromuscular disorder (ALS) vs paraneoplastic (less likely due to negative CT findings vs infection (Endocarditis? - less likely due to TTE results, no blood cx, no murmur) vs psychiatric illness  - Physical therapy evaluation recommending MARI  - UTI treatment as above  - continue with sertraline   - TTE without new findings  - Inpatient vs outpatient EMG to be discussed with Dr. Caba and he is following.   - as per neuro, limited neurocognitive testing  - fu urine cytology - negative for RCC Hx of 6 months of weight loss, FTT 2/2 neuromuscular disorder (ALS) vs paraneoplastic (less likely due to negative paraneoplastic panel and negative CT findings) vs infection (Endocarditis? - less likely due to TTE results, no blood cx, no murmur) vs psychiatric illness  - Physical therapy evaluation recommending MARI  - UTI treatment as above  - continue with sertraline   - TTE without new findings  - Inpatient vs outpatient EMG to be discussed with Dr. Caba and he is following.   - as per neuro, limited neurocognitive testing  - fu urine cytology - negative for RCC Hx of 6 months of weight loss, FTT 2/2 neuromuscular disorder (ALS) vs paraneoplastic (less likely due to negative paraneoplastic panel and negative CT findings) vs infection (Endocarditis? - less likely due to TTE results, no blood cx, no murmur) vs psychiatric illness  - Physical therapy evaluation recommending MARI  - UTI treatment as above  - continue with sertraline   - TTE without new findings  - Inpatient vs outpatient EMG to be discussed with Dr. Caba and he is following.   - as per neuro, limited neurocognitive testing

## 2020-07-06 NOTE — PROGRESS NOTE ADULT - ATTENDING COMMENTS
Patient seen and examined, case d/w house staff.  Much more alert today.  Will continue abx through 7/12, Ceftriaxone while in house, can be switched to po if discharged earlier.  TOV, neuro f/u regarding EMG.  d/c planning to [facility].

## 2020-07-06 NOTE — PROGRESS NOTE ADULT - PROBLEM SELECTOR PLAN 1
- abx changed to CTX 1 g QD on 7/1. Zosyn d/c'd  - CT A/P w/wo contx shows interval resolution of left renal abscess - abx changed to CTX 1 g QD on 7/1 and now day 6. Zosyn d/c'd  - CT A/P w/wo contx shows interval resolution of left renal abscess - abx changed to CTX 1 g QD on 7/1 and now day 6 and will dc tomorrow. Zosyn d/c'd  - CT A/P w/wo contx shows interval resolution of left renal abscess - abx changed to CTX 1 g QD on 7/1 continue through  7/12,   - CT A/P w/wo contx shows interval resolution of left renal abscess - abx changed to CTX 1 g QD on 7/1.  - per ID c/w CTX for additional 7 days (last dose 7/14)  - per ID, consider de-escalation to PO Ceftin soon  - CT A/P w/wo contx shows interval resolution of left renal abscess

## 2020-07-06 NOTE — PROGRESS NOTE ADULT - PROBLEM SELECTOR PLAN 3
-Seizure-like activity ceased on 7/1 likely 2/2 Klonopin 0.25 mg dosing  -Will continue to monitor.  -LP yesterday -- f/u results. Negative results at this time (7/4). -Seizure-like activity ceased on 7/1 likely 2/2 Klonopin 0.25 mg dosing  -Will continue to monitor.  -LP f/u results. Negative results thus far (7/5). -Seizure-like activity ceased on 7/1 likely 2/2 Klonopin 0.25 mg dosing; currently tolerating dose  -Will continue to monitor.  -LP f/u results coincides with inflammatory picture [segmented neutrophils and elevated RBC]. -Seizure-like activity ceased on 7/1 likely 2/2 Klonopin 0.25 mg dosing; currently tolerating dose  -Will continue to monitor.  -LP f/u results coincides with inflammatory picture [segmented neutrophils and elevated RBC].  -appreciate neuro recs

## 2020-07-06 NOTE — PROGRESS NOTE ADULT - SUBJECTIVE AND OBJECTIVE BOX
Patient is a 64y old  Male who presents with a chief complaint of Hematuria; weight loss (06 Jul 2020 07:18)    Being followed by ID for UTI    Interval history:more awake  denies any complaints   No acute events      ROS:  No cough,SOB,CP  No N/V/D./abd pain  No other complaints      Antimicrobials:    cefTRIAXone   IVPB 1000 milliGRAM(s) IV Intermittent every 24 hours    Other medications reviewed    Vital Signs Last 24 Hrs  T(C): 36.6 (07-06-20 @ 09:34), Max: 36.7 (07-05-20 @ 23:32)  T(F): 97.8 (07-06-20 @ 09:34), Max: 98.1 (07-05-20 @ 23:32)  HR: 84 (07-06-20 @ 09:34) (75 - 92)  BP: 132/70 (07-06-20 @ 09:34) (121/71 - 134/73)  BP(mean): --  RR: 18 (07-06-20 @ 09:34) (18 - 18)  SpO2: 98% (07-06-20 @ 09:34) (98% - 99%)    Physical Exam:      cachecxia    HEENT PERRLA EOMI    No oral exudate or erythema    Chest Good AE,CTA    CVS RRR S1 S2 WNl No murmur or rub or gallop    Abd soft BS normal No tenderness no masses  griffith    IV site no erythema tenderness or discharge    CNS as above    Lab Data:                          9.0    7.80  )-----------( 232      ( 06 Jul 2020 06:52 )             26.8       07-06    141  |  106  |  23  ----------------------------<  178<H>  3.6   |  26  |  1.11    Ca    8.9      06 Jul 2020 06:51  Phos  2.5     07-06  Mg     1.9     07-06          Culture - CSF with Gram Stain (collected 02 Jul 2020 18:58)  Source: .CSF CSF  Gram Stain (02 Jul 2020 20:26):    polymorphonuclear leukocytes seen    No organisms seen    by cytocentrifuge  Final Report (05 Jul 2020 16:40):    No growth        < from: CT Abdomen and Pelvis w/ IV Cont (07.01.20 @ 18:05) >    IMPRESSION:     Resolving left renal abscess/pyelonephritis.        < end of copied text >

## 2020-07-06 NOTE — CHART NOTE - NSCHARTNOTEFT_GEN_A_CORE
Nutrition Follow Up Note  Patient seen for: Malnutrition follow up and calorie count results. 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     Source: Pt, noted to be a poor historian, still hypophonic but less so compared to previous encounter.     Diet : Soft diet with ensure enlive 2 daily     Patient reports: No N+V, last BM noted yesterday.      PO intake : Pt S/P 3 day calorie count 7/3- however only 1 day completed, pt consumed ~1233 Kcal and 74g protein to meet 76% low end calorie and >100% low end protein needs. Pt stated recently he is feeling better, RD observed breakfast >50% consumed, pt ate 50% Thai toast thus far and is not yet finished eating, 50% of sweet potato hash and 1 full chocolate milk thus far, pt stated he has been drinking 2 ensure enlive per day, RD observed 3 at bedside. No ensures noted on 1 day documented of calorie count.     Estimated needs based on recent weight 54.1 Kg  9334-9208 Kcal/day (30-36 Kcal/Kg)  65-76g /day (1.2-1.4g/Kg)      Daily Weight in k.1 (), no new wt  % Weight Change    Pertinent Medications: MEDICATIONS  (STANDING):  aspirin enteric coated 81 milliGRAM(s) Oral daily  atorvastatin 80 milliGRAM(s) Oral at bedtime  bromocriptine Capsule 5 milliGRAM(s) Oral daily  cefTRIAXone   IVPB 1000 milliGRAM(s) IV Intermittent every 24 hours  clonazePAM  Tablet 0.25 milliGRAM(s) Oral every 8 hours  dextrose 5%. 1000 milliLiter(s) (50 mL/Hr) IV Continuous <Continuous>  dextrose 50% Injectable 12.5 Gram(s) IV Push once  dextrose 50% Injectable 25 Gram(s) IV Push once  dextrose 50% Injectable 25 Gram(s) IV Push once  heparin   Injectable 5000 Unit(s) SubCutaneous every 8 hours  insulin lispro (HumaLOG) corrective regimen sliding scale   SubCutaneous three times a day before meals  insulin lispro (HumaLOG) corrective regimen sliding scale   SubCutaneous at bedtime  pantoprazole    Tablet 40 milliGRAM(s) Oral before breakfast  polyethylene glycol 3350 17 Gram(s) Oral daily  sertraline 50 milliGRAM(s) Oral daily  thiamine IVPB 500 milliGRAM(s) IV Intermittent every 8 hours    MEDICATIONS  (PRN):  dextrose 40% Gel 15 Gram(s) Oral once PRN Blood Glucose LESS THAN 70 milliGRAM(s)/deciliter  glucagon  Injectable 1 milliGRAM(s) IntraMuscular once PRN Glucose LESS THAN 70 milligrams/deciliter    Pertinent Labs:  @ 06:51: Na 141, BUN 23, Cr 1.11, <H>, K+ 3.6, Phos 2.5, Mg 1.9, Alk Phos --, ALT/SGPT --, AST/SGOT --, HbA1c --    Finger Sticks:  POCT Blood Glucose.: 174 mg/dL ( @ 08:25)  POCT Blood Glucose.: 152 mg/dL ( @ 21:08)  POCT Blood Glucose.: 185 mg/dL ( @ 16:45)  POCT Blood Glucose.: 160 mg/dL ( @ 11:49)      Skin per nursing documentation: free of pressure injury   Edema: none     Estimated Needs:   [ x] no change since previous assessment  [ ] recalculated:     Previous Nutrition Diagnosis: Moderate malnutrition   Nutrition Diagnosis is: ongoing, addressed with improving appetite and supplements     New Nutrition Diagnosis:   Related to:    As evidenced by:      Interventions:     Recommend  1) Diet texture deferred to medical team, continue liberalized diet with ensure enlive 2 daily as ordered  2) Continue to encourage po intake and obtain/honor food preferences as able, will continue to obtain/honor food preferences as requested, will continue to provide ice cream   3) Calorie count results discussed with team    Monitoring and Evaluation:     Continue to monitor Nutritional intake, Tolerance to diet prescription, weights, labs, skin integrity    RD remains available upon request and will follow up per protocol Nutrition Follow Up Note  Patient seen for: Malnutrition follow up and calorie count results. 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     Source: Pt, noted to be a poor historian, still hypophonic but less so compared to previous encounter.     Diet : Soft diet with ensure enlive 2 daily     Patient reports: No N+V, last BM noted yesterday.      PO intake : Pt S/P 3 day calorie count 7/3- however only 1 day completed, pt consumed ~1233 Kcal and 74g protein to meet 76% low end calorie and >100% low end protein needs. Pt stated recently he is feeling better, RD observed breakfast >50% consumed, pt ate 50% Chinese toast thus far and is not yet finished eating, 50% of sweet potato hash and <75% of chocolate milk thus far, pt stated he has been drinking 2 ensure enlive per day, RD observed 3 at bedside. No ensures noted on 1 day documented of calorie count.     Estimated needs based on recent weight 54.1 Kg  7266-5904 Kcal/day (30-36 Kcal/Kg)  65-76g /day (1.2-1.4g/Kg)      Daily Weight in k.1 (), no new wt  % Weight Change    Pertinent Medications: MEDICATIONS  (STANDING):  aspirin enteric coated 81 milliGRAM(s) Oral daily  atorvastatin 80 milliGRAM(s) Oral at bedtime  bromocriptine Capsule 5 milliGRAM(s) Oral daily  cefTRIAXone   IVPB 1000 milliGRAM(s) IV Intermittent every 24 hours  clonazePAM  Tablet 0.25 milliGRAM(s) Oral every 8 hours  dextrose 5%. 1000 milliLiter(s) (50 mL/Hr) IV Continuous <Continuous>  dextrose 50% Injectable 12.5 Gram(s) IV Push once  dextrose 50% Injectable 25 Gram(s) IV Push once  dextrose 50% Injectable 25 Gram(s) IV Push once  heparin   Injectable 5000 Unit(s) SubCutaneous every 8 hours  insulin lispro (HumaLOG) corrective regimen sliding scale   SubCutaneous three times a day before meals  insulin lispro (HumaLOG) corrective regimen sliding scale   SubCutaneous at bedtime  pantoprazole    Tablet 40 milliGRAM(s) Oral before breakfast  polyethylene glycol 3350 17 Gram(s) Oral daily  sertraline 50 milliGRAM(s) Oral daily  thiamine IVPB 500 milliGRAM(s) IV Intermittent every 8 hours    MEDICATIONS  (PRN):  dextrose 40% Gel 15 Gram(s) Oral once PRN Blood Glucose LESS THAN 70 milliGRAM(s)/deciliter  glucagon  Injectable 1 milliGRAM(s) IntraMuscular once PRN Glucose LESS THAN 70 milligrams/deciliter    Pertinent Labs:  @ 06:51: Na 141, BUN 23, Cr 1.11, <H>, K+ 3.6, Phos 2.5, Mg 1.9, Alk Phos --, ALT/SGPT --, AST/SGOT --, HbA1c --    Finger Sticks:  POCT Blood Glucose.: 174 mg/dL ( @ 08:25)  POCT Blood Glucose.: 152 mg/dL ( @ 21:08)  POCT Blood Glucose.: 185 mg/dL ( @ 16:45)  POCT Blood Glucose.: 160 mg/dL ( @ 11:49)      Skin per nursing documentation: free of pressure injury   Edema: none     Estimated Needs:   [ x] no change since previous assessment  [ ] recalculated:     Previous Nutrition Diagnosis: Moderate malnutrition   Nutrition Diagnosis is: ongoing, addressed with improving appetite and supplements     New Nutrition Diagnosis:   Related to:    As evidenced by:      Interventions:     Recommend  1) Diet texture deferred to medical team, continue liberalized diet with ensure enlive 2 daily as ordered  2) Continue to encourage po intake and obtain/honor food preferences as able, will continue to obtain/honor food preferences as requested, will continue to provide ice cream   3) Calorie count results discussed with team, no continuation of calorie count at this time given improved intake    Monitoring and Evaluation:     Continue to monitor Nutritional intake, Tolerance to diet prescription, weights, labs, skin integrity    RD remains available upon request and will follow up per protocol

## 2020-07-06 NOTE — PROGRESS NOTE ADULT - PROBLEM SELECTOR PLAN 2
Pt presented with 2 days of hematuria 2/2 cystitis. 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  - ultrasound shows resolution of renal abscess.  - monitor for fever, trend WBC/Cr  - uro following  - cw monitoring UOP (7/4) Pt presented with 2 days of hematuria 2/2 cystitis. 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  - ultrasound shows resolution of renal abscess.  - monitor for fever, trend WBC/Cr  - cw monitoring UOP  - uro following Pt presented with 2 days of hematuria 2/2 cystitis. CT a/p showing left renal abscess and emphysematous cystitis, repeat CT showed resolving infection  -abx as above

## 2020-07-07 LAB
ANION GAP SERPL CALC-SCNC: 11 MMOL/L — SIGNIFICANT CHANGE UP (ref 5–17)
BUN SERPL-MCNC: 20 MG/DL — SIGNIFICANT CHANGE UP (ref 7–23)
CALCIUM SERPL-MCNC: 9.2 MG/DL — SIGNIFICANT CHANGE UP (ref 8.4–10.5)
CHLORIDE SERPL-SCNC: 104 MMOL/L — SIGNIFICANT CHANGE UP (ref 96–108)
CO2 SERPL-SCNC: 24 MMOL/L — SIGNIFICANT CHANGE UP (ref 22–31)
CREAT SERPL-MCNC: 0.98 MG/DL — SIGNIFICANT CHANGE UP (ref 0.5–1.3)
GLUCOSE BLDC GLUCOMTR-MCNC: 167 MG/DL — HIGH (ref 70–99)
GLUCOSE BLDC GLUCOMTR-MCNC: 210 MG/DL — HIGH (ref 70–99)
GLUCOSE BLDC GLUCOMTR-MCNC: 231 MG/DL — HIGH (ref 70–99)
GLUCOSE BLDC GLUCOMTR-MCNC: 248 MG/DL — HIGH (ref 70–99)
GLUCOSE SERPL-MCNC: 162 MG/DL — HIGH (ref 70–99)
HCT VFR BLD CALC: 29.2 % — LOW (ref 39–50)
HGB BLD-MCNC: 9.1 G/DL — LOW (ref 13–17)
MCHC RBC-ENTMCNC: 26.1 PG — LOW (ref 27–34)
MCHC RBC-ENTMCNC: 31.2 GM/DL — LOW (ref 32–36)
MCV RBC AUTO: 83.9 FL — SIGNIFICANT CHANGE UP (ref 80–100)
NRBC # BLD: 0 /100 WBCS — SIGNIFICANT CHANGE UP (ref 0–0)
PHOSPHATE SERPL-MCNC: 2.5 MG/DL — SIGNIFICANT CHANGE UP (ref 2.5–4.5)
PLATELET # BLD AUTO: 292 K/UL — SIGNIFICANT CHANGE UP (ref 150–400)
POTASSIUM SERPL-MCNC: 3.8 MMOL/L — SIGNIFICANT CHANGE UP (ref 3.5–5.3)
POTASSIUM SERPL-SCNC: 3.8 MMOL/L — SIGNIFICANT CHANGE UP (ref 3.5–5.3)
RBC # BLD: 3.48 M/UL — LOW (ref 4.2–5.8)
RBC # FLD: 17.2 % — HIGH (ref 10.3–14.5)
SODIUM SERPL-SCNC: 139 MMOL/L — SIGNIFICANT CHANGE UP (ref 135–145)
WBC # BLD: 7.4 K/UL — SIGNIFICANT CHANGE UP (ref 3.8–10.5)
WBC # FLD AUTO: 7.4 K/UL — SIGNIFICANT CHANGE UP (ref 3.8–10.5)

## 2020-07-07 PROCEDURE — 99233 SBSQ HOSP IP/OBS HIGH 50: CPT | Mod: GC

## 2020-07-07 PROCEDURE — 99232 SBSQ HOSP IP/OBS MODERATE 35: CPT

## 2020-07-07 RX ADMIN — POLYETHYLENE GLYCOL 3350 17 GRAM(S): 17 POWDER, FOR SOLUTION ORAL at 12:42

## 2020-07-07 RX ADMIN — PANTOPRAZOLE SODIUM 40 MILLIGRAM(S): 20 TABLET, DELAYED RELEASE ORAL at 05:41

## 2020-07-07 RX ADMIN — BROMOCRIPTINE MESYLATE 5 MILLIGRAM(S): 5 CAPSULE ORAL at 12:42

## 2020-07-07 RX ADMIN — HEPARIN SODIUM 5000 UNIT(S): 5000 INJECTION INTRAVENOUS; SUBCUTANEOUS at 22:01

## 2020-07-07 RX ADMIN — SERTRALINE 50 MILLIGRAM(S): 25 TABLET, FILM COATED ORAL at 12:43

## 2020-07-07 RX ADMIN — Medication 0.25 MILLIGRAM(S): at 22:01

## 2020-07-07 RX ADMIN — ATORVASTATIN CALCIUM 80 MILLIGRAM(S): 80 TABLET, FILM COATED ORAL at 22:01

## 2020-07-07 RX ADMIN — HEPARIN SODIUM 5000 UNIT(S): 5000 INJECTION INTRAVENOUS; SUBCUTANEOUS at 14:27

## 2020-07-07 RX ADMIN — Medication 2: at 12:56

## 2020-07-07 RX ADMIN — Medication 2: at 17:19

## 2020-07-07 RX ADMIN — CEFTRIAXONE 100 MILLIGRAM(S): 500 INJECTION, POWDER, FOR SOLUTION INTRAMUSCULAR; INTRAVENOUS at 16:15

## 2020-07-07 RX ADMIN — Medication 81 MILLIGRAM(S): at 12:42

## 2020-07-07 RX ADMIN — Medication 105 MILLIGRAM(S): at 14:28

## 2020-07-07 RX ADMIN — Medication 105 MILLIGRAM(S): at 22:01

## 2020-07-07 RX ADMIN — Medication 105 MILLIGRAM(S): at 05:41

## 2020-07-07 RX ADMIN — Medication 0.25 MILLIGRAM(S): at 05:41

## 2020-07-07 RX ADMIN — Medication 1: at 08:45

## 2020-07-07 RX ADMIN — Medication 0.25 MILLIGRAM(S): at 14:27

## 2020-07-07 RX ADMIN — HEPARIN SODIUM 5000 UNIT(S): 5000 INJECTION INTRAVENOUS; SUBCUTANEOUS at 05:41

## 2020-07-07 NOTE — PROGRESS NOTE ADULT - ASSESSMENT
65 yo M pmhx DM2 on insulin, HTN, CAD w. LAD stent/restent after thrombosis, depression presenting with 2 days of hematuria  Patient with no fevers,chills  Leucocytosis as OP  Admitted CT with emphysematous cystitis and renal lesion ? 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  delirium  hypophonation/wasting   Neuro input noted  LP with elevated protein-No cell count-PCR and Cx negative-NOT s/o any acute infection  will defer to neuro/primary team       Will tailor plan for ID issues  per course,results.Will defer to primary team on management of other issues.  Assessment, plan and recommendations as detailed above were discussed with the medical/primary  team-Dr gracia  I am away tomorrow.My colleagues in ID service will cover .Please call [phone] if any issues or questions.

## 2020-07-07 NOTE — PROGRESS NOTE ADULT - PROBLEM SELECTOR PLAN 6
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 As above iso FTT with global weakness  - PT consulted, recommending MARI  - neuro consulted; paraneoplastic panel negative.  - Inpatient vs outpatient EMG to be discussed with Dr. Caba

## 2020-07-07 NOTE — PROGRESS NOTE ADULT - ATTENDING COMMENTS
Patient seen and examined, case d/w house staff.  Much more alert today.  Will continue abx through 7/12, Ceftriaxone while in house, can be switched to po if discharged earlier.  Neuro f/u regarding EMG.  d/c planning to [facility].

## 2020-07-07 NOTE — PROGRESS NOTE ADULT - ASSESSMENT
63 yo M pmhx prolactinoma, DM2 on insulin, HTN, CAD w. 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 Pt also presenting with 6 months of weight loss/FTT 2/2 neuromuscular disorder (?ALS) vs paraneoplastic vs infectious. Course c/b by seizure-like activity. At this time, we are following the results of the LP. Additional history was gathered from family and spouse that seems suggestive for ALS. Neurology suggests diagnosis of FTD-ALS. Will f/u with Neurology re: scheduling of inpatient EMG.

## 2020-07-07 NOTE — PROGRESS NOTE ADULT - PROBLEM SELECTOR PLAN 2
Pt presented with 2 days of hematuria 2/2 cystitis. CT a/p showing left renal abscess and emphysematous cystitis, repeat CT showed resolving infection  -abx as above

## 2020-07-07 NOTE — PROGRESS NOTE ADULT - SUBJECTIVE AND OBJECTIVE BOX
Patient is a 64y old  Male who presents with a chief complaint of Hematuria; weight loss (07 Jul 2020 07:02)    Being followed by ID for UTI    Interval history:awake  denies any complaints  No other acute events      ROS:  No cough,SOB,CP  No N/V/D  No abd pain  No urinary complaints  Antimicrobials:    cefTRIAXone   IVPB 1000 milliGRAM(s) IV Intermittent every 24 hours      other medications reviewed    Vital Signs Last 24 Hrs  T(C): 36.4 (07-07-20 @ 08:14), Max: 36.7 (07-06-20 @ 16:21)  T(F): 97.5 (07-07-20 @ 08:14), Max: 98 (07-06-20 @ 16:21)  HR: 70 (07-07-20 @ 12:00) (70 - 77)  BP: 146/76 (07-07-20 @ 12:00) (123/72 - 151/81)  BP(mean): --  RR: 18 (07-07-20 @ 12:00) (18 - 18)  SpO2: 100% (07-07-20 @ 12:00) (98% - 100%)    Physical Exam:    cachecxia    HEENT PERRLA EOMI    No oral exudate or erythema    Chest Good AE,CTA    CVS RRR S1 S2 WNl No murmur or rub or gallop    Abd soft BS normal No tenderness no masses  griffith    IV site no erythema tenderness or discharge    CNS as above  Lab Data:                          9.1    7.40  )-----------( 292      ( 07 Jul 2020 07:16 )             29.2       07-07    139  |  104  |  20  ----------------------------<  162<H>  3.8   |  24  |  0.98    Ca    9.2      07 Jul 2020 07:15  Phos  2.5     07-07  Mg     1.9     07-06          Culture - CSF with Gram Stain (collected 02 Jul 2020 18:58)  Source: .CSF CSF  Gram Stain (02 Jul 2020 20:26):    polymorphonuclear leukocytes seen    No organisms seen    by cytocentrifuge  Final Report (05 Jul 2020 16:40):    No growth      < from: CT Abdomen and Pelvis w/ IV Cont (07.01.20 @ 18:05) >    IMPRESSION:     Resolving left renal abscess/pyelonephritis.          < end of copied text >

## 2020-07-07 NOTE — PROVIDER CONTACT NOTE (OTHER) - ACTION/TREATMENT ORDERED:
Continue to monitor for discomfort.  Call with any updates.
MD aware, will put in order for straight cath
MD aware. Ordered to do bladder scan now and straight cath if over 300ml. Bladder scan showed 205ml pt encouraged to void and will bladder scan again to check
As per MD no action needed at this time, May have enough recording. Will advise
MD Hurley/MD Swartz Team 3 aware, griffith order to be done by team, leave griffith in place in per team, cont to monitor
Md jeanie velazco. as per MD day team will address.

## 2020-07-07 NOTE — PROGRESS NOTE ADULT - SUBJECTIVE AND OBJECTIVE BOX
***INCOMPLETE NOTE***    Patient is a 64y old  Male who presents with a chief complaint of Hematuria; weight loss (05 Jul 2020 09:36)      SUBJECTIVE / OVERNIGHT EVENTS:   - No acute events overnight; patient seen and evaluated at bedside.   - Pt is remarkably much more alert, significant resolution of hypophonism; and reports "feeling more energy"  - No seizure-like activity encountered  - No fevers/chills   - Denies SOB at rest, chest pain, palpitations, abdominal pain, nausea/vomiting.      MEDICATIONS  (STANDING):  aspirin enteric coated 81 milliGRAM(s) Oral daily  atorvastatin 80 milliGRAM(s) Oral at bedtime  bromocriptine Capsule 5 milliGRAM(s) Oral daily  cefTRIAXone   IVPB 1000 milliGRAM(s) IV Intermittent every 24 hours  clonazePAM  Tablet 0.25 milliGRAM(s) Oral every 8 hours  dextrose 5%. 1000 milliLiter(s) (50 mL/Hr) IV Continuous <Continuous>  dextrose 50% Injectable 12.5 Gram(s) IV Push once  dextrose 50% Injectable 25 Gram(s) IV Push once  dextrose 50% Injectable 25 Gram(s) IV Push once  heparin   Injectable 5000 Unit(s) SubCutaneous every 8 hours  insulin lispro (HumaLOG) corrective regimen sliding scale   SubCutaneous three times a day before meals  insulin lispro (HumaLOG) corrective regimen sliding scale   SubCutaneous at bedtime  pantoprazole    Tablet 40 milliGRAM(s) Oral before breakfast  polyethylene glycol 3350 17 Gram(s) Oral daily  sertraline 50 milliGRAM(s) Oral daily  thiamine IVPB 500 milliGRAM(s) IV Intermittent every 8 hours    MEDICATIONS  (PRN):  dextrose 40% Gel 15 Gram(s) Oral once PRN Blood Glucose LESS THAN 70 milliGRAM(s)/deciliter  glucagon  Injectable 1 milliGRAM(s) IntraMuscular once PRN Glucose LESS THAN 70 milligrams/deciliter      CAPILLARY BLOOD GLUCOSE      POCT Blood Glucose.: 152 mg/dL (05 Jul 2020 21:08)  POCT Blood Glucose.: 185 mg/dL (05 Jul 2020 16:45)  POCT Blood Glucose.: 160 mg/dL (05 Jul 2020 11:49)  POCT Blood Glucose.: 169 mg/dL (05 Jul 2020 07:48)    I&O's Summary    05 Jul 2020 07:01  -  06 Jul 2020 07:00  --------------------------------------------------------  IN: 450 mL / OUT: 500 mL / NET: -50 mL    06 Jul 2020 07:01  -  06 Jul 2020 11:49  --------------------------------------------------------  IN: 180 mL / OUT: 150 mL / NET: 30 mL        PHYSICAL EXAM:  Vital Signs Last 24 Hrs  T(C): 36.6 (06 Jul 2020 09:34), Max: 36.7 (05 Jul 2020 23:32)  T(F): 97.8 (06 Jul 2020 09:34), Max: 98.1 (05 Jul 2020 23:32)  HR: 84 (06 Jul 2020 09:34) (75 - 92)  BP: 132/70 (06 Jul 2020 09:34) (121/71 - 134/73)  BP(mean): --  RR: 18 (06 Jul 2020 09:34) (18 - 18)  SpO2: 98% (06 Jul 2020 09:34) (98% - 99%)    CONSTITUTIONAL: NAD; alert  RESPIRATORY: preserved respiratory effort; lungs are clear to auscultation bilaterally; no wheezes, crackles, or ronchi  CARDIOVASCULAR: Regular rate, normal S1 and S2, no murmur/rub/gallop; No lower extremity edema; Peripheral radial pulses are 2+ bilaterally  ABDOMEN: Nontender to palpation, normoactive bowel sounds, no rebound/guarding; No hepatosplenomegaly  MUSCLOSKELETAL: no cyanosis of digits; no joint swelling or tenderness to palpation, full strength all extremities.  NEURO: non-focal, bilateral UE 5/5 on flexion and extension; R hip flexion 4/5 and L hip flexion 5/5; R lower leg flexion 2/5; L lower leg 4/5.  PSYCH: A+O to person, place, and time; affect appropriate.    LABS:                          9.1    5.80  )-----------( 214      ( 05 Jul 2020 05:59 )             28.4     07-05    137  |  103  |  15  ----------------------------<  151<H>  3.3<L>   |  26  |  0.87    Ca    9.0      05 Jul 2020 06:00  Phos  3.3     07-05  Mg     2.0     07-05                  RADIOLOGY & ADDITIONAL TESTS:  Results Reviewed:   Imaging Personally Reviewed:  Electrocardiogram Personally Reviewed:    COORDINATION OF CARE:  Care Discussed with Consultants/Other Providers [Y/N]:   Prior or Outpatient Records Reviewed [Y/N]: Patient is a 64y old  Male who presents with a chief complaint of Hematuria; weight loss (05 Jul 2020 09:36)      SUBJECTIVE / OVERNIGHT EVENTS:   - No acute events overnight; patient seen and evaluated at bedside.  - Pt failed 1st TOV and was straight cath-ed   - Pt is remarkably much more alert, significant resolution of hypophonism  - No seizure-like activity encountered  - No fevers/chills   - Denies SOB at rest, chest pain, palpitations, abdominal pain, nausea/vomiting.      MEDICATIONS  (STANDING):  aspirin enteric coated 81 milliGRAM(s) Oral daily  atorvastatin 80 milliGRAM(s) Oral at bedtime  bromocriptine Capsule 5 milliGRAM(s) Oral daily  cefTRIAXone   IVPB 1000 milliGRAM(s) IV Intermittent every 24 hours  clonazePAM  Tablet 0.25 milliGRAM(s) Oral every 8 hours  dextrose 5%. 1000 milliLiter(s) (50 mL/Hr) IV Continuous <Continuous>  dextrose 50% Injectable 12.5 Gram(s) IV Push once  dextrose 50% Injectable 25 Gram(s) IV Push once  dextrose 50% Injectable 25 Gram(s) IV Push once  heparin   Injectable 5000 Unit(s) SubCutaneous every 8 hours  insulin lispro (HumaLOG) corrective regimen sliding scale   SubCutaneous three times a day before meals  insulin lispro (HumaLOG) corrective regimen sliding scale   SubCutaneous at bedtime  pantoprazole    Tablet 40 milliGRAM(s) Oral before breakfast  polyethylene glycol 3350 17 Gram(s) Oral daily  sertraline 50 milliGRAM(s) Oral daily  thiamine IVPB 500 milliGRAM(s) IV Intermittent every 8 hours    MEDICATIONS  (PRN):  dextrose 40% Gel 15 Gram(s) Oral once PRN Blood Glucose LESS THAN 70 milliGRAM(s)/deciliter  glucagon  Injectable 1 milliGRAM(s) IntraMuscular once PRN Glucose LESS THAN 70 milligrams/deciliter      CAPILLARY BLOOD GLUCOSE      POCT Blood Glucose.: 152 mg/dL (05 Jul 2020 21:08)  POCT Blood Glucose.: 185 mg/dL (05 Jul 2020 16:45)  POCT Blood Glucose.: 160 mg/dL (05 Jul 2020 11:49)  POCT Blood Glucose.: 169 mg/dL (05 Jul 2020 07:48)    I&O's Summary    06 Jul 2020 07:01  -  07 Jul 2020 07:00  --------------------------------------------------------  IN: 915 mL / OUT: 1050 mL / NET: -135 mL        PHYSICAL EXAM:  Vital Signs Last 24 Hrs  T(C): 36.4 (07 Jul 2020 00:51), Max: 36.7 (06 Jul 2020 16:21)  T(F): 97.5 (07 Jul 2020 00:51), Max: 98 (06 Jul 2020 16:21)  HR: 77 (07 Jul 2020 00:51) (75 - 84)  BP: 123/72 (07 Jul 2020 00:51) (123/72 - 132/70)  BP(mean): --  RR: 18 (07 Jul 2020 00:51) (18 - 18)  SpO2: 100% (07 Jul 2020 00:51) (98% - 100%)    CONSTITUTIONAL: NAD; alert  RESPIRATORY: preserved respiratory effort; lungs are clear to auscultation bilaterally; no wheezes, crackles, or ronchi  CARDIOVASCULAR: Regular rate, normal S1 and S2, no murmur/rub/gallop; No lower extremity edema; Peripheral radial pulses are 2+ bilaterally  ABDOMEN: Nontender to palpation, normoactive bowel sounds, no rebound/guarding; No hepatosplenomegaly  MUSCLOSKELETAL: no cyanosis of digits; no joint swelling or tenderness to palpation, full strength all extremities.  NEURO: non-focal, bilateral UE 5/5 on flexion and extension; R hip flexion 4/5 and L hip flexion 5/5; R lower leg flexion and extension 4/5; L lower leg flexion and extension 5/5.  PSYCH: A+O to person, place, and time; affect appropriate.    LABS:                            9.0    7.80  )-----------( 232      ( 06 Jul 2020 06:52 )             26.8              07-06      141  |  106  |  23  ----------------------------<  178<H>  3.6   |  26  |  1.11    Ca    9.0      05 Jul 2020 06:00  Phos  2.5     07-06    Mg     1.9    07-06                    RADIOLOGY & ADDITIONAL TESTS:  Results Reviewed:   Imaging Personally Reviewed:  Electrocardiogram Personally Reviewed:    COORDINATION OF CARE:  Care Discussed with Consultants/Other Providers [Y/N]:   Prior or Outpatient Records Reviewed [Y/N]:

## 2020-07-07 NOTE — PROGRESS NOTE ADULT - PROBLEM SELECTOR PLAN 3
-Seizure-like activity ceased on 7/1 likely 2/2 Klonopin 0.25 mg dosing; currently tolerating dose  -Will continue to monitor.  -LP f/u results coincides with inflammatory picture [segmented neutrophils and elevated RBC].  -appreciate neuro recs

## 2020-07-07 NOTE — PROGRESS NOTE ADULT - PROBLEM SELECTOR PLAN 1
- abx changed to CTX 1 g QD on 7/1.  - per ID c/w CTX for additional 7 days (last dose 7/14)  - per ID, consider de-escalation to PO Ceftin soon  - CT A/P w/wo contx shows interval resolution of left renal abscess - abx changed to CTX 1 g QD on 7/1.  - per ID c/w CTX for additional 7 days; (last dose 7/14)  - per ID, consider de-escalation to PO Ceftin soon  - CT A/P w/wo contx shows interval resolution of left renal abscess

## 2020-07-07 NOTE — PROGRESS NOTE ADULT - PROBLEM SELECTOR PLAN 5
Hx of 6 months of weight loss, FTT 2/2 neuromuscular disorder (ALS) vs paraneoplastic (less likely due to negative paraneoplastic panel and negative CT findings) vs infection (Endocarditis? - less likely due to TTE results, no blood cx, no murmur) vs psychiatric illness  - Physical therapy evaluation recommending MARI  - UTI treatment as above  - continue with sertraline   - TTE without new findings  - Inpatient vs outpatient EMG to be discussed with Dr. Caba and he is following.   - as per neuro, limited neurocognitive testing

## 2020-07-08 LAB
GLUCOSE BLDC GLUCOMTR-MCNC: 174 MG/DL — HIGH (ref 70–99)
GLUCOSE BLDC GLUCOMTR-MCNC: 177 MG/DL — HIGH (ref 70–99)
GLUCOSE BLDC GLUCOMTR-MCNC: 192 MG/DL — HIGH (ref 70–99)
GLUCOSE BLDC GLUCOMTR-MCNC: 279 MG/DL — HIGH (ref 70–99)

## 2020-07-08 PROCEDURE — 99233 SBSQ HOSP IP/OBS HIGH 50: CPT | Mod: GC

## 2020-07-08 PROCEDURE — 99233 SBSQ HOSP IP/OBS HIGH 50: CPT

## 2020-07-08 RX ORDER — CEFTRIAXONE 500 MG/1
1000 INJECTION, POWDER, FOR SOLUTION INTRAMUSCULAR; INTRAVENOUS EVERY 24 HOURS
Refills: 0 | Status: DISCONTINUED | OUTPATIENT
Start: 2020-07-08 | End: 2020-07-09

## 2020-07-08 RX ORDER — SENNA PLUS 8.6 MG/1
2 TABLET ORAL AT BEDTIME
Refills: 0 | Status: DISCONTINUED | OUTPATIENT
Start: 2020-07-08 | End: 2020-07-09

## 2020-07-08 RX ORDER — CLONAZEPAM 1 MG
0.25 TABLET ORAL EVERY 8 HOURS
Refills: 0 | Status: DISCONTINUED | OUTPATIENT
Start: 2020-07-08 | End: 2020-07-09

## 2020-07-08 RX ADMIN — ATORVASTATIN CALCIUM 80 MILLIGRAM(S): 80 TABLET, FILM COATED ORAL at 22:20

## 2020-07-08 RX ADMIN — Medication 0.25 MILLIGRAM(S): at 14:19

## 2020-07-08 RX ADMIN — Medication 1: at 10:29

## 2020-07-08 RX ADMIN — SENNA PLUS 2 TABLET(S): 8.6 TABLET ORAL at 22:20

## 2020-07-08 RX ADMIN — BROMOCRIPTINE MESYLATE 5 MILLIGRAM(S): 5 CAPSULE ORAL at 11:04

## 2020-07-08 RX ADMIN — POLYETHYLENE GLYCOL 3350 17 GRAM(S): 17 POWDER, FOR SOLUTION ORAL at 11:04

## 2020-07-08 RX ADMIN — Medication 0.25 MILLIGRAM(S): at 22:21

## 2020-07-08 RX ADMIN — Medication 3: at 12:53

## 2020-07-08 RX ADMIN — CEFTRIAXONE 100 MILLIGRAM(S): 500 INJECTION, POWDER, FOR SOLUTION INTRAMUSCULAR; INTRAVENOUS at 11:43

## 2020-07-08 RX ADMIN — HEPARIN SODIUM 5000 UNIT(S): 5000 INJECTION INTRAVENOUS; SUBCUTANEOUS at 14:19

## 2020-07-08 RX ADMIN — Medication 105 MILLIGRAM(S): at 22:21

## 2020-07-08 RX ADMIN — Medication 81 MILLIGRAM(S): at 11:04

## 2020-07-08 RX ADMIN — PANTOPRAZOLE SODIUM 40 MILLIGRAM(S): 20 TABLET, DELAYED RELEASE ORAL at 05:49

## 2020-07-08 RX ADMIN — Medication 0.25 MILLIGRAM(S): at 05:49

## 2020-07-08 RX ADMIN — HEPARIN SODIUM 5000 UNIT(S): 5000 INJECTION INTRAVENOUS; SUBCUTANEOUS at 05:49

## 2020-07-08 RX ADMIN — Medication 1: at 17:30

## 2020-07-08 RX ADMIN — Medication 105 MILLIGRAM(S): at 14:19

## 2020-07-08 RX ADMIN — HEPARIN SODIUM 5000 UNIT(S): 5000 INJECTION INTRAVENOUS; SUBCUTANEOUS at 22:21

## 2020-07-08 RX ADMIN — Medication 105 MILLIGRAM(S): at 05:48

## 2020-07-08 RX ADMIN — SERTRALINE 50 MILLIGRAM(S): 25 TABLET, FILM COATED ORAL at 11:04

## 2020-07-08 NOTE — PROGRESS NOTE ADULT - ATTENDING COMMENTS
patient seen and examined at bedside. diagnosis/ prognosis discussed.  will start riluzole 50mg bid  out pt neuro follow up

## 2020-07-08 NOTE — PROGRESS NOTE ADULT - SUBJECTIVE AND OBJECTIVE BOX
Neurology Follow Up Note    Subjective: Interval History - No events overnight  Patient was seen and examined at bedside. He had no new complaints. EMG was performed by Dr. Caba with findings consistent with ALS. Fasciculations were present on left medial thigh during exam. No tongue fasciculations were noted. Patient was notified of results and plan to initiate treatment. He was agreeable to Riluzole 50mg twice daily. His questions were answered.    Objective:   Vital Signs Last 24 Hrs  T(C): 36.5 (08 Jul 2020 09:11), Max: 36.6 (07 Jul 2020 16:07)  T(F): 97.7 (08 Jul 2020 09:11), Max: 97.9 (07 Jul 2020 16:07)  HR: 83 (08 Jul 2020 09:11) (70 - 97)  BP: 164/89 (08 Jul 2020 09:11) (119/81 - 164/89)  BP(mean): --  RR: 18 (08 Jul 2020 09:11) (18 - 18)  SpO2: 100% (08 Jul 2020 09:11) (98% - 100%)    General Exam:   General: Cachectic appearing male in no acute distress. Diffuse muscle wasting noted, including left pectoralis.  Head: Normocephalic & atraumatic.  Respiratory: Patent airway. All lung fields are clear to auscultation bilaterally.  Extremities: No cyanosis or edema.  Skin: No rashes.    Neurological (>12):  Mental status: Awake, alert, oriented to person, place (hospital), year, and situation. Somewhat flat affect. Follows cross commands.    Language: Speech is fluent, voice is very hypophonic.     CNs: PERRLA. VFF, EOMI no nystagmus, no diplopia. V1-3 intact to LT/pinprick, well developed masseter muscles b/l. No facial asymmetry b/l, full eye closure strength b/l. Hearing grossly normal (rubbing fingers) b/l. Symmetric palate elevation in midline. Gag reflex deferred. Head turning & shoulder shrug intact b/l. Tongue midline with normal movement, no fasciculations.    Motor: Able to move all extremities. Diffuse muscle wasting, L>R thenars, L pectoralis, scapulas bilaterally, intercostals, thigh muscles. No noticeable tremor. Tone increased throughout. Fasciculations noted of left posterior arm, left thigh. R  4/5, left  4/5, elbow flexion 4/5 bilaterally, shoulder abduction 4/5 bilaterally. LLE foot dorsiflexion 4/5, right DF 4+/5, hip flexion 4/5 bilaterally    Sensation: Intact to light touch and pinprick bilaterally    Coordination: No dysmetria.     Reflexes:  Brachials symmetric 1+ bilaterally, right patellar absent (tendon possibly atrophied), L patellar 2+    Babinski: Left toe upgoing, right toe mute    Gait: Not tested, bedrest.    Other:    07-07    139  |  104  |  20  ----------------------------<  162<H>  3.8   |  24  |  0.98    Ca    9.2      07 Jul 2020 07:15  Phos  2.5     07-07      07-07    139  |  104  |  20  ----------------------------<  162<H>  3.8   |  24  |  0.98    Ca    9.2      07 Jul 2020 07:15  Phos  2.5     07-07                              9.1    7.40  )-----------( 292      ( 07 Jul 2020 07:16 )             29.2     MEDICATIONS  (STANDING):  aspirin enteric coated 81 milliGRAM(s) Oral daily  atorvastatin 80 milliGRAM(s) Oral at bedtime  bromocriptine Capsule 5 milliGRAM(s) Oral daily  cefTRIAXone   IVPB 1000 milliGRAM(s) IV Intermittent every 24 hours  clonazePAM  Tablet 0.25 milliGRAM(s) Oral every 8 hours  dextrose 5%. 1000 milliLiter(s) (50 mL/Hr) IV Continuous <Continuous>  dextrose 50% Injectable 12.5 Gram(s) IV Push once  dextrose 50% Injectable 25 Gram(s) IV Push once  dextrose 50% Injectable 25 Gram(s) IV Push once  heparin   Injectable 5000 Unit(s) SubCutaneous every 8 hours  insulin lispro (HumaLOG) corrective regimen sliding scale   SubCutaneous three times a day before meals  insulin lispro (HumaLOG) corrective regimen sliding scale   SubCutaneous at bedtime  pantoprazole    Tablet 40 milliGRAM(s) Oral before breakfast  polyethylene glycol 3350 17 Gram(s) Oral daily  sertraline 50 milliGRAM(s) Oral daily  thiamine IVPB 500 milliGRAM(s) IV Intermittent every 8 hours    MEDICATIONS  (PRN):  dextrose 40% Gel 15 Gram(s) Oral once PRN Blood Glucose LESS THAN 70 milliGRAM(s)/deciliter  glucagon  Injectable 1 milliGRAM(s) IntraMuscular once PRN Glucose LESS THAN 70 milligrams/deciliter    LP results:  glucose 90 (high)  protein 132 (high)  cell count 1  lymphocytes 73   neutrophils 15     HIV-2 DNA/RNA Qualitative Real-Time PCR (06.30.20 @ 14:09)    HIV-2 DNA/RNA Qualitative Real-Time PCR: NotDetec: REFERENCE RANGE: NOT DETECTED  This test was developed and its analytical performance  characteristics have been determined by [facility]. It has not been cleared or approved by  FDA. This assay has been validated pursuant to the CLIA  regulations and is used for clinical purposes.  Test Performed by:  [facility]  [id number] [place] [id number]    CSF PCR Panel (07.02.20 @ 19:53)    CSF PCR Result: NotDetec: The meningitis/encephalitis (ME) panel (CSFPCR) is a PCR based assay that  screens for: Escherichia coli; Haemophilus influenzae; Listeria  monocytogenes; Neisseria meningitidis; Streptococcus agalactiae;  Streptococcus pneumoniae; CMV; Enterovirus; HSV-1; HSV-2; Human  herpesvirus 6; Parechovirus; VZV and Cryptococcus. Result should be  interpreted in context of clinical presentation, imaging and other lab  tests. Positive predictive value may be lower in patients with normal CSF  chemistry and cell count.    Herpes Simplex Virus 1/2 PCR, CSF (07.02.20 @ 19:53)    Source HSV 1/2: CSF    HSV 1/2 PCR: NotDetec: HSV1/2 PCR assay is a real-time PCR test that detects and differentiates  HSV-1 and/or HSV-2 DNA in CSF (Vitreous Fluid). The results of this test  should be interpreted with consideration of all clinical and laboratory  findings.    Culture - CSF with Gram Stain . (07.02.20 @ 18:58)    Gram Stain:   polymorphonuclear leukocytes seen  No organisms seen  by cytocentrifuge    Specimen Source: .CSF CSF    Culture Results:   No growth    Lactate Dehydrogenase, CSF (07.02.20 @ 15:12)    Lactate Dehydrogenase, CSF: 30: Reference Ranges have NOT been established for CSF LDH.  The  has not determined the efficacy of this test when  performed on CSF specimens. The performance characteristics of this test  were determined by [facility]. U/L    Protein, CSF (07.02.20 @ 15:12)    Protein, CSF: 132 mg/dL    Glucose, CSF (07.02.20 @ 15:12)    Glucose, CSF: 90 mg/dL

## 2020-07-08 NOTE — PROGRESS NOTE ADULT - ASSESSMENT
Assessment:  64M w/ PMH of DM, HTN, CAD w/ LAD stent, depression, admitted for 2 days of hematuria 2/2 cystitis. Neuro consult for worsening RLE weakness & muscle wasting, with progression to other extremities, and 100lb weight loss for the past year. Pt continues to be hypophonic with impaired upgaze. Diffuse muscle atrophy, fasciculations on exam, and EMG findings definitively points to motor neuron disease, strongly suggestive of ALS.     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     Plan:  -EMG performed on 7/8/20 by Dr. Caba - findings consistent with ALS  -Start Riluzole 50mg twice daily  -Aspiration precautions  -On clonazepam 0.25 mg q8h. Pt appears to be tolerating the dose  -Could consider restarting Artane (trihexyphenidyl) if tremors reoccur  -Serum paraneoplastic panel negative  -CSF culture and gram stain showed polymorphonuclear leukocytes  -CSF HSV PCR negative  -CSF paraneoplastic panel - canceled, quantity not sufficiency  -HIV negative  -Follow up with private neurologist, Dr. Winters, and Dr. Caba (ALS specialist) upon discharge at [address] [place] [address]. ([phone])

## 2020-07-08 NOTE — PROGRESS NOTE ADULT - PROBLEM SELECTOR PLAN 1
- abx changed to CTX 1 g QD on 7/1.  - per ID c/w CTX for additional 7 days; (last dose 7/14)  - per ID, consider de-escalation to PO Ceftin soon  - CT A/P w/wo contx shows interval resolution of left renal abscess

## 2020-07-08 NOTE — PROGRESS NOTE ADULT - ASSESSMENT
65 yo M pmhx prolactinoma, DM2 on insulin, HTN, CAD w. 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 Pt also presenting with 6 months of weight loss/FTT 2/2 neuromuscular disorder (?ALS) vs paraneoplastic vs infectious. Course c/b by seizure-like activity. At this time, we are following the results of the LP. Additional history was gathered from family and spouse that seems suggestive for ALS. Neurology suggests diagnosis of FTD-ALS. Will f/u with Neurology re: scheduling of inpatient EMG. 65 yo M pmhx prolactinoma, DM2 on insulin, HTN, CAD w. 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 Pt also presenting with 6 months of weight loss/FTT 2/2 neuromuscular disorder confirmed by EMG to be most likely ALS.  Additional history was gathered from family and spouse that seems suggestive for ALS. Course c/b by seizure-like activity and 2.5cm L renal abscess but have now resolved. At this time, we are following the results of the LP.

## 2020-07-08 NOTE — PROGRESS NOTE ADULT - NSHPATTENDINGPLANDISCUSS_GEN_ALL_CORE
neurology and medicine resident
Dr. Castillo
neuro team and patient
team.
Dr. Méndez, Pt helen Wren over phone

## 2020-07-08 NOTE — PROGRESS NOTE ADULT - ATTENDING COMMENTS
Patient seen and examined.  Case d/w house staff.  EMG performed and was c/w ALS.  Appreciate neurology input and follow up.  Plan for discharge to [facility], can switch to po abx upon d/c.

## 2020-07-08 NOTE — PROGRESS NOTE ADULT - PROBLEM SELECTOR PLAN 9
DVT: SQH  Diet: soft feeds after family discussion   Dispo: [facility] DVT: SQH, baby aspirin  Diet: soft feeds after family discussion   Dispo: [facility]

## 2020-07-08 NOTE — PROGRESS NOTE ADULT - PROBLEM SELECTOR PLAN 6
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 As above iso FTT with global weakness  - PT consulted, recommending MARI  - neuro consulted; paraneoplastic panel negative.  - Inpatient vs outpatient EMG to be discussed with Dr. Caba likely 2/2 ALS as per EMG by Dr. Caba.  - will start riluzole 50 mg BID  - PT consulted, recommending [facility]I  - neuro consulted; paraneoplastic panel negative.

## 2020-07-08 NOTE — PROGRESS NOTE ADULT - SUBJECTIVE AND OBJECTIVE BOX
***INCOMPLETE NOTE***    Patient is a 64y old  Male who presents with a chief complaint of Hematuria; weight loss (05 Jul 2020 09:36)      SUBJECTIVE / OVERNIGHT EVENTS:   - No acute events overnight; patient seen and evaluated at bedside.  - Pt failed 1st TOV and was straight cath-ed   - Pt is remarkably much more alert, significant resolution of hypophonism  - No seizure-like activity encountered  - No fevers/chills   - Denies SOB at rest, chest pain, palpitations, abdominal pain, nausea/vomiting.      MEDICATIONS  (STANDING):  aspirin enteric coated 81 milliGRAM(s) Oral daily  atorvastatin 80 milliGRAM(s) Oral at bedtime  bromocriptine Capsule 5 milliGRAM(s) Oral daily  cefTRIAXone   IVPB 1000 milliGRAM(s) IV Intermittent every 24 hours  clonazePAM  Tablet 0.25 milliGRAM(s) Oral every 8 hours  dextrose 5%. 1000 milliLiter(s) (50 mL/Hr) IV Continuous <Continuous>  dextrose 50% Injectable 12.5 Gram(s) IV Push once  dextrose 50% Injectable 25 Gram(s) IV Push once  dextrose 50% Injectable 25 Gram(s) IV Push once  heparin   Injectable 5000 Unit(s) SubCutaneous every 8 hours  insulin lispro (HumaLOG) corrective regimen sliding scale   SubCutaneous three times a day before meals  insulin lispro (HumaLOG) corrective regimen sliding scale   SubCutaneous at bedtime  pantoprazole    Tablet 40 milliGRAM(s) Oral before breakfast  polyethylene glycol 3350 17 Gram(s) Oral daily  sertraline 50 milliGRAM(s) Oral daily  thiamine IVPB 500 milliGRAM(s) IV Intermittent every 8 hours    MEDICATIONS  (PRN):  dextrose 40% Gel 15 Gram(s) Oral once PRN Blood Glucose LESS THAN 70 milliGRAM(s)/deciliter  glucagon  Injectable 1 milliGRAM(s) IntraMuscular once PRN Glucose LESS THAN 70 milligrams/deciliter      CAPILLARY BLOOD GLUCOSE      POCT Blood Glucose.: 152 mg/dL (05 Jul 2020 21:08)  POCT Blood Glucose.: 185 mg/dL (05 Jul 2020 16:45)  POCT Blood Glucose.: 160 mg/dL (05 Jul 2020 11:49)  POCT Blood Glucose.: 169 mg/dL (05 Jul 2020 07:48)    I&O's Summary    06 Jul 2020 07:01  -  07 Jul 2020 07:00  --------------------------------------------------------  IN: 915 mL / OUT: 1050 mL / NET: -135 mL        PHYSICAL EXAM:  Vital Signs Last 24 Hrs  T(C): 36.4 (07 Jul 2020 00:51), Max: 36.7 (06 Jul 2020 16:21)  T(F): 97.5 (07 Jul 2020 00:51), Max: 98 (06 Jul 2020 16:21)  HR: 77 (07 Jul 2020 00:51) (75 - 84)  BP: 123/72 (07 Jul 2020 00:51) (123/72 - 132/70)  BP(mean): --  RR: 18 (07 Jul 2020 00:51) (18 - 18)  SpO2: 100% (07 Jul 2020 00:51) (98% - 100%)    CONSTITUTIONAL: NAD; alert  RESPIRATORY: preserved respiratory effort; lungs are clear to auscultation bilaterally; no wheezes, crackles, or ronchi  CARDIOVASCULAR: Regular rate, normal S1 and S2, no murmur/rub/gallop; No lower extremity edema; Peripheral radial pulses are 2+ bilaterally  ABDOMEN: Nontender to palpation, normoactive bowel sounds, no rebound/guarding; No hepatosplenomegaly  MUSCLOSKELETAL: no cyanosis of digits; no joint swelling or tenderness to palpation, full strength all extremities.  NEURO: non-focal, bilateral UE 5/5 on flexion and extension; R hip flexion 4/5 and L hip flexion 5/5; R lower leg flexion and extension 4/5; L lower leg flexion and extension 5/5.  PSYCH: A+O to person, place, and time; affect appropriate.    LABS:                            9.0    7.80  )-----------( 232      ( 06 Jul 2020 06:52 )             26.8              07-06      141  |  106  |  23  ----------------------------<  178<H>  3.6   |  26  |  1.11    Ca    9.0      05 Jul 2020 06:00  Phos  2.5     07-06    Mg     1.9    07-06                    RADIOLOGY & ADDITIONAL TESTS:  Results Reviewed:   Imaging Personally Reviewed:  Electrocardiogram Personally Reviewed:    COORDINATION OF CARE:  Care Discussed with Consultants/Other Providers [Y/N]:   Prior or Outpatient Records Reviewed [Y/N]: Patient is a 64y old  Male who presents with a chief complaint of Hematuria; weight loss (05 Jul 2020 09:36)      SUBJECTIVE / OVERNIGHT EVENTS:   - No acute events overnight; patient seen and evaluated at bedside.  - Pt has griffith placed in yesterday due to urinary retention 600cc on bladder US   - Pt remains significantly less hypophonic  - No seizure-like activity encountered  - No fevers/chills   - Denies SOB at rest, chest pain, palpitations, abdominal pain, nausea/vomiting.      MEDICATIONS  (STANDING):  aspirin enteric coated 81 milliGRAM(s) Oral daily  atorvastatin 80 milliGRAM(s) Oral at bedtime  bromocriptine Capsule 5 milliGRAM(s) Oral daily  cefTRIAXone   IVPB 1000 milliGRAM(s) IV Intermittent every 24 hours  clonazePAM  Tablet 0.25 milliGRAM(s) Oral every 8 hours  dextrose 5%. 1000 milliLiter(s) (50 mL/Hr) IV Continuous <Continuous>  dextrose 50% Injectable 12.5 Gram(s) IV Push once  dextrose 50% Injectable 25 Gram(s) IV Push once  dextrose 50% Injectable 25 Gram(s) IV Push once  heparin   Injectable 5000 Unit(s) SubCutaneous every 8 hours  insulin lispro (HumaLOG) corrective regimen sliding scale   SubCutaneous three times a day before meals  insulin lispro (HumaLOG) corrective regimen sliding scale   SubCutaneous at bedtime  pantoprazole    Tablet 40 milliGRAM(s) Oral before breakfast  polyethylene glycol 3350 17 Gram(s) Oral daily  sertraline 50 milliGRAM(s) Oral daily  thiamine IVPB 500 milliGRAM(s) IV Intermittent every 8 hours    MEDICATIONS  (PRN):  dextrose 40% Gel 15 Gram(s) Oral once PRN Blood Glucose LESS THAN 70 milliGRAM(s)/deciliter  glucagon  Injectable 1 milliGRAM(s) IntraMuscular once PRN Glucose LESS THAN 70 milligrams/deciliter      CAPILLARY BLOOD GLUCOSE  POCT Blood Glucose.: 248 mg/dL (07 Jul 2020 21:13)  POCT Blood Glucose.: 210 mg/dL (07 Jul 2020 17:18)  POCT Blood Glucose.: 231 mg/dL (07 Jul 2020 12:53)  POCT Blood Glucose.: 167 mg/dL (07 Jul 2020 08:40)      I&O's Summary    07 Jul 2020 07:01  -  08 Jul 2020 07:00  --------------------------------------------------------  IN: 360 mL / OUT: 1450 mL / NET: -1090 mL      PHYSICAL EXAM:  Vital Signs Last 24 Hrs  T(C): 36.6 (07 Jul 2020 23:27), Max: 36.6 (07 Jul 2020 16:07)  T(F): 97.8 (07 Jul 2020 23:27), Max: 97.9 (07 Jul 2020 16:07)  HR: 97 (07 Jul 2020 23:27) (70 - 97)  BP: 145/75 (07 Jul 2020 23:27) (119/81 - 151/81)  BP(mean): --  RR: 18 (07 Jul 2020 23:27) (18 - 18)  SpO2: 98% (07 Jul 2020 23:27) (98% - 100%)    CONSTITUTIONAL: NAD; alert  RESPIRATORY: preserved respiratory effort; lungs are clear to auscultation bilaterally; no wheezes, crackles, or ronchi  CARDIOVASCULAR: Regular rate, normal S1 and S2, no murmur/rub/gallop; No lower extremity edema; Peripheral radial pulses are 2+ bilaterally  ABDOMEN: Nontender to palpation, normoactive bowel sounds, no rebound/guarding; No hepatosplenomegaly  MUSCLOSKELETAL: no cyanosis of digits; no joint swelling or tenderness to palpation, full strength all extremities.  NEURO: non-focal, bilateral UE 5/5 on flexion and extension; R hip flexion 4/5 and L hip flexion 5/5; R lower leg flexion and extension 4/5; L lower leg flexion and extension 5/5.  PSYCH: A+O to person, place, and time; affect appropriate.    LABS:                                          9.1    7.40  )-----------( 292      ( 07 Jul 2020 07:16 )             29.2              07-07    139  |  104  |  20  ----------------------------<  162<H>  3.8   |  24  |  0.98    Ca    9.2      07 Jul 2020 07:15  Phos  2.5     07-07          RADIOLOGY & ADDITIONAL TESTS:  Results Reviewed:   Imaging Personally Reviewed:  Electrocardiogram Personally Reviewed:    COORDINATION OF CARE:  Care Discussed with Consultants/Other Providers [Y/N]:   Prior or Outpatient Records Reviewed [Y/N]: Patient is a 64y old  Male who presents with a chief complaint of Hematuria; weight loss (05 Jul 2020 09:36)      SUBJECTIVE / OVERNIGHT EVENTS:   - No acute events overnight; patient seen and evaluated at bedside.  - Pt has griffith placed in yesterday due to urinary retention 600cc on bladder US  - Dr. Caba came in the morning, performed EMG, and confirmed ALS diagnosis   - Pt remains significantly less hypophonic  - No seizure-like activity encountered  - No fevers/chills   - Denies SOB at rest, chest pain, palpitations, abdominal pain, nausea/vomiting.      MEDICATIONS  (STANDING):  aspirin enteric coated 81 milliGRAM(s) Oral daily  atorvastatin 80 milliGRAM(s) Oral at bedtime  bromocriptine Capsule 5 milliGRAM(s) Oral daily  cefTRIAXone   IVPB 1000 milliGRAM(s) IV Intermittent every 24 hours  clonazePAM  Tablet 0.25 milliGRAM(s) Oral every 8 hours  dextrose 5%. 1000 milliLiter(s) (50 mL/Hr) IV Continuous <Continuous>  dextrose 50% Injectable 12.5 Gram(s) IV Push once  dextrose 50% Injectable 25 Gram(s) IV Push once  dextrose 50% Injectable 25 Gram(s) IV Push once  heparin   Injectable 5000 Unit(s) SubCutaneous every 8 hours  insulin lispro (HumaLOG) corrective regimen sliding scale   SubCutaneous three times a day before meals  insulin lispro (HumaLOG) corrective regimen sliding scale   SubCutaneous at bedtime  pantoprazole    Tablet 40 milliGRAM(s) Oral before breakfast  polyethylene glycol 3350 17 Gram(s) Oral daily  sertraline 50 milliGRAM(s) Oral daily  thiamine IVPB 500 milliGRAM(s) IV Intermittent every 8 hours    MEDICATIONS  (PRN):  dextrose 40% Gel 15 Gram(s) Oral once PRN Blood Glucose LESS THAN 70 milliGRAM(s)/deciliter  glucagon  Injectable 1 milliGRAM(s) IntraMuscular once PRN Glucose LESS THAN 70 milligrams/deciliter      CAPILLARY BLOOD GLUCOSE  POCT Blood Glucose.: 248 mg/dL (07 Jul 2020 21:13)  POCT Blood Glucose.: 210 mg/dL (07 Jul 2020 17:18)  POCT Blood Glucose.: 231 mg/dL (07 Jul 2020 12:53)  POCT Blood Glucose.: 167 mg/dL (07 Jul 2020 08:40)      I&O's Summary    07 Jul 2020 07:01  -  08 Jul 2020 07:00  --------------------------------------------------------  IN: 360 mL / OUT: 1450 mL / NET: -1090 mL      PHYSICAL EXAM:  Vital Signs Last 24 Hrs  T(C): 36.6 (07 Jul 2020 23:27), Max: 36.6 (07 Jul 2020 16:07)  T(F): 97.8 (07 Jul 2020 23:27), Max: 97.9 (07 Jul 2020 16:07)  HR: 97 (07 Jul 2020 23:27) (70 - 97)  BP: 145/75 (07 Jul 2020 23:27) (119/81 - 151/81)  BP(mean): --  RR: 18 (07 Jul 2020 23:27) (18 - 18)  SpO2: 98% (07 Jul 2020 23:27) (98% - 100%)    CONSTITUTIONAL: NAD; alert  RESPIRATORY: preserved respiratory effort; lungs are clear to auscultation bilaterally; no wheezes, crackles, or ronchi  CARDIOVASCULAR: Regular rate, normal S1 and S2, no murmur/rub/gallop; No lower extremity edema; Peripheral radial pulses are 2+ bilaterally  ABDOMEN: Nontender to palpation, normoactive bowel sounds, no rebound/guarding; No hepatosplenomegaly  MUSCLOSKELETAL: no cyanosis of digits; no joint swelling or tenderness to palpation, full strength all extremities.  NEURO: non-focal, bilateral UE 5/5 on flexion and extension; R hip flexion 4/5 and L hip flexion 4/5; R lower leg flexion 2-3/5 and extension 4/5; L lower leg flexion and extension 4/5.  PSYCH: A+O to person, place, and time; affect appropriate.    LABS:                                     9.1    7.40  )-----------( 292      ( 07 Jul 2020 07:16 )             29.2              07-07    139  |  104  |  20  ----------------------------<  162<H>  3.8   |  24  |  0.98    Ca    9.2      07 Jul 2020 07:15  Phos  2.5     07-07          RADIOLOGY & ADDITIONAL TESTS:  Results Reviewed:   Imaging Personally Reviewed:  Electrocardiogram Personally Reviewed:    COORDINATION OF CARE:  Care Discussed with Consultants/Other Providers [Y/N]:   Prior or Outpatient Records Reviewed [Y/N]:

## 2020-07-08 NOTE — PROGRESS NOTE ADULT - PROBLEM SELECTOR PLAN 5
Hx of 6 months of weight loss, FTT 2/2 neuromuscular disorder (ALS) vs paraneoplastic (less likely due to negative paraneoplastic panel and negative CT findings) vs infection (Endocarditis? - less likely due to TTE results, no blood cx, no murmur) vs psychiatric illness  - Physical therapy evaluation recommending MARI  - UTI treatment as above  - continue with sertraline   - TTE without new findings  - Inpatient vs outpatient EMG to be discussed with Dr. Caba and he is following.   - as per neuro, limited neurocognitive testing Hx of 6 months of weight loss, FTT 2/2 ALS and confirmed by EMG. Paraneoplastic panel negative and negative CT findings.  - Physical therapy evaluation recommending MARI  - UTI treatment as above  - continue with sertraline   - TTE without new findings  - as per neuro, limited neurocognitive testing

## 2020-07-09 ENCOUNTER — TRANSCRIPTION ENCOUNTER (OUTPATIENT)
Age: 65
End: 2020-07-09

## 2020-07-09 VITALS
SYSTOLIC BLOOD PRESSURE: 111 MMHG | OXYGEN SATURATION: 98 % | DIASTOLIC BLOOD PRESSURE: 71 MMHG | RESPIRATION RATE: 18 BRPM | HEART RATE: 83 BPM | TEMPERATURE: 98 F

## 2020-07-09 DIAGNOSIS — G12.21 AMYOTROPHIC LATERAL SCLEROSIS: ICD-10-CM

## 2020-07-09 LAB
ANION GAP SERPL CALC-SCNC: 12 MMOL/L — SIGNIFICANT CHANGE UP (ref 5–17)
BUN SERPL-MCNC: 25 MG/DL — HIGH (ref 7–23)
CALCIUM SERPL-MCNC: 9.4 MG/DL — SIGNIFICANT CHANGE UP (ref 8.4–10.5)
CHLORIDE SERPL-SCNC: 101 MMOL/L — SIGNIFICANT CHANGE UP (ref 96–108)
CO2 SERPL-SCNC: 23 MMOL/L — SIGNIFICANT CHANGE UP (ref 22–31)
CREAT SERPL-MCNC: 0.9 MG/DL — SIGNIFICANT CHANGE UP (ref 0.5–1.3)
GLUCOSE BLDC GLUCOMTR-MCNC: 167 MG/DL — HIGH (ref 70–99)
GLUCOSE BLDC GLUCOMTR-MCNC: 186 MG/DL — HIGH (ref 70–99)
GLUCOSE BLDC GLUCOMTR-MCNC: 266 MG/DL — HIGH (ref 70–99)
GLUCOSE SERPL-MCNC: 158 MG/DL — HIGH (ref 70–99)
HCT VFR BLD CALC: 29.5 % — LOW (ref 39–50)
HGB BLD-MCNC: 9.3 G/DL — LOW (ref 13–17)
MAGNESIUM SERPL-MCNC: 1.8 MG/DL — SIGNIFICANT CHANGE UP (ref 1.6–2.6)
MCHC RBC-ENTMCNC: 26.6 PG — LOW (ref 27–34)
MCHC RBC-ENTMCNC: 31.5 GM/DL — LOW (ref 32–36)
MCV RBC AUTO: 84.5 FL — SIGNIFICANT CHANGE UP (ref 80–100)
NRBC # BLD: 0 /100 WBCS — SIGNIFICANT CHANGE UP (ref 0–0)
PHOSPHATE SERPL-MCNC: 3.1 MG/DL — SIGNIFICANT CHANGE UP (ref 2.5–4.5)
PLATELET # BLD AUTO: 371 K/UL — SIGNIFICANT CHANGE UP (ref 150–400)
POTASSIUM SERPL-MCNC: 4.3 MMOL/L — SIGNIFICANT CHANGE UP (ref 3.5–5.3)
POTASSIUM SERPL-SCNC: 4.3 MMOL/L — SIGNIFICANT CHANGE UP (ref 3.5–5.3)
RBC # BLD: 3.49 M/UL — LOW (ref 4.2–5.8)
RBC # FLD: 18 % — HIGH (ref 10.3–14.5)
SARS-COV-2 RNA SPEC QL NAA+PROBE: SIGNIFICANT CHANGE UP
SODIUM SERPL-SCNC: 136 MMOL/L — SIGNIFICANT CHANGE UP (ref 135–145)
WBC # BLD: 8.44 K/UL — SIGNIFICANT CHANGE UP (ref 3.8–10.5)
WBC # FLD AUTO: 8.44 K/UL — SIGNIFICANT CHANGE UP (ref 3.8–10.5)

## 2020-07-09 PROCEDURE — 70551 MRI BRAIN STEM W/O DYE: CPT

## 2020-07-09 PROCEDURE — 97116 GAIT TRAINING THERAPY: CPT

## 2020-07-09 PROCEDURE — 89051 BODY FLUID CELL COUNT: CPT

## 2020-07-09 PROCEDURE — 99285 EMERGENCY DEPT VISIT HI MDM: CPT | Mod: 25

## 2020-07-09 PROCEDURE — 83605 ASSAY OF LACTIC ACID: CPT

## 2020-07-09 PROCEDURE — 87040 BLOOD CULTURE FOR BACTERIA: CPT

## 2020-07-09 PROCEDURE — 74177 CT ABD & PELVIS W/CONTRAST: CPT

## 2020-07-09 PROCEDURE — 87483 CNS DNA AMP PROBE TYPE 12-25: CPT

## 2020-07-09 PROCEDURE — 82945 GLUCOSE OTHER FLUID: CPT

## 2020-07-09 PROCEDURE — 85027 COMPLETE CBC AUTOMATED: CPT

## 2020-07-09 PROCEDURE — 71260 CT THORAX DX C+: CPT

## 2020-07-09 PROCEDURE — 83735 ASSAY OF MAGNESIUM: CPT

## 2020-07-09 PROCEDURE — 87529 HSV DNA AMP PROBE: CPT

## 2020-07-09 PROCEDURE — 95714 VEEG EA 12-26 HR UNMNTR: CPT

## 2020-07-09 PROCEDURE — 97162 PT EVAL MOD COMPLEX 30 MIN: CPT

## 2020-07-09 PROCEDURE — 82607 VITAMIN B-12: CPT

## 2020-07-09 PROCEDURE — 84146 ASSAY OF PROLACTIN: CPT

## 2020-07-09 PROCEDURE — 88185 FLOWCYTOMETRY/TC ADD-ON: CPT

## 2020-07-09 PROCEDURE — 87086 URINE CULTURE/COLONY COUNT: CPT

## 2020-07-09 PROCEDURE — 87521 HEPATITIS C PROBE&RVRS TRNSC: CPT

## 2020-07-09 PROCEDURE — 92610 EVALUATE SWALLOWING FUNCTION: CPT

## 2020-07-09 PROCEDURE — 87538 HIV-2 PROBE&REVRSE TRNSCRIPJ: CPT

## 2020-07-09 PROCEDURE — 82435 ASSAY OF BLOOD CHLORIDE: CPT

## 2020-07-09 PROCEDURE — 84100 ASSAY OF PHOSPHORUS: CPT

## 2020-07-09 PROCEDURE — 84295 ASSAY OF SERUM SODIUM: CPT

## 2020-07-09 PROCEDURE — 99239 HOSP IP/OBS DSCHRG MGMT >30: CPT | Mod: GC

## 2020-07-09 PROCEDURE — 80053 COMPREHEN METABOLIC PANEL: CPT

## 2020-07-09 PROCEDURE — 95700 EEG CONT REC W/VID EEG TECH: CPT

## 2020-07-09 PROCEDURE — 85014 HEMATOCRIT: CPT

## 2020-07-09 PROCEDURE — 83036 HEMOGLOBIN GLYCOSYLATED A1C: CPT

## 2020-07-09 PROCEDURE — 76770 US EXAM ABDO BACK WALL COMP: CPT

## 2020-07-09 PROCEDURE — 92611 MOTION FLUOROSCOPY/SWALLOW: CPT

## 2020-07-09 PROCEDURE — 88184 FLOWCYTOMETRY/ TC 1 MARKER: CPT

## 2020-07-09 PROCEDURE — 84425 ASSAY OF VITAMIN B-1: CPT

## 2020-07-09 PROCEDURE — 36415 COLL VENOUS BLD VENIPUNCTURE: CPT

## 2020-07-09 PROCEDURE — 87205 SMEAR GRAM STAIN: CPT

## 2020-07-09 PROCEDURE — 86769 SARS-COV-2 COVID-19 ANTIBODY: CPT

## 2020-07-09 PROCEDURE — 51701 INSERT BLADDER CATHETER: CPT

## 2020-07-09 PROCEDURE — 86618 LYME DISEASE ANTIBODY: CPT

## 2020-07-09 PROCEDURE — 88112 CYTOPATH CELL ENHANCE TECH: CPT

## 2020-07-09 PROCEDURE — 82550 ASSAY OF CK (CPK): CPT

## 2020-07-09 PROCEDURE — C8929: CPT

## 2020-07-09 PROCEDURE — 99232 SBSQ HOSP IP/OBS MODERATE 35: CPT

## 2020-07-09 PROCEDURE — 74230 X-RAY XM SWLNG FUNCJ C+: CPT

## 2020-07-09 PROCEDURE — 82565 ASSAY OF CREATININE: CPT

## 2020-07-09 PROCEDURE — 82962 GLUCOSE BLOOD TEST: CPT

## 2020-07-09 PROCEDURE — 82803 BLOOD GASES ANY COMBINATION: CPT

## 2020-07-09 PROCEDURE — 97110 THERAPEUTIC EXERCISES: CPT

## 2020-07-09 PROCEDURE — 80076 HEPATIC FUNCTION PANEL: CPT

## 2020-07-09 PROCEDURE — 87070 CULTURE OTHR SPECIMN AEROBIC: CPT

## 2020-07-09 PROCEDURE — 81001 URINALYSIS AUTO W/SCOPE: CPT

## 2020-07-09 PROCEDURE — 70450 CT HEAD/BRAIN W/O DYE: CPT

## 2020-07-09 PROCEDURE — 82330 ASSAY OF CALCIUM: CPT

## 2020-07-09 PROCEDURE — 97530 THERAPEUTIC ACTIVITIES: CPT

## 2020-07-09 PROCEDURE — 86255 FLUORESCENT ANTIBODY SCREEN: CPT

## 2020-07-09 PROCEDURE — 86780 TREPONEMA PALLIDUM: CPT

## 2020-07-09 PROCEDURE — 80074 ACUTE HEPATITIS PANEL: CPT

## 2020-07-09 PROCEDURE — 80048 BASIC METABOLIC PNL TOTAL CA: CPT

## 2020-07-09 PROCEDURE — 83615 LACTATE (LD) (LDH) ENZYME: CPT

## 2020-07-09 PROCEDURE — 82947 ASSAY GLUCOSE BLOOD QUANT: CPT

## 2020-07-09 PROCEDURE — 74018 RADEX ABDOMEN 1 VIEW: CPT

## 2020-07-09 PROCEDURE — 84157 ASSAY OF PROTEIN OTHER: CPT

## 2020-07-09 PROCEDURE — U0003: CPT

## 2020-07-09 PROCEDURE — 97112 NEUROMUSCULAR REEDUCATION: CPT

## 2020-07-09 PROCEDURE — 84145 PROCALCITONIN (PCT): CPT

## 2020-07-09 PROCEDURE — 87389 HIV-1 AG W/HIV-1&-2 AB AG IA: CPT

## 2020-07-09 PROCEDURE — 82746 ASSAY OF FOLIC ACID SERUM: CPT

## 2020-07-09 PROCEDURE — 82140 ASSAY OF AMMONIA: CPT

## 2020-07-09 PROCEDURE — 84132 ASSAY OF SERUM POTASSIUM: CPT

## 2020-07-09 RX ORDER — SERTRALINE 25 MG/1
1 TABLET, FILM COATED ORAL
Qty: 0 | Refills: 0 | DISCHARGE

## 2020-07-09 RX ORDER — POLYETHYLENE GLYCOL 3350 17 G/17G
17 POWDER, FOR SOLUTION ORAL
Qty: 0 | Refills: 0 | DISCHARGE
Start: 2020-07-09

## 2020-07-09 RX ORDER — SERTRALINE 25 MG/1
1 TABLET, FILM COATED ORAL
Qty: 0 | Refills: 0 | DISCHARGE
Start: 2020-07-09

## 2020-07-09 RX ORDER — SENNA PLUS 8.6 MG/1
2 TABLET ORAL
Qty: 0 | Refills: 0 | DISCHARGE
Start: 2020-07-09

## 2020-07-09 RX ORDER — BROMOCRIPTINE MESYLATE 5 MG/1
1 CAPSULE ORAL
Qty: 0 | Refills: 0 | DISCHARGE

## 2020-07-09 RX ORDER — PANTOPRAZOLE SODIUM 20 MG/1
1 TABLET, DELAYED RELEASE ORAL
Qty: 0 | Refills: 0 | DISCHARGE
Start: 2020-07-09

## 2020-07-09 RX ORDER — BROMOCRIPTINE MESYLATE 5 MG/1
1 CAPSULE ORAL
Qty: 0 | Refills: 0 | DISCHARGE
Start: 2020-07-09

## 2020-07-09 RX ORDER — CEFTRIAXONE 500 MG/1
1 INJECTION, POWDER, FOR SOLUTION INTRAMUSCULAR; INTRAVENOUS
Qty: 0 | Refills: 0 | DISCHARGE
Start: 2020-07-09

## 2020-07-09 RX ORDER — RILUZOLE 50 MG/1
1 TABLET ORAL
Qty: 0 | Refills: 0 | DISCHARGE
Start: 2020-07-09

## 2020-07-09 RX ORDER — RILUZOLE 50 MG/1
50 TABLET ORAL
Refills: 0 | Status: DISCONTINUED | OUTPATIENT
Start: 2020-07-09 | End: 2020-07-09

## 2020-07-09 RX ORDER — CEFUROXIME AXETIL 250 MG
500 TABLET ORAL EVERY 12 HOURS
Refills: 0 | Status: DISCONTINUED | OUTPATIENT
Start: 2020-07-09 | End: 2020-07-09

## 2020-07-09 RX ORDER — HEPARIN SODIUM 5000 [USP'U]/ML
5000 INJECTION INTRAVENOUS; SUBCUTANEOUS
Qty: 0 | Refills: 0 | DISCHARGE
Start: 2020-07-09

## 2020-07-09 RX ORDER — CEFUROXIME AXETIL 250 MG
1 TABLET ORAL
Qty: 0 | Refills: 0 | DISCHARGE
Start: 2020-07-09 | End: 2020-07-12

## 2020-07-09 RX ORDER — ASPIRIN/CALCIUM CARB/MAGNESIUM 324 MG
1 TABLET ORAL
Qty: 0 | Refills: 0 | DISCHARGE
Start: 2020-07-09

## 2020-07-09 RX ORDER — CLONAZEPAM 1 MG
0.25 TABLET ORAL
Qty: 0 | Refills: 0 | DISCHARGE
Start: 2020-07-09

## 2020-07-09 RX ORDER — BUPROPION HYDROCHLORIDE 150 MG/1
1 TABLET, EXTENDED RELEASE ORAL
Qty: 0 | Refills: 0 | DISCHARGE

## 2020-07-09 RX ADMIN — Medication 105 MILLIGRAM(S): at 05:58

## 2020-07-09 RX ADMIN — Medication 1: at 09:06

## 2020-07-09 RX ADMIN — Medication 105 MILLIGRAM(S): at 13:33

## 2020-07-09 RX ADMIN — PANTOPRAZOLE SODIUM 40 MILLIGRAM(S): 20 TABLET, DELAYED RELEASE ORAL at 05:57

## 2020-07-09 RX ADMIN — CEFTRIAXONE 100 MILLIGRAM(S): 500 INJECTION, POWDER, FOR SOLUTION INTRAMUSCULAR; INTRAVENOUS at 11:12

## 2020-07-09 RX ADMIN — Medication 500 MILLIGRAM(S): at 17:12

## 2020-07-09 RX ADMIN — SERTRALINE 50 MILLIGRAM(S): 25 TABLET, FILM COATED ORAL at 11:17

## 2020-07-09 RX ADMIN — HEPARIN SODIUM 5000 UNIT(S): 5000 INJECTION INTRAVENOUS; SUBCUTANEOUS at 05:57

## 2020-07-09 RX ADMIN — RILUZOLE 50 MILLIGRAM(S): 50 TABLET ORAL at 16:49

## 2020-07-09 RX ADMIN — POLYETHYLENE GLYCOL 3350 17 GRAM(S): 17 POWDER, FOR SOLUTION ORAL at 11:17

## 2020-07-09 RX ADMIN — Medication 0.25 MILLIGRAM(S): at 13:38

## 2020-07-09 RX ADMIN — HEPARIN SODIUM 5000 UNIT(S): 5000 INJECTION INTRAVENOUS; SUBCUTANEOUS at 13:33

## 2020-07-09 RX ADMIN — Medication 3: at 13:38

## 2020-07-09 RX ADMIN — BROMOCRIPTINE MESYLATE 5 MILLIGRAM(S): 5 CAPSULE ORAL at 11:17

## 2020-07-09 RX ADMIN — Medication 81 MILLIGRAM(S): at 11:17

## 2020-07-09 RX ADMIN — Medication 1: at 16:59

## 2020-07-09 RX ADMIN — Medication 0.25 MILLIGRAM(S): at 05:57

## 2020-07-09 NOTE — PROGRESS NOTE ADULT - ATTENDING COMMENTS
Patient seen and examined.  Case d/w house staff.  EMG performed and was c/w ALS.  Appreciate neurology input and follow up.  Plan for discharge to [facility], can switch to po abx upon d/c.  Total d/c time 35.

## 2020-07-09 NOTE — PROGRESS NOTE ADULT - PROBLEM SELECTOR PLAN 5
Hx of 6 months of weight loss, FTT 2/2 ALS and confirmed by EMG. Paraneoplastic panel negative and negative CT findings.  - Physical therapy evaluation recommending [facility]  - UTI treatment as above  - continue with sertraline   - TTE without new findings  - as per neuro, limited neurocognitive testing Evaluated by s/s  Currently tolerating some fluid and soft feeds PO.  -aspiration precaution

## 2020-07-09 NOTE — PROGRESS NOTE ADULT - PROBLEM SELECTOR PLAN 10
Transitions of Care Status:  1.  Name of PCP: Dr. Jefferson  2.  PCP Contacted on Admission: [x ] Y    [ ] N    3.  PCP contacted at Discharge: [ ] Y    [ ] N    [ ] N/A  4.  Post-Discharge Appointment Date and Location:  5.  Summary of Handoff given to PCP: Transitions of Care Status:  1.  Name of PCP: Dr. Jefferson  2.  PCP Contacted on Admission: [x ] Y    [ ] N    3.  PCP contacted at Discharge: [x] Y    [ ] N    [ ] N/A  4.  Post-Discharge Appointment Date and Location:  5.  Summary of Handoff given to PCP: Explained new diagnosis of ALS and that patient needs follow up with his Neurologist as well as Dr. Caba

## 2020-07-09 NOTE — DISCHARGE NOTE NURSING/CASE MANAGEMENT/SOCIAL WORK - PATIENT PORTAL LINK FT
You can access the FollowMyHealth Patient Portal offered by [facility] by registering at the following website: [email or web address]. By joining [employer]’s FollowMyHealth portal, you will also be able to view your health information using other applications (apps) compatible with our system.

## 2020-07-09 NOTE — PROGRESS NOTE ADULT - PROBLEM SELECTOR PROBLEM 10
Discharge planning issues

## 2020-07-09 NOTE — PROGRESS NOTE ADULT - PROBLEM SELECTOR PLAN 4
Evaluated by s/s  Currently tolerating some fluid and soft feeds PO.  -aspiration precaution -Seizure-like activity ceased on 7/1 likely 2/2 Klonopin 0.25 mg dosing; currently tolerating dose  -Will continue to monitor.  -LP f/u results coincides with inflammatory picture [segmented neutrophils and elevated RBC]  -appreciate neuro recs

## 2020-07-09 NOTE — PROGRESS NOTE ADULT - SUBJECTIVE AND OBJECTIVE BOX
Patient is a 64y old  Male who presents with a chief complaint of Hematuria; weight loss (09 Jul 2020 07:17)    Being followed by ID for UTI, emphysematous cystitis, renal abscess    Interval history:awake  denies any complaints  EMG findings c/w ALS per neuro  No acute events      ROS:  No cough,SOB,CP  No N/V/D./abd pain  No other complaints      Antimicrobials:    cefTRIAXone   IVPB 1000 milliGRAM(s) IV Intermittent every 24 hours    Other medications reviewed    Vital Signs Last 24 Hrs  T(C): 36.8 (07-09-20 @ 13:54), Max: 36.9 (07-09-20 @ 00:26)  T(F): 98.3 (07-09-20 @ 13:54), Max: 98.4 (07-09-20 @ 00:26)  HR: 95 (07-09-20 @ 13:54) (76 - 95)  BP: 117/74 (07-09-20 @ 13:54) (99/60 - 126/69)  BP(mean): --  RR: 18 (07-09-20 @ 13:54) (18 - 18)  SpO2: 97% (07-09-20 @ 13:54) (97% - 99%)    Physical Exam:      cachecxia    HEENT PERRLA EOMI    No oral exudate or erythema    Chest Good AE,CTA    CVS RRR S1 S2 WNl No murmur or rub or gallop    Abd soft BS normal No tenderness no masses  griffith    IV site no erythema tenderness or discharge    CNS as above  Lab Data:                          9.3    8.44  )-----------( 371      ( 09 Jul 2020 07:23 )             29.5       07-09    136  |  101  |  25<H>  ----------------------------<  158<H>  4.3   |  23  |  0.90    Ca    9.4      09 Jul 2020 07:23  Phos  3.1     07-09  Mg     1.8     07-09        < from: CT Abdomen and Pelvis w/ IV Cont (07.01.20 @ 18:05) >    IMPRESSION:     Resolving left renal abscess/pyelonephritis.          < end of copied text >

## 2020-07-09 NOTE — PROGRESS NOTE ADULT - ASSESSMENT
63 yo M pmhx DM2 on insulin, HTN, CAD w. LAD stent/restent after thrombosis, depression presenting with 2 days of hematuria  Patient with no fevers,chills  Leucocytosis as OP  Admitted CT with emphysematous cystitis and renal lesion ? abscess  But also with 80 lb weight loss over 6 months  In hospital minimal leucocytosis, No fevers  Blood and urine cs negative (though did recieve cipro as OP)  s/p 2 weeks of IV abx  repeat CT with resolution of abscess and much improved custitis-no intramural air  Will defer to urology on any further workup for malignancy    Would rec:    A) Emphysematous cystitis, renal mass ? abscess  Ct results as above  Continue ceftriaxone for now but can de-escalate to po ceftin soon   To continue for another 4 days       B) Renal mass  resolved  urine cytology was negative  will defer to urology on any further workup given weight loss and hematuria    C) seizures ?  delirium  hypophonation/wasting   Neuro input noted  ALS on EMG  will defer to neuro and primary team       Will tailor plan for ID issues  per course,results.Will defer to primary team on management of other issues.  Assessment, plan and recommendations as detailed above were discussed with the medical/primary  team-Dr gracia  Will Follow.  Beeper [phone] [facility] [id number].   Wknd/afterhours/No response-[phone] or Fellow on call

## 2020-07-09 NOTE — PROGRESS NOTE ADULT - PROBLEM SELECTOR PLAN 1
- abx changed to CTX 1 g QD on 7/1.  - per ID c/w CTX for additional 7 days; (last dose 7/14)  - per ID, consider de-escalation to PO Ceftin soon  - CT A/P w/wo contx shows interval resolution of left renal abscess - abx changed to CTX 1 g QD on 7/1.  - per ID c/w abx for additional 7 days; (last dose 7/14)  - per ID, will de-escalate to PO Ceftin on DC  - CT A/P w/wo contx shows interval resolution of left renal abscess Hx of 6 months of weight loss, FTT 2/2 ALS and confirmed by EMG 7/8. Paraneoplastic panel negative and negative CT findings.  - Physical therapy evaluation recommending [facility], pending placement  - Will need DC with Grewal for retention  - Will start Riluzole 50 mg BID per Neuro recs  - continue with sertraline   - TTE without new findings  - as per neuro, limited neurocognitive testing  - Will F/u outpatient w Dr. Winters and Dr. Caba

## 2020-07-09 NOTE — PROGRESS NOTE ADULT - PROBLEM SELECTOR PLAN 2
Pt presented with 2 days of hematuria 2/2 cystitis. CT a/p showing left renal abscess and emphysematous cystitis, repeat CT showed resolving infection  -abx as above - abx changed to CTX 1 g QD on 7/1.  - per ID c/w abx for additional 7 days; (last dose 7/14)  - per ID, will de-escalate to PO Ceftin on DC  - CT A/P w/wo contx shows interval resolution of left renal abscess

## 2020-07-09 NOTE — PROGRESS NOTE ADULT - PROVIDER SPECIALTY LIST ADULT
Infectious Disease
Internal Medicine
Neurology
Urology
Neurology
Infectious Disease
Infectious Disease
Neurology
Neurology
Infectious Disease
Urology
Internal Medicine

## 2020-07-09 NOTE — PROGRESS NOTE ADULT - PROBLEM SELECTOR PLAN 6
likely 2/2 ALS as per EMG by Dr. Caba.  - will start riluzole 50 mg BID  - PT consulted, recommending [facility]  - neuro consulted; paraneoplastic panel negative. 2/2 ALS as per EMG by Dr. Caba.  - will start riluzole 50 mg BID  - PT consulted, recommending [facility]  - neuro consulted; paraneoplastic panel negative.  - Will F/u outpatient w Dr. Winters and Dr. Caba

## 2020-07-09 NOTE — CHART NOTE - NSCHARTNOTEFT_GEN_A_CORE
Nutrition Follow Up Note  Patient seen for: Malnutrition follow up. Chart reviewed, interim events noted, pt with failure to thrive, Dysphagia (S/P MBS recommended NPO with non-oral nutrition, pt declined and wished to continue with oral diet with understanding of risks), pt S/P EMG 7/8 confirmed ALS.       Source: Pt    Diet : Soft with ensure enlive 2 daily     Patient reports: No N+V, last BM noted 7/6-ordered for senna and miralax      PO intake : Pt reports appetite has been better lately consuming 50-75% of meals and supplementing with ensure shakes 1 daily, noted pt with a decent stock at bedside. Per RN pt was able to take >50% of breakfast today.         Weight: 119.2 lbs (7/1), 119.2 lbs (7/8), pt reported wt  lbs, wt stable thus far    Pertinent Medications: MEDICATIONS  (STANDING):  aspirin enteric coated 81 milliGRAM(s) Oral daily  atorvastatin 80 milliGRAM(s) Oral at bedtime  bromocriptine Capsule 5 milliGRAM(s) Oral daily  cefTRIAXone   IVPB 1000 milliGRAM(s) IV Intermittent every 24 hours  clonazePAM  Tablet 0.25 milliGRAM(s) Oral every 8 hours  dextrose 5%. 1000 milliLiter(s) (50 mL/Hr) IV Continuous <Continuous>  dextrose 50% Injectable 12.5 Gram(s) IV Push once  dextrose 50% Injectable 25 Gram(s) IV Push once  dextrose 50% Injectable 25 Gram(s) IV Push once  heparin   Injectable 5000 Unit(s) SubCutaneous every 8 hours  insulin lispro (HumaLOG) corrective regimen sliding scale   SubCutaneous three times a day before meals  insulin lispro (HumaLOG) corrective regimen sliding scale   SubCutaneous at bedtime  pantoprazole    Tablet 40 milliGRAM(s) Oral before breakfast  polyethylene glycol 3350 17 Gram(s) Oral daily  senna 2 Tablet(s) Oral at bedtime  sertraline 50 milliGRAM(s) Oral daily  thiamine IVPB 500 milliGRAM(s) IV Intermittent every 8 hours    MEDICATIONS  (PRN):  dextrose 40% Gel 15 Gram(s) Oral once PRN Blood Glucose LESS THAN 70 milliGRAM(s)/deciliter  glucagon  Injectable 1 milliGRAM(s) IntraMuscular once PRN Glucose LESS THAN 70 milligrams/deciliter    Pertinent Labs: 07-09 @ 07:23: Na 136, BUN 25<H>, Cr 0.90, <H>, K+ 4.3, Phos 3.1, Mg 1.8, Alk Phos --, ALT/SGPT --, AST/SGOT --, HbA1c --    Finger Sticks:  POCT Blood Glucose.: 167 mg/dL (07-09 @ 08:50)  POCT Blood Glucose.: 177 mg/dL (07-08 @ 21:25)  POCT Blood Glucose.: 174 mg/dL (07-08 @ 17:25)  POCT Blood Glucose.: 279 mg/dL (07-08 @ 12:43)      Skin per nursing documentation: skin intact  Edema: none    Estimated Needs:   [x ] no change since previous assessment  [ ] recalculated:     Previous Nutrition Diagnosis: Moderate malnutrition   Nutrition Diagnosis is: ongoing, addressed with supplements    New Nutrition Diagnosis:  Related to:    As evidenced by:      Interventions:     Recommend  1) Diet texture deferred to medical team and pt wishes-currently soft  2) Consider decreasing ensure enlive to 1 given pt with stock at bedside (350 Kcal, 20g protein per 8 oz serving)  3) Continue to encourage po intake and obtain/honor food preferences as able     Monitoring and Evaluation:     Continue to monitor Nutritional intake, Tolerance to diet prescription, weights, labs, skin integrity    RD remains available upon request and will follow up per protocol    Mireya Morrison R.D., [facility], Pager #[phone]

## 2020-07-09 NOTE — PROGRESS NOTE ADULT - SUBJECTIVE AND OBJECTIVE BOX
***INCOMPLETE NOTE***  Austin Chavarria MD PGY-2  Internal Medicine  Pager [phone] / [phone] ***INCOMPLETE NOTE***  Austin Chavarria MD PGY-2  Internal Medicine  Pager [phone] / [phone]    Patient is a 64y old  Male who presents with a chief complaint of Hematuria; weight loss (09 Jul 2020 07:17)    SUBJECTIVE / OVERNIGHT EVENTS:  No acute events overnight. Patient reports no complaints, feels well this AM. Understands that he will be going to rehab soon with a Grewal in place.  Denies nausea, vomiting, constipation, diarrhea, fevers, chills, chest pain, SOB, numbness, tingling, weakness.    MEDICATIONS  (STANDING):  aspirin enteric coated 81 milliGRAM(s) Oral daily  atorvastatin 80 milliGRAM(s) Oral at bedtime  bromocriptine Capsule 5 milliGRAM(s) Oral daily  cefTRIAXone   IVPB 1000 milliGRAM(s) IV Intermittent every 24 hours  clonazePAM  Tablet 0.25 milliGRAM(s) Oral every 8 hours  dextrose 5%. 1000 milliLiter(s) (50 mL/Hr) IV Continuous <Continuous>  dextrose 50% Injectable 12.5 Gram(s) IV Push once  dextrose 50% Injectable 25 Gram(s) IV Push once  dextrose 50% Injectable 25 Gram(s) IV Push once  heparin   Injectable 5000 Unit(s) SubCutaneous every 8 hours  insulin lispro (HumaLOG) corrective regimen sliding scale   SubCutaneous three times a day before meals  insulin lispro (HumaLOG) corrective regimen sliding scale   SubCutaneous at bedtime  pantoprazole    Tablet 40 milliGRAM(s) Oral before breakfast  polyethylene glycol 3350 17 Gram(s) Oral daily  senna 2 Tablet(s) Oral at bedtime  sertraline 50 milliGRAM(s) Oral daily  thiamine IVPB 500 milliGRAM(s) IV Intermittent every 8 hours    MEDICATIONS  (PRN):  dextrose 40% Gel 15 Gram(s) Oral once PRN Blood Glucose LESS THAN 70 milliGRAM(s)/deciliter  glucagon  Injectable 1 milliGRAM(s) IntraMuscular once PRN Glucose LESS THAN 70 milligrams/deciliter      CAPILLARY BLOOD GLUCOSE      POCT Blood Glucose.: 167 mg/dL (09 Jul 2020 08:50)  POCT Blood Glucose.: 177 mg/dL (08 Jul 2020 21:25)  POCT Blood Glucose.: 174 mg/dL (08 Jul 2020 17:25)  POCT Blood Glucose.: 279 mg/dL (08 Jul 2020 12:43)    I&O's Summary    08 Jul 2020 07:01  -  09 Jul 2020 07:00  --------------------------------------------------------  IN: 560 mL / OUT: 1000 mL / NET: -440 mL        PHYSICAL EXAM:  Vital Signs Last 24 Hrs  T(C): 36.6 (07-09-20 @ 07:31)  T(F): 97.8 (07-09-20 @ 07:31), Max: 98.4 (07-09-20 @ 00:26)  HR: 76 (07-09-20 @ 09:55) (76 - 85)  BP: 108/70 (07-09-20 @ 09:55)  BP(mean): --  RR: 18 (07-09-20 @ 09:55) (18 - 18)  SpO2: 99% (07-09-20 @ 09:55) (97% - 99%)  Wt(kg): --    07-08 @ 07:01  -  07-09 @ 07:00  --------------------------------------------------------  IN: 560 mL / OUT: 1000 mL / NET: -440 mL      CONSTITUTIONAL: NAD; alert  RESPIRATORY: preserved respiratory effort; lungs are clear to auscultation bilaterally; no wheezes, crackles, or ronchi  CARDIOVASCULAR: Regular rate, normal S1 and S2, no murmur/rub/gallop; No lower extremity edema; Peripheral radial pulses are 2+ bilaterally  ABDOMEN: Flat, soft. Nontender to palpation, normoactive bowel sounds, no rebound/guarding; No hepatosplenomegaly  MUSCLOSKELETAL: no cyanosis of digits; no joint swelling or tenderness to palpation  NEURO: non-focal, bilateral UE 5/5 on flexion and extension; R hip flexion 4/5 and L hip flexion 4/5; R lower leg flexion 2-3/5 and extension 4/5; L lower leg flexion and extension 4/5. Hypophonic voice, improved from prior.   PSYCH: A+O to person, place, and time; affect appropriate.    LABS:                        9.3    8.44  )-----------( 371      ( 09 Jul 2020 07:23 )             29.5     07-09    136  |  101  |  25<H>  ----------------------------<  158<H>  4.3   |  23  |  0.90    Ca    9.4      09 Jul 2020 07:23  Phos  3.1     07-09  Mg     1.8     07-09    RADIOLOGY & ADDITIONAL TESTS:    Imaging Personally Reviewed:    Consultant(s) Notes Reviewed:    Neurology    Care Discussed with Consultants/Other Providers: Austin Chavarria MD PGY-2  Internal Medicine  Pager [phone] / [phone]    Patient is a 64y old  Male who presents with a chief complaint of Hematuria; weight loss (09 Jul 2020 07:17)    SUBJECTIVE / OVERNIGHT EVENTS:  No acute events overnight. Patient reports no complaints, feels well this AM. Understands that he will be going to rehab soon with a Grewal in place.  Denies nausea, vomiting, constipation, diarrhea, fevers, chills, chest pain, SOB, numbness, tingling, weakness.    MEDICATIONS  (STANDING):  aspirin enteric coated 81 milliGRAM(s) Oral daily  atorvastatin 80 milliGRAM(s) Oral at bedtime  bromocriptine Capsule 5 milliGRAM(s) Oral daily  cefTRIAXone   IVPB 1000 milliGRAM(s) IV Intermittent every 24 hours  clonazePAM  Tablet 0.25 milliGRAM(s) Oral every 8 hours  dextrose 5%. 1000 milliLiter(s) (50 mL/Hr) IV Continuous <Continuous>  dextrose 50% Injectable 12.5 Gram(s) IV Push once  dextrose 50% Injectable 25 Gram(s) IV Push once  dextrose 50% Injectable 25 Gram(s) IV Push once  heparin   Injectable 5000 Unit(s) SubCutaneous every 8 hours  insulin lispro (HumaLOG) corrective regimen sliding scale   SubCutaneous three times a day before meals  insulin lispro (HumaLOG) corrective regimen sliding scale   SubCutaneous at bedtime  pantoprazole    Tablet 40 milliGRAM(s) Oral before breakfast  polyethylene glycol 3350 17 Gram(s) Oral daily  senna 2 Tablet(s) Oral at bedtime  sertraline 50 milliGRAM(s) Oral daily  thiamine IVPB 500 milliGRAM(s) IV Intermittent every 8 hours    MEDICATIONS  (PRN):  dextrose 40% Gel 15 Gram(s) Oral once PRN Blood Glucose LESS THAN 70 milliGRAM(s)/deciliter  glucagon  Injectable 1 milliGRAM(s) IntraMuscular once PRN Glucose LESS THAN 70 milligrams/deciliter      CAPILLARY BLOOD GLUCOSE      POCT Blood Glucose.: 167 mg/dL (09 Jul 2020 08:50)  POCT Blood Glucose.: 177 mg/dL (08 Jul 2020 21:25)  POCT Blood Glucose.: 174 mg/dL (08 Jul 2020 17:25)  POCT Blood Glucose.: 279 mg/dL (08 Jul 2020 12:43)    I&O's Summary    08 Jul 2020 07:01  -  09 Jul 2020 07:00  --------------------------------------------------------  IN: 560 mL / OUT: 1000 mL / NET: -440 mL        PHYSICAL EXAM:  Vital Signs Last 24 Hrs  T(C): 36.6 (07-09-20 @ 07:31)  T(F): 97.8 (07-09-20 @ 07:31), Max: 98.4 (07-09-20 @ 00:26)  HR: 76 (07-09-20 @ 09:55) (76 - 85)  BP: 108/70 (07-09-20 @ 09:55)  BP(mean): --  RR: 18 (07-09-20 @ 09:55) (18 - 18)  SpO2: 99% (07-09-20 @ 09:55) (97% - 99%)  Wt(kg): --    07-08 @ 07:01  -  07-09 @ 07:00  --------------------------------------------------------  IN: 560 mL / OUT: 1000 mL / NET: -440 mL      CONSTITUTIONAL: NAD; alert  RESPIRATORY: preserved respiratory effort; lungs are clear to auscultation bilaterally; no wheezes, crackles, or ronchi  CARDIOVASCULAR: Regular rate, normal S1 and S2, no murmur/rub/gallop; No lower extremity edema; Peripheral radial pulses are 2+ bilaterally  ABDOMEN: Flat, soft. Nontender to palpation, normoactive bowel sounds, no rebound/guarding; No hepatosplenomegaly  MUSCLOSKELETAL: no cyanosis of digits; no joint swelling or tenderness to palpation  NEURO: non-focal, bilateral UE 5/5 on flexion and extension; R hip flexion 4/5 and L hip flexion 4/5; R lower leg flexion 2-3/5 and extension 4/5; L lower leg flexion and extension 4/5. Hypophonic voice, improved from prior.   PSYCH: A+O to person, place, and time; affect appropriate.    LABS:                        9.3    8.44  )-----------( 371      ( 09 Jul 2020 07:23 )             29.5     07-09    136  |  101  |  25<H>  ----------------------------<  158<H>  4.3   |  23  |  0.90    Ca    9.4      09 Jul 2020 07:23  Phos  3.1     07-09  Mg     1.8     07-09    RADIOLOGY & ADDITIONAL TESTS:    Imaging Personally Reviewed:    Consultant(s) Notes Reviewed:    Neurology    Care Discussed with Consultants/Other Providers:

## 2020-07-09 NOTE — PROGRESS NOTE ADULT - ASSESSMENT
65 yo M pmhx prolactinoma, DM2 on insulin, HTN, CAD w. 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 Pt also presenting with 6 months of weight loss/FTT 2/2 neuromuscular disorder confirmed by EMG to be most likely ALS.  Additional history was gathered from family and spouse that seems suggestive for ALS. Course c/b by seizure-like activity and 2.5cm L renal abscess but have now resolved. At this time, we are following the results of the LP. 63 yo M pmhx prolactinoma, DM2 on insulin, HTN, CAD w. 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 Pt also presenting with 6 months of weight loss/FTT 2/2 neuromuscular disorder confirmed by EMG to be ALS.  Course c/b by seizure-like activity and 2.5cm L renal abscess now resolved, pending DC to rehab 65 yo M pmhx prolactinoma, DM2 on insulin, HTN, CAD w. 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 Pt also presenting with 6 months of weight loss/FTT 2/2 neuromuscular disorder confirmed by EMG to be ALS.  Course c/b by seizure-like activity and 2.5cm L renal abscess now resolved, pending DC to [facility]    Hx of 6 months of weight loss, FTT 2/2 ALS and confirmed by EMG. Paraneoplastic panel negative and negative CT findings.  - Physical therapy evaluation recommending [facility]  - UTI treatment as above  - continue with sertraline   - TTE without new findings  - as per neuro, limited neurocognitive testing

## 2020-07-09 NOTE — PROGRESS NOTE ADULT - REASON FOR ADMISSION
Hematuria; weight loss

## 2020-07-09 NOTE — PROGRESS NOTE ADULT - PROBLEM SELECTOR PLAN 3
-Seizure-like activity ceased on 7/1 likely 2/2 Klonopin 0.25 mg dosing; currently tolerating dose  -Will continue to monitor.  -LP f/u results coincides with inflammatory picture [segmented neutrophils and elevated RBC].  -appreciate neuro recs Pt presented with 2 days of hematuria 2/2 cystitis. CT a/p showing left renal abscess and emphysematous cystitis, repeat CT showed resolving infection  -abx as above  - No gross hematuria

## 2020-07-13 PROBLEM — E78.5 HYPERLIPIDEMIA, UNSPECIFIED: Chronic | Status: ACTIVE | Noted: 2020-06-17

## 2020-07-15 ENCOUNTER — APPOINTMENT (OUTPATIENT)
Dept: INTERNAL MEDICINE | Facility: CLINIC | Age: 65
End: 2020-07-15

## 2020-07-28 ENCOUNTER — RESULT REVIEW (OUTPATIENT)
Age: 65
End: 2020-07-28

## 2020-08-11 ENCOUNTER — APPOINTMENT (OUTPATIENT)
Dept: INTERNAL MEDICINE | Facility: CLINIC | Age: 65
End: 2020-08-11
Payer: COMMERCIAL

## 2020-08-11 DIAGNOSIS — R13.19 OTHER DYSPHAGIA: ICD-10-CM

## 2020-08-11 DIAGNOSIS — K59.00 CONSTIPATION, UNSPECIFIED: ICD-10-CM

## 2020-08-11 PROCEDURE — 99214 OFFICE O/P EST MOD 30 MIN: CPT | Mod: 95

## 2020-08-14 PROBLEM — R13.19 DYSPHAGIA, NEUROLOGIC: Status: ACTIVE | Noted: 2020-08-14

## 2020-08-14 PROBLEM — K59.00 CONSTIPATION: Status: ACTIVE | Noted: 2020-08-14

## 2020-08-14 RX ORDER — SILDENAFIL CITRATE 50 MG/1
50 TABLET, FILM COATED ORAL
Qty: 3 | Refills: 0 | Status: COMPLETED | COMMUNITY
Start: 2017-03-11 | End: 2020-08-14

## 2020-08-14 NOTE — HISTORY OF PRESENT ILLNESS
[Home] : at home, [unfilled] , at the time of the visit. [Spouse] : spouse [Medical Office: ([facility])___] : at the medical office located in  [Verbal consent obtained from patient] : the patient, [unfilled] [FreeTextEntry1] : Wife set up telehealth/video visit mostly because she wanted to talk about/reinforce what patient could eat/drink after rehab he just completed [de-identified] : Has been diagnosed with ALS.  EMG done during hospitalization for urinary infection/renal abscess he developed.  Completed IV antibx, weight loss and failure to thrive work up was continued in hospital.  EMG was c/w ALS.  He does look more drawn, has been working with home PT since he's home.  They are stressing to him he needs to get out of bed, sit up.  Wife says he is a bit stronger now, walking with a walker.  Has been able to independently use BR, eating some.  Issue is he needs to take thickened liquids based on speech and swallow eval at rehab.  He isn't so happy with the thickener and the texture and has been cheating with some water at times.  Wife does see him need to cough after thinner liquids.  \par \par They have visiting nurse, considering aide (wife says she doesn't quite need one yet), and have home PT.  If he has to get to doctor's appointment it will be with walker and help of son.  Wife is asking about possibility of home doctor visits if needed one day.  Has appt w neuro/Dr. Caba soon to establish as outpatient; on that day, if he is up to it we can have f/u visit.  \par \par Since home he is taking insulin but in smaller doses.  They are getting sugars 150-250.  He will be still seeing Dr. Pereira as long as he is able to get there.  H\par \par Has had some constipation since he's home.  They will be trying senna HS for 2-3 nights.  If not helpful, discussed back up plan.

## 2020-08-14 NOTE — REVIEW OF SYSTEMS
[Unsteady Walk] : ataxia [Constipation] : constipation [Negative] : Genitourinary [Fever] : no fever [Shortness Of Breath] : no shortness of breath [Chills] : no chills [Night Sweats] : no night sweats [Cough] : no cough [Wheezing] : no wheezing [Abdominal Pain] : no abdominal pain [Vomiting] : no vomiting [de-identified] : muscle wasting

## 2020-08-14 NOTE — PHYSICAL EXAM
[No Acute Distress] : no acute distress [No JVD] : no jugular venous distention [No Respiratory Distress] : no respiratory distress  [Supple] : supple [No Accessory Muscle Use] : no accessory muscle use [No Edema] : there was no peripheral edema [No Extremity Clubbing/Cyanosis] : no extremity clubbing/cyanosis [Alert and Oriented x3] : oriented to person, place, and time [Normal Insight/Judgement] : insight and judgment were intact [de-identified] : sitting in chair in front of device; engaging; using cr nerves/facial muscles; arms/hands appear thinner; voice airy [de-identified] : appears thinner; has cachectic appearing face compared to last time seen

## 2020-08-14 NOTE — ASSESSMENT
[FreeTextEntry1] : 1) new dx ALS - establishing w Dr. Caba next 1-2 weeks.  2) has good support at home; for the time being appears safe.  Working w visiting nurse, PT.  Would have low threshold for HHA to help his wife, but she is declining at this point.  Son will help w transports.  Might consider changing his visits to home visit network at some point.  3) urged flu vacc this season.  4) had long talk about need to thicken his liquids.  Can have shakes w ice cream, ice chips to lips but reexplained why he should avoid thin liquids, connection to aspiration PNA/sepsis risk, etc.  5) if senna not helpful next 1-2 days, can have miralax powder in one of his shakes/smoothies.  6) will see live same day as neuro if he is up to it.  7) believe his A1C goal can be eased significantly.  Has freestyle sarah sensor/reader system, so will keep eye for any swings in his sugar, and cover w humalog - they have become very comfortable with it finally.

## 2020-08-14 NOTE — COUNSELING
[Fall prevention counseling provided] : Fall prevention counseling provided [Use proper foot wear] : Use proper foot wear [Use recommended devices] : Use recommended devices

## 2020-09-06 ENCOUNTER — RX RENEWAL (OUTPATIENT)
Age: 65
End: 2020-09-06

## 2020-09-07 ENCOUNTER — RX RENEWAL (OUTPATIENT)
Age: 65
End: 2020-09-07

## 2020-09-11 ENCOUNTER — APPOINTMENT (OUTPATIENT)
Dept: INTERNAL MEDICINE | Facility: CLINIC | Age: 65
End: 2020-09-11
Payer: COMMERCIAL

## 2020-09-11 ENCOUNTER — APPOINTMENT (OUTPATIENT)
Dept: NEUROLOGY | Facility: CLINIC | Age: 65
End: 2020-09-11
Payer: COMMERCIAL

## 2020-09-11 VITALS
WEIGHT: 123 LBS | SYSTOLIC BLOOD PRESSURE: 110 MMHG | DIASTOLIC BLOOD PRESSURE: 67 MMHG | HEART RATE: 81 BPM | BODY MASS INDEX: 19.77 KG/M2 | HEIGHT: 66 IN

## 2020-09-11 VITALS
SYSTOLIC BLOOD PRESSURE: 90 MMHG | BODY MASS INDEX: 19.85 KG/M2 | HEIGHT: 66 IN | HEART RATE: 90 BPM | OXYGEN SATURATION: 97 % | DIASTOLIC BLOOD PRESSURE: 50 MMHG

## 2020-09-11 DIAGNOSIS — D35.2 BENIGN NEOPLASM OF PITUITARY GLAND: ICD-10-CM

## 2020-09-11 DIAGNOSIS — I25.10 ATHEROSCLEROTIC HEART DISEASE OF NATIVE CORONARY ARTERY W/OUT ANGINA PECTORIS: ICD-10-CM

## 2020-09-11 DIAGNOSIS — E78.5 HYPERLIPIDEMIA, UNSPECIFIED: ICD-10-CM

## 2020-09-11 LAB — HBA1C MFR BLD HPLC: 6.5

## 2020-09-11 PROCEDURE — 83036 HEMOGLOBIN GLYCOSYLATED A1C: CPT | Mod: QW

## 2020-09-11 PROCEDURE — 99214 OFFICE O/P EST MOD 30 MIN: CPT | Mod: 25

## 2020-09-11 PROCEDURE — 99214 OFFICE O/P EST MOD 30 MIN: CPT

## 2020-09-11 NOTE — HISTORY OF PRESENT ILLNESS
[FreeTextEntry1] : Patient presents for evaluation after diagnosed by me with ALS on the basis of clinical criteria and EMG this past June.  \par \par Wife and patient state that his right leg first became weak about 2 years ago and progressed slowly until last summer when he fell.  His legs have slowly worsened and now he can only ambulate with walker and one person assist.  He lives with wife and she is sole caregiver, but son lives nearby and helps.  \par \par He also states that the arms became weak about a year ago and has slowly worsened.  He has had low volume voice over the past 6 months. He has lost 100 lbs in 2 years.  He has mild slurring of speech, he has been coughing on food and liquids and has had swallow. He gets cramps in feet, he gets some muscle twitches also. \par \par He denies pain, burning or tingling in feet or hands.  He is generally not incontinent.  He denies FH of ALS.

## 2020-09-11 NOTE — PHYSICAL EXAM
[General Appearance - Alert] : alert [General Appearance - In No Acute Distress] : in no acute distress [Person] : oriented to person [Time] : oriented to time [Place] : oriented to place [Remote Intact] : remote memory intact [Short Term Intact] : short term memory intact [Registration Intact] : recent registration memory intact [Naming Objects] : no difficulty naming common objects [Concentration Intact] : normal concentrating ability [Visual Intact] : visual attention was ~T not ~L decreased [Writing A Sentence] : no difficulty writing a sentence [Repeating Phrases] : no difficulty repeating a phrase [Fluency] : fluency intact [Comprehension] : comprehension intact [Reading] : reading intact [Past History] : adequate knowledge of personal past history [Cranial Nerves Oculomotor (III)] : extraocular motion intact [Cranial Nerves Optic (II)] : visual acuity intact bilaterally,  visual fields full to confrontation, pupils equal round and reactive to light [Cranial Nerves Trigeminal (V)] : facial sensation intact symmetrically [Cranial Nerves Vestibulocochlear (VIII)] : hearing was intact bilaterally [Cranial Nerves Glossopharyngeal (IX)] : tongue and palate midline [Cranial Nerves Facial (VII)] : face symmetrical [Cranial Nerves Accessory (XI - Cranial And Spinal)] : head turning and shoulder shrug symmetric [Motor Handedness Right-Handed] : the patient is right hand dominant [5] : wrist extension 5/5 [Hand Weakness Right] : normal hand  [Hand Weakness Left] : the hand  was weak [3] : hip extension 3/5 [-4] : hip flexion -4/5 [4] : ankle dorsiflexion 4/5 [+4] : ankle eversion +4/5 [Sensation Tactile Decrease] : light touch was intact [Vibration Decrease Leg / Foot Toes Both Feet] : decreased at the toes of both feet  [Position Sensation Decrease Leg/Foot At Level Of Toes] : impaired at the toes in both legs [Non-ambulatory] : Non-ambulatory [Past-pointing] : there was no past-pointing [Tremor] : no tremor present [3+] : Brachioradialis left 3+ [1+] : Ankle jerk left 1+ [Plantar Reflex Left Only] : abnormal on the left [Plantar Reflex Right Only] : normal on the right [FreeTextEntry5] : tongue fascics with lateral scalloped atrophy and slow movements.  Fundi normal [FreeTextEntry6] : increased tone mild LUE, more so LLE, RLE knee flexion contracture to 130 degrees.  QMT 23kg right, 10kg left.  ALS-FRS 33/48.  Fascics noted chest and arms [FreeTextEntry9] : bilateral brisk pectoral jerks [Neck Appearance] : the appearance of the neck was normal [Neck Cervical Mass (___cm)] : no neck mass was observed [Jugular Venous Distention Increased] : there was no jugular-venous distention [Thyroid Diffuse Enlargement] : the thyroid was not enlarged [Thyroid Nodule] : there were no palpable thyroid nodules [] : no respiratory distress [Heart Sounds] : normal S1 and S2 [Auscultation Breath Sounds / Voice Sounds] : lungs were clear to auscultation bilaterally [Heart Rate And Rhythm] : heart rate was normal and rhythm regular [Murmurs] : no murmurs [Heart Sounds Gallop] : no gallops [Heart Sounds Pericardial Friction Rub] : no pericardial rub

## 2020-09-11 NOTE — ASSESSMENT
[FreeTextEntry1] : This nice gentleman was evaluated by me with EMG/NCV in July while in hospital, and the clinical picture was c/w MND.  \par \par With today's history of progressive LE and UE weakness, dyspnea, and exam findings of mixed UMN and LMN along with fascics on tongue and chest, he meets [place] criteria for definite ALS. \par \par Will cont riluzole 50mg BID.  All questions answered, will see him next in November at ALS clinic.

## 2020-09-12 RX ORDER — ELECTROLYTES/DEXTROSE
32G X 4 MM SOLUTION, ORAL ORAL
Qty: 2 | Refills: 3 | Status: COMPLETED | COMMUNITY
Start: 2019-10-18 | End: 2020-09-12

## 2020-09-12 RX ORDER — INSULIN GLARGINE 100 [IU]/ML
100 INJECTION, SOLUTION SUBCUTANEOUS
Qty: 1 | Refills: 1 | Status: COMPLETED | COMMUNITY
Start: 2019-10-01 | End: 2020-09-12

## 2020-09-12 RX ORDER — BUPROPION HYDROCHLORIDE 150 MG/1
150 TABLET, EXTENDED RELEASE ORAL
Qty: 30 | Refills: 1 | Status: COMPLETED | COMMUNITY
Start: 2020-06-10 | End: 2020-09-12

## 2020-09-12 RX ORDER — CIPROFLOXACIN HYDROCHLORIDE 500 MG/1
500 TABLET, FILM COATED ORAL
Qty: 14 | Refills: 0 | Status: COMPLETED | COMMUNITY
Start: 2020-06-16 | End: 2020-09-12

## 2020-09-12 RX ORDER — BLOOD-GLUCOSE METER
W/DEVICE EACH MISCELLANEOUS
Qty: 1 | Refills: 0 | Status: COMPLETED | COMMUNITY
Start: 2019-10-18 | End: 2020-09-12

## 2020-09-12 RX ORDER — SENNOSIDES 8.6 MG
8.6 TABLET ORAL
Qty: 60 | Refills: 0 | Status: COMPLETED | COMMUNITY
Start: 2020-08-05 | End: 2020-09-12

## 2020-09-12 RX ORDER — DOCUSATE SODIUM 100 MG/1
100 CAPSULE, LIQUID FILLED ORAL
Qty: 30 | Refills: 0 | Status: COMPLETED | COMMUNITY
Start: 2020-08-05 | End: 2020-09-12

## 2020-09-12 RX ORDER — INSULIN LISPRO 100 [IU]/ML
100 INJECTION, SOLUTION INTRAVENOUS; SUBCUTANEOUS
Qty: 1 | Refills: 2 | Status: COMPLETED | COMMUNITY
Start: 2019-10-01 | End: 2020-09-12

## 2020-09-12 RX ORDER — INSULIN ASPART 100 [IU]/ML
100 INJECTION, SOLUTION INTRAVENOUS; SUBCUTANEOUS
Qty: 3 | Refills: 1 | Status: COMPLETED | COMMUNITY
Start: 2019-10-10 | End: 2020-09-12

## 2020-09-12 RX ORDER — PANTOPRAZOLE 40 MG/1
40 TABLET, DELAYED RELEASE ORAL
Qty: 30 | Refills: 0 | Status: COMPLETED | COMMUNITY
Start: 2020-08-05 | End: 2020-09-12

## 2020-09-12 RX ORDER — PEN NEEDLE, DIABETIC 31 GX5/16"
NEEDLE, DISPOSABLE MISCELLANEOUS
Qty: 1 | Refills: 3 | Status: COMPLETED | COMMUNITY
Start: 2019-10-18 | End: 2020-09-12

## 2020-09-12 RX ORDER — BLOOD SUGAR DIAGNOSTIC
STRIP MISCELLANEOUS
Qty: 1 | Refills: 1 | Status: COMPLETED | COMMUNITY
Start: 2019-10-18 | End: 2020-09-12

## 2020-09-12 NOTE — HISTORY OF PRESENT ILLNESS
[de-identified] : Here after hospitalization/rehab, now back home.  Had urinary infxn/renal abscess treated w antibx.  While hospitalizized his work up for advancing muscle atrophy in LEs/UEs, weight loss over last 1-2 years reached a conclusion, w testing c/w ALS.  He is now seeing Dr. Caba/neuro, on riluzole.  Walks with walker and assist.  Has had no falls since home.  Wife has been caring for him throughout and is managing.  One son is nearby.  Homecare is involved, consideration for HHA can be made at any point, so far wife saying she is ok.  He looks drawn, is in wheelchair, has a whispering voice.  There is some trouble with thin liquids/coughing - he is insisting he wants to eat as normally as he can, including drinking.   Does not like thickening agents.  Drinking shakes/smoothees.  Enjoys those.  Has an ultra sarah system and can check sugars more conveniently now.  Hates needles - sugars have been , only on metformin 500 BID.  Not on any insulin at this point given his caloric intake, his new habitus/bmi, dec insulin resistance,etc.  He is interested in shedding as much meds as possible.  Has no polyuria, polydyspsia, cp.  \par \par Wife helps w all ADLs, she needs letter for work - she is in school system and is thinking if she can't get back by end of September, she may just retire.  We spent some time talking about advanced directives.  He wants DNR/DNI, has already assigned proxy and done basic advanced directives.  They have not done a MOLST, introduced today.   [FreeTextEntry1] : Here for f/u of DM, CAD/lipids, microprolactinoma

## 2020-09-12 NOTE — REVIEW OF SYSTEMS
[Fever] : no fever [Chills] : no chills [Fatigue] : fatigue [Recent Change In Weight] : ~T recent weight change [Night Sweats] : no night sweats [Abdominal Pain] : no abdominal pain [Diarrhea] : no diarrhea [Constipation] : no constipation [Nausea] : no nausea [Melena] : no melena [Vomiting] : no vomiting [Negative] : Cardiovascular [de-identified] : see hphi

## 2020-09-12 NOTE — COUNSELING
[Fall prevention counseling provided] : Fall prevention counseling provided [Use proper foot wear] : Use proper foot wear [Use recommended devices] : Use recommended devices [Adequate lighting] : Adequate lighting [No throw rugs] : No throw rugs [Completed Healthcare Proxy] : Completed healthcare proxy [Discussed at today's visit] : Advance directives discussed at today's visit [Completed DNR] : Completed DNR [DNI] : Intubation: DNI [DNR] : Code Status: DNR [Name: ___] : Health Care Proxy's Name: [unfilled] [Relationship: ___] : Relationship: [unfilled] [de-identified] : DALJIT reviewed today; family taking it home to review with him and fill out/sign.  I will co sign on next visit, or Maribel can mail into me.  Has proxy/DNR order for homecare, but would like him to have Prakashst as well.

## 2020-09-12 NOTE — PHYSICAL EXAM
[No Acute Distress] : no acute distress [No Lymphadenopathy] : no lymphadenopathy [Supple] : supple [No JVD] : no jugular venous distention [No Accessory Muscle Use] : no accessory muscle use [Thyroid Normal, No Nodules] : the thyroid was normal and there were no nodules present [No Respiratory Distress] : no respiratory distress  [Clear to Auscultation] : lungs were clear to auscultation bilaterally [Normal Rate] : normal rate  [Normal Percussion] : the chest was normal to percussion [Normal S1, S2] : normal S1 and S2 [Regular Rhythm] : with a regular rhythm [No Carotid Bruits] : no carotid bruits [No Extremity Clubbing/Cyanosis] : no extremity clubbing/cyanosis [de-identified] : looks much more drawn; tired, wants to get home; voice weak, but he can raise volume somewhat [No Edema] : there was no peripheral edema [de-identified] : see neuro exam earlier today w Dr. Caba [de-identified] : no pallor, icterus

## 2020-09-12 NOTE — ASSESSMENT
[FreeTextEntry1] : 1) flu vacc given by me after hours - will document.  2) DM - would like to d/c metformin for 1-2 weeks, and keep eye on sugars.  Perhaps can just rely on his diet/realistic situation without having sugars go >200.  A1C today 6.5.  3) will decrease atorva to 40 from 80 - he is out from the stenting by 3 years, perhaps might have some neurologic benefit to ease dose.  Would d/c b asa, was on singulair for allergies/drip - d/c.   4) d/c pantoprazole on d/c list, will talk to Dr. Pereira about possibility of d/c bromocriptine.  5) would continue sertraline for his spirits, at 100 mg daily currently.  6) MOLST given to review, to complete all his AD and to have it clearly at home.  Also discussed hospice help in home when time comes.  He is open to it, wife very open to it.  For now will observe for neuro course and decide when that timing is correct.  7) discussed risks of thinner liquids, he hears that, but wants to occasionally have them.

## 2020-09-18 ENCOUNTER — RECORD ABSTRACTING (OUTPATIENT)
Age: 65
End: 2020-09-18

## 2020-10-21 ENCOUNTER — APPOINTMENT (OUTPATIENT)
Dept: INTERNAL MEDICINE | Facility: CLINIC | Age: 65
End: 2020-10-21

## 2020-11-09 ENCOUNTER — APPOINTMENT (OUTPATIENT)
Dept: NEUROLOGY | Facility: CLINIC | Age: 65
End: 2020-11-09
Payer: MEDICARE

## 2020-11-09 VITALS
SYSTOLIC BLOOD PRESSURE: 118 MMHG | HEART RATE: 94 BPM | WEIGHT: 122 LBS | HEIGHT: 66 IN | DIASTOLIC BLOOD PRESSURE: 72 MMHG | BODY MASS INDEX: 19.61 KG/M2

## 2020-11-09 VITALS — TEMPERATURE: 97.3 F

## 2020-11-09 PROCEDURE — 99215 OFFICE O/P EST HI 40 MIN: CPT | Mod: 25

## 2020-11-09 PROCEDURE — 97166 OT EVAL MOD COMPLEX 45 MIN: CPT | Mod: GO

## 2020-11-09 PROCEDURE — 97162 PT EVAL MOD COMPLEX 30 MIN: CPT | Mod: GP

## 2020-11-09 PROCEDURE — 92610 EVALUATE SWALLOWING FUNCTION: CPT

## 2020-11-09 NOTE — REASON FOR VISIT
[Initial] : initial visit for [Clinical Swallow] : clinical swallow evaluation [Speech and Language] : speech and language evaluation [Follow-Up: _____] : a [unfilled] follow-up visit [Spouse] : spouse

## 2020-12-01 ENCOUNTER — RX RENEWAL (OUTPATIENT)
Age: 65
End: 2020-12-01

## 2020-12-01 RX ORDER — SERTRALINE HYDROCHLORIDE 100 MG/1
100 TABLET, FILM COATED ORAL
Qty: 135 | Refills: 3 | Status: ACTIVE | COMMUNITY
Start: 2020-01-21 | End: 1900-01-01

## 2020-12-03 NOTE — PHYSICAL EXAM
[Person] : oriented to person [Place] : oriented to place [Time] : oriented to time [Short Term Intact] : short term memory intact [Remote Intact] : remote memory intact [Registration Intact] : recent registration memory intact [Concentration Intact] : normal concentrating ability [Visual Intact] : visual attention was ~T not ~L decreased [Naming Objects] : no difficulty naming common objects [Repeating Phrases] : no difficulty repeating a phrase [Writing A Sentence] : no difficulty writing a sentence [Fluency] : fluency intact [Comprehension] : comprehension intact [Reading] : reading intact [Past History] : adequate knowledge of personal past history [Cranial Nerves Optic (II)] : visual acuity intact bilaterally,  visual fields full to confrontation, pupils equal round and reactive to light [Cranial Nerves Oculomotor (III)] : extraocular motion intact [Cranial Nerves Trigeminal (V)] : facial sensation intact symmetrically [Cranial Nerves Facial (VII)] : face symmetrical [Cranial Nerves Vestibulocochlear (VIII)] : hearing was intact bilaterally [Cranial Nerves Glossopharyngeal (IX)] : tongue and palate midline [Cranial Nerves Accessory (XI - Cranial And Spinal)] : head turning and shoulder shrug symmetric [Motor Handedness Right-Handed] : the patient is right hand dominant [5] : wrist extension 5/5 [Hand Weakness Left] : the hand  was weak [3] : hip extension 3/5 [-4] : hip flexion -4/5 [4] : ankle dorsiflexion 4/5 [+4] : ankle inversion +4/5 [Sensation Tactile Decrease] : light touch was intact [Vibration Decrease Leg / Foot Toes Both Feet] : decreased at the toes of both feet  [Position Sensation Decrease Leg/Foot At Level Of Toes] : impaired at the toes in both legs [Non-ambulatory] : Non-ambulatory [2+] : Brachioradialis left 2+ [1+] : Ankle jerk left 1+ [Plantar Reflex Left Only] : abnormal on the left [Hand Weakness Right] : normal hand  [Past-pointing] : there was no past-pointing [Tremor] : no tremor present [Plantar Reflex Right Only] : normal on the right [FreeTextEntry5] : tongue fascics with lateral scalloped atrophy and slow movements.  Fundi normal [FreeTextEntry6] : increased tone mild LUE, more so LLE, RLE knee flexion contracture to 130 degrees.  QMT 23kg right, 10kg left.  ALS-FRS 29/48.  Fascics noted chest and arms [FreeTextEntry9] : bilateral brisk pectoral jerks

## 2020-12-03 NOTE — HISTORY OF PRESENT ILLNESS
[FreeTextEntry1] : Since last visit in September, RLE is weaker.  Wife states he has memory problems and word finding problems, some change in temperament.  \par \par Eating ok, denies SOB.

## 2020-12-03 NOTE — ASSESSMENT
[FreeTextEntry1] : RAHUL VITALE evaluated by me today  in multidisciplinary ALS clinic; requires evaluations today by PT/OT/speech and swallow therapists.  Social work needs addressed and goals of care reinforced. End of life care including health care proxy and patient wishes discussed.\par \par Nutrition/dietary needs discussed and addressed.  \par \par Pulmonary function assessed by respiratory therapy and spirometry.  Patient’s respiratory function is O2 sat 97, ETCO2 36, HR 84, RR 20, cough flow 250, NIF -55.  Due to compromised cough flow, diminished FVC and weak chest wall muscles due to neuromuscular disease this patient requires cough assist\par \par Saliva management needs assessed\par \par PT/OT recommends: home PT\par \par Speech/Swallow therapy recommends: bedside eval limited but there is concern for laryngeal penetration.\par \par Continue riluzole 50m BID\par \par f/u in 3 months at ALS clinic\par \par

## 2020-12-23 PROBLEM — Z87.440 HISTORY OF URINARY TRACT INFECTION: Status: RESOLVED | Noted: 2020-06-16 | Resolved: 2020-12-23

## 2020-12-30 ENCOUNTER — NON-APPOINTMENT (OUTPATIENT)
Age: 65
End: 2020-12-30

## 2021-01-11 ENCOUNTER — APPOINTMENT (OUTPATIENT)
Dept: INTERNAL MEDICINE | Facility: CLINIC | Age: 66
End: 2021-01-11
Payer: MEDICARE

## 2021-01-11 DIAGNOSIS — M54.5 LOW BACK PAIN: ICD-10-CM

## 2021-01-11 DIAGNOSIS — F32.9 MAJOR DEPRESSIVE DISORDER, SINGLE EPISODE, UNSPECIFIED: ICD-10-CM

## 2021-01-11 PROCEDURE — 99214 OFFICE O/P EST MOD 30 MIN: CPT | Mod: 95

## 2021-01-12 PROBLEM — M54.5 LOW BACK PAIN: Status: ACTIVE | Noted: 2021-01-12

## 2021-01-12 PROBLEM — F32.9 DEPRESSION: Status: ACTIVE | Noted: 2020-01-21

## 2021-01-12 NOTE — PHYSICAL EXAM
[No Acute Distress] : no acute distress [Well Nourished] : well nourished [No JVD] : no jugular venous distention [Supple] : supple [No Respiratory Distress] : no respiratory distress  [No Accessory Muscle Use] : no accessory muscle use [No Edema] : there was no peripheral edema [Speech Grossly Normal] : speech grossly normal [No Extremity Clubbing/Cyanosis] : no extremity clubbing/cyanosis [Memory Grossly Normal] : memory grossly normal [Alert and Oriented x3] : oriented to person, place, and time [de-identified] : in bed, musculature in arms atrophic; voice whispery at times [de-identified] : mood fair, converses, sometimes has wife answer some things - ?fatigue

## 2021-01-12 NOTE — ASSESSMENT
[FreeTextEntry1] : 1) had flu vacc this season.  2) DM - on just diet control now.  A1C goal less strict given overall circumstances.  3) increasing sertraline.  4) f/u w Dr. Caba re: ALS/riluzole.  5) plan as above in terms of rxs needed (egg shell cushion, patches for low back pain (likely deconditioned paraspinal muscles) when sitting up out of bed.  Needs more home PT.

## 2021-01-12 NOTE — HISTORY OF PRESENT ILLNESS
[FreeTextEntry1] : Has next telehealth appointment scheduled for today to review his DM, update on his ALS, go over any needs he and his wife may have [de-identified] : Has been in his words 'at best so so' since last visit.  Weaker from his ALS, mostly in bed.  Gets out of bed to go to bathroom with walker and assist from wife.  Gets into a recliner to watch some TV, stay up for a while less and less.  Likes to be in bed, sleep, it seems.  Discussed his spirits - he says 'ok', wife says 'sometimes irritable', 'always wants to go to bed.  Routine is sleep 7PM - 7AM, then up for BR/eating, then nap until noon, then up for lunch, then resting, occasionally in recliner, until 5PM.  Has a dinner and would like to be back in bed by 7PM.  Has some appetite - sugars have been 100-120 at all times; meter reading average of 104.  Recliner gives him back pain.  Asking for egg shell mattress covering to have less ache from 4 inch mattress with his hospital bed.  Has had no skin breakdown, wife watching.  Wears pads over night after getting up to use the BR at about 9PM, does a lot of urinating middle of early morning in pad.  During day sometimes deals with going to BR, feeling he has some urine, but not having it materialize - 'doesn't happen every day'.  \par \par On riluzole with Dr. Caba - next appointment not for some time, ? any room to titrate, not sure.  Will ascertain.  \par \par P : as above, rx for egg crate, patches for low back when trying to get out of bed, question on riluzole titration possibilities.  Asked to increase his sertraline from 50 mg he's been taking to 100 mg, have room to titrate there, seems to be tolerating.  DM in good control.  Support given.  AD in place.  Has had home PT, seems hesitant, encouraging more.  F/U 8-10 weeks after next neuro visit.  FC\par \par Taina would like egg crate order to go to "[employer]", their supply company [phone].  He has over the counter M'caid medication benefit, they would like 4% lidocaine icy hot patches we agreed upon to help him tolerate being up in recliner more sent to pharmacy as official rx, believe it will be covered.

## 2021-02-08 ENCOUNTER — APPOINTMENT (OUTPATIENT)
Dept: NEUROLOGY | Facility: CLINIC | Age: 66
End: 2021-02-08

## 2021-02-09 NOTE — PROGRESS NOTE ADULT - PROBLEM SELECTOR PLAN 2
Hx of 6 months of weight loss, FTT 2/2 neuromuscular disorder vs paraneoplastic vs infection (Endocarditis?) vs psychiatric illness  - Physical therapy evaluation  - UTI treatment as above  - continue with wellbutrin, sertraline   - follow up blood cultures  - TTE without new findings Hx of 6 months of weight loss, FTT 2/2 neuromuscular disorder (ALS) vs paraneoplastic (less likely due to negative CT findings vs infection (Endocarditis? - less likely due to TTE results, no blood cx, no murmur) vs psychiatric illness  - Physical therapy evaluation  - UTI treatment as above  - continue with wellbutrin, sertraline   - follow up blood cultures  - TTE without new findings  - discussed with Neurologist outpatient; will need EMG as outpatient , no further workup for now. Erythromycin Pregnancy And Lactation Text: This medication is Pregnancy Category B and is considered safe during pregnancy. It is also excreted in breast milk.

## 2021-02-11 ENCOUNTER — RX RENEWAL (OUTPATIENT)
Age: 66
End: 2021-02-11

## 2021-03-02 ENCOUNTER — RX RENEWAL (OUTPATIENT)
Age: 66
End: 2021-03-02

## 2021-03-02 RX ORDER — BROMOCRIPTINE MESYLATE 5 MG/1
5 CAPSULE ORAL
Qty: 90 | Refills: 1 | Status: ACTIVE | COMMUNITY
Start: 2017-03-11 | End: 1900-01-01

## 2021-03-09 ENCOUNTER — APPOINTMENT (OUTPATIENT)
Dept: NEUROLOGY | Facility: CLINIC | Age: 66
End: 2021-03-09
Payer: MEDICARE

## 2021-03-09 VITALS
WEIGHT: 125 LBS | HEART RATE: 76 BPM | SYSTOLIC BLOOD PRESSURE: 104 MMHG | HEIGHT: 66 IN | DIASTOLIC BLOOD PRESSURE: 64 MMHG | BODY MASS INDEX: 20.09 KG/M2

## 2021-03-09 VITALS — TEMPERATURE: 97.5 F

## 2021-03-09 PROCEDURE — 99214 OFFICE O/P EST MOD 30 MIN: CPT

## 2021-03-09 NOTE — HISTORY OF PRESENT ILLNESS
[FreeTextEntry1] : Patient last seen in Nov at ALS clinic. \par \par Wife states he gets some slurred speech.  Rare coughing on food.  Feels comfortable breathing.  Rare muscle cramps.  \par \par Taking riluzole without problems.  He can't stand anymore, needs full assist. \par \par Wife has aide daily.

## 2021-03-09 NOTE — ASSESSMENT
[FreeTextEntry1] : Patient is progressing and now is full assist.  \par \par Saliva needs - none.  He again has states he does not want PEG or invasive or NIV ventilation.  \par Will cont PO eating and get home PT.  Will get genetics done.\par \par f/u 3 months.

## 2021-03-09 NOTE — PHYSICAL EXAM
[Person] : oriented to person [Place] : oriented to place [Time] : oriented to time [Short Term Intact] : short term memory intact [Remote Intact] : remote memory intact [Registration Intact] : recent registration memory intact [Concentration Intact] : normal concentrating ability [Visual Intact] : visual attention was ~T not ~L decreased [Naming Objects] : no difficulty naming common objects [Repeating Phrases] : no difficulty repeating a phrase [Writing A Sentence] : no difficulty writing a sentence [Fluency] : fluency intact [Comprehension] : comprehension intact [Reading] : reading intact [Past History] : adequate knowledge of personal past history [Cranial Nerves Optic (II)] : visual acuity intact bilaterally,  visual fields full to confrontation, pupils equal round and reactive to light [Cranial Nerves Oculomotor (III)] : extraocular motion intact [Cranial Nerves Trigeminal (V)] : facial sensation intact symmetrically [Cranial Nerves Facial (VII)] : face symmetrical [Cranial Nerves Vestibulocochlear (VIII)] : hearing was intact bilaterally [Cranial Nerves Glossopharyngeal (IX)] : tongue and palate midline [Cranial Nerves Accessory (XI - Cranial And Spinal)] : head turning and shoulder shrug symmetric [Motor Handedness Right-Handed] : the patient is right hand dominant [5] : wrist extension 5/5 [Hand Weakness Right] : normal hand  [Hand Weakness Left] : the hand  was weak [3] : hip extension 3/5 [4] : knee extension 4/5 [-4] : ankle eversion -4/5 [+4] : ankle inversion +4/5 [Sensation Tactile Decrease] : light touch was intact [Vibration Decrease Leg / Foot Toes Both Feet] : decreased at the toes of both feet  [Position Sensation Decrease Leg/Foot At Level Of Toes] : impaired at the toes in both legs [Non-ambulatory] : Non-ambulatory [Past-pointing] : there was no past-pointing [Tremor] : no tremor present [2+] : Brachioradialis left 2+ [1+] : Ankle jerk left 1+ [Plantar Reflex Right Only] : abnormal on the right [Plantar Reflex Left Only] : abnormal on the left [FreeTextEntry5] : tongue fascics with lateral scalloped atrophy and slow movements.  Fundi normal [FreeTextEntry6] : increased tone mild LUE, more so LLE, RLE knee flexion contracture to 130 degrees.  QMT 23kg right, 10kg left.  ALS-FRS 22/48.  Fascics noted chest and arms [FreeTextEntry9] : bilateral brisk pectoral jerks

## 2021-03-11 ENCOUNTER — RX RENEWAL (OUTPATIENT)
Age: 66
End: 2021-03-11

## 2021-03-17 ENCOUNTER — RX RENEWAL (OUTPATIENT)
Age: 66
End: 2021-03-17

## 2021-03-20 ENCOUNTER — RX RENEWAL (OUTPATIENT)
Age: 66
End: 2021-03-20

## 2021-03-29 NOTE — ED BEHAVIORAL HEALTH ASSESSMENT NOTE - REFERRED BY
[facility] AMBULATORY ENCOUNTER  HEMATOLOGY/ONCOLOGY CONSULT NOTE      REASON FOR CONSULTATION:  I am seeing Alexx Welch at the request of ALHAJI Diego for my opinion regarding evaluation and management of leukocytosis.    SOURCE OF INFORMATION:  EMR and self    CHIEF COMPLAINT:  Consultation (referred by Gail Bailey for abd cbc)        HISTORY OF PRESENT ILLNESS:  Alexx Welch is a 61 year old male seen today for leukocytosis with persistent lymphocytosis and eosinophilia.     His significant past medical history includes PMR, ANDREA on CPAP, chronic sinusitis, vasomotor rhinitis, hypertension, CAD, morbid obesity, IBS, GERD, overactive bladder, OA of knee, type 2 DM, Gout and remote history of melanoma in situ of skin.   On review of her blood work he has been having persistent absolute lymphocytosis since 2/26/20 with mild eosinophilia. His lymphocytosis has been gradually increasing from 3210 initially to 6000. His eosinophilia has been gradually worsening as well along with worsening leukocytosis. He doesn't have any cytopenias. His CMP is unremarkable.     He states that he sustained fall last year and was in hospital for about 2 weeks in February of 2020 when he sustained injury to left knee, bilateral radius and right shoulder. She as in nursing home for prolonged time period and got discharged in May.   Since then he has been having issues with shortness of breath with exertion. He has atrial fibrillation, chest tightness and has palpitation off and on. He has been following with cardiology clinic.     He complains of sinus congestion in the morning lasting about one hour later. He uses CPAP and has humidifier.     He states that about 3 month ago, he was told that he had a lump in back by his dermatologist. He denies any other lumps or bumps.     He complaints of right side of abdomen which is sharp ache and stress bring it on. Can have alternating diarrhea and constipation. Having good bowel  movement helps.     Denies weight loss, fever or chills, early satiety or abdominal fullness.       MEDICATIONS AND ALLERGIES:    Current Outpatient Medications   Medication Sig Dispense Refill   • warfarin (COUMADIN) 5 MG tablet Take 1 tablet Mon, Wed, Fri and 1.5 tablets all other days OR as directed. 110 tablet 1   • diclofenac (diclofenac) 1 % gel DICLOFENAC SODIUM 1 % TRANSDERMAL GEL     • rosuvastatin (CRESTOR) 40 MG tablet Take 1 tablet by mouth daily. 90 tablet 3   • lisinopril (ZESTRIL) 20 MG tablet Take 1 tablet by mouth daily. 90 tablet 3   • atenolol-chlorthalidone (TENORETIC) 100-25 MG per tablet Take 1 tablet by mouth daily. 90 tablet 3   • omeprazole (PrilOSEC) 20 MG capsule Take 1 capsule by mouth daily. 90 capsule 1   • metformin (GLUCOPHAGE) 1000 MG tablet Take 1 tablet by mouth 2 times daily (with meals). 60 tablet 5   • Sennosides (SENOKOT PO) Take by mouth daily. OTC stool softener     • GLUCOSAMINE-CHONDROITIN PO Take by mouth 2 times daily.     • NIACIN PO Take by mouth 2 times daily. OTC     • Cholecalciferol (VITAMIN D) 50 mcg (2,000 units) tablet Take 2 tablets by mouth daily. 60 tablet 5   • ACETAMINOPHEN PO Take 1,500 mg by mouth daily.      • CARBOXYMethylcellulose (REFRESH PLUS) 0.5 % ophthalmic solution Place 1 drop into both eyes 3 times daily. 70 each 0   • polyethylene glycol (GLYCOLAX, MIRALAX) packet Take 17 g by mouth daily. (Patient taking differently: Take 17 g by mouth daily as needed. ) 14 each 0   • allopurinol (ZYLOPRIM) 300 MG tablet Take 300 mg by mouth daily.     • hydroxychloroquine (PLAQUENIL) 200 MG tablet Take 200 mg by mouth 2 times daily.      • Fiber Powder Taking 2 tablespoons once a day. (per patient- generic metamucil) (otc)     • aspirin 81 MG tablet Take 81 mg by mouth daily.      • albuterol 108 (90 Base) MCG/ACT inhaler Inhale 2 puffs into the lungs every 4 hours as needed for Shortness of Breath or Wheezing. 1 Inhaler 5     No current facility-administered  medications for this visit.      ALLERGIES:   Allergen Reactions   • Fenofibrate ARTHRALGIA and MYALGIA   • Niacin Other (See Comments)     Had flushing, itching on prescription strength. Is on OTC strength with a baby aspirin every morning and has no flushing but does have some itching.    • Simvastatin-High Dose ARTHRALGIA and MYALGIA   • Welchol [Colesevelam Hcl] MYALGIA and Other (See Comments)     Vision affected   • Colesevelam MYALGIA     Vision affected        PROBLEM LIST:  Patient Active Problem List   Diagnosis   • Uncomplicated type 2 diabetes mellitus (CMS/[facility])   • Pure hypercholesterolemia   • Essential hypertension   • ANDREA on CPAP   • Unspecified vitamin D deficiency   • Anemia, unspecified   • Morbid obesity with BMI of 45.0-49.9, adult (CMS/[facility])   • PMR (polymyalgia rheumatica)/inflammatory polyarthritis   • Encounter for long-term (current) use of other medications   • Hypertriglyceridemia   • S/P ablation of typical atrial flutter 12/8/16   • Coronary artery disease   • Long term (current) use of anticoagulants   • Abdominal pain, RLQ (right lower quadrant)   • History of melanoma   • History of adenomatous polyp of colon   • Irritable bowel syndrome with both constipation and diarrhea   • Gastroesophageal reflux disease with esophagitis   • Other fracture of left patella, subsequent encounter for closed fracture with routine healing   • Chronic anticoagulation   • Nondisplaced fracture of head of left radius, initial encounter for closed fracture   • Closed fracture of head of right radius with routine healing   • S/P ORIF (open reduction internal fixation) fracture patella 2/8/20, Dr. Mcneil   • S/P ORIF (open reduction internal fixation) fracture right radial head fracture 2/12/20, Dr. Guillermo   • Closed fracture of head of left radius with routine healing   • Therapeutic drug monitoring   • Long term current use of anticoagulant therapy   • Chronic sinusitis   • Depression   • Dyslipidemia   •  Gout   • Hypotestosteronism   • Inflammatory arthritis   • Myalgia due to HMG CoA reductase inhibitor   • Neuralgia of right sciatic nerve   • Osteoarthritis of knee   • Overactive bladder   • Pain in right shoulder   • SIADH (syndrome of inappropriate ADH production) (CMS/HCC)   • SNHL (sensorineural hearing loss)   • Vasomotor rhinitis   • PMR (polymyalgia rheumatica) (CMS/[facility])         HISTORIES:  Past medical history, surgical history, family history, and social history were reviewed and updated.      REVIEW OF SYSTEMS:    Comprehensive review of system was completed with pertinent findings as per HPI above.    PHYSICAL EXAM:  Vital Signs: There were no vitals taken for this visit.  ECOG   ECOG Performance Status      General: The patient is alert and oriented to place and time, well-developed, well-nourished, no distress.  Skin: A soft lump in upper back which is about 3cm in maximum diameter without tenderness. Warm, normal color, without rash.  Head: Normocephalic, atraumatic.  Neck: Supple, no lump.  Eyes: Normal conjunctivae and sclerae. Pupils equal, round, reactive to light and accommodation. Extraocular movements intact.  Cardiovascular: Regular rate and rhythm, no murmurs, gallops or rubs.  Respiratory: Normal respiratory effort. Clear to auscultation. No wheezes, rales, or rhonchi.  Abdomen: Abdomen is soft and non-tender. There is no detected hepatosplenomegaly, masses, or ascites.  Extremities: no edema.  Lymph: No cervical, supraclavicular, axillary or inguinal lymphadenopathy.  Neurologic: No gross focal deficits. Cranial nerves grossly intact.   Psychiatric: Cooperative. Appropriate mood and affect.    LABORATORY DATA:  Recent Results (from the past 672 hour(s))   CBC WITH AUTOMATED DIFFERENTIAL (PERFORMABLE ONLY)    Collection Time: 03/01/21  2:00 PM   Result Value Ref Range    WBC 14.0 (H) 4.2 - 11.0 K/mcL    RBC 4.40 (L) 4.50 - 5.90 mil/mcL    HGB 13.2 13.0 - 17.0 g/dL    HCT 41.6 39.0 - 51.0 %     MCV 94.5 78.0 - 100.0 fl    MCH 30.0 26.0 - 34.0 pg    MCHC 31.7 (L) 32.0 - 36.5 g/dL    RDW-CV 12.1 11.0 - 15.0 %    RDW-SD 42.3 39.0 - 50.0 fL     140 - 450 K/mcL    NRBC 0 <=0 /100 WBC    Neutrophil, Percent 37 %    Lymphocytes, Percent 43 %    Mono, Percent 7 %    Eosinophils, Percent 12 %    Basophils, Percent 1 %    Immature Granulocytes 0 %    Absolute Neutrophils 5.1 1.8 - 7.7 K/mcL    Absolute Lymphocytes 6.0 (H) 1.0 - 4.0 K/mcL    Absolute Monocytes 1.0 (H) 0.3 - 0.9 K/mcL    Absolute Eosinophils  1.7 (H) 0.0 - 0.5 K/mcL    Absolute Basophils 0.1 0.0 - 0.3 K/mcL    Absolute Immmature Granulocytes 0.1 0.0 - 0.2 K/mcL   PROTHROMBIN TIME    Collection Time: 03/16/21  1:00 PM   Result Value Ref Range    Prothrombin Time 36.1 (H) 9.7 - 11.8 sec    INR 3.6 <=5.0     Procedures:   EGD and colonoscopy 12/2017   GE reflux, gastritis.  Sigmoid polyps, moderate diverticulosis coli, internal hemorrhoids     ASSESSMENT:  1. Lymphocytosis  2. Eosinophilic leukocytosis, unspecified type  3. PMR    He has been having persistent leukocytosis with lymphocytosis and eosinophilia. Has history of PMR and the leukocytosis and eosinophilia can be secondary to autoimmune disorder he has.   He doesn't have any adenopathy or any constitutional symptoms. Discussed with him that this is likely benign and but given persistent lymphocytosis, will get further evaluation.   Will get peripheral smear, CBC, and flow cytometry.   Will plan in 3 months with labs prior.   Recommended him to monitor for weight loss, any lumps or bumps, fever or night sweats. And to call us if he develops any.     Follow up:   Labs on Monday locally  Will call with results.   Return in 3 months for visit with labs prior (CBC, CMP)     There are no Patient Instructions on file for this visit.      The patient indicated understanding of the diagnosis and agreed with the plan of care.    A copy of this note was sent to the requesting provider.    Yuri  than 50% of this 45 minute visit was spent on face to face discussion, counseling and coordination of care.     Ananya Mann MD   Department of Hematology/Oncology   3/19/2021 3:37 PM       Self

## 2021-04-05 ENCOUNTER — APPOINTMENT (OUTPATIENT)
Dept: INTERNAL MEDICINE | Facility: CLINIC | Age: 66
End: 2021-04-05

## 2021-04-19 ENCOUNTER — APPOINTMENT (OUTPATIENT)
Dept: INTERNAL MEDICINE | Facility: CLINIC | Age: 66
End: 2021-04-19

## 2021-05-17 ENCOUNTER — LABORATORY RESULT (OUTPATIENT)
Age: 66
End: 2021-05-17

## 2021-06-28 ENCOUNTER — APPOINTMENT (OUTPATIENT)
Dept: NEUROLOGY | Facility: CLINIC | Age: 66
End: 2021-06-28
Payer: MEDICARE

## 2021-06-28 VITALS — HEART RATE: 91 BPM | HEIGHT: 66 IN | DIASTOLIC BLOOD PRESSURE: 61 MMHG | SYSTOLIC BLOOD PRESSURE: 123 MMHG

## 2021-06-28 PROCEDURE — 99213 OFFICE O/P EST LOW 20 MIN: CPT

## 2021-06-28 RX ORDER — METFORMIN HYDROCHLORIDE 500 MG/1
500 TABLET, COATED ORAL
Qty: 180 | Refills: 1 | Status: DISCONTINUED | COMMUNITY
Start: 2020-08-05 | End: 2021-06-28

## 2021-06-28 RX ORDER — ATORVASTATIN CALCIUM 40 MG/1
40 TABLET, FILM COATED ORAL
Qty: 90 | Refills: 0 | Status: ACTIVE | COMMUNITY
Start: 2021-03-20

## 2021-06-28 NOTE — HISTORY OF PRESENT ILLNESS
I called patient and left message that she can try to reach Luma Bridges and Trung knowles for sooner appointment or can go to [facility] if she does not get appointment sooner than needed. Can call if she has any further questions.
[FreeTextEntry1] : Patient seen 3 months ago.  He and family state he seems weaker, more difficulty with transfers.  \par \par He is not taking riluzole due to diarrhea but dietary changes have reduced that.  He has a congested cough.  He coughs when he eats solids and liquids.  He can feed himself.  \par \par Patient again clearly tells me he doesn't want PEG, NIV or trach.  \par \par

## 2021-06-28 NOTE — ASSESSMENT
[FreeTextEntry1] : Patient is about the same in terms of progression but he clearly is at high risk for aspiration PNA. As he is clear about his desire NOT to have PEG, NIV or trach he likely will be a candidate for hospice in-home evaluation in the near future.  \par \par f/u three months

## 2021-06-28 NOTE — PHYSICAL EXAM
[Person] : oriented to person [Place] : oriented to place [Time] : oriented to time [Short Term Intact] : short term memory intact [Remote Intact] : remote memory intact [Registration Intact] : recent registration memory intact [Concentration Intact] : normal concentrating ability [Visual Intact] : visual attention was ~T not ~L decreased [Naming Objects] : no difficulty naming common objects [Repeating Phrases] : no difficulty repeating a phrase [Writing A Sentence] : no difficulty writing a sentence [Fluency] : fluency intact [Comprehension] : comprehension intact [Reading] : reading intact [Past History] : adequate knowledge of personal past history [Cranial Nerves Optic (II)] : visual acuity intact bilaterally,  visual fields full to confrontation, pupils equal round and reactive to light [Cranial Nerves Trigeminal (V)] : facial sensation intact symmetrically [Cranial Nerves Oculomotor (III)] : extraocular motion intact [Cranial Nerves Facial (VII)] : face symmetrical [Cranial Nerves Vestibulocochlear (VIII)] : hearing was intact bilaterally [Cranial Nerves Glossopharyngeal (IX)] : tongue and palate midline [Cranial Nerves Accessory (XI - Cranial And Spinal)] : head turning and shoulder shrug symmetric [Motor Handedness Right-Handed] : the patient is right hand dominant [5] : wrist extension 5/5 [Hand Weakness Right] : the hand  was weak [Hand Weakness Left] : the hand  was weak [3] : hip extension 3/5 [4] : knee extension 4/5 [-4] : ankle eversion -4/5 [+4] : ankle inversion +4/5 [Sensation Tactile Decrease] : light touch was intact [Vibration Decrease Leg / Foot Toes Both Feet] : decreased at the toes of both feet  [Position Sensation Decrease Leg/Foot At Level Of Toes] : impaired at the toes in both legs [Non-ambulatory] : Non-ambulatory [Past-pointing] : there was no past-pointing [Tremor] : no tremor present [3+] : Biceps left 3+ [2+] : Brachioradialis left 2+ [1+] : Ankle jerk left 1+ [Plantar Reflex Right Only] : abnormal on the right [Plantar Reflex Left Only] : abnormal on the left [FreeTextEntry5] : tongue fascics with lateral scalloped atrophy and slow movements.  Fundi normal [FreeTextEntry6] : increased tone mild LUE, more so LLE, RLE knee flexion contracture to 130 degrees.  QMT 23kg right, 10kg left.  ALS-FRS 25/48.  Fascics noted chest and arms [FreeTextEntry9] : bilateral brisk pectoral jerks

## 2021-06-29 ENCOUNTER — INPATIENT (INPATIENT)
Facility: HOSPITAL | Age: 66
LOS: 6 days | Discharge: HOME HEALTH | DRG: 871 | End: 2021-07-06
Attending: INTERNAL MEDICINE | Admitting: HOSPITALIST
Payer: MEDICARE

## 2021-06-29 ENCOUNTER — FORM ENCOUNTER (OUTPATIENT)
Age: 66
End: 2021-06-29

## 2021-06-29 VITALS
HEART RATE: 80 BPM | DIASTOLIC BLOOD PRESSURE: 80 MMHG | TEMPERATURE: 98 F | RESPIRATION RATE: 19 BRPM | WEIGHT: 110.01 LBS | OXYGEN SATURATION: 97 % | HEIGHT: 67 IN | SYSTOLIC BLOOD PRESSURE: 144 MMHG

## 2021-06-29 DIAGNOSIS — J18.9 PNEUMONIA, UNSPECIFIED ORGANISM: ICD-10-CM

## 2021-06-29 DIAGNOSIS — Z98.89 OTHER SPECIFIED POSTPROCEDURAL STATES: Chronic | ICD-10-CM

## 2021-06-29 DIAGNOSIS — Z90.89 ACQUIRED ABSENCE OF OTHER ORGANS: Chronic | ICD-10-CM

## 2021-06-29 LAB
ALBUMIN SERPL ELPH-MCNC: 3.3 G/DL — SIGNIFICANT CHANGE UP (ref 3.3–5)
ALBUMIN SERPL ELPH-MCNC: 3.4 G/DL — SIGNIFICANT CHANGE UP (ref 3.3–5)
ALBUMIN SERPL ELPH-MCNC: 3.7 G/DL — SIGNIFICANT CHANGE UP (ref 3.3–5)
ALP SERPL-CCNC: 61 U/L — SIGNIFICANT CHANGE UP (ref 40–120)
ALP SERPL-CCNC: 65 U/L — SIGNIFICANT CHANGE UP (ref 40–120)
ALP SERPL-CCNC: 70 U/L — SIGNIFICANT CHANGE UP (ref 40–120)
ALT FLD-CCNC: 37 U/L — SIGNIFICANT CHANGE UP (ref 10–45)
ALT FLD-CCNC: 37 U/L — SIGNIFICANT CHANGE UP (ref 10–45)
ALT FLD-CCNC: 45 U/L — SIGNIFICANT CHANGE UP (ref 10–45)
ANION GAP SERPL CALC-SCNC: 12 MMOL/L — SIGNIFICANT CHANGE UP (ref 5–17)
ANION GAP SERPL CALC-SCNC: 12 MMOL/L — SIGNIFICANT CHANGE UP (ref 5–17)
ANION GAP SERPL CALC-SCNC: 13 MMOL/L — SIGNIFICANT CHANGE UP (ref 5–17)
APPEARANCE UR: CLEAR — SIGNIFICANT CHANGE UP
APTT BLD: 30.1 SEC — SIGNIFICANT CHANGE UP (ref 27.5–35.5)
AST SERPL-CCNC: 32 U/L — SIGNIFICANT CHANGE UP (ref 10–40)
AST SERPL-CCNC: 35 U/L — SIGNIFICANT CHANGE UP (ref 10–40)
AST SERPL-CCNC: 51 U/L — HIGH (ref 10–40)
BASE EXCESS BLDV CALC-SCNC: 1.2 MMOL/L — SIGNIFICANT CHANGE UP (ref -2–2)
BASE EXCESS BLDV CALC-SCNC: 1.3 MMOL/L — SIGNIFICANT CHANGE UP (ref -2–2)
BASE EXCESS BLDV CALC-SCNC: 1.6 MMOL/L — SIGNIFICANT CHANGE UP (ref -2–2)
BASOPHILS # BLD AUTO: 0.02 K/UL — SIGNIFICANT CHANGE UP (ref 0–0.2)
BASOPHILS # BLD AUTO: 0.04 K/UL — SIGNIFICANT CHANGE UP (ref 0–0.2)
BASOPHILS NFR BLD AUTO: 0.1 % — SIGNIFICANT CHANGE UP (ref 0–2)
BASOPHILS NFR BLD AUTO: 0.3 % — SIGNIFICANT CHANGE UP (ref 0–2)
BILIRUB SERPL-MCNC: 0.4 MG/DL — SIGNIFICANT CHANGE UP (ref 0.2–1.2)
BILIRUB SERPL-MCNC: 0.4 MG/DL — SIGNIFICANT CHANGE UP (ref 0.2–1.2)
BILIRUB SERPL-MCNC: 0.8 MG/DL — SIGNIFICANT CHANGE UP (ref 0.2–1.2)
BILIRUB UR-MCNC: NEGATIVE — SIGNIFICANT CHANGE UP
BUN SERPL-MCNC: 17 MG/DL — SIGNIFICANT CHANGE UP (ref 7–23)
BUN SERPL-MCNC: 20 MG/DL — SIGNIFICANT CHANGE UP (ref 7–23)
BUN SERPL-MCNC: 21 MG/DL — SIGNIFICANT CHANGE UP (ref 7–23)
CA-I SERPL-SCNC: 1.15 MMOL/L — SIGNIFICANT CHANGE UP (ref 1.12–1.3)
CA-I SERPL-SCNC: 1.16 MMOL/L — SIGNIFICANT CHANGE UP (ref 1.12–1.3)
CA-I SERPL-SCNC: 1.18 MMOL/L — SIGNIFICANT CHANGE UP (ref 1.12–1.3)
CALCIUM SERPL-MCNC: 8.5 MG/DL — SIGNIFICANT CHANGE UP (ref 8.4–10.5)
CALCIUM SERPL-MCNC: 8.5 MG/DL — SIGNIFICANT CHANGE UP (ref 8.4–10.5)
CALCIUM SERPL-MCNC: 9 MG/DL — SIGNIFICANT CHANGE UP (ref 8.4–10.5)
CHLORIDE BLDV-SCNC: 103 MMOL/L — SIGNIFICANT CHANGE UP (ref 96–108)
CHLORIDE BLDV-SCNC: 104 MMOL/L — SIGNIFICANT CHANGE UP (ref 96–108)
CHLORIDE BLDV-SCNC: 104 MMOL/L — SIGNIFICANT CHANGE UP (ref 96–108)
CHLORIDE SERPL-SCNC: 100 MMOL/L — SIGNIFICANT CHANGE UP (ref 96–108)
CHLORIDE SERPL-SCNC: 100 MMOL/L — SIGNIFICANT CHANGE UP (ref 96–108)
CHLORIDE SERPL-SCNC: 102 MMOL/L — SIGNIFICANT CHANGE UP (ref 96–108)
CO2 BLDV-SCNC: 26 MMOL/L — SIGNIFICANT CHANGE UP (ref 22–30)
CO2 BLDV-SCNC: 27 MMOL/L — SIGNIFICANT CHANGE UP (ref 22–30)
CO2 BLDV-SCNC: 27 MMOL/L — SIGNIFICANT CHANGE UP (ref 22–30)
CO2 SERPL-SCNC: 20 MMOL/L — LOW (ref 22–31)
CO2 SERPL-SCNC: 21 MMOL/L — LOW (ref 22–31)
CO2 SERPL-SCNC: 22 MMOL/L — SIGNIFICANT CHANGE UP (ref 22–31)
COLOR SPEC: SIGNIFICANT CHANGE UP
CREAT SERPL-MCNC: 0.7 MG/DL — SIGNIFICANT CHANGE UP (ref 0.5–1.3)
CREAT SERPL-MCNC: 0.77 MG/DL — SIGNIFICANT CHANGE UP (ref 0.5–1.3)
CREAT SERPL-MCNC: 0.81 MG/DL — SIGNIFICANT CHANGE UP (ref 0.5–1.3)
DIFF PNL FLD: NEGATIVE — SIGNIFICANT CHANGE UP
EOSINOPHIL # BLD AUTO: 0 K/UL — SIGNIFICANT CHANGE UP (ref 0–0.5)
EOSINOPHIL # BLD AUTO: 0.06 K/UL — SIGNIFICANT CHANGE UP (ref 0–0.5)
EOSINOPHIL NFR BLD AUTO: 0 % — SIGNIFICANT CHANGE UP (ref 0–6)
EOSINOPHIL NFR BLD AUTO: 0.4 % — SIGNIFICANT CHANGE UP (ref 0–6)
GAS PNL BLDV: 130 MMOL/L — LOW (ref 135–145)
GAS PNL BLDV: 132 MMOL/L — LOW (ref 135–145)
GAS PNL BLDV: 135 MMOL/L — SIGNIFICANT CHANGE UP (ref 135–145)
GAS PNL BLDV: SIGNIFICANT CHANGE UP
GLUCOSE BLDC GLUCOMTR-MCNC: 113 MG/DL — HIGH (ref 70–99)
GLUCOSE BLDC GLUCOMTR-MCNC: 115 MG/DL — HIGH (ref 70–99)
GLUCOSE BLDC GLUCOMTR-MCNC: 124 MG/DL — HIGH (ref 70–99)
GLUCOSE BLDC GLUCOMTR-MCNC: 146 MG/DL — HIGH (ref 70–99)
GLUCOSE BLDC GLUCOMTR-MCNC: 159 MG/DL — HIGH (ref 70–99)
GLUCOSE BLDC GLUCOMTR-MCNC: 182 MG/DL — HIGH (ref 70–99)
GLUCOSE BLDC GLUCOMTR-MCNC: 187 MG/DL — HIGH (ref 70–99)
GLUCOSE BLDC GLUCOMTR-MCNC: 45 MG/DL — CRITICAL LOW (ref 70–99)
GLUCOSE BLDC GLUCOMTR-MCNC: 57 MG/DL — LOW (ref 70–99)
GLUCOSE BLDC GLUCOMTR-MCNC: 70 MG/DL — SIGNIFICANT CHANGE UP (ref 70–99)
GLUCOSE BLDC GLUCOMTR-MCNC: 85 MG/DL — SIGNIFICANT CHANGE UP (ref 70–99)
GLUCOSE BLDV-MCNC: 122 MG/DL — HIGH (ref 70–99)
GLUCOSE BLDV-MCNC: 173 MG/DL — HIGH (ref 70–99)
GLUCOSE BLDV-MCNC: 52 MG/DL — CRITICAL LOW (ref 70–99)
GLUCOSE SERPL-MCNC: 123 MG/DL — HIGH (ref 70–99)
GLUCOSE SERPL-MCNC: 179 MG/DL — HIGH (ref 70–99)
GLUCOSE SERPL-MCNC: 54 MG/DL — CRITICAL LOW (ref 70–99)
GLUCOSE UR QL: NEGATIVE — SIGNIFICANT CHANGE UP
HCO3 BLDV-SCNC: 24 MMOL/L — SIGNIFICANT CHANGE UP (ref 21–29)
HCO3 BLDV-SCNC: 25 MMOL/L — SIGNIFICANT CHANGE UP (ref 21–29)
HCO3 BLDV-SCNC: 26 MMOL/L — SIGNIFICANT CHANGE UP (ref 21–29)
HCT VFR BLD CALC: 29.6 % — LOW (ref 39–50)
HCT VFR BLD CALC: 32.5 % — LOW (ref 39–50)
HCT VFR BLDA CALC: 23 % — LOW (ref 39–50)
HCT VFR BLDA CALC: 31 % — LOW (ref 39–50)
HCT VFR BLDA CALC: 35 % — LOW (ref 39–50)
HGB BLD CALC-MCNC: 11.2 G/DL — LOW (ref 13–17)
HGB BLD CALC-MCNC: 7.5 G/DL — LOW (ref 13–17)
HGB BLD CALC-MCNC: 9.9 G/DL — LOW (ref 13–17)
HGB BLD-MCNC: 10.6 G/DL — LOW (ref 13–17)
HGB BLD-MCNC: 9.5 G/DL — LOW (ref 13–17)
HOROWITZ INDEX BLDV+IHG-RTO: 21 — SIGNIFICANT CHANGE UP
IMM GRANULOCYTES NFR BLD AUTO: 0.6 % — SIGNIFICANT CHANGE UP (ref 0–1.5)
IMM GRANULOCYTES NFR BLD AUTO: 0.7 % — SIGNIFICANT CHANGE UP (ref 0–1.5)
INR BLD: 1.11 RATIO — SIGNIFICANT CHANGE UP (ref 0.88–1.16)
KETONES UR-MCNC: NEGATIVE — SIGNIFICANT CHANGE UP
LACTATE BLDV-MCNC: 0.7 MMOL/L — SIGNIFICANT CHANGE UP (ref 0.7–2)
LACTATE BLDV-MCNC: 1.4 MMOL/L — SIGNIFICANT CHANGE UP (ref 0.7–2)
LACTATE BLDV-MCNC: 1.5 MMOL/L — SIGNIFICANT CHANGE UP (ref 0.7–2)
LEGIONELLA AG UR QL: NEGATIVE — SIGNIFICANT CHANGE UP
LEUKOCYTE ESTERASE UR-ACNC: NEGATIVE — SIGNIFICANT CHANGE UP
LYMPHOCYTES # BLD AUTO: 0.55 K/UL — LOW (ref 1–3.3)
LYMPHOCYTES # BLD AUTO: 1.83 K/UL — SIGNIFICANT CHANGE UP (ref 1–3.3)
LYMPHOCYTES # BLD AUTO: 12.8 % — LOW (ref 13–44)
LYMPHOCYTES # BLD AUTO: 3.5 % — LOW (ref 13–44)
MAGNESIUM SERPL-MCNC: 1.9 MG/DL — SIGNIFICANT CHANGE UP (ref 1.6–2.6)
MCHC RBC-ENTMCNC: 27.5 PG — SIGNIFICANT CHANGE UP (ref 27–34)
MCHC RBC-ENTMCNC: 28 PG — SIGNIFICANT CHANGE UP (ref 27–34)
MCHC RBC-ENTMCNC: 32.1 GM/DL — SIGNIFICANT CHANGE UP (ref 32–36)
MCHC RBC-ENTMCNC: 32.6 GM/DL — SIGNIFICANT CHANGE UP (ref 32–36)
MCV RBC AUTO: 85.5 FL — SIGNIFICANT CHANGE UP (ref 80–100)
MCV RBC AUTO: 85.8 FL — SIGNIFICANT CHANGE UP (ref 80–100)
MONOCYTES # BLD AUTO: 1.07 K/UL — HIGH (ref 0–0.9)
MONOCYTES # BLD AUTO: 1.21 K/UL — HIGH (ref 0–0.9)
MONOCYTES NFR BLD AUTO: 6.9 % — SIGNIFICANT CHANGE UP (ref 2–14)
MONOCYTES NFR BLD AUTO: 8.5 % — SIGNIFICANT CHANGE UP (ref 2–14)
NEUTROPHILS # BLD AUTO: 11.06 K/UL — HIGH (ref 1.8–7.4)
NEUTROPHILS # BLD AUTO: 13.83 K/UL — HIGH (ref 1.8–7.4)
NEUTROPHILS NFR BLD AUTO: 77.4 % — HIGH (ref 43–77)
NEUTROPHILS NFR BLD AUTO: 88.8 % — HIGH (ref 43–77)
NITRITE UR-MCNC: NEGATIVE — SIGNIFICANT CHANGE UP
NRBC # BLD: 0 /100 WBCS — SIGNIFICANT CHANGE UP (ref 0–0)
NRBC # BLD: 0 /100 WBCS — SIGNIFICANT CHANGE UP (ref 0–0)
NT-PROBNP SERPL-SCNC: 2739 PG/ML — HIGH (ref 0–300)
OTHER CELLS CSF MANUAL: 12 ML/DL — LOW (ref 18–22)
OTHER CELLS CSF MANUAL: 14 ML/DL — LOW (ref 18–22)
PCO2 BLDV: 35 MMHG — SIGNIFICANT CHANGE UP (ref 35–50)
PCO2 BLDV: 40 MMHG — SIGNIFICANT CHANGE UP (ref 35–50)
PCO2 BLDV: 43 MMHG — SIGNIFICANT CHANGE UP (ref 35–50)
PH BLDV: 7.4 — SIGNIFICANT CHANGE UP (ref 7.35–7.45)
PH BLDV: 7.41 — SIGNIFICANT CHANGE UP (ref 7.35–7.45)
PH BLDV: 7.46 — HIGH (ref 7.35–7.45)
PH UR: 7.5 — SIGNIFICANT CHANGE UP (ref 5–8)
PHOSPHATE SERPL-MCNC: 2.4 MG/DL — LOW (ref 2.5–4.5)
PLATELET # BLD AUTO: 203 K/UL — SIGNIFICANT CHANGE UP (ref 150–400)
PLATELET # BLD AUTO: 243 K/UL — SIGNIFICANT CHANGE UP (ref 150–400)
PO2 BLDV: 36 MMHG — SIGNIFICANT CHANGE UP (ref 25–45)
PO2 BLDV: 56 MMHG — HIGH (ref 25–45)
PO2 BLDV: 57 MMHG — HIGH (ref 25–45)
POTASSIUM BLDV-SCNC: 3.5 MMOL/L — SIGNIFICANT CHANGE UP (ref 3.5–5.3)
POTASSIUM BLDV-SCNC: 3.8 MMOL/L — SIGNIFICANT CHANGE UP (ref 3.5–5.3)
POTASSIUM BLDV-SCNC: 3.8 MMOL/L — SIGNIFICANT CHANGE UP (ref 3.5–5.3)
POTASSIUM SERPL-MCNC: 3.7 MMOL/L — SIGNIFICANT CHANGE UP (ref 3.5–5.3)
POTASSIUM SERPL-MCNC: 3.9 MMOL/L — SIGNIFICANT CHANGE UP (ref 3.5–5.3)
POTASSIUM SERPL-MCNC: 4.1 MMOL/L — SIGNIFICANT CHANGE UP (ref 3.5–5.3)
POTASSIUM SERPL-SCNC: 3.7 MMOL/L — SIGNIFICANT CHANGE UP (ref 3.5–5.3)
POTASSIUM SERPL-SCNC: 3.9 MMOL/L — SIGNIFICANT CHANGE UP (ref 3.5–5.3)
POTASSIUM SERPL-SCNC: 4.1 MMOL/L — SIGNIFICANT CHANGE UP (ref 3.5–5.3)
PROLACTIN SERPL-MCNC: 8.3 NG/ML — SIGNIFICANT CHANGE UP (ref 4.1–18.4)
PROT SERPL-MCNC: 5.8 G/DL — LOW (ref 6–8.3)
PROT SERPL-MCNC: 6 G/DL — SIGNIFICANT CHANGE UP (ref 6–8.3)
PROT SERPL-MCNC: 6.5 G/DL — SIGNIFICANT CHANGE UP (ref 6–8.3)
PROT UR-MCNC: 100 — SIGNIFICANT CHANGE UP
PROTHROM AB SERPL-ACNC: 13.3 SEC — SIGNIFICANT CHANGE UP (ref 10.6–13.6)
RAPID RVP RESULT: SIGNIFICANT CHANGE UP
RBC # BLD: 3.46 M/UL — LOW (ref 4.2–5.8)
RBC # BLD: 3.79 M/UL — LOW (ref 4.2–5.8)
RBC # FLD: 14.6 % — HIGH (ref 10.3–14.5)
RBC # FLD: 14.7 % — HIGH (ref 10.3–14.5)
SAO2 % BLDV: 67 % — SIGNIFICANT CHANGE UP (ref 67–88)
SAO2 % BLDV: 87 % — SIGNIFICANT CHANGE UP (ref 67–88)
SAO2 % BLDV: 89 % — HIGH (ref 67–88)
SARS-COV-2 RNA SPEC QL NAA+PROBE: SIGNIFICANT CHANGE UP
SODIUM SERPL-SCNC: 132 MMOL/L — LOW (ref 135–145)
SODIUM SERPL-SCNC: 134 MMOL/L — LOW (ref 135–145)
SODIUM SERPL-SCNC: 136 MMOL/L — SIGNIFICANT CHANGE UP (ref 135–145)
SP GR SPEC: 1.02 — SIGNIFICANT CHANGE UP (ref 1.01–1.02)
UROBILINOGEN FLD QL: ABNORMAL
WBC # BLD: 14.28 K/UL — HIGH (ref 3.8–10.5)
WBC # BLD: 15.58 K/UL — HIGH (ref 3.8–10.5)
WBC # FLD AUTO: 14.28 K/UL — HIGH (ref 3.8–10.5)
WBC # FLD AUTO: 15.58 K/UL — HIGH (ref 3.8–10.5)

## 2021-06-29 PROCEDURE — 71250 CT THORAX DX C-: CPT | Mod: 26,MA

## 2021-06-29 PROCEDURE — 71045 X-RAY EXAM CHEST 1 VIEW: CPT | Mod: 26

## 2021-06-29 PROCEDURE — 99285 EMERGENCY DEPT VISIT HI MDM: CPT | Mod: CS

## 2021-06-29 PROCEDURE — 99291 CRITICAL CARE FIRST HOUR: CPT

## 2021-06-29 PROCEDURE — 93308 TTE F-UP OR LMTD: CPT | Mod: 26

## 2021-06-29 RX ORDER — SODIUM CHLORIDE 9 MG/ML
1000 INJECTION, SOLUTION INTRAVENOUS
Refills: 0 | Status: DISCONTINUED | OUTPATIENT
Start: 2021-06-29 | End: 2021-06-29

## 2021-06-29 RX ORDER — MONTELUKAST 4 MG/1
1 TABLET, CHEWABLE ORAL
Qty: 0 | Refills: 0 | DISCHARGE

## 2021-06-29 RX ORDER — DEXTROSE 50 % IN WATER 50 %
12.5 SYRINGE (ML) INTRAVENOUS ONCE
Refills: 0 | Status: DISCONTINUED | OUTPATIENT
Start: 2021-06-29 | End: 2021-07-06

## 2021-06-29 RX ORDER — POLYETHYLENE GLYCOL 3350 17 G/17G
17 POWDER, FOR SOLUTION ORAL DAILY
Refills: 0 | Status: DISCONTINUED | OUTPATIENT
Start: 2021-06-29 | End: 2021-07-06

## 2021-06-29 RX ORDER — METOPROLOL TARTRATE 50 MG
2.5 TABLET ORAL ONCE
Refills: 0 | Status: COMPLETED | OUTPATIENT
Start: 2021-06-29 | End: 2021-06-30

## 2021-06-29 RX ORDER — DEXTROSE 50 % IN WATER 50 %
25 SYRINGE (ML) INTRAVENOUS ONCE
Refills: 0 | Status: DISCONTINUED | OUTPATIENT
Start: 2021-06-29 | End: 2021-07-06

## 2021-06-29 RX ORDER — SODIUM CHLORIDE 9 MG/ML
1000 INJECTION, SOLUTION INTRAVENOUS ONCE
Refills: 0 | Status: COMPLETED | OUTPATIENT
Start: 2021-06-29 | End: 2021-06-29

## 2021-06-29 RX ORDER — SODIUM CHLORIDE 9 MG/ML
1000 INJECTION, SOLUTION INTRAVENOUS
Refills: 0 | Status: DISCONTINUED | OUTPATIENT
Start: 2021-06-29 | End: 2021-07-06

## 2021-06-29 RX ORDER — BROMOCRIPTINE MESYLATE 5 MG/1
5 CAPSULE ORAL DAILY
Refills: 0 | Status: DISCONTINUED | OUTPATIENT
Start: 2021-06-29 | End: 2021-07-06

## 2021-06-29 RX ORDER — OCTREOTIDE ACETATE 200 UG/ML
50 INJECTION, SOLUTION INTRAVENOUS; SUBCUTANEOUS ONCE
Refills: 0 | Status: COMPLETED | OUTPATIENT
Start: 2021-06-29 | End: 2021-06-29

## 2021-06-29 RX ORDER — ACETAMINOPHEN 500 MG
1000 TABLET ORAL ONCE
Refills: 0 | Status: COMPLETED | OUTPATIENT
Start: 2021-06-29 | End: 2021-06-29

## 2021-06-29 RX ORDER — DEXTROSE 50 % IN WATER 50 %
50 SYRINGE (ML) INTRAVENOUS ONCE
Refills: 0 | Status: COMPLETED | OUTPATIENT
Start: 2021-06-29 | End: 2021-06-29

## 2021-06-29 RX ORDER — SERTRALINE 25 MG/1
50 TABLET, FILM COATED ORAL DAILY
Refills: 0 | Status: DISCONTINUED | OUTPATIENT
Start: 2021-06-29 | End: 2021-07-06

## 2021-06-29 RX ORDER — ATORVASTATIN CALCIUM 80 MG/1
80 TABLET, FILM COATED ORAL AT BEDTIME
Refills: 0 | Status: DISCONTINUED | OUTPATIENT
Start: 2021-06-29 | End: 2021-07-06

## 2021-06-29 RX ORDER — DEXTROSE 50 % IN WATER 50 %
15 SYRINGE (ML) INTRAVENOUS ONCE
Refills: 0 | Status: DISCONTINUED | OUTPATIENT
Start: 2021-06-29 | End: 2021-07-06

## 2021-06-29 RX ORDER — ASPIRIN/CALCIUM CARB/MAGNESIUM 324 MG
81 TABLET ORAL DAILY
Refills: 0 | Status: DISCONTINUED | OUTPATIENT
Start: 2021-06-29 | End: 2021-07-06

## 2021-06-29 RX ORDER — VANCOMYCIN HCL 1 G
1000 VIAL (EA) INTRAVENOUS ONCE
Refills: 0 | Status: COMPLETED | OUTPATIENT
Start: 2021-06-29 | End: 2021-06-29

## 2021-06-29 RX ORDER — RILUZOLE 50 MG/1
50 TABLET ORAL
Refills: 0 | Status: DISCONTINUED | OUTPATIENT
Start: 2021-06-29 | End: 2021-07-06

## 2021-06-29 RX ORDER — GLUCAGON INJECTION, SOLUTION 0.5 MG/.1ML
1 INJECTION, SOLUTION SUBCUTANEOUS ONCE
Refills: 0 | Status: DISCONTINUED | OUTPATIENT
Start: 2021-06-29 | End: 2021-07-06

## 2021-06-29 RX ORDER — SODIUM CHLORIDE 9 MG/ML
1000 INJECTION, SOLUTION INTRAVENOUS
Refills: 0 | Status: DISCONTINUED | OUTPATIENT
Start: 2021-06-29 | End: 2021-06-30

## 2021-06-29 RX ORDER — MEROPENEM 1 G/30ML
1000 INJECTION INTRAVENOUS ONCE
Refills: 0 | Status: COMPLETED | OUTPATIENT
Start: 2021-06-29 | End: 2021-06-29

## 2021-06-29 RX ORDER — SENNA PLUS 8.6 MG/1
2 TABLET ORAL AT BEDTIME
Refills: 0 | Status: DISCONTINUED | OUTPATIENT
Start: 2021-06-29 | End: 2021-07-01

## 2021-06-29 RX ORDER — CHOLECALCIFEROL (VITAMIN D3) 125 MCG
1 CAPSULE ORAL
Qty: 0 | Refills: 0 | DISCHARGE

## 2021-06-29 RX ORDER — HEPARIN SODIUM 5000 [USP'U]/ML
5000 INJECTION INTRAVENOUS; SUBCUTANEOUS EVERY 8 HOURS
Refills: 0 | Status: DISCONTINUED | OUTPATIENT
Start: 2021-06-29 | End: 2021-07-06

## 2021-06-29 RX ADMIN — MEROPENEM 100 MILLIGRAM(S): 1 INJECTION INTRAVENOUS at 11:20

## 2021-06-29 RX ADMIN — SODIUM CHLORIDE 100 MILLILITER(S): 9 INJECTION, SOLUTION INTRAVENOUS at 16:15

## 2021-06-29 RX ADMIN — OCTREOTIDE ACETATE 50 MICROGRAM(S): 200 INJECTION, SOLUTION INTRAVENOUS; SUBCUTANEOUS at 16:31

## 2021-06-29 RX ADMIN — Medication 400 MILLIGRAM(S): at 11:20

## 2021-06-29 RX ADMIN — Medication 50 MILLILITER(S): at 16:09

## 2021-06-29 RX ADMIN — SODIUM CHLORIDE 75 MILLILITER(S): 9 INJECTION, SOLUTION INTRAVENOUS at 14:27

## 2021-06-29 RX ADMIN — Medication 250 MILLIGRAM(S): at 12:11

## 2021-06-29 RX ADMIN — SODIUM CHLORIDE 1000 MILLILITER(S): 9 INJECTION, SOLUTION INTRAVENOUS at 11:20

## 2021-06-29 RX ADMIN — Medication 50 MILLILITER(S): at 14:00

## 2021-06-29 RX ADMIN — Medication 50 MILLILITER(S): at 11:47

## 2021-06-29 RX ADMIN — SODIUM CHLORIDE 75 MILLILITER(S): 9 INJECTION, SOLUTION INTRAVENOUS at 13:21

## 2021-06-29 NOTE — ED ADULT NURSE REASSESSMENT NOTE - NS ED NURSE REASSESS COMMENT FT1
RONIT Larry aware of repeat fingerstick of 84, D5 and LR given per order, no additional RN interventions required at this time.

## 2021-06-29 NOTE — ED PROVIDER NOTE - PROGRESS NOTE DETAILS
Attending Fatoumata Naidu: pt febrile. pan cutured. reviewed old cultures has grown ESBL in the past. will cover with broad spectrum abx. pt tachypnic, awaiting familymember as unclear if took tylenol and when last toook glyberide RONIT John: spoke to wife Maribel for collateral information. woke up 2AM "jibberish" was coming out of him "squeezing sheets" making "weird noises" "unable to make eye contact" "made gurgling noises". Aide arrived 7:30AM who help clean up patient. lips looked dry and gave sip tea and then choked on tea. Called ALS nurse who suggested to call 911. EMS took FS 38. Patient wife Did not give any medications prior to arrival. RONIT John: 99-->142-->84 will place on dextrose 5% with LR and repeat FS q1 hour until maintains over 100 RONIT John: after being on D5 with LR for one hour FS went from 84 --> 45. ZULEMA Knott gave another amp. will place on d10 NS and repeat FS again in one hour. if continues to be hypoglycemic will trial octreotide and premedicate with zofran and consider MICU consult Attending Fatoumata Naidu: pt becoming more hypoglycemic, will continue po and switch to d10, consider micu RONIT John: confirmed medications with Wife Maribel  sertraline HCL 100mg once daily  bromocriptine 5mg once daily  atorvastatin 40mg once daily  glimepiride 2.5mg once BID  Vitamin D3 RONIT John: FS 70 despite being on d10 NS. will consult MICU RONIT John: FS 70 despite being on d10 NS. will give another amp, increase d10 to to 100mL/hr, start octreotide, will consult MICU RONIT John: FS 70 despite being on d10 NS. will give another amp, increase d10 to to 100mL/hr, start octreotide, will consult MICU. made hospitalist Dr. Gonsalez to make aware of pts status RONIT John: Spoke to Health Care Proxy [phone]  [place] who stated patient is DNR/ DNI and to place orders in and wife Maribel unable to locate MOLST form. advised Holger to have wife Maribel fill out another MOLST form. RONIT John: Spoke to Health Care Proxy [phone]  [place] (lives out of state) who stated patient is DNR/ DNI and to place orders in and wife Maribel unable to locate MOLST form. advised Holger to have wife Maribel fill out another MOLST form. ED attending Dr. Naidu stated ok to place DNR DNI order in after son gave verbal orders over the phone RONIT John: spoke to MICU attending Dr. Herman who recommended q1 hour finger sticks therefore will require MICU admission as the floors could only perform q4 hours finger sticks RONIT John: spoke to MICU attending Dr. Herman who recommended q1 hour finger sticks therefore will require MICU admission as the floors could only perform q4 hours finger sticks. updated family and hospitalist Dr. Gonsalez regarding change in status

## 2021-06-29 NOTE — ED ADULT TRIAGE NOTE - PATIENT ON (OXYGEN DELIVERY METHOD)
Inferior Lateral Orbicularis Oculi Units: 0 Glabellar Complex Units: 20 Dilution (U/0.1 Cc): 4 Post-Care Instructions: Patient instructed to not lie down for 4 hours and limit physical activity for 24 hours. Patient instructed not to travel by airplane for 48 hours. Consent: Written consent obtained. Risks include but not limited to lid/brow ptosis, bruising, swelling, diplopia, temporary effect, incomplete chemical denervation. Detail Level: Zone Forehead Units: 1 room air

## 2021-06-29 NOTE — H&P ADULT - NSHPLABSRESULTS_GEN_ALL_CORE
LABS:  06-29 @ 11:34 Creatine 35 U/L [30 - 200]  cret                        10.6   15.58 )-----------( 243      ( 29 Jun 2021 11:31 )             32.5     06-29    134<L>  |  100  |  20  ----------------------------<  123<H>  3.7   |  22  |  0.77    Ca    8.5      29 Jun 2021 14:27  Mg     1.9     06-29    TPro  5.8<L>  /  Alb  3.3  /  TBili  0.4  /  DBili  x   /  AST  35  /  ALT  37  /  AlkPhos  61  06-29    PT/INR - ( 29 Jun 2021 11:31 )   PT: 13.3 sec;   INR: 1.11 ratio         PTT - ( 29 Jun 2021 11:31 )  PTT:30.1 sec

## 2021-06-29 NOTE — H&P ADULT - ATTENDING COMMENTS
Pt is a 65yM with DM on sulfonylurea, CAD s/p stent, prolactinoma and ALS comes in from home to ed with AMS and dry cough, was found to have hypoglycemia by ems to 39. pt given d50. In the ed, pt given octreotride and D10 drip. Pt has multifocal pneumonia, on vanco and meropenem. Pt currently getting q1h finger sticks with hyploglycemia protocol. Pt needs ICU because nursing protocol on the floors doesn't allow for q1h finger sticks.

## 2021-06-29 NOTE — ED PROVIDER NOTE - CARE PLAN
Principal Discharge DX:	Pneumonia   Principal Discharge DX:	Pneumonia  Secondary Diagnosis:	Hypoglycemia

## 2021-06-29 NOTE — ED PROVIDER NOTE - OBJECTIVE STATEMENT
63 yo M pmhx prolactinoma, DM2 on insulin, HTN, CAD w. LAD stent and restent after thrombosis, depression, ALS (history obtained from previous history) 63 yo M pmhx prolactinoma, DM2 on insulin, HTN, CAD w. LAD stent and restent after thrombosis, depression, ALS (history obtained from previous history)  Attending Fatoumata Naidu: 63 yo male h/o DM, ALS presenting with AMS. per family this am was more confused. was reportedly recently changed from metformin to glyburide. no reports of falls or trauma. when EMS arrived pt was hypoglycemic and given dextrose. upon arrival pt awake and following commands. denies any abdominal pain. does report a nonproductive cough 65 yo M pmhx prolactinoma, DM2 on glimeperide, HTN, CAD w. LAD stent and restent after thrombosis, depression, ALS (history obtained from previous history)  Attending Fatoumata Naidu: 65 yo male h/o DM, ALS presenting with AMS. per family this am was more confused. was reportedly recently changed from metformin to glyburide. no reports of falls or trauma. when EMS arrived pt was hypoglycemic and given dextrose. upon arrival pt awake and following commands. denies any abdominal pain. does report a nonproductive cough

## 2021-06-29 NOTE — ED ADULT NURSE NOTE - OBJECTIVE STATEMENT
64 yo male PMH ALS, DM recently switched from Metformin to Glimepiride? this week BIB EMS from home for AMS x 6 hours. Per EMS pt's spouse noted change in mentation and called [facility] and was told to come to ED. Upon arrival EMS noted FS to be 30, given amp of D50 with return to mentation per spouse. Arrives to ED febrile, tachypneic, with course bilateral breath sounds. Pt disoriented to time but able to answer questions. RT called for NIF test. CM applied, EKG done. Sepsis order set initiated per Evangelista GARDNER.

## 2021-06-29 NOTE — H&P ADULT - ASSESSMENT
[Mr./Mrs./Ms.] is a ___ who presents with ___      #Neuro  -Hx ALS, A&Ox3  -Baseline extremity strength, can move upper extremities against gravity, minimal movement of BL LE  -On home riluzole, sertraline, bromocriptine (known prolactinoma  that was found last year?)      #Respiratory  -Multifocal PNA, on RA, ?aspiration event  -CT chest non-con with consolidative opacities within left lower lobe and nodular groundglass opacities within the right upper and lower lobes representing multifocal pneumonia.  -On dior/vanco, BC sent    #CV    #GI/Nutrition    #/Renal/Fluids    #ID    #Endocrine    #Heme  - DVT ppx:     #Dispo: ICU 64 y/o M w/ hx DM, ALS, CAD (prior stent), depression presents with AMS this morning, multifocal PNA and hypoglycemia. Admitted to ICU for q1 fingerstick and close monitoring.       #Neuro  -Hx ALS, A&Ox3  -Baseline extremity strength, can move upper extremities against gravity, minimal movement of BL LE  -On home riluzole, sertraline, bromocriptine (known prolactinoma that was found last year?)    #Respiratory  -Multifocal PNA, on RA, ?aspiration event  -CT chest non-con with consolidative opacities within left lower lobe and nodular groundglass opacities within the right upper and lower lobes representing multifocal pneumonia.  -On dior/vanco, BC sent    #CV  -Not on HTN meds  -No active issues at this time    #GI/Nutrition  -Speech/swallow given hx ALS prior to initiating diet    #/Renal/Fluids    #ID    #Endocrine    #Heme  - DVT ppx:     #Dispo: ICU 64 y/o M w/ hx DM, ALS, CAD (prior stent), depression presents with AMS this morning, multifocal PNA and hypoglycemia. Admitted to ICU for q1 fingerstick and close monitoring.     #Neuro  -Hx ALS, A&Ox3  -Baseline extremity strength, can move upper extremities against gravity, minimal movement of BL LE  -On home riluzole, sertraline, bromocriptine (known prolactinoma that was found last year?)    #Respiratory  -Multifocal PNA, on RA, ?aspiration event  -CT chest non-con with consolidative opacities within left lower lobe and nodular groundglass opacities within the right upper and lower lobes representing multifocal pneumonia.  -On dior/vanco, BC sent    #CV  -Not on HTN meds  -No active issues at this time    #GI/Nutrition  -Speech/swallow given hx ALS prior to initiating diet    #/Renal/Fluids  -BUn/Cr wnl, monitor I/O  -Patient self caths, may need cath or griffith    #ID  -Multifocal PNA, persistent cough for past few days  -WBC 15  [facility]    #Endocrine  -Persistent hypoglycemia in setting of multifocal pna, recent sulfonylurea use (but over 24 hours ago)  -q1 finger sticks, on D10 @ 100, monitor neuro status  -s/p x1 amp D50 by EMS, x3 amp D50 by ED    #Heme  - DVT ppx: SQH    #Dispo: ICU

## 2021-06-29 NOTE — ED PROVIDER NOTE - PHYSICAL EXAMINATION
Attending Fatoumata Naidu: Gen: NAD, heent: atrauamtic, eomi, perrla,dry mucous membranesop pink, uvula midline, neck; nttp, no nuchal rigidity, chest: nttp, no crepitus, cv: rrr,+murmur, lungs: left sided crackles, abd: soft, nontender, nondistended, no peritoneal signs, +BS, no guarding, ext: wwp, neg homans, skin: no rash, neuro: awake and alert, following commands,

## 2021-06-29 NOTE — H&P ADULT - NSHPPHYSICALEXAM_GEN_ALL_CORE
[Const] well-appearing, resting comfortably, no acute distress  [HEENT] PERRL, EOMI, moist mucus membranes  [Neck] Supple, trachea midline  [CV] +S1/S2, no m/r/g appreciated  [Lungs] Clear to auscultations bilaterally, no adventitious lung sounds  [Abd] soft, non-tender, nondistended in all 4 quadrants  [MSK] Able to move upper extremities against gravity, bedbound at baseline  [Skin] warm, dry, well-perfused  [Neuro] A&Ox3, able to slightly move toes but unable to move legs against gravity

## 2021-06-29 NOTE — ED ADULT NURSE REASSESSMENT NOTE - NS ED NURSE REASSESS COMMENT FT1
Dakotah GARDNER from admitting team at bedside, updated on pt FS dropped to 45 and did not cross over to sunrise ( see Kendy COTTRELL reassessment notes) Pt given additional amp of D50 and changed to D10/0.9NS. 1500 FS 85. Aware ED RN will recheck FS within hour. VSS.

## 2021-06-29 NOTE — ED PROVIDER NOTE - CLINICAL SUMMARY MEDICAL DECISION MAKING FREE TEXT BOX
Attending Fatoumata Naidu: 63 y/o male with multiple medical issues presenting with AMS. found to be hypoglycemic and given dextrose. upon arrival pt awake and alert. found to have a fever. pocus performed which showed evidence of left sided pna. reviewed old sensitiviites pt with h/o ESBL in the past and started on meropenem. unclear last time with oral glycemic agent. no anion gap on labs. will monitor finger sticks. will do dysphagia to try to give juice. pt cultured will admit. will also obtain nif and vital capacity

## 2021-06-29 NOTE — H&P ADULT - HISTORY OF PRESENT ILLNESS
HPI:  63 yo male h/o DM, ALS presenting with AMS around 2am in setting of cough for the past few days. Non-productive cough, denies fevers or shortness of breath. ED spoke to wife Maribel for collateral information. Woke up 2AM "jibberish" was coming out of him "squeezing sheets" making "weird noises" "unable to make eye contact" "made gurgling noises". Aide arrived 7:30AM who help clean up patient. lips looked dry and gave sip tea and then choked on tea. Called ALS nurse who suggested to call 911. EMS took FS 38, amp of D50 given which appeared to resolve symptoms. Last dose of glyburide 730am yesterday per patient. Per family, patient was more confused today. Denies any chest pain, pressure, nausea/vomiting, dizziness, syncope episodes, headache or visual changes.     VITAL SIGNS:  ICU Vital Signs Last 24 Hrs  T(C): 36.3 (2021 18:05), Max: 39.1 (2021 10:58)  T(F): 97.4 (2021 18:05), Max: 102.3 (2021 10:58)  HR: 75 (2021 18:05) (75 - 94)  BP: 150/80 (2021 18:05) (100/56 - 155/74)  BP(mean): 89 (2021 18:05) (65 - 97)  ABP: --  ABP(mean): --  RR: 22 (2021 18:05) (18 - 28)  SpO2: 98% (2021 18:05) (96% - 99%)    CAPILLARY BLOOD GLUCOSE    POCT Blood Glucose.: 124 mg/dL (2021 18:05)    PHYSICAL EXAM:    General: NAD  HEENT: NC/AT; PERRL, clear conjunctiva  Neck: supple  Respiratory: CTA b/l  Cardiovascular: +S1/S2; RRR  Abdomen: soft, NT/ND; +BS x4  Extremities: WWP, 2+ peripheral pulses b/l; no LE edema  Skin: normal color and turgor; no rash  Neurological: A&Ox3    MEDICATIONS:  MEDICATIONS  (STANDING):  dextrose 10% + sodium chloride 0.9%. 1000 milliLiter(s) (100 mL/Hr) IV Continuous <Continuous>    MEDICATIONS  (PRN):    ALLERGIES:  Allergies    lactose (Diarrhea)  No Known Drug Allergies    Intolerances    LABS:                        10.6   15.58 )-----------( 243      ( 2021 11:31 )             32.5         134<L>  |  100  |  20  ----------------------------<  123<H>  3.7   |  22  |  0.77    Ca    8.5      2021 14:27  Mg     1.9         TPro  5.8<L>  /  Alb  3.3  /  TBili  0.4  /  DBili  x   /  AST  35  /  ALT  37  /  AlkPhos  61      PT/INR - ( 2021 11:31 )   PT: 13.3 sec;   INR: 1.11 ratio         PTT - ( 2021 11:31 )  PTT:30.1 sec  Urinalysis Basic - ( 2021 11:33 )    Color: Light Yellow / Appearance: Clear / S.019 / pH: x  Gluc: x / Ketone: Negative  / Bili: Negative / Urobili: 2 mg/dL   Blood: x / Protein: 100 / Nitrite: Negative   Leuk Esterase: Negative / RBC: x / WBC x   Sq Epi: x / Non Sq Epi: x / Bacteria: x    RADIOLOGY & ADDITIONAL TESTS: Reviewed. HPI:  63 yo male h/o DM, ALS presenting with AMS around 2am in setting of cough for the past few days. Non-productive cough, denies fevers or shortness of breath. ED spoke to wife Maribel for collateral information. Woke up 2AM "jibberish" was coming out of him "squeezing sheets" making "weird noises" "unable to make eye contact" "made gurgling noises". Aide arrived 7:30AM who help clean up patient. lips looked dry and gave sip tea and then choked on tea. Called ALS nurse who suggested to call 911. EMS took FS 38, amp of D50 given which appeared to resolve symptoms. Last dose of glyburide 730am yesterday per patient. Per family, patient was more confused today. Denies any chest pain, pressure, nausea/vomiting, dizziness, syncope episodes, headache or visual changes.     While in ED, patient well-appearing and stable however had 2 episodes of hypoglycemia despite receiving up to x3 amps of D50. D10 @ 100. Last sulfonyulrea taken over 24 hours ago. Lakeshia/vanc started, UA negative, has hx of self cath, x1 dose octreotide given. Will admit to MICU for q1 fingersticks.     VITAL SIGNS:  ICU Vital Signs Last 24 Hrs  T(C): 36.3 (2021 18:05), Max: 39.1 (2021 10:58)  T(F): 97.4 (2021 18:05), Max: 102.3 (2021 10:58)  HR: 75 (2021 18:05) (75 - 94)  BP: 150/80 (2021 18:05) (100/56 - 155/74)  BP(mean): 89 (2021 18:05) (65 - 97)  ABP: --  ABP(mean): --  RR: 22 (2021 18:05) (18 - 28)  SpO2: 98% (2021 18:05) (96% - 99%)    CAPILLARY BLOOD GLUCOSE    POCT Blood Glucose.: 124 mg/dL (2021 18:05)    PHYSICAL EXAM:    General: NAD  HEENT: NC/AT; PERRL, clear conjunctiva  Neck: supple  Respiratory: CTA b/l  Cardiovascular: +S1/S2; RRR  Abdomen: soft, NT/ND; +BS x4  Extremities: WWP, 2+ peripheral pulses b/l; no LE edema  Skin: normal color and turgor; no rash  Neurological: A&Ox3    MEDICATIONS:  MEDICATIONS  (STANDING):  dextrose 10% + sodium chloride 0.9%. 1000 milliLiter(s) (100 mL/Hr) IV Continuous <Continuous>    MEDICATIONS  (PRN):    ALLERGIES:  Allergies    lactose (Diarrhea)  No Known Drug Allergies    Intolerances    LABS:                        10.6   15.58 )-----------( 243      ( 2021 11:31 )             32.5         134<L>  |  100  |  20  ----------------------------<  123<H>  3.7   |  22  |  0.77    Ca    8.5      2021 14:27  Mg     1.9         TPro  5.8<L>  /  Alb  3.3  /  TBili  0.4  /  DBili  x   /  AST  35  /  ALT  37  /  AlkPhos  61  -    PT/INR - ( 2021 11:31 )   PT: 13.3 sec;   INR: 1.11 ratio         PTT - ( 2021 11:31 )  PTT:30.1 sec  Urinalysis Basic - ( 2021 11:33 )    Color: Light Yellow / Appearance: Clear / S.019 / pH: x  Gluc: x / Ketone: Negative  / Bili: Negative / Urobili: 2 mg/dL   Blood: x / Protein: 100 / Nitrite: Negative   Leuk Esterase: Negative / RBC: x / WBC x   Sq Epi: x / Non Sq Epi: x / Bacteria: x    RADIOLOGY & ADDITIONAL TESTS: Reviewed.

## 2021-06-29 NOTE — ED ADULT NURSE NOTE - PAIN RATING/NUMBER SCALE (0-10): ACTIVITY
History of Present Illness  Bariatric MNT [facility] Surgery Screening Preop [facility]:     She was on time  the appointment lasted: 60 minutes  Her surgeon is Dr Jem Lee  The patient was present at the session  The diagnosis/reason for the appointment is: She has Grade III Obesity with a BMI of 42 4  Diet Order: Nutritional Assesment for Bariatric Surgery  Her goal weight is 181 5#-192#  She has the following comorbidities: hypertension  Labs: Johanna Neff She was reminded to have her labs drawn   She does not need to monitor her glucose  She does not have a meter to test her blood glucose  Exercise Frequency:  She does not exercise  Relationship to food: She is an emotional eater, is a stress eater, eats when she is bored and eats large portions  She knows the difference between being comfortably full and uncomfortably full and knows the difference between being stuffed and comfortably full  She felt/thought they felt her best at 194# pounds she last weighed this years ago  She completed a food journal She drinks 6+ cups of water daily She drinks 0-2 caffienated beverages daily Her motivators are:pt wants to improve health and quality of life  Her obesity/being overweight is related to positive energy balance and is evidenced by: BMI=42 4, pt reports sedentary lifestyle  Knowledge Deficit Prior to Education  She is new to the diet  Barriers to education:  She has no barriers to education  Medical Nutrition Therapy Intervention: Individualized Meal Plan, Daily Food /Exercise Diary, Lifestyle /Behavior Modification Techniques, Basic Pathophysiology of Obesity, Checklist of the Overview of Lesson Plans and Surgical changes to Stomach/GI  Area's Reviewed: Post - surgery goal weight, Web forum, Lifestyle Iram Brizuelaer Modification Techniques, [employer] in [place], [employer] ( hand out for [employer]) and Pre- surgery goals Lety Merino     Brief Review of Vitamins, diet progression for post-op and Pathophysiology of Obesity  Her comprehension of the presented material was good  Her receptivity to the presented material was good  Her motivation was good  Provided: Nutrition Guidelines for Gastric Surgery, Food Journaling Application handout, Bariatric Program Pre- Surgery Nutrition and Goals  Goals: Her set goal for physical activity is walk , for 30-60 minutes, 5-7 days a week  Review [employer] in [place]   Post Surgery: She will adhere to the diet progression: remain hydrated, consume adequate protein; and take vitamins as outlined in guidelines  Patient is a 19year old who is here for nutritional evaluation for weight loss surgery  I reviewed co-morbidities and medications with patient  She first recalls having problems with weight gain as a young adult  She has dieted in the past with variable success but would regain the weight back  For her personal pre-op goals she will: practice sipping calorie-free liquids, chewing well, and eating slowly  She plans to food journal to track her intake  Provided pt with information on [employer] connor  She was instructed on the importance of consistent vitamin and mineral intake after her surgery to prevent deficiencies  She currently takes no vitamins  Reviewed importance of support after surgery and discussed the [employer] connor, facebook page, pep rally, and support group  Patient states adequate knowledge of nutrition, exercise, and behavior modification required for long term success  Pt  is recommended for surgery and is aware to practice lifestyle modification, complete all six lesson plans in bariatric manual, and complete two-week liver shrinking diet prior to surgery  Patient is aware that she has 6 months of a medically-supervised weight management program required by her insurance  Recommendations: She was provided contact information for any questions  She is recommended for surgery  ~He/She needs additional education   She states adequate knowledge of nutrition, exercise, and behavior modification  She will attend a Team Meeting approximately 2-3 weeks prior to surgery  Active Problems    1  Benign essential hypertension (401 1) (I10)   2  Dental infection (522 4) (K04 7)   3  Encounter for administrative examinations (V68 9) (Z02 9)   4  Encounter for gynecological examination (V72 31) (Z01 419)   5  Morbid obesity (278 01) (E66 01)   6  Need for hepatitis B vaccination (V05 3) (Z23)   7  Post-nasal drip (784 91) (R09 82)   8  Screening for tuberculosis (V74 1) (Z11 1)    Past Medical History    1  History of acute pharyngitis (V12 69) (Z87 09)   2  History of hypertension (V12 59) (Z86 79)   3  History of upper respiratory infection (V12 09) (Z87 09)   4  History of Otitis media, unspecified laterality    Family History  Mother    1  Family history of Hypertension (V17 49)  Father    2  Family history of Coronary Artery Disease (V17 49)   3  Family history of Myocardial Infarction Arrhythmias    Social History    · Current smoker on some days (305 1) (F17 200)   · Currently In School   · Denied: History of Drug Use   · Former smoker (F77 64) (V77 710)   · Social alcohol use (Z78 9)    Current Meds   1  Advil 200 MG Oral Capsule; TAKE 1 CAPSULE EVERY 4 TO 6 HOURS; Therapy: [id number] to Recorded   2  One Daily Womens Oral Tablet; Therapy: (Recorded:[id number]) to Recorded    Allergies    1  No Known Drug Allergies    Vitals  Signs   Recorded: [id number] 01:01PM   Height: 5 ft 5 in  Weight: 254 lb 8 oz  BMI Calculated: 42 35  BSA Calculated: 2 19    Future Appointments    Date/Time Provider Specialty Site   10/06/2017 02:00 PM LAURITA Lomas  [address] [facility]   01/12/2018 09:15 AM LAURITA Beal   Internal Medicine [facility]   09/07/2017 09:00 AM Alyssa Powers, [facility] Diabetes Educator [facility]     Signatures   Electronically signed by : Eleno Goldberg NATHALIA Howell RD; Aug  7 2017  1:01PM EST                       (Author)    Electronically signed by :  LAURITA Lal Ra ; Aug  8 2017  9:16AM EST 5

## 2021-06-29 NOTE — ED PROVIDER NOTE - ATTENDING CONTRIBUTION TO CARE
Attending MD Fatoumata Naidu:   I personally have seen and examined this patient.  Physician assistant note reviewed and agree on plan of care and except where noted.  See HPI, PE, and MDM for details.

## 2021-06-30 LAB
A1C WITH ESTIMATED AVERAGE GLUCOSE RESULT: 5.4 % — SIGNIFICANT CHANGE UP (ref 4–5.6)
A1C WITH ESTIMATED AVERAGE GLUCOSE RESULT: 5.4 % — SIGNIFICANT CHANGE UP (ref 4–5.6)
ALBUMIN SERPL ELPH-MCNC: 3.4 G/DL — SIGNIFICANT CHANGE UP (ref 3.3–5)
ALP SERPL-CCNC: 63 U/L — SIGNIFICANT CHANGE UP (ref 40–120)
ALT FLD-CCNC: 32 U/L — SIGNIFICANT CHANGE UP (ref 10–45)
ANION GAP SERPL CALC-SCNC: 11 MMOL/L — SIGNIFICANT CHANGE UP (ref 5–17)
AST SERPL-CCNC: 27 U/L — SIGNIFICANT CHANGE UP (ref 10–40)
BASE EXCESS BLDV CALC-SCNC: 2.1 MMOL/L — HIGH (ref -2–2)
BASOPHILS # BLD AUTO: 0.03 K/UL — SIGNIFICANT CHANGE UP (ref 0–0.2)
BASOPHILS NFR BLD AUTO: 0.3 % — SIGNIFICANT CHANGE UP (ref 0–2)
BILIRUB SERPL-MCNC: 0.7 MG/DL — SIGNIFICANT CHANGE UP (ref 0.2–1.2)
BUN SERPL-MCNC: 16 MG/DL — SIGNIFICANT CHANGE UP (ref 7–23)
CA-I SERPL-SCNC: 1.16 MMOL/L — SIGNIFICANT CHANGE UP (ref 1.12–1.3)
CALCIUM SERPL-MCNC: 8.8 MG/DL — SIGNIFICANT CHANGE UP (ref 8.4–10.5)
CHLORIDE BLDV-SCNC: 103 MMOL/L — SIGNIFICANT CHANGE UP (ref 96–108)
CHLORIDE SERPL-SCNC: 101 MMOL/L — SIGNIFICANT CHANGE UP (ref 96–108)
CO2 BLDV-SCNC: 27 MMOL/L — SIGNIFICANT CHANGE UP (ref 22–30)
CO2 SERPL-SCNC: 21 MMOL/L — LOW (ref 22–31)
COVID-19 SPIKE DOMAIN AB INTERP: NEGATIVE — SIGNIFICANT CHANGE UP
COVID-19 SPIKE DOMAIN ANTIBODY RESULT: 0.4 U/ML — SIGNIFICANT CHANGE UP
CREAT SERPL-MCNC: 0.7 MG/DL — SIGNIFICANT CHANGE UP (ref 0.5–1.3)
CULTURE RESULTS: SIGNIFICANT CHANGE UP
EOSINOPHIL # BLD AUTO: 0.06 K/UL — SIGNIFICANT CHANGE UP (ref 0–0.5)
EOSINOPHIL NFR BLD AUTO: 0.5 % — SIGNIFICANT CHANGE UP (ref 0–6)
ESTIMATED AVERAGE GLUCOSE: 108 MG/DL — SIGNIFICANT CHANGE UP (ref 68–114)
ESTIMATED AVERAGE GLUCOSE: 108 MG/DL — SIGNIFICANT CHANGE UP (ref 68–114)
GAS PNL BLDV: 130 MMOL/L — LOW (ref 135–145)
GAS PNL BLDV: SIGNIFICANT CHANGE UP
GLUCOSE BLDC GLUCOMTR-MCNC: 112 MG/DL — HIGH (ref 70–99)
GLUCOSE BLDC GLUCOMTR-MCNC: 112 MG/DL — HIGH (ref 70–99)
GLUCOSE BLDC GLUCOMTR-MCNC: 116 MG/DL — HIGH (ref 70–99)
GLUCOSE BLDC GLUCOMTR-MCNC: 119 MG/DL — HIGH (ref 70–99)
GLUCOSE BLDC GLUCOMTR-MCNC: 125 MG/DL — HIGH (ref 70–99)
GLUCOSE BLDC GLUCOMTR-MCNC: 155 MG/DL — HIGH (ref 70–99)
GLUCOSE BLDC GLUCOMTR-MCNC: 162 MG/DL — HIGH (ref 70–99)
GLUCOSE BLDC GLUCOMTR-MCNC: 165 MG/DL — HIGH (ref 70–99)
GLUCOSE BLDC GLUCOMTR-MCNC: 182 MG/DL — HIGH (ref 70–99)
GLUCOSE BLDC GLUCOMTR-MCNC: 192 MG/DL — HIGH (ref 70–99)
GLUCOSE BLDC GLUCOMTR-MCNC: 195 MG/DL — HIGH (ref 70–99)
GLUCOSE BLDC GLUCOMTR-MCNC: 219 MG/DL — HIGH (ref 70–99)
GLUCOSE BLDC GLUCOMTR-MCNC: 326 MG/DL — HIGH (ref 70–99)
GLUCOSE BLDC GLUCOMTR-MCNC: 59 MG/DL — LOW (ref 70–99)
GLUCOSE BLDC GLUCOMTR-MCNC: 59 MG/DL — LOW (ref 70–99)
GLUCOSE BLDV-MCNC: 181 MG/DL — HIGH (ref 70–99)
GLUCOSE SERPL-MCNC: 186 MG/DL — HIGH (ref 70–99)
HCO3 BLDV-SCNC: 26 MMOL/L — SIGNIFICANT CHANGE UP (ref 21–29)
HCT VFR BLD CALC: 27.9 % — LOW (ref 39–50)
HCT VFR BLDA CALC: 30 % — LOW (ref 39–50)
HCV AB S/CO SERPL IA: 1.01 S/CO — HIGH (ref 0–0.99)
HCV AB SERPL-IMP: ABNORMAL
HGB BLD CALC-MCNC: 9.7 G/DL — LOW (ref 13–17)
HGB BLD-MCNC: 9.2 G/DL — LOW (ref 13–17)
HOROWITZ INDEX BLDV+IHG-RTO: 21 — SIGNIFICANT CHANGE UP
IMM GRANULOCYTES NFR BLD AUTO: 0.6 % — SIGNIFICANT CHANGE UP (ref 0–1.5)
LACTATE BLDV-MCNC: 1.1 MMOL/L — SIGNIFICANT CHANGE UP (ref 0.7–2)
LEGIONELLA AG UR QL: NEGATIVE — SIGNIFICANT CHANGE UP
LYMPHOCYTES # BLD AUTO: 1.36 K/UL — SIGNIFICANT CHANGE UP (ref 1–3.3)
LYMPHOCYTES # BLD AUTO: 11.9 % — LOW (ref 13–44)
MAGNESIUM SERPL-MCNC: 1.8 MG/DL — SIGNIFICANT CHANGE UP (ref 1.6–2.6)
MCHC RBC-ENTMCNC: 28 PG — SIGNIFICANT CHANGE UP (ref 27–34)
MCHC RBC-ENTMCNC: 33 GM/DL — SIGNIFICANT CHANGE UP (ref 32–36)
MCV RBC AUTO: 84.8 FL — SIGNIFICANT CHANGE UP (ref 80–100)
MONOCYTES # BLD AUTO: 0.85 K/UL — SIGNIFICANT CHANGE UP (ref 0–0.9)
MONOCYTES NFR BLD AUTO: 7.4 % — SIGNIFICANT CHANGE UP (ref 2–14)
MRSA PCR RESULT.: SIGNIFICANT CHANGE UP
NEUTROPHILS # BLD AUTO: 9.07 K/UL — HIGH (ref 1.8–7.4)
NEUTROPHILS NFR BLD AUTO: 79.3 % — HIGH (ref 43–77)
NRBC # BLD: 0 /100 WBCS — SIGNIFICANT CHANGE UP (ref 0–0)
OTHER CELLS CSF MANUAL: 8 ML/DL — LOW (ref 18–22)
PCO2 BLDV: 39 MMHG — SIGNIFICANT CHANGE UP (ref 35–50)
PH BLDV: 7.44 — SIGNIFICANT CHANGE UP (ref 7.35–7.45)
PHOSPHATE SERPL-MCNC: 2.2 MG/DL — LOW (ref 2.5–4.5)
PLATELET # BLD AUTO: 206 K/UL — SIGNIFICANT CHANGE UP (ref 150–400)
PO2 BLDV: 33 MMHG — SIGNIFICANT CHANGE UP (ref 25–45)
POTASSIUM BLDV-SCNC: 3.8 MMOL/L — SIGNIFICANT CHANGE UP (ref 3.5–5.3)
POTASSIUM SERPL-MCNC: 4.1 MMOL/L — SIGNIFICANT CHANGE UP (ref 3.5–5.3)
POTASSIUM SERPL-SCNC: 4.1 MMOL/L — SIGNIFICANT CHANGE UP (ref 3.5–5.3)
PROT SERPL-MCNC: 6 G/DL — SIGNIFICANT CHANGE UP (ref 6–8.3)
RBC # BLD: 3.29 M/UL — LOW (ref 4.2–5.8)
RBC # FLD: 14.5 % — SIGNIFICANT CHANGE UP (ref 10.3–14.5)
S AUREUS DNA NOSE QL NAA+PROBE: SIGNIFICANT CHANGE UP
SAO2 % BLDV: 58 % — LOW (ref 67–88)
SARS-COV-2 IGG+IGM SERPL QL IA: 0.4 U/ML — SIGNIFICANT CHANGE UP
SARS-COV-2 IGG+IGM SERPL QL IA: NEGATIVE — SIGNIFICANT CHANGE UP
SODIUM SERPL-SCNC: 133 MMOL/L — LOW (ref 135–145)
SPECIMEN SOURCE: SIGNIFICANT CHANGE UP
WBC # BLD: 11.44 K/UL — HIGH (ref 3.8–10.5)
WBC # FLD AUTO: 11.44 K/UL — HIGH (ref 3.8–10.5)

## 2021-06-30 PROCEDURE — 99291 CRITICAL CARE FIRST HOUR: CPT

## 2021-06-30 RX ORDER — AMPICILLIN SODIUM AND SULBACTAM SODIUM 250; 125 MG/ML; MG/ML
1.5 INJECTION, POWDER, FOR SUSPENSION INTRAMUSCULAR; INTRAVENOUS EVERY 6 HOURS
Refills: 0 | Status: DISCONTINUED | OUTPATIENT
Start: 2021-06-30 | End: 2021-07-05

## 2021-06-30 RX ORDER — INSULIN LISPRO 100/ML
VIAL (ML) SUBCUTANEOUS
Refills: 0 | Status: DISCONTINUED | OUTPATIENT
Start: 2021-06-30 | End: 2021-07-01

## 2021-06-30 RX ORDER — AMPICILLIN SODIUM AND SULBACTAM SODIUM 250; 125 MG/ML; MG/ML
1.5 INJECTION, POWDER, FOR SUSPENSION INTRAMUSCULAR; INTRAVENOUS ONCE
Refills: 0 | Status: COMPLETED | OUTPATIENT
Start: 2021-06-30 | End: 2021-06-30

## 2021-06-30 RX ORDER — MAGNESIUM SULFATE 500 MG/ML
1 VIAL (ML) INJECTION ONCE
Refills: 0 | Status: COMPLETED | OUTPATIENT
Start: 2021-06-30 | End: 2021-06-30

## 2021-06-30 RX ORDER — VANCOMYCIN HCL 1 G
1000 VIAL (EA) INTRAVENOUS ONCE
Refills: 0 | Status: COMPLETED | OUTPATIENT
Start: 2021-06-30 | End: 2021-06-30

## 2021-06-30 RX ORDER — SODIUM CHLORIDE 9 MG/ML
1000 INJECTION, SOLUTION INTRAVENOUS
Refills: 0 | Status: DISCONTINUED | OUTPATIENT
Start: 2021-06-30 | End: 2021-06-30

## 2021-06-30 RX ORDER — AMPICILLIN SODIUM AND SULBACTAM SODIUM 250; 125 MG/ML; MG/ML
INJECTION, POWDER, FOR SUSPENSION INTRAMUSCULAR; INTRAVENOUS
Refills: 0 | Status: DISCONTINUED | OUTPATIENT
Start: 2021-06-30 | End: 2021-07-05

## 2021-06-30 RX ORDER — INSULIN LISPRO 100/ML
VIAL (ML) SUBCUTANEOUS AT BEDTIME
Refills: 0 | Status: DISCONTINUED | OUTPATIENT
Start: 2021-06-30 | End: 2021-07-06

## 2021-06-30 RX ORDER — SODIUM CHLORIDE 9 MG/ML
1000 INJECTION, SOLUTION INTRAVENOUS
Refills: 0 | Status: DISCONTINUED | OUTPATIENT
Start: 2021-06-30 | End: 2021-07-02

## 2021-06-30 RX ORDER — PIPERACILLIN AND TAZOBACTAM 4; .5 G/20ML; G/20ML
3.38 INJECTION, POWDER, LYOPHILIZED, FOR SOLUTION INTRAVENOUS ONCE
Refills: 0 | Status: DISCONTINUED | OUTPATIENT
Start: 2021-06-30 | End: 2021-06-30

## 2021-06-30 RX ORDER — DEXTROSE 50 % IN WATER 50 %
50 SYRINGE (ML) INTRAVENOUS ONCE
Refills: 0 | Status: COMPLETED | OUTPATIENT
Start: 2021-06-30 | End: 2021-06-30

## 2021-06-30 RX ORDER — PIPERACILLIN AND TAZOBACTAM 4; .5 G/20ML; G/20ML
3.38 INJECTION, POWDER, LYOPHILIZED, FOR SOLUTION INTRAVENOUS EVERY 8 HOURS
Refills: 0 | Status: DISCONTINUED | OUTPATIENT
Start: 2021-06-30 | End: 2021-06-30

## 2021-06-30 RX ADMIN — SODIUM CHLORIDE 50 MILLILITER(S): 9 INJECTION, SOLUTION INTRAVENOUS at 17:16

## 2021-06-30 RX ADMIN — Medication 2.5 MILLIGRAM(S): at 06:20

## 2021-06-30 RX ADMIN — PIPERACILLIN AND TAZOBACTAM 200 GRAM(S): 4; .5 INJECTION, POWDER, LYOPHILIZED, FOR SOLUTION INTRAVENOUS at 09:59

## 2021-06-30 RX ADMIN — SERTRALINE 50 MILLIGRAM(S): 25 TABLET, FILM COATED ORAL at 11:07

## 2021-06-30 RX ADMIN — ATORVASTATIN CALCIUM 80 MILLIGRAM(S): 80 TABLET, FILM COATED ORAL at 21:12

## 2021-06-30 RX ADMIN — RILUZOLE 50 MILLIGRAM(S): 50 TABLET ORAL at 06:22

## 2021-06-30 RX ADMIN — SENNA PLUS 2 TABLET(S): 8.6 TABLET ORAL at 21:13

## 2021-06-30 RX ADMIN — Medication 50 MILLILITER(S): at 15:59

## 2021-06-30 RX ADMIN — Medication 100 GRAM(S): at 09:59

## 2021-06-30 RX ADMIN — HEPARIN SODIUM 5000 UNIT(S): 5000 INJECTION INTRAVENOUS; SUBCUTANEOUS at 06:20

## 2021-06-30 RX ADMIN — Medication 2: at 12:11

## 2021-06-30 RX ADMIN — POLYETHYLENE GLYCOL 3350 17 GRAM(S): 17 POWDER, FOR SOLUTION ORAL at 11:07

## 2021-06-30 RX ADMIN — AMPICILLIN SODIUM AND SULBACTAM SODIUM 100 GRAM(S): 250; 125 INJECTION, POWDER, FOR SUSPENSION INTRAMUSCULAR; INTRAVENOUS at 11:08

## 2021-06-30 RX ADMIN — HEPARIN SODIUM 5000 UNIT(S): 5000 INJECTION INTRAVENOUS; SUBCUTANEOUS at 21:12

## 2021-06-30 RX ADMIN — Medication 62.5 MILLIMOLE(S): at 02:36

## 2021-06-30 RX ADMIN — Medication 250 MILLIGRAM(S): at 10:00

## 2021-06-30 RX ADMIN — Medication 81 MILLIGRAM(S): at 11:08

## 2021-06-30 RX ADMIN — HEPARIN SODIUM 5000 UNIT(S): 5000 INJECTION INTRAVENOUS; SUBCUTANEOUS at 16:01

## 2021-06-30 RX ADMIN — BROMOCRIPTINE MESYLATE 5 MILLIGRAM(S): 5 CAPSULE ORAL at 11:07

## 2021-06-30 RX ADMIN — Medication 1: at 10:07

## 2021-06-30 RX ADMIN — SODIUM CHLORIDE 50 MILLILITER(S): 9 INJECTION, SOLUTION INTRAVENOUS at 10:00

## 2021-06-30 RX ADMIN — RILUZOLE 50 MILLIGRAM(S): 50 TABLET ORAL at 17:17

## 2021-06-30 RX ADMIN — AMPICILLIN SODIUM AND SULBACTAM SODIUM 100 GRAM(S): 250; 125 INJECTION, POWDER, FOR SUSPENSION INTRAMUSCULAR; INTRAVENOUS at 17:23

## 2021-06-30 NOTE — SWALLOW BEDSIDE ASSESSMENT ADULT - ADDITIONAL RECOMMENDATIONS
Call placed to number in chart [phone]. Number reported at pt's spouse Maribel however call received by Holger Michell who reports he is pt's son/HCP/power of . Son Holger requesting this service speak to his spouse Jenna Galarza, a physician assistant. Daughter in law, Jenna, endorses h/o dysphagia with MBS in June 2020 with recommendation for NPO, with non-oral nutrition/hydration/medications. Daughter in law reports at that time pt refused PEG and NPO recommendation and was discharged on an oral diet. Pt has been non compliant with puree diet and has been receiving soft foods at home. Daughter in law states pt aspirates/coughs/gurgles often with PO intake Pt has been educated by MDs re: risks of aspiration multiple times (including risks of choking and death). However, pt continued to refuse PEG and continued on a soft diet. Clinician discussed rationale for NPO recommendation and that pt would require repeat MBS to assess swallow function. Pt's daughter in law aware that given diagnosis of ALS as well as difficulty tolerating PO intake and current dx of multifocal pna with concern for aspiration, that MBS recommendations may continue to be NPO, with non-oral nutrition/hydration/medications. Daughter in law and son Holger requesting MBS at this time to for possible assistance in future hospice care as well as to provide visual Call placed to number in chart [phone]. Number reported as pt's spouse Maribel however call received by Holger Galarza who reports he is pt's son/HCP/power of . Son Holger requesting this service speak to his spouse Jenna Galarza, a physician assistant. Daughter in law, Jenna, endorses h/o dysphagia with MBS in June 2020 recommending NPO, with non-oral nutrition/hydration/medications. Daughter in law reports at that time pt refused PEG and NPO recommendation and was discharged on an oral diet. Pt has been non compliant with puree diet and has been receiving soft foods at home. Daughter in law states pt aspirates/coughs/gurgles often with PO intake. Pt has been educated by MDs re: risks of aspiration multiple times (including risks of choking and death). However, pt continued to refuse PEG and continued on a soft diet. Clinician discussed rationale for NPO recommendation and that pt would require repeat MBS to assess swallow function. Pt's daughter in law aware that given diagnosis of ALS as well as difficulty tolerating PO intake and current dx of multifocal pna with concern for aspiration, that MBS recommendations may continue to be NPO, with non-oral nutrition/hydration/medications. Daughter in law and son Holger requesting MBS at this time for possible assistance in future hospice care as well as to provide pt a visual re: his swallow function to aid in decision making. Son/HCP/Daughter in law reporting that at this time they wish for pt to remain NPO at this time. Daughter in law also mentioned pt with increased confusion and is not at baseline. MBS to be tentatively scheduled pending medical/mental status optimization as well as pending pt/family wishes. This was discussed with MD Alba. MD to have further discussion with pt/family/HCP re: feeding GOC. Call placed to number in chart [phone]. Number reported as pt's spouse Maribel however call received by Holger Galarza who reports he is pt's son/HCP/power of . Son Holger requesting this service speak to his spouse Jenna Galarza, a physician assistant. Daughter in law, Jenna, endorses h/o dysphagia with MBS in June 2020 recommending NPO, with non-oral nutrition/hydration/medications. Daughter in law reports at that time pt refused PEG and NPO recommendation and was discharged on an oral diet. Pt has been non compliant with puree diet and has been receiving soft foods at home. Daughter in law states pt aspirates/coughs/gurgles often with PO intake. Pt has been educated by MDs re: risks of aspiration multiple times (including risks of choking and death). However, pt continued to refuse PEG and continued on a soft diet. Clinician discussed rationale for NPO recommendation and that pt would require repeat MBS to assess swallow function. Pt's daughter in law aware that given diagnosis of ALS as well as difficulty tolerating PO intake and current dx of multifocal pna with concern for aspiration, that MBS recommendations may continue to be NPO, with non-oral nutrition/hydration/medications. Daughter in law and son Holger requesting MBS for possible assistance in future hospice care as well as to provide pt a visual re: his swallow function to aid in decision making. Son/HCP/Daughter in law reporting that at this time they wish for pt to remain NPO. Daughter in law also mentioned pt with increased confusion and is not at baseline. MBS to be tentatively scheduled pending medical/mental status optimization as well as pending pt/family wishes. This was discussed with MD Alba. MD to have further discussion with pt/family/HCP re: feeding GOC.

## 2021-06-30 NOTE — PHYSICAL THERAPY INITIAL EVALUATION ADULT - PERTINENT HX OF CURRENT PROBLEM, REHAB EVAL
Pt is 64M admitted 6/29/21 PMHx DM, ALS presenting with AMS around 2am in setting of cough for the past few days.

## 2021-06-30 NOTE — CHART NOTE - NSCHARTNOTEFT_GEN_A_CORE
MICU Transfer Note    Transfer from: MICU  Transfer to:  (  ) Medicine    (  ) Telemetry    (  ) RCU    (  ) Palliative    (  ) Stroke Unit    (  ) _______________  Accepting physican:      MICU COURSE:          ASSESSMENT & PLAN:   64 y/o M w/ hx DM, ALS, CAD (prior stent), depression presents with AMS this morning, multifocal PNA and hypoglycemia. Admitted to ICU for q1 fingerstick and close monitoring.     #Neuro  -Hx ALS, A&Ox3  -Baseline extremity strength, can move upper extremities against gravity, minimal movement of BL LE  -On home riluzole, sertraline, bromocriptine    #Respiratory  Multifocal PNA, on RA, likely from aspiration  - CT chest non-con (6/29) with consolidative opacities within left lower lobe and nodular groundglass opacities within the right upper and lower lobes representing multifocal pneumonia.  - Switch from Zosyn to Unasyn  - MRSA swab pending if negative D/C vanc    #CV  -Not on HTN meds  -No active issues at this time    #GI/Nutrition  - Passed bedside swallow  - Will start oral feeds, DC D5/LR    #/Renal/Fluids  -BUn/Cr wnl, monitor I/O  -Patient self caths declining griffith    #ID  -Multifocal PNA, persistent cough for past few days  -WBC 15  - Switch to Unasyn, D/C zosyn  - MRSA swab pending, if negative D/C vanc.  - Follow cultures    #Endocrine  -Persistent hypoglycemia in setting of multifocal pna, recent sulfonylurea use (but over 24 hours ago)  -Switched to q4 FSBG, no hypoglycemic episodes, started oral feeds    #Heme  - DVT ppx: SQH    #Ethics  DNR/DNI, Molst form completed      For Follow-Up:  Endocrinology consulted for f/u DM med recommendations  Vanc swab if negatie D/C vanc  Follow blood and urine cultures          Vital Signs Last 24 Hrs  T(C): 37 (30 Jun 2021 11:00), Max: 37.1 (30 Jun 2021 07:00)  T(F): 98.6 (30 Jun 2021 11:00), Max: 98.8 (30 Jun 2021 07:00)  HR: 65 (30 Jun 2021 12:00) (64 - 80)  BP: 163/71 (30 Jun 2021 12:00) (116/71 - 189/89)  BP(mean): 102 (30 Jun 2021 12:00) (85 - 132)  RR: 27 (30 Jun 2021 12:00) (13 - 32)  SpO2: 100% (30 Jun 2021 12:00) (96% - 100%)  I&O's Summary    29 Jun 2021 07:01  -  30 Jun 2021 07:00  --------------------------------------------------------  IN: 1450 mL / OUT: 1300 mL / NET: 150 mL    30 Jun 2021 07:01  -  30 Jun 2021 12:34  --------------------------------------------------------  IN: 800 mL / OUT: 0 mL / NET: 800 mL          MEDICATIONS  (STANDING):  ampicillin/sulbactam  IVPB      ampicillin/sulbactam  IVPB 1.5 Gram(s) IV Intermittent every 6 hours  aspirin enteric coated 81 milliGRAM(s) Oral daily  atorvastatin 80 milliGRAM(s) Oral at bedtime  bromocriptine Capsule 5 milliGRAM(s) Oral daily  dextrose 40% Gel 15 Gram(s) Oral once  dextrose 5%. 1000 milliLiter(s) (50 mL/Hr) IV Continuous <Continuous>  dextrose 5%. 1000 milliLiter(s) (100 mL/Hr) IV Continuous <Continuous>  dextrose 50% Injectable 25 Gram(s) IV Push once  dextrose 50% Injectable 12.5 Gram(s) IV Push once  dextrose 50% Injectable 25 Gram(s) IV Push once  glucagon  Injectable 1 milliGRAM(s) IntraMuscular once  heparin   Injectable 5000 Unit(s) SubCutaneous every 8 hours  insulin lispro (ADMELOG) corrective regimen sliding scale   SubCutaneous three times a day before meals  insulin lispro (ADMELOG) corrective regimen sliding scale   SubCutaneous at bedtime  polyethylene glycol 3350 17 Gram(s) Oral daily  riluzole 50 milliGRAM(s) Oral two times a day  senna 2 Tablet(s) Oral at bedtime  sertraline 50 milliGRAM(s) Oral daily    MEDICATIONS  (PRN):        LABS                                            9.2                   Neurophils% (auto):   79.3   (06-30 @ 00:34):    11.44)-----------(206          Lymphocytes% (auto):  11.9                                          27.9                   Eosinphils% (auto):   0.5      Manual%: Neutrophils x    ; Lymphocytes x    ; Eosinophils x    ; Bands%: x    ; Blasts x                                    133    |  101    |  16                  Calcium: 8.8   / iCa: x      (06-30 @ 00:34)    ----------------------------<  186       Magnesium: 1.8                              4.1     |  21     |  0.70             Phosphorous: 2.2      TPro  6.0    /  Alb  3.4    /  TBili  0.7    /  DBili  x      /  AST  27     /  ALT  32     /  AlkPhos  63     30 Jun 2021 00:34 MICU Transfer Note    Transfer from: MICU  Transfer to:  ( X ) Medicine    (  ) Telemetry    (  ) RCU    (  ) Palliative    (  ) Stroke Unit    (  ) _______________  Accepting physican: Sowmya Paul      MICU COURSE:  63 yo male h/o DM, ALS presenting with AMS noted to be hypoglycemic (FS 38) by EMS, taking to ED on 06/29. Last dose of glyburide 730am 07/28 per patient, patient had decreased PO intake in the last couple of days. While in ED, patient Remained hypoglycemic in ED s/p 3 amps of D50, was put on D20@100. CT scan demonstrated multifocal PNA and Lakeshia/vanc was started.  UA found to be negative. Admitted to MICU for q1 FSBG checks. In MICU Abx de-escalated to Unasyn and FSBG stable changed to Q4 checks and patient was put on D5/LR. The patient passed bedside swallow 06/30 and is tolerating oral feeds. Awaiting MRSA swab to d/c vanc. Zofia has been consulted for DM medication recommendations.         ASSESSMENT & PLAN:   64 y/o M w/ hx DM, ALS, CAD (prior stent), depression presents with AMS this morning, multifocal PNA and hypoglycemia. Admitted to ICU for q1 fingerstick and close monitoring.     #Neuro  -Hx ALS, A&Ox3  -Baseline extremity strength, can move upper extremities against gravity, minimal movement of BL LE  -On home riluzole, sertraline, bromocriptine    #Respiratory  Multifocal PNA, on RA, likely from aspiration  - CT chest non-con (6/29) with consolidative opacities within left lower lobe and nodular groundglass opacities within the right upper and lower lobes representing multifocal pneumonia.  - Switch from Zosyn to Unasyn  - MRSA swab pending if negative D/C vanc    #CV  -Not on HTN meds  -No active issues at this time    #GI/Nutrition  - Passed bedside swallow  - Will start oral feeds, DC D5/LR    #/Renal/Fluids  -BUn/Cr wnl, monitor I/O  -Patient self caths declining griffith    #ID  -Multifocal PNA, persistent cough for past few days  -WBC 15  - Switch to Unasyn, D/C zosyn  - MRSA swab pending, if negative D/C vanc.  - Follow cultures    #Endocrine  -Persistent hypoglycemia in setting of multifocal pna, recent sulfonylurea use (but over 24 hours ago)  -Switched to q4 FSBG, no hypoglycemic episodes, started oral feeds    #Heme  - DVT ppx: SQH    #Ethics  DNR/DNI, Molst form completed  Follow up with palliative as family is interested in hospice care      For Follow-Up:  Endocrinology consulted for f/u DM med recommendations  Vanc swab if negatie D/C vanc  Follow blood and urine cultures  Follow up with palliative as family is interested in hospice care          Vital Signs Last 24 Hrs  T(C): 37 (30 Jun 2021 11:00), Max: 37.1 (30 Jun 2021 07:00)  T(F): 98.6 (30 Jun 2021 11:00), Max: 98.8 (30 Jun 2021 07:00)  HR: 65 (30 Jun 2021 12:00) (64 - 80)  BP: 163/71 (30 Jun 2021 12:00) (116/71 - 189/89)  BP(mean): 102 (30 Jun 2021 12:00) (85 - 132)  RR: 27 (30 Jun 2021 12:00) (13 - 32)  SpO2: 100% (30 Jun 2021 12:00) (96% - 100%)  I&O's Summary    29 Jun 2021 07:01  -  30 Jun 2021 07:00  --------------------------------------------------------  IN: 1450 mL / OUT: 1300 mL / NET: 150 mL    30 Jun 2021 07:01  -  30 Jun 2021 12:34  --------------------------------------------------------  IN: 800 mL / OUT: 0 mL / NET: 800 mL          MEDICATIONS  (STANDING):  ampicillin/sulbactam  IVPB      ampicillin/sulbactam  IVPB 1.5 Gram(s) IV Intermittent every 6 hours  aspirin enteric coated 81 milliGRAM(s) Oral daily  atorvastatin 80 milliGRAM(s) Oral at bedtime  bromocriptine Capsule 5 milliGRAM(s) Oral daily  dextrose 40% Gel 15 Gram(s) Oral once  dextrose 5%. 1000 milliLiter(s) (50 mL/Hr) IV Continuous <Continuous>  dextrose 5%. 1000 milliLiter(s) (100 mL/Hr) IV Continuous <Continuous>  dextrose 50% Injectable 25 Gram(s) IV Push once  dextrose 50% Injectable 12.5 Gram(s) IV Push once  dextrose 50% Injectable 25 Gram(s) IV Push once  glucagon  Injectable 1 milliGRAM(s) IntraMuscular once  heparin   Injectable 5000 Unit(s) SubCutaneous every 8 hours  insulin lispro (ADMELOG) corrective regimen sliding scale   SubCutaneous three times a day before meals  insulin lispro (ADMELOG) corrective regimen sliding scale   SubCutaneous at bedtime  polyethylene glycol 3350 17 Gram(s) Oral daily  riluzole 50 milliGRAM(s) Oral two times a day  senna 2 Tablet(s) Oral at bedtime  sertraline 50 milliGRAM(s) Oral daily    MEDICATIONS  (PRN):        LABS                                            9.2                   Neurophils% (auto):   79.3   (06-30 @ 00:34):    11.44)-----------(206          Lymphocytes% (auto):  11.9                                          27.9                   Eosinphils% (auto):   0.5      Manual%: Neutrophils x    ; Lymphocytes x    ; Eosinophils x    ; Bands%: x    ; Blasts x                                    133    |  101    |  16                  Calcium: 8.8   / iCa: x      (06-30 @ 00:34)    ----------------------------<  186       Magnesium: 1.8                              4.1     |  21     |  0.70             Phosphorous: 2.2      TPro  6.0    /  Alb  3.4    /  TBili  0.7    /  DBili  x      /  AST  27     /  ALT  32     /  AlkPhos  63     30 Jun 2021 00:34 MICU Transfer Note    Transfer from: MICU  Transfer to:  ( X ) Medicine    (  ) Telemetry    (  ) RCU    (  ) Palliative    (  ) Stroke Unit    (  ) _______________  Accepting physican: Sowmya Paul      MICU COURSE:  63 yo male h/o DM, ALS presenting with AMS noted to be hypoglycemic (FS 38) by EMS, taking to ED on 06/29. Last dose of glyburide 730am 07/28 per patient, patient had decreased PO intake in the last couple of days. While in ED, patient Remained hypoglycemic in ED s/p 3 amps of D50, was put on D20@100. CT scan demonstrated multifocal PNA and Lakeshia/vanc was started.  UA found to be negative. Admitted to MICU for q1 FSBG checks. In MICU Abx de-escalated to Unasyn and FSBG stable changed to Q4 checks and patient was put on D5/LR. The patient passed bedside swallow 06/30 and is tolerating oral feeds. Awaiting MRSA swab to d/c vanc. Zofia has been consulted for DM medication recommendations.         ASSESSMENT & PLAN:   64 y/o M w/ hx DM, ALS, CAD (prior stent), depression presents with AMS this morning, multifocal PNA and hypoglycemia. Admitted to ICU for q1 fingerstick and close monitoring.     #Neuro  -Hx ALS, A&Ox3  -Baseline extremity strength, can move upper extremities against gravity, minimal movement of BL LE  -On home riluzole, sertraline, bromocriptine    #Respiratory  Multifocal PNA, on RA, likely from aspiration  - CT chest non-con (6/29) with consolidative opacities within left lower lobe and nodular groundglass opacities within the right upper and lower lobes representing multifocal pneumonia.  - Switch from Zosyn to Unasyn  - MRSA swab pending if negative D/C vanc    #CV  -Not on HTN meds  -No active issues at this time    #GI/Nutrition  - Passed bedside swallow  - Will start oral feeds, D/C D5/LR    #/Renal/Fluids  -BUn/Cr wnl, monitor I/O  -Patient self caths declining griffith    #ID  -Multifocal PNA, persistent cough for past few days  -WBC 15  - Switch to Unasyn, D/C zosyn  - MRSA swab pending, if negative D/C vanc.  - Follow cultures    #Endocrine  -Persistent hypoglycemia in setting of multifocal pna, recent sulfonylurea use (but over 24 hours ago)  -Switched to q4 FSBG, no hypoglycemic episodes, started oral feeds    #Heme  - DVT ppx: SQH    #Ethics  DNR/DNI, Molst form completed  Follow up with palliative as family is interested in hospice care      For Follow-Up:  Endocrinology consulted for f/u DM med recommendations  Vanc swab if negatie D/C vanc  Follow blood and urine cultures  Follow up with palliative as family is interested in hospice care          Vital Signs Last 24 Hrs  T(C): 37 (30 Jun 2021 11:00), Max: 37.1 (30 Jun 2021 07:00)  T(F): 98.6 (30 Jun 2021 11:00), Max: 98.8 (30 Jun 2021 07:00)  HR: 65 (30 Jun 2021 12:00) (64 - 80)  BP: 163/71 (30 Jun 2021 12:00) (116/71 - 189/89)  BP(mean): 102 (30 Jun 2021 12:00) (85 - 132)  RR: 27 (30 Jun 2021 12:00) (13 - 32)  SpO2: 100% (30 Jun 2021 12:00) (96% - 100%)  I&O's Summary    29 Jun 2021 07:01  -  30 Jun 2021 07:00  --------------------------------------------------------  IN: 1450 mL / OUT: 1300 mL / NET: 150 mL    30 Jun 2021 07:01  -  30 Jun 2021 12:34  --------------------------------------------------------  IN: 800 mL / OUT: 0 mL / NET: 800 mL          MEDICATIONS  (STANDING):  ampicillin/sulbactam  IVPB      ampicillin/sulbactam  IVPB 1.5 Gram(s) IV Intermittent every 6 hours  aspirin enteric coated 81 milliGRAM(s) Oral daily  atorvastatin 80 milliGRAM(s) Oral at bedtime  bromocriptine Capsule 5 milliGRAM(s) Oral daily  dextrose 40% Gel 15 Gram(s) Oral once  dextrose 5%. 1000 milliLiter(s) (50 mL/Hr) IV Continuous <Continuous>  dextrose 5%. 1000 milliLiter(s) (100 mL/Hr) IV Continuous <Continuous>  dextrose 50% Injectable 25 Gram(s) IV Push once  dextrose 50% Injectable 12.5 Gram(s) IV Push once  dextrose 50% Injectable 25 Gram(s) IV Push once  glucagon  Injectable 1 milliGRAM(s) IntraMuscular once  heparin   Injectable 5000 Unit(s) SubCutaneous every 8 hours  insulin lispro (ADMELOG) corrective regimen sliding scale   SubCutaneous three times a day before meals  insulin lispro (ADMELOG) corrective regimen sliding scale   SubCutaneous at bedtime  polyethylene glycol 3350 17 Gram(s) Oral daily  riluzole 50 milliGRAM(s) Oral two times a day  senna 2 Tablet(s) Oral at bedtime  sertraline 50 milliGRAM(s) Oral daily    MEDICATIONS  (PRN):        LABS                                            9.2                   Neurophils% (auto):   79.3   (06-30 @ 00:34):    11.44)-----------(206          Lymphocytes% (auto):  11.9                                          27.9                   Eosinphils% (auto):   0.5      Manual%: Neutrophils x    ; Lymphocytes x    ; Eosinophils x    ; Bands%: x    ; Blasts x                                    133    |  101    |  16                  Calcium: 8.8   / iCa: x      (06-30 @ 00:34)    ----------------------------<  186       Magnesium: 1.8                              4.1     |  21     |  0.70             Phosphorous: 2.2      TPro  6.0    /  Alb  3.4    /  TBili  0.7    /  DBili  x      /  AST  27     /  ALT  32     /  AlkPhos  63     30 Jun 2021 00:34 MICU Transfer Note    Transfer from: MICU  Transfer to:  ( X ) Medicine    (  ) Telemetry    (  ) RCU    (  ) Palliative    (  ) Stroke Unit    (  ) _______________  Accepting physican: Sowmya Paul      MICU COURSE:  63 yo male h/o DM, ALS presenting with AMS noted to be hypoglycemic (FS 38) by EMS, taking to ED on 06/29. Last dose of glyburide 730am 07/28 per patient, patient had decreased PO intake in the last couple of days. While in ED, patient Remained hypoglycemic in ED s/p 3 amps of D50, was put on D20@100. CT scan demonstrated multifocal PNA and Lakeshia/vanc was started.  UA found to be negative. Admitted to MICU for q1 FSBG checks. In MICU Abx de-escalated to Unasyn and FSBG stable changed to Q4 checks and patient was put on D5/LR. The patient passed bedside swallow 06/30 and is tolerating oral feeds. Awaiting MRSA swab to d/c jamison Armijo has been consulted for DM medication recommendations.               Vital Signs Last 24 Hrs  T(C): 37 (30 Jun 2021 11:00), Max: 37.1 (30 Jun 2021 07:00)  T(F): 98.6 (30 Jun 2021 11:00), Max: 98.8 (30 Jun 2021 07:00)  HR: 65 (30 Jun 2021 12:00) (64 - 80)  BP: 163/71 (30 Jun 2021 12:00) (116/71 - 189/89)  BP(mean): 102 (30 Jun 2021 12:00) (85 - 132)  RR: 27 (30 Jun 2021 12:00) (13 - 32)  SpO2: 100% (30 Jun 2021 12:00) (96% - 100%)  I&O's Summary    29 Jun 2021 07:01  -  30 Jun 2021 07:00  --------------------------------------------------------  IN: 1450 mL / OUT: 1300 mL / NET: 150 mL    30 Jun 2021 07:01 - 30 Jun 2021 12:34  --------------------------------------------------------  IN: 800 mL / OUT: 0 mL / NET: 800 mL          MEDICATIONS  (STANDING):  ampicillin/sulbactam  IVPB      ampicillin/sulbactam  IVPB 1.5 Gram(s) IV Intermittent every 6 hours  aspirin enteric coated 81 milliGRAM(s) Oral daily  atorvastatin 80 milliGRAM(s) Oral at bedtime  bromocriptine Capsule 5 milliGRAM(s) Oral daily  dextrose 40% Gel 15 Gram(s) Oral once  dextrose 5%. 1000 milliLiter(s) (50 mL/Hr) IV Continuous <Continuous>  dextrose 5%. 1000 milliLiter(s) (100 mL/Hr) IV Continuous <Continuous>  dextrose 50% Injectable 25 Gram(s) IV Push once  dextrose 50% Injectable 12.5 Gram(s) IV Push once  dextrose 50% Injectable 25 Gram(s) IV Push once  glucagon  Injectable 1 milliGRAM(s) IntraMuscular once  heparin   Injectable 5000 Unit(s) SubCutaneous every 8 hours  insulin lispro (ADMELOG) corrective regimen sliding scale   SubCutaneous three times a day before meals  insulin lispro (ADMELOG) corrective regimen sliding scale   SubCutaneous at bedtime  polyethylene glycol 3350 17 Gram(s) Oral daily  riluzole 50 milliGRAM(s) Oral two times a day  senna 2 Tablet(s) Oral at bedtime  sertraline 50 milliGRAM(s) Oral daily    MEDICATIONS  (PRN):        LABS                                            9.2                   Neurophils% (auto):   79.3   (06-30 @ 00:34):    11.44)-----------(206          Lymphocytes% (auto):  11.9                                          27.9                   Eosinphils% (auto):   0.5      Manual%: Neutrophils x    ; Lymphocytes x    ; Eosinophils x    ; Bands%: x    ; Blasts x                                    133    |  101    |  16                  Calcium: 8.8   / iCa: x      (06-30 @ 00:34)    ----------------------------<  186       Magnesium: 1.8                              4.1     |  21     |  0.70             Phosphorous: 2.2      TPro  6.0    /  Alb  3.4    /  TBili  0.7    /  DBili  x      /  AST  27     /  ALT  32     /  AlkPhos  63     30 Jun 2021 00:34            ASSESSMENT & PLAN:   64 y/o M w/ hx DM, ALS, CAD (prior stent), depression presents with AMS this morning, multifocal PNA and hypoglycemia. Admitted to ICU for q1 fingerstick and close monitoring.     #Neuro  -Hx ALS, A&Ox3  -Baseline extremity strength, can move upper extremities against gravity, minimal movement of BL LE  -On home riluzole, sertraline, bromocriptine    #Respiratory  Multifocal PNA, on RA, likely from aspiration  - CT chest non-con (6/29) with consolidative opacities within left lower lobe and nodular groundglass opacities within the right upper and lower lobes representing multifocal pneumonia.  - Switch from Zosyn to Unasyn  - MRSA swab pending if negative D/C vanc    #CV  -Not on HTN meds  -No active issues at this time    #GI/Nutrition  - Passed bedside swallow  - Will start oral feeds, D/C D5/LR    #/Renal/Fluids  -BUn/Cr wnl, monitor I/O  -Patient self caths declining griffith    #ID  -Multifocal PNA, persistent cough for past few days  -WBC 15  - Switch to Unasyn, D/C zosyn  - MRSA swab pending, if negative D/C vanc.  - Follow cultures    #Endocrine  -Persistent hypoglycemia in setting of multifocal pna, recent sulfonylurea use (but over 24 hours ago)  -Switched to q4 FSBG, no hypoglycemic episodes, started oral feeds    #Heme  - DVT ppx: SQH    #Ethics  DNR/DNI, Molst form completed  Follow up with palliative as family is interested in hospice care      For Follow-Up:  Endocrinology consulted for f/u DM med recommendations  Vanc swab if negatie D/C vanc  Follow blood and urine cultures  Follow up with palliative as family is interested in hospice care

## 2021-06-30 NOTE — SWALLOW BEDSIDE ASSESSMENT ADULT - SWALLOW EVAL: DIAGNOSIS
64 y/o M w/ hx DM, ALS, CAD (prior stent), depression presents with AMS this morning, multifocal PNA and hypoglycemia. Admitted to ICU for q1 fingerstick and close monitoring. 66 y/o M w/ hx DM, ALS, CAD (prior stent), depression presents with AMS this morning, multifocal PNA and hypoglycemia. Admitted to ICU for q1 fingerstick and close monitoring. Pt with known h/o oropharyngeal dysphagia with silent aspiration across consistencies on MBS 6/20/2020. Recommendations at that time were NPO, with non-oral nutrition/hydration/medications. Subjective PO trials contraindicated given h/o silent aspiration. Pt requires objective swallow assessment to determine least restrictive diet. 66 y/o M w/ hx DM, ALS, CAD (prior stent), depression presents with AMS, multifocal PNA and hypoglycemia. Admitted to ICU for q1 fingerstick and close monitoring. Pt with known h/o oropharyngeal dysphagia with silent aspiration across consistencies on MBS 6/20/2020. Recommendations at that time were NPO, with non-oral nutrition/hydration/medications. Subjective PO trials contraindicated given h/o silent aspiration. Pt requires objective swallow assessment to determine least restrictive diet.

## 2021-06-30 NOTE — PROGRESS NOTE ADULT - SUBJECTIVE AND OBJECTIVE BOX
Leo Alba MD  PGY-1, Department of Internal Medicine  Pager: [phone] ([facility])   Pager: [phone] ([facility])    Patient is a 65y old  Male who presents with a chief complaint of     SUBJECTIVE / OVERNIGHT EVENTS: Patient stable overnight. FSBQ have been ok switched to Q4 checks. Patient passed bedside swallow. Spoke to family and proxies regarding DNR/DNI status and completed MOLST form. No CP, SOB, N, V, D.         MEDICATIONS  (STANDING):  ampicillin/sulbactam  IVPB      ampicillin/sulbactam  IVPB 1.5 Gram(s) IV Intermittent every 6 hours  aspirin enteric coated 81 milliGRAM(s) Oral daily  atorvastatin 80 milliGRAM(s) Oral at bedtime  bromocriptine Capsule 5 milliGRAM(s) Oral daily  dextrose 40% Gel 15 Gram(s) Oral once  dextrose 5% + lactated ringers. 1000 milliLiter(s) (50 mL/Hr) IV Continuous <Continuous>  dextrose 5%. 1000 milliLiter(s) (50 mL/Hr) IV Continuous <Continuous>  dextrose 5%. 1000 milliLiter(s) (100 mL/Hr) IV Continuous <Continuous>  dextrose 50% Injectable 25 Gram(s) IV Push once  dextrose 50% Injectable 12.5 Gram(s) IV Push once  dextrose 50% Injectable 25 Gram(s) IV Push once  glucagon  Injectable 1 milliGRAM(s) IntraMuscular once  heparin   Injectable 5000 Unit(s) SubCutaneous every 8 hours  insulin lispro (ADMELOG) corrective regimen sliding scale   SubCutaneous three times a day before meals  insulin lispro (ADMELOG) corrective regimen sliding scale   SubCutaneous at bedtime  polyethylene glycol 3350 17 Gram(s) Oral daily  riluzole 50 milliGRAM(s) Oral two times a day  senna 2 Tablet(s) Oral at bedtime  sertraline 50 milliGRAM(s) Oral daily    MEDICATIONS  (PRN):      I&O's Summary    2021 07:  -  2021 07:00  --------------------------------------------------------  IN: 1450 mL / OUT: 1300 mL / NET: 150 mL    2021 07:01  -  2021 11:53  --------------------------------------------------------  IN: 800 mL / OUT: 0 mL / NET: 800 mL        Vital Signs Last 24 Hrs  T(C): 37 (2021 11:00), Max: 37.4 (2021 12:15)  T(F): 98.6 (2021 11:00), Max: 99.4 (2021 12:15)  HR: 65 (2021 11:00) (64 - 86)  BP: 176/72 (2021 11:00) (100/56 - 189/89)  BP(mean): 104 (2021 11:00) (65 - 132)  RR: 22 (2021 11:00) (13 - 32)  SpO2: 99% (2021 11:00) (96% - 100%)    CAPILLARY BLOOD GLUCOSE      POCT Blood Glucose.: 162 mg/dL (2021 09:58)  POCT Blood Glucose.: 155 mg/dL (2021 07:45)  POCT Blood Glucose.: 192 mg/dL (2021 03:56)  POCT Blood Glucose.: 182 mg/dL (2021 23:56)  POCT Blood Glucose.: 187 mg/dL (2021 22:31)  POCT Blood Glucose.: 182 mg/dL (2021 20:51)  POCT Blood Glucose.: 159 mg/dL (2021 19:52)  POCT Blood Glucose.: 146 mg/dL (2021 18:59)  POCT Blood Glucose.: 124 mg/dL (2021 18:05)  POCT Blood Glucose.: 115 mg/dL (2021 17:12)  POCT Blood Glucose.: 70 mg/dL (2021 15:55)  POCT Blood Glucose.: 85 mg/dL (2021 15:11)  POCT Blood Glucose.: 113 mg/dL (2021 14:34)  POCT Blood Glucose.: 57 mg/dL (2021 14:01)  POCT Blood Glucose.: 45 mg/dL (2021 13:59)  POCT Blood Glucose.: 84 mg/dL (2021 13:00)  POCT Blood Glucose.: 142 mg/dL (2021 12:03)      PHYSICAL EXAM:  GENERAL: NAD,   HEAD:  Atraumatic, Normocephalic  EYES: EOMI, PERRL, conjunctiva and sclera clear  NECK: No JVD  CHEST/LUNG: Clear to auscultation bilaterally  HEART: Regular rate and rhythm; No murmurs, rubs, or gallops  ABDOMEN: Soft, Nontender, Nondistended; Bowel sounds present  EXTREMITIES:  2+ Peripheral Pulses, No clubbing, cyanosis, or edema  PSYCH: AAOx3  NEUROLOGY: non-focal, baseline strength  SKIN: No rashes or lesions       LABS:                        9.2    11.44 )-----------( 206      ( 2021 00:34 )             27.9     Auto Eosinophil # 0.06  / Auto Eosinophil % 0.5   / Auto Neutrophil # 9.07  / Auto Neutrophil % 79.3  / BANDS % x                            9.5    14.28 )-----------( 203      ( 2021 20:40 )             29.6     Auto Eosinophil # 0.06  / Auto Eosinophil % 0.4   / Auto Neutrophil # 11.06 / Auto Neutrophil % 77.4  / BANDS % x                            10.6   15.58 )-----------( 243      ( 2021 11:31 )             32.5     Auto Eosinophil # 0.00  / Auto Eosinophil % 0.0   / Auto Neutrophil # 13.83 / Auto Neutrophil % 88.8  / BANDS % x        06-30    133<L>  |  101  |  16  ----------------------------<  186<H>  4.1   |  21<L>  |  0.70  06-29    132<L>  |  100  |  17  ----------------------------<  179<H>  4.1   |  20<L>  |  0.70  06-29    134<L>  |  100  |  20  ----------------------------<  123<H>  3.7   |  22  |  0.77    Ca    8.8      2021 00:34  Mg     1.8       Phos  2.2     30  TPro  6.0  /  Alb  3.4  /  TBili  0.7  /  DBili  x   /  AST  27  /  ALT  32  /  AlkPhos  63  30  TPro  6.0  /  Alb  3.4  /  TBili  0.8  /  DBili  x   /  AST  32  /  ALT  37  /  AlkPhos  65  06-29  TPro  5.8<L>  /  Alb  3.3  /  TBili  0.4  /  DBili  x   /  AST  35  /  ALT  37  /  AlkPhos  61  -29    PT/INR - ( 2021 11:31 )   PT: 13.3 sec;   INR: 1.11 ratio         PTT - ( 2021 11:31 )  PTT:30.1 sec  CARDIAC MARKERS ( 2021 11:34 )  x     / x     / 35 U/L / x     / x          Urinalysis Basic - ( 2021 11:33 )    Color: Light Yellow / Appearance: Clear / S.019 / pH: x  Gluc: x / Ketone: Negative  / Bili: Negative / Urobili: 2 mg/dL   Blood: x / Protein: 100 / Nitrite: Negative   Leuk Esterase: Negative / RBC: x / WBC x   Sq Epi: x / Non Sq Epi: x / Bacteria: x            RADIOLOGY & ADDITIONAL TESTS:    Imaging Personally Reviewed:    Consultant(s) Notes Reviewed:      Care Discussed with Consultants/Other Providers:

## 2021-06-30 NOTE — SWALLOW BEDSIDE ASSESSMENT ADULT - SLP PERTINENT HISTORY OF CURRENT PROBLEM
ALLERGY: LACTOSE. 63 yo male h/o DM, ALS presenting with AMS around 2am in setting of cough for the past few days. Non-productive cough, denies fevers or shortness of breath. ED spoke to wife Maribel for collateral information. Woke up 2AM "jibberish" was coming out of him "squeezing sheets" making "weird noises" "unable to make eye contact" "made gurgling noises". Aide arrived 7:30AM who help clean up patient. lips looked dry and gave sip tea and then choked on tea. Called ALS nurse who suggested to call 911. EMS took FS 38, amp of D50 given which appeared to resolve symptoms. Last dose of glyburide 730am yesterday per patient. Per family, patient was more confused today. Denies any chest pain, pressure, nausea/vomiting, dizziness, syncope episodes, headache or visual changes.

## 2021-06-30 NOTE — PROGRESS NOTE ADULT - ASSESSMENT
66 y/o M w/ hx DM, ALS, CAD (prior stent), depression presents with AMS this morning, multifocal PNA and hypoglycemia. Admitted to ICU for q1 fingerstick and close monitoring.     #Neuro  -Hx ALS, A&Ox3  -Baseline extremity strength, can move upper extremities against gravity, minimal movement of BL LE  -On home riluzole, sertraline, bromocriptine    #Respiratory  Multifocal PNA, on RA, likely from aspiration  - CT chest non-con (6/29) with consolidative opacities within left lower lobe and nodular groundglass opacities within the right upper and lower lobes representing multifocal pneumonia.  - Switch from Zosyn to Unasyn  - D/c vanc    #CV  -Not on HTN meds  -No active issues at this time    #GI/Nutrition  - Passed bedside swallow  - Will start oral feeds, DC D5/LR    #/Renal/Fluids  -BUn/Cr wnl, monitor I/O  -Patient self caths declining griffith    #ID  -Multifocal PNA, persistent cough for past few days  -WBC 15  - Switch to Unasyn, D/C zosyn and vanc  - Follow cultures    #Endocrine  -Persistent hypoglycemia in setting of multifocal pna, recent sulfonylurea use (but over 24 hours ago)  -q4 FSBG    #Heme  - DVT ppx: SQH    #Dispo: ICU   66 y/o M w/ hx DM, ALS, CAD (prior stent), depression presents with AMS this morning, multifocal PNA and hypoglycemia. Admitted to ICU for q1 fingerstick and close monitoring.     #Neuro  -Hx ALS, A&Ox3  -Baseline extremity strength, can move upper extremities against gravity, minimal movement of BL LE  -On home riluzole, sertraline, bromocriptine    #Respiratory  Multifocal PNA, on RA, likely from aspiration  - CT chest non-con (6/29) with consolidative opacities within left lower lobe and nodular groundglass opacities within the right upper and lower lobes representing multifocal pneumonia.  - Switch from Zosyn to Unasyn  - MRSA swab pending if negative D/C vanc    #CV  -Not on HTN meds  -No active issues at this time    #GI/Nutrition  - Passed bedside swallow  - Will start oral feeds, DC D5/LR    #/Renal/Fluids  -BUn/Cr wnl, monitor I/O  -Patient self caths declining griffith    #ID  -Multifocal PNA, persistent cough for past few days  -WBC 15  - Switch to Unasyn, D/C zosyn  - MRSA swab pending, if negative D/C vanc.  - Follow cultures    #Endocrine  -Persistent hypoglycemia in setting of multifocal pna, recent sulfonylurea use (but over 24 hours ago)  -q4 FSBG    #Heme  - DVT ppx: SQH    #Dispo: ICU

## 2021-06-30 NOTE — PHYSICAL THERAPY INITIAL EVALUATION ADULT - DISCHARGE DISPOSITION, PT EVAL
pt declines Subacute Rehab; recommend home with home PT, pt owns RW, cane, wheelchair, hospital bed, commode, shower chair; pt has HHA 7days/8hours, recommend supervision & assist for all mobility and ADL's, ambulance for safe transfer into home/home/home w/ assist/home w/ home PT

## 2021-06-30 NOTE — PROVIDER CONTACT NOTE (HYPOGLYCEMIA EVENT) - NS PROVIDER CONTACT BACKGROUND-HYPO
Age: 65y    Gender: Male    POCT Blood Glucose:  326 mg/dL (06-30-21 @ 1555)  59 mg/dL (06-30-21 @ 15:46)  59 mg/dL (06-30-21 @ 15:41)  219 mg/dL (06-30-21 @ 12:07)  162 mg/dL (06-30-21 @ 09:58)  155 mg/dL (06-30-21 @ 07:45)  192 mg/dL (06-30-21 @ 03:56)  182 mg/dL (06-29-21 @ 23:56)  187 mg/dL (06-29-21 @ 22:31)      eMAR:  dextrose 50% Injectable   50 milliLiter(s) IV Push (06-29-21 @ 16:09)    insulin lispro (ADMELOG) corrective regimen sliding scale   2 Unit(s) SubCutaneous (06-30-21 @ 12:11)   1 Unit(s) SubCutaneous (06-30-21 @ 10:07)

## 2021-06-30 NOTE — PROGRESS NOTE ADULT - ATTENDING COMMENTS
65M w/ DM  (on sulfonylurea), CAD, ALS. Admitted w/ AMS due to hypogkycemia and multifocal pneumonia.     - pt is now awake and alert, back to baseline  - continue w/ home ALS meds- riluzole and bromocriptine; continue w/ sertraline  - HD stable  - satting well on RA  - found to have multifocal pneumonia on CT chest and pt has been having a cough; concern for aspiration when he was altered and coughed when he was eating and drinking - will de-escalate to unasyn to cover for aspiration pneumonia; f/u blood cx, MRSA PCR; plan for 5-7 days of abx  - hypoglycemia has resolved, likely due to sulfonylurea and poor PO intake; was on octreotide and D10; now off octreotide and on D5LR w/ FS >180; once ensure that pt is eating will d/c IVF; hold all oral DM meds; FS q4 for now w/ ISS coverage; endo consult   - passed bedside dysphagia screen; PO diet  - HSQ for ppx  - stable for downgrade to medical floor; prognosis guarded; DNR/DNI confirmed w/ HCP; PT consult 65M w/ DM  (on sulfonylurea), CAD, ALS. Admitted w/ AMS due to hypogkycemia and multifocal pneumonia.     - pt is now awake and alert, back to baseline  - continue w/ home ALS meds- riluzole and bromocriptine; continue w/ sertraline  - HD stable  - satting well on RA  - found to have multifocal pneumonia on CT chest and pt has been having a cough; concern for aspiration when he was altered and coughed when he was eating and drinking - will de-escalate to unasyn to cover for aspiration pneumonia; f/u blood cx, MRSA PCR; plan for 5-7 days of abx  - hypoglycemia has resolved, likely due to sulfonylurea and poor PO intake; was on octreotide and D10; now off octreotide and on D5LR w/ FS >180; once ensure that pt is eating will d/c IVF; hold all oral DM meds; FS q4 for now w/ ISS coverage; endo consult   - passed bedside dysphagia screen; PO diet  - normal renal function, Phos and Mg repleted  - HSQ for ppx  - stable for downgrade to medical floor; prognosis guarded; DNR/DNI confirmed w/ HCP; PT consult

## 2021-06-30 NOTE — CHART NOTE - NSCHARTNOTEFT_GEN_A_CORE
Endocrine consulted for hypoglycemia. Pt. appears to be no longer having hypoglycemia now with some hyperglycemia without clear pattern at this time. Would continue to monitor on low dose correction scale at this time. Will gain a better sense of glycemic control over the next 24 hour and if the patient remains above or below goal will provide full consultation with recommendations at that time.      Gino Maldonado D.O  [phone]

## 2021-06-30 NOTE — PHYSICAL THERAPY INITIAL EVALUATION ADULT - GENERAL OBSERVATIONS, REHAB EVAL
Pt received semisupine in bed, A&OX3, following commands, pleasant & willing to participate, hypophonic speech, a/w PNA, h/o ALS, +ICU monitoring.

## 2021-06-30 NOTE — PHYSICAL THERAPY INITIAL EVALUATION ADULT - IMPAIRMENTS CONTRIBUTING TO GAIT DEVIATIONS, PT EVAL
PT in front/impaired balance/impaired coordination/impaired motor control/impaired postural control/decreased strength

## 2021-06-30 NOTE — PHYSICAL THERAPY INITIAL EVALUATION ADULT - ADDITIONAL COMMENTS
Pt resides in private home with spouse, flight with handrail to enter, one level within, Pt resides in private home with spouse, flight with handrail to enter, one level within, PTA pt with HHA 7days/8hours, states that he was mostly wheelchair bound, could ambulate short distances with RW, owns  hospital bed, commode, shower chair.

## 2021-06-30 NOTE — SWALLOW BEDSIDE ASSESSMENT ADULT - SLP GENERAL OBSERVATIONS
Pt received in bed. Pt received in bed. Grossly A&Ox1-2. Reduced vocal volume. +Dysarthria. + confusion. + command following.

## 2021-06-30 NOTE — SWALLOW BEDSIDE ASSESSMENT ADULT - COMMENTS
Hx cont: While in ED, patient well-appearing and stable however had 2 episodes of hypoglycemia despite receiving up to x3 amps of D50. D10 @ 100. Last sulfonyulrea taken over 24 hours ago. [place]/vanc started, UA negative, has hx of self cath, x1 dose octreotide given. Will admit to MICU for q1 fingersticks. Pt with multifocal PNA, on RA, likely from aspiration and hypoglycemia. Baseline extremity strength, can move upper extremities against gravity, minimal movement of BL LE. A&Ox3. CT chest non-con with consolidative opacities within left lower lobe and nodular groundglass opacities within the right upper and lower lobes representing multifocal pneumonia. 6/30: MICU:  Patient stable overnight. FSBQ have been ok switched to Q4 checks. Patient passed bedside swallow. Started diet. Spoke to family and proxies regarding DNR/DNI status and completed MOLST form. Pt is now awake and alert, back to baseline. Concern for aspiration when he was altered and coughed when he was eating and drinking - will de-escalate to unasyn to cover for aspiration pneumonia. Stable for downgrade to medical floor; prognosis guarded; DNR/DNI confirmed w/ HCP. Follow up with palliative as family is interested in hospice care.    SWALLOW  HX: 6/20/20 Patient presented with an oropharyngeal dysphagia characterized by premature spillage into the pharynx prior to airway closure, delayed trigger of the swallow, laryngeal penetration without retrieval with honey thick liquids, and silent aspiration of purees, solids, nectar thick liquids, and thin liquids. A chin tuck and cued cough was not effective in maintaining airway protection. Rx: NPO, with non-oral nutrition/hydration/medications. Discussed recommendations with patient who stated he does not wish to pursue alternative means of nutrition (e.g., PEG) despite risks/complications of aspiration. MD Kohli (T3) informed of same. Suggest GOC conversation with MD to confirm and document pt/family wishes re: provision of nutrition.

## 2021-07-01 DIAGNOSIS — E78.5 HYPERLIPIDEMIA, UNSPECIFIED: ICD-10-CM

## 2021-07-01 DIAGNOSIS — E11.9 TYPE 2 DIABETES MELLITUS WITHOUT COMPLICATIONS: ICD-10-CM

## 2021-07-01 DIAGNOSIS — E16.2 HYPOGLYCEMIA, UNSPECIFIED: ICD-10-CM

## 2021-07-01 DIAGNOSIS — I10 ESSENTIAL (PRIMARY) HYPERTENSION: ICD-10-CM

## 2021-07-01 LAB
ALBUMIN SERPL ELPH-MCNC: 3.4 G/DL — SIGNIFICANT CHANGE UP (ref 3.3–5)
ALP SERPL-CCNC: 63 U/L — SIGNIFICANT CHANGE UP (ref 40–120)
ALT FLD-CCNC: 23 U/L — SIGNIFICANT CHANGE UP (ref 10–45)
ANION GAP SERPL CALC-SCNC: 11 MMOL/L — SIGNIFICANT CHANGE UP (ref 5–17)
AST SERPL-CCNC: 16 U/L — SIGNIFICANT CHANGE UP (ref 10–40)
BASE EXCESS BLDV CALC-SCNC: 2.3 MMOL/L — HIGH (ref -2–2)
BASOPHILS # BLD AUTO: 0.02 K/UL — SIGNIFICANT CHANGE UP (ref 0–0.2)
BASOPHILS NFR BLD AUTO: 0.2 % — SIGNIFICANT CHANGE UP (ref 0–2)
BILIRUB SERPL-MCNC: 0.5 MG/DL — SIGNIFICANT CHANGE UP (ref 0.2–1.2)
BUN SERPL-MCNC: 15 MG/DL — SIGNIFICANT CHANGE UP (ref 7–23)
CA-I SERPL-SCNC: 1.18 MMOL/L — SIGNIFICANT CHANGE UP (ref 1.12–1.3)
CALCIUM SERPL-MCNC: 8.9 MG/DL — SIGNIFICANT CHANGE UP (ref 8.4–10.5)
CHLORIDE BLDV-SCNC: 100 MMOL/L — SIGNIFICANT CHANGE UP (ref 96–108)
CHLORIDE SERPL-SCNC: 99 MMOL/L — SIGNIFICANT CHANGE UP (ref 96–108)
CO2 BLDV-SCNC: 27 MMOL/L — SIGNIFICANT CHANGE UP (ref 22–30)
CO2 SERPL-SCNC: 22 MMOL/L — SIGNIFICANT CHANGE UP (ref 22–31)
CREAT SERPL-MCNC: 0.85 MG/DL — SIGNIFICANT CHANGE UP (ref 0.5–1.3)
EOSINOPHIL # BLD AUTO: 0.11 K/UL — SIGNIFICANT CHANGE UP (ref 0–0.5)
EOSINOPHIL NFR BLD AUTO: 1.1 % — SIGNIFICANT CHANGE UP (ref 0–6)
GAS PNL BLDV: 133 MMOL/L — LOW (ref 135–145)
GAS PNL BLDV: SIGNIFICANT CHANGE UP
GAS PNL BLDV: SIGNIFICANT CHANGE UP
GLUCOSE BLDC GLUCOMTR-MCNC: 105 MG/DL — HIGH (ref 70–99)
GLUCOSE BLDC GLUCOMTR-MCNC: 106 MG/DL — HIGH (ref 70–99)
GLUCOSE BLDC GLUCOMTR-MCNC: 114 MG/DL — HIGH (ref 70–99)
GLUCOSE BLDC GLUCOMTR-MCNC: 114 MG/DL — HIGH (ref 70–99)
GLUCOSE BLDC GLUCOMTR-MCNC: 122 MG/DL — HIGH (ref 70–99)
GLUCOSE BLDC GLUCOMTR-MCNC: 134 MG/DL — HIGH (ref 70–99)
GLUCOSE BLDV-MCNC: 109 MG/DL — HIGH (ref 70–99)
GLUCOSE SERPL-MCNC: 110 MG/DL — HIGH (ref 70–99)
HCO3 BLDV-SCNC: 26 MMOL/L — SIGNIFICANT CHANGE UP (ref 21–29)
HCT VFR BLD CALC: 27.9 % — LOW (ref 39–50)
HCT VFR BLDA CALC: 30 % — LOW (ref 39–50)
HGB BLD CALC-MCNC: 9.6 G/DL — LOW (ref 13–17)
HGB BLD-MCNC: 9.2 G/DL — LOW (ref 13–17)
HOROWITZ INDEX BLDV+IHG-RTO: 21 — SIGNIFICANT CHANGE UP
IMM GRANULOCYTES NFR BLD AUTO: 0.6 % — SIGNIFICANT CHANGE UP (ref 0–1.5)
LACTATE BLDV-MCNC: 0.7 MMOL/L — SIGNIFICANT CHANGE UP (ref 0.7–2)
LYMPHOCYTES # BLD AUTO: 1.53 K/UL — SIGNIFICANT CHANGE UP (ref 1–3.3)
LYMPHOCYTES # BLD AUTO: 15 % — SIGNIFICANT CHANGE UP (ref 13–44)
MAGNESIUM SERPL-MCNC: 2 MG/DL — SIGNIFICANT CHANGE UP (ref 1.6–2.6)
MCHC RBC-ENTMCNC: 28 PG — SIGNIFICANT CHANGE UP (ref 27–34)
MCHC RBC-ENTMCNC: 33 GM/DL — SIGNIFICANT CHANGE UP (ref 32–36)
MCV RBC AUTO: 84.8 FL — SIGNIFICANT CHANGE UP (ref 80–100)
MONOCYTES # BLD AUTO: 0.82 K/UL — SIGNIFICANT CHANGE UP (ref 0–0.9)
MONOCYTES NFR BLD AUTO: 8.1 % — SIGNIFICANT CHANGE UP (ref 2–14)
NEUTROPHILS # BLD AUTO: 7.64 K/UL — HIGH (ref 1.8–7.4)
NEUTROPHILS NFR BLD AUTO: 75 % — SIGNIFICANT CHANGE UP (ref 43–77)
NRBC # BLD: 0 /100 WBCS — SIGNIFICANT CHANGE UP (ref 0–0)
OTHER CELLS CSF MANUAL: 11 ML/DL — LOW (ref 18–22)
PCO2 BLDV: 39 MMHG — SIGNIFICANT CHANGE UP (ref 35–50)
PH BLDV: 7.44 — SIGNIFICANT CHANGE UP (ref 7.35–7.45)
PHOSPHATE SERPL-MCNC: 3.1 MG/DL — SIGNIFICANT CHANGE UP (ref 2.5–4.5)
PLATELET # BLD AUTO: 227 K/UL — SIGNIFICANT CHANGE UP (ref 150–400)
PO2 BLDV: 52 MMHG — HIGH (ref 25–45)
POTASSIUM BLDV-SCNC: 3.8 MMOL/L — SIGNIFICANT CHANGE UP (ref 3.5–5.3)
POTASSIUM SERPL-MCNC: 4 MMOL/L — SIGNIFICANT CHANGE UP (ref 3.5–5.3)
POTASSIUM SERPL-SCNC: 4 MMOL/L — SIGNIFICANT CHANGE UP (ref 3.5–5.3)
PROT SERPL-MCNC: 6.4 G/DL — SIGNIFICANT CHANGE UP (ref 6–8.3)
RBC # BLD: 3.29 M/UL — LOW (ref 4.2–5.8)
RBC # FLD: 14.3 % — SIGNIFICANT CHANGE UP (ref 10.3–14.5)
SAO2 % BLDV: 84 % — SIGNIFICANT CHANGE UP (ref 67–88)
SODIUM SERPL-SCNC: 132 MMOL/L — LOW (ref 135–145)
WBC # BLD: 10.18 K/UL — SIGNIFICANT CHANGE UP (ref 3.8–10.5)
WBC # FLD AUTO: 10.18 K/UL — SIGNIFICANT CHANGE UP (ref 3.8–10.5)

## 2021-07-01 PROCEDURE — 99222 1ST HOSP IP/OBS MODERATE 55: CPT

## 2021-07-01 PROCEDURE — 99233 SBSQ HOSP IP/OBS HIGH 50: CPT | Mod: GC

## 2021-07-01 RX ORDER — INSULIN LISPRO 100/ML
VIAL (ML) SUBCUTANEOUS
Refills: 0 | Status: DISCONTINUED | OUTPATIENT
Start: 2021-07-01 | End: 2021-07-06

## 2021-07-01 RX ORDER — SENNA PLUS 8.6 MG/1
2 TABLET ORAL AT BEDTIME
Refills: 0 | Status: DISCONTINUED | OUTPATIENT
Start: 2021-07-01 | End: 2021-07-06

## 2021-07-01 RX ADMIN — RILUZOLE 50 MILLIGRAM(S): 50 TABLET ORAL at 17:34

## 2021-07-01 RX ADMIN — POLYETHYLENE GLYCOL 3350 17 GRAM(S): 17 POWDER, FOR SOLUTION ORAL at 11:47

## 2021-07-01 RX ADMIN — HEPARIN SODIUM 5000 UNIT(S): 5000 INJECTION INTRAVENOUS; SUBCUTANEOUS at 13:11

## 2021-07-01 RX ADMIN — SERTRALINE 50 MILLIGRAM(S): 25 TABLET, FILM COATED ORAL at 11:47

## 2021-07-01 RX ADMIN — AMPICILLIN SODIUM AND SULBACTAM SODIUM 100 GRAM(S): 250; 125 INJECTION, POWDER, FOR SUSPENSION INTRAMUSCULAR; INTRAVENOUS at 23:23

## 2021-07-01 RX ADMIN — BROMOCRIPTINE MESYLATE 5 MILLIGRAM(S): 5 CAPSULE ORAL at 11:47

## 2021-07-01 RX ADMIN — AMPICILLIN SODIUM AND SULBACTAM SODIUM 100 GRAM(S): 250; 125 INJECTION, POWDER, FOR SUSPENSION INTRAMUSCULAR; INTRAVENOUS at 17:12

## 2021-07-01 RX ADMIN — HEPARIN SODIUM 5000 UNIT(S): 5000 INJECTION INTRAVENOUS; SUBCUTANEOUS at 05:39

## 2021-07-01 RX ADMIN — AMPICILLIN SODIUM AND SULBACTAM SODIUM 100 GRAM(S): 250; 125 INJECTION, POWDER, FOR SUSPENSION INTRAMUSCULAR; INTRAVENOUS at 05:38

## 2021-07-01 RX ADMIN — AMPICILLIN SODIUM AND SULBACTAM SODIUM 100 GRAM(S): 250; 125 INJECTION, POWDER, FOR SUSPENSION INTRAMUSCULAR; INTRAVENOUS at 11:46

## 2021-07-01 RX ADMIN — RILUZOLE 50 MILLIGRAM(S): 50 TABLET ORAL at 05:39

## 2021-07-01 RX ADMIN — ATORVASTATIN CALCIUM 80 MILLIGRAM(S): 80 TABLET, FILM COATED ORAL at 22:16

## 2021-07-01 RX ADMIN — AMPICILLIN SODIUM AND SULBACTAM SODIUM 100 GRAM(S): 250; 125 INJECTION, POWDER, FOR SUSPENSION INTRAMUSCULAR; INTRAVENOUS at 00:51

## 2021-07-01 RX ADMIN — Medication 81 MILLIGRAM(S): at 11:46

## 2021-07-01 RX ADMIN — HEPARIN SODIUM 5000 UNIT(S): 5000 INJECTION INTRAVENOUS; SUBCUTANEOUS at 22:16

## 2021-07-01 NOTE — DIETITIAN INITIAL EVALUATION ADULT. - NUTRITION DIAGNOSTIC #1
Patient needs to reestablish with the office it is almost been a year since she has been seen.  I cannot refill this medication meloxicam for patient   Statement Selected

## 2021-07-01 NOTE — DIETITIAN NUTRITION RISK NOTIFICATION - TREATMENT: THE FOLLOWING DIET HAS BEEN RECOMMENDED
Diet, Pureed:   Supplement Feeding Modality:  Oral  Ensure Clear Cans or Servings Per Day:  3       Frequency:  Daily (07-01-21 @ 14:56) [Active]

## 2021-07-01 NOTE — DIETITIAN INITIAL EVALUATION ADULT. - PERTINENT MEDS FT
Aspirin, Heparin, Unasyn, D5%+LR, Insulin Lispro, Lipitor, Parlodel, Miralax, Riluzole, Senna, Zoloft

## 2021-07-01 NOTE — CONSULT NOTE ADULT - ASSESSMENT
RAHUL VITALE is a 65y old man with Type 2 DM, well controlled, A1C 5.6%, ALS, HLD, presenting with AMS, found to have hypoglycemia and pneumonia.

## 2021-07-01 NOTE — CONSULT NOTE ADULT - SUBJECTIVE AND OBJECTIVE BOX
HPI:  HPI:  65 yo male h/o DM, ALS presenting with AMS around 2am in setting of cough for the past few days. Non-productive cough, denies fevers or shortness of breath. ED spoke to wife Maribel for collateral information. Woke up 2AM "jibberish" was coming out of him "squeezing sheets" making "weird noises" "unable to make eye contact" "made gurgling noises". Aide arrived 7:30AM who help clean up patient. lips looked dry and gave sip tea and then choked on tea. Called ALS nurse who suggested to call 911. EMS took FS 38, amp of D50 given which appeared to resolve symptoms. Last dose of glyburide 730am yesterday per patient. Per family, patient was more confused today. Denies any chest pain, pressure, nausea/vomiting, dizziness, syncope episodes, headache or visual changes.     While in ED, patient well-appearing and stable however had 2 episodes of hypoglycemia despite receiving up to x3 amps of D50. D10 @ 100. Last sulfonyulrea taken over 24 hours ago. Lakeshia/vanc started, UA negative, has hx of self cath, x1 dose octreotide given. Will admit to MICU for q1 fingersticks.     VITAL SIGNS:  ICU Vital Signs Last 24 Hrs  T(C): 36.3 (2021 18:05), Max: 39.1 (2021 10:58)  T(F): 97.4 (2021 18:05), Max: 102.3 (2021 10:58)  HR: 75 (2021 18:05) (75 - 94)  BP: 150/80 (2021 18:05) (100/56 - 155/74)  BP(mean): 89 (2021 18:05) (65 - 97)  ABP: --  ABP(mean): --  RR: 22 (2021 18:05) (18 - 28)  SpO2: 98% (2021 18:05) (96% - 99%)    CAPILLARY BLOOD GLUCOSE    POCT Blood Glucose.: 124 mg/dL (2021 18:05)    PHYSICAL EXAM:    General: NAD  HEENT: NC/AT; PERRL, clear conjunctiva  Neck: supple  Respiratory: CTA b/l  Cardiovascular: +S1/S2; RRR  Abdomen: soft, NT/ND; +BS x4  Extremities: WWP, 2+ peripheral pulses b/l; no LE edema  Skin: normal color and turgor; no rash  Neurological: A&Ox3    MEDICATIONS:  MEDICATIONS  (STANDING):  dextrose 10% + sodium chloride 0.9%. 1000 milliLiter(s) (100 mL/Hr) IV Continuous <Continuous>    MEDICATIONS  (PRN):    ALLERGIES:  Allergies    lactose (Diarrhea)  No Known Drug Allergies    Intolerances    LABS:                        10.6   15.58 )-----------( 243      ( 2021 11:31 )             32.5         134<L>  |  100  |  20  ----------------------------<  123<H>  3.7   |  22  |  0.77    Ca    8.5      2021 14:27  Mg     1.9         TPro  5.8<L>  /  Alb  3.3  /  TBili  0.4  /  DBili  x   /  AST  35  /  ALT  37  /  AlkPhos  61  -    PT/INR - ( 2021 11:31 )   PT: 13.3 sec;   INR: 1.11 ratio         PTT - ( 2021 11:31 )  PTT:30.1 sec  Urinalysis Basic - ( 2021 11:33 )    Color: Light Yellow / Appearance: Clear / S.019 / pH: x  Gluc: x / Ketone: Negative  / Bili: Negative / Urobili: 2 mg/dL   Blood: x / Protein: 100 / Nitrite: Negative   Leuk Esterase: Negative / RBC: x / WBC x   Sq Epi: x / Non Sq Epi: x / Bacteria: x    RADIOLOGY & ADDITIONAL TESTS: Reviewed. (2021 18:43)      PAST MEDICAL & SURGICAL HISTORY:  Dyslipidemia    Deviated nasal septum    Prolactinoma    Hypertension    Type 2 diabetes mellitus    HLD (hyperlipidemia)    H/O nasal septoplasty    History of tonsillectomy        FAMILY HISTORY:  Family history of Hodgkins disease    Family history of Alzheimer&#x27;s disease        Social History:    Outpatient Medications:      MEDICATIONS  (STANDING):  ampicillin/sulbactam  IVPB      ampicillin/sulbactam  IVPB 1.5 Gram(s) IV Intermittent every 6 hours  aspirin enteric coated 81 milliGRAM(s) Oral daily  atorvastatin 80 milliGRAM(s) Oral at bedtime  bromocriptine Capsule 5 milliGRAM(s) Oral daily  dextrose 40% Gel 15 Gram(s) Oral once  dextrose 5% + lactated ringers. 1000 milliLiter(s) (50 mL/Hr) IV Continuous <Continuous>  dextrose 5%. 1000 milliLiter(s) (50 mL/Hr) IV Continuous <Continuous>  dextrose 5%. 1000 milliLiter(s) (100 mL/Hr) IV Continuous <Continuous>  dextrose 50% Injectable 25 Gram(s) IV Push once  dextrose 50% Injectable 12.5 Gram(s) IV Push once  dextrose 50% Injectable 25 Gram(s) IV Push once  glucagon  Injectable 1 milliGRAM(s) IntraMuscular once  heparin   Injectable 5000 Unit(s) SubCutaneous every 8 hours  insulin lispro (ADMELOG) corrective regimen sliding scale   SubCutaneous three times a day before meals  insulin lispro (ADMELOG) corrective regimen sliding scale   SubCutaneous at bedtime  polyethylene glycol 3350 17 Gram(s) Oral daily  riluzole 50 milliGRAM(s) Oral two times a day  senna 2 Tablet(s) Oral at bedtime  sertraline 50 milliGRAM(s) Oral daily    MEDICATIONS  (PRN):      Allergies    lactose (Diarrhea)  No Known Drug Allergies    Intolerances      Review of Systems:  Constitutional: No fever  Eyes: No blurry vision  Neuro: No tremors  HEENT: No pain  Cardiovascular: No chest pain, palpitations  Respiratory: No SOB, no cough  GI: No nausea, vomiting, abdominal pain  : No dysuria  Skin: no rash  Psych: no depression  Endocrine: no polyuria, polydipsia  Hem/lymph: no swelling  Osteoporosis: no fractures    ALL OTHER SYSTEMS REVIEWED AND NEGATIVE    UNABLE TO OBTAIN    PHYSICAL EXAM:  -----------------------------  VITALS: T(C): 36.9 (21 @ 07:00)  T(F): 98.5 (21 @ 07:00), Max: 98.6 (21 @ 11:00)  HR: 72 (21 @ 08:00) (62 - 74)  BP: 151/90 (21 @ 08:00) (128/58 - 176/72)  RR:  (16 - 40)  SpO2:  (94% - 100%)  Wt(kg): --  GENERAL: NAD, well-groomed, well-developed  EYES: No proptosis, no lid lag, anicteric  HEENT:  Atraumatic, Normocephalic, moist mucous membranes  THYROID: Normal size, no palpable nodules  RESPIRATORY: Clear to auscultation bilaterally; No rales, rhonchi, wheezing, or rubs  CARDIOVASCULAR: Regular rate and rhythm; No murmurs; no peripheral edema  GI: Soft, nontender, non distended, normal bowel sounds  SKIN: Dry, intact, No rashes or lesions  MUSCULOSKELETAL: Full range of motion, normal strength  NEURO: sensation intact, extraocular movements intact, no tremor, normal reflexes  PSYCH: Alert and oriented x 3, normal affect, normal mood  CUSHING'S SIGNS: no striae    POCT Blood Glucose.: 114 mg/dL (21 @ 06:37)  POCT Blood Glucose.: 106 mg/dL (21 @ 05:19)  POCT Blood Glucose.: 112 mg/dL (21 @ 22:14)  POCT Blood Glucose.: 116 mg/dL (21 @ 20:55)  POCT Blood Glucose.: 112 mg/dL (21 @ 19:36)  POCT Blood Glucose.: 119 mg/dL (21 @ 18:41)  POCT Blood Glucose.: 125 mg/dL (21 @ 17:23)  POCT Blood Glucose.: 165 mg/dL (21 @ 16:31)  POCT Blood Glucose.: 195 mg/dL (21 @ 16:15)  POCT Blood Glucose.: 326 mg/dL (21 @ 15:55)  POCT Blood Glucose.: 59 mg/dL (21 @ 15:46)  POCT Blood Glucose.: 59 mg/dL (21 @ 15:41)  POCT Blood Glucose.: 219 mg/dL (21 @ 12:07)  POCT Blood Glucose.: 162 mg/dL (21 @ 09:58)  POCT Blood Glucose.: 155 mg/dL (21 @ 07:45)  POCT Blood Glucose.: 192 mg/dL (21 @ 03:56)  POCT Blood Glucose.: 182 mg/dL (21 @ 23:56)  POCT Blood Glucose.: 187 mg/dL (21 @ 22:31)  POCT Blood Glucose.: 182 mg/dL (21 @ 20:51)  POCT Blood Glucose.: 159 mg/dL (21 @ 19:52)  POCT Blood Glucose.: 146 mg/dL (21 @ 18:59)  POCT Blood Glucose.: 124 mg/dL (21 @ 18:05)  POCT Blood Glucose.: 115 mg/dL (21 @ 17:12)  POCT Blood Glucose.: 70 mg/dL (21 @ 15:55)  POCT Blood Glucose.: 85 mg/dL (21 @ 15:11)  POCT Blood Glucose.: 113 mg/dL (21 @ 14:34)  POCT Blood Glucose.: 57 mg/dL (21 @ 14:01)  POCT Blood Glucose.: 45 mg/dL (21 @ 13:59)  POCT Blood Glucose.: 84 mg/dL (21 @ 13:00)  POCT Blood Glucose.: 142 mg/dL (21 @ 12:03)  POCT Blood Glucose.: 99 mg/dL (21 @ 10:39)                            9.2    10.18 )-----------( 227      ( 2021 00:53 )             27.9           132<L>  |  99  |  15  ----------------------------<  110<H>  4.0   |  22  |  0.85    EGFR if : 104  EGFR if non : 90    Ca    8.9        Mg     2.0       Phos  3.1         TPro  6.4  /  Alb  3.4  /  TBili  0.5  /  DBili  x   /  AST  16  /  ALT  23  /  AlkPhos  63        Thyroid Function Tests:      A1C with Estimated Average Glucose Result: 5.4 % (21 @ 10:03)  A1C with Estimated Average Glucose Result: 5.4 % (21 @ 01:45)          Radiology:   ------------------------               HPI:  63 yo male h/o DM, ALS presenting with AMS around 2am in setting of cough for the past few days. Non-productive cough, denies fevers or shortness of breath. ED spoke to wife Maribel for collateral information. Woke up 2AM "jibberish" was coming out of him "squeezing sheets" making "weird noises" "unable to make eye contact" "made gurgling noises". Aide arrived 7:30AM who help clean up patient. lips looked dry and gave sip tea and then choked on tea. Called ALS nurse who suggested to call 911. EMS took FS 38, amp of D50 given which appeared to resolve symptoms. Last dose of glyburide 730am yesterday per patient. Per family, patient was more confused today. Denies any chest pain, pressure, nausea/vomiting, dizziness, syncope episodes, headache or visual changes.     While in ED, patient well-appearing and stable however had 2 episodes of hypoglycemia despite receiving up to x3 amps of D50. D10 @ 100. Last sulfonylurea taken over 24 hours ago. Lakeshia/vanc started, UA negative, has hx of self cath, x1 dose octreotide given. Will admit to MICU for q1 fingersticks.     Patient was diagnosed with Type 2 DM for about 20 years.  Per his wife, he had not been very compliance with his DM treatment including only taking his insulin therapy intermittently.  DM complicated by history of CAD s/p stent.  He was diagnosed with ALS around Dec 2019, had been following with neurologist instead of following up with his previous endocrinologist.  He has a visiting NP as primary care doctor and the NP had been managing his DM regimen.  He was having symptoms of diarrhea for about 1 month, he was on metformin 500mg bid at the time.  He was started on glyburide 2.5mg once daily last Saturday. The last does of glyburide was at 7:30am in the morning prior to admission when he woke up, altered, and found to have hypoglycemia.  Patient was treated with Octreotide and now glucose in normal range.      Per wife, patient also has been not adherent to thickened liquid diet.  Spoke with patient's daughter in law Jenna Galarza who is a PA in [place].  They do not wish for aggressive treatment of type 2 DM.       PAST MEDICAL & SURGICAL HISTORY:  Dyslipidemia  Deviated nasal septum  Prolactinoma  Hypertension  Type 2 diabetes mellitus  HLD (hyperlipidemia)  H/O nasal septoplasty  History of tonsillectomy        FAMILY HISTORY:  Family history of Hodgkins disease  Family history of Alzheimer&#x27;s disease -Father     Social History:  Retired  No alcohol  No drugs  Former smoker but quitted when he was 15 years old.     Home Medications:  aspirin 81 mg oral delayed release tablet: 1 tab(s) orally once a day (09 Jul 2020 15:26)  bromocriptine 5 mg oral capsule: 1 cap(s) orally once a day (09 Jul 2020 15:26)  Lipitor 80 mg oral tablet: 1 tab(s) orally once a day (at bedtime) (17 Jun 2020 17:05)  polyethylene glycol 3350 oral powder for reconstitution: 17 gram(s) orally once a day (09 Jul 2020 16:14)  riluzole 50 mg oral tablet: 1 tab(s) orally 2 times a day (before meals) (09 Jul 2020 16:14)  senna oral tablet: 2 tab(s) orally once a day (at bedtime) (09 Jul 2020 16:14)  sertraline 50 mg oral tablet: 1 tab(s) orally once a day (09 Jul 2020 15:26)    MEDICATIONS  (STANDING):  ampicillin/sulbactam  IVPB      ampicillin/sulbactam  IVPB 1.5 Gram(s) IV Intermittent every 6 hours  aspirin enteric coated 81 milliGRAM(s) Oral daily  atorvastatin 80 milliGRAM(s) Oral at bedtime  bromocriptine Capsule 5 milliGRAM(s) Oral daily  dextrose 40% Gel 15 Gram(s) Oral once  dextrose 5% + lactated ringers. 1000 milliLiter(s) (50 mL/Hr) IV Continuous <Continuous>  dextrose 5%. 1000 milliLiter(s) (50 mL/Hr) IV Continuous <Continuous>  dextrose 5%. 1000 milliLiter(s) (100 mL/Hr) IV Continuous <Continuous>  dextrose 50% Injectable 25 Gram(s) IV Push once  dextrose 50% Injectable 12.5 Gram(s) IV Push once  dextrose 50% Injectable 25 Gram(s) IV Push once  glucagon  Injectable 1 milliGRAM(s) IntraMuscular once  heparin   Injectable 5000 Unit(s) SubCutaneous every 8 hours  insulin lispro (ADMELOG) corrective regimen sliding scale   SubCutaneous three times a day before meals  insulin lispro (ADMELOG) corrective regimen sliding scale   SubCutaneous at bedtime  polyethylene glycol 3350 17 Gram(s) Oral daily  riluzole 50 milliGRAM(s) Oral two times a day  senna 2 Tablet(s) Oral at bedtime  sertraline 50 milliGRAM(s) Oral daily    MEDICATIONS  (PRN):      Allergies    lactose (Diarrhea)  No Known Drug Allergies    Intolerances      Review of Systems:  Constitutional: No fever  Eyes: No blurry vision  Neuro: No tremors  HEENT: No pain  Cardiovascular: No chest pain, palpitations  Respiratory: No SOB, no cough  GI: No nausea, vomiting, abdominal pain  : No dysuria  Skin: no rash  Psych: no depression  Endocrine: no polyuria, polydipsia  Hem/lymph: no swelling  Osteoporosis: no fractures    ALL OTHER SYSTEMS REVIEWED AND NEGATIVE    UNABLE TO OBTAIN    PHYSICAL EXAM:  -----------------------------  VITALS: T(C): 36.9 (07-01-21 @ 07:00)  T(F): 98.5 (07-01-21 @ 07:00), Max: 98.6 (06-30-21 @ 11:00)  HR: 72 (07-01-21 @ 08:00) (62 - 74)  BP: 151/90 (07-01-21 @ 08:00) (128/58 - 176/72)  RR:  (16 - 40)  SpO2:  (94% - 100%)  Wt(kg): --  GENERAL: NAD, well-groomed, well-developed  EYES: No proptosis, no lid lag, anicteric  HEENT:  Atraumatic, Normocephalic, moist mucous membranes  THYROID: Normal size, no palpable nodules  RESPIRATORY: Clear to auscultation bilaterally; No rales, rhonchi, wheezing, or rubs  CARDIOVASCULAR: Regular rate and rhythm; No murmurs; no peripheral edema  GI: Soft, nontender, non distended, normal bowel sounds  SKIN: Dry, intact, No rashes or lesions  MUSCULOSKELETAL: Full range of motion, normal strength  NEURO: sensation intact, extraocular movements intact, no tremor, normal reflexes  PSYCH: Alert and oriented x 3, normal affect, normal mood  CUSHING'S SIGNS: no striae    POCT Blood Glucose.: 114 mg/dL (07-01-21 @ 06:37)  POCT Blood Glucose.: 106 mg/dL (07-01-21 @ 05:19)  POCT Blood Glucose.: 112 mg/dL (06-30-21 @ 22:14)  POCT Blood Glucose.: 116 mg/dL (06-30-21 @ 20:55)  POCT Blood Glucose.: 112 mg/dL (06-30-21 @ 19:36)  POCT Blood Glucose.: 119 mg/dL (06-30-21 @ 18:41)  POCT Blood Glucose.: 125 mg/dL (06-30-21 @ 17:23)  POCT Blood Glucose.: 165 mg/dL (06-30-21 @ 16:31)  POCT Blood Glucose.: 195 mg/dL (06-30-21 @ 16:15)  POCT Blood Glucose.: 326 mg/dL (06-30-21 @ 15:55)  POCT Blood Glucose.: 59 mg/dL (06-30-21 @ 15:46)  POCT Blood Glucose.: 59 mg/dL (06-30-21 @ 15:41)  POCT Blood Glucose.: 219 mg/dL (06-30-21 @ 12:07)  POCT Blood Glucose.: 162 mg/dL (06-30-21 @ 09:58)  POCT Blood Glucose.: 155 mg/dL (06-30-21 @ 07:45)  POCT Blood Glucose.: 192 mg/dL (06-30-21 @ 03:56)  POCT Blood Glucose.: 182 mg/dL (06-29-21 @ 23:56)  POCT Blood Glucose.: 187 mg/dL (06-29-21 @ 22:31)  POCT Blood Glucose.: 182 mg/dL (06-29-21 @ 20:51)  POCT Blood Glucose.: 159 mg/dL (06-29-21 @ 19:52)  POCT Blood Glucose.: 146 mg/dL (06-29-21 @ 18:59)  POCT Blood Glucose.: 124 mg/dL (06-29-21 @ 18:05)  POCT Blood Glucose.: 115 mg/dL (06-29-21 @ 17:12)  POCT Blood Glucose.: 70 mg/dL (06-29-21 @ 15:55)  POCT Blood Glucose.: 85 mg/dL (06-29-21 @ 15:11)  POCT Blood Glucose.: 113 mg/dL (06-29-21 @ 14:34)  POCT Blood Glucose.: 57 mg/dL (06-29-21 @ 14:01)  POCT Blood Glucose.: 45 mg/dL (06-29-21 @ 13:59)  POCT Blood Glucose.: 84 mg/dL (06-29-21 @ 13:00)  POCT Blood Glucose.: 142 mg/dL (06-29-21 @ 12:03)  POCT Blood Glucose.: 99 mg/dL (06-29-21 @ 10:39)                            9.2    10.18 )-----------( 227      ( 01 Jul 2021 00:53 )             27.9       07-01    132<L>  |  99  |  15  ----------------------------<  110<H>  4.0   |  22  |  0.85    EGFR if : 104  EGFR if non : 90    Ca    8.9      07-01  Mg     2.0     07-01  Phos  3.1     07-01    TPro  6.4  /  Alb  3.4  /  TBili  0.5  /  DBili  x   /  AST  16  /  ALT  23  /  AlkPhos  63  07-01      Thyroid Function Tests:      A1C with Estimated Average Glucose Result: 5.4 % (06-30-21 @ 10:03)  A1C with Estimated Average Glucose Result: 5.4 % (06-30-21 @ 01:45)      Radiology:   ------------------------

## 2021-07-01 NOTE — DIETITIAN INITIAL EVALUATION ADULT. - ADD RECOMMEND
1) Defer consistency to diet to medical team, SLP. Recommend continue no therapeutic restrictions. 2) RD to encourage intake of PO diet of appropriate consistency; honor dietary preferences as expressed as able. 3) RD to add Mighty Shakes TID (chocolate) with meals, chocolate pudding. 4) Consider multivitamin if no medication contraindications noted. 5) Monitor wt trends, labs, skin, hydration status and bowel regularity.

## 2021-07-01 NOTE — DIETITIAN INITIAL EVALUATION ADULT. - ORAL INTAKE PTA/DIET HISTORY
Spouse present; able to provide diet history. Reports pt with extensive hx of non-compliance to recommended modified consistency diets and mainly consumes soft/chopped, or non-pureed foods despite associated risks. Pt appeared adamant re: diet advancement (currently prescribed pureed diet). Per spouse, pt with hx of past admissions and has gone on "hunger strike" to express resentment re: modified diet recommendations. Does not wish to receive PEG despite risk/complications of aspiration.

## 2021-07-01 NOTE — CONSULT NOTE ADULT - NSCONSULTADDITIONALINFOA_GEN_ALL_CORE
Catalino Buregss MD  Pager [phone], short range [phone]  Please provide 10 digit call back number if any questions.    For after hours or weekends please call [phone] or page fellow on call.      Case discussed with wife and daughter in law (Jenna COTTRELL) on phone.

## 2021-07-01 NOTE — PROGRESS NOTE ADULT - ATTENDING COMMENTS
65M w/ DM  (on sulfonylurea), CAD, ALS. Admitted w/ AMS due to hypoglycemia and multifocal pneumonia.     - pt is now awake and alert, back to baseline  - continue w/ home ALS meds- riluzole and bromocriptine; continue w/ sertraline  - HD stable  - satting well on RA  - found to have multifocal pneumonia on CT chest and pt has been having a cough; concern for aspiration when he was altered and coughed when he was eating and drinking - continue w/ unasyn; cx NGTD; plan for 5 days of abx  - hypoglycemia has resolved, likely due to sulfonylurea and poor PO intake; was on octreotide and D10; had episode oh hypoglycemia yesterday afternoon so D5LR @ 50 cc/hr restarted, continue w/ q4 FS; endo following  - passed bedside dysphagia screen however, pt has a hx of silent aspiration that was seen on cinesophgram in 6/2020 - pt (and family) are aware and he would like to continue eating by mouth understanding the risk; continue w/ puree diet  - normal renal function, griffith placed for urinary retention  - HSQ for ppx  - awaiting bed on medical floors; prognosis guarded; DNR/DNI

## 2021-07-01 NOTE — DIETITIAN INITIAL EVALUATION ADULT. - OTHER INFO
Pt currently prescribed pureed diet; refusing meals. Amenable to receive chocolate Mighty shakes, Italian ices, chocolate pudding. Will follow up with medical team, SLP re: possibility of advancement of diet consistency. Continues on IVF, D5%+lactated ringers for hydration.     Dosing wt (6/30/21): 129.6 lbs.   Wt history (per [facility]): (3/12/21): 125 lbs; (11/9/20): 122 lbs; (9/11/20): 123 lbs; (5/14/20): 126 lbs. Wt appears consistent x > 1 year.     Skin: no pressure injuries noted  Edema: none noted  GI: no documented BM's recorded thus far. Continues on Miralax, senna.

## 2021-07-01 NOTE — PROGRESS NOTE ADULT - ASSESSMENT
66 y/o M w/ hx DM, ALS, CAD (prior stent), depression presents with AMS this morning, multifocal PNA and hypoglycemia. Admitted to ICU for q1 fingerstick and close monitoring.     #Neuro  -Hx ALS, A&Ox3  -Baseline extremity strength, can move upper extremities against gravity, minimal movement of BL LE  -On home riluzole, sertraline, bromocriptine    #Respiratory  Multifocal PNA, on RA, likely from aspiration  - CT chest non-con (6/29) with consolidative opacities within left lower lobe and nodular groundglass opacities within the right upper and lower lobes representing multifocal pneumonia.  - Switch from Zosyn to Unasyn  - MRSA swab pending if negative D/C vanc    #CV  -Not on HTN meds  -No active issues at this time    #GI/Nutrition  - Passed bedside swallow  - Will start oral feeds, D/C D5/LR    #/Renal/Fluids  -BUn/Cr wnl, monitor I/O  -Patient self caths declining griffith    #ID  -Multifocal PNA, persistent cough for past few days  -WBC 15  - Switch to Unasyn, D/C zosyn  - MRSA swab pending, if negative D/C vanc.  - Follow cultures    #Endocrine  -Persistent hypoglycemia in setting of multifocal pna, recent sulfonylurea use (but over 24 hours ago)  -Switched to q4 FSBG, no hypoglycemic episodes, started oral feeds    #Heme  - DVT ppx: SQH    #Ethics  DNR/DNI, Molst form completed  Follow up with palliative as family is interested in hospice care      For Follow-Up:  Endocrinology consulted for f/u DM med recommendations  Vanc swab if negatie D/C vanc  Follow blood and urine cultures  Follow up with palliative as family is interested in hospice care. 64 y/o M w/ hx DM, ALS, CAD (prior stent), depression presents with AMS this morning, multifocal PNA and hypoglycemia. Admitted to ICU for q1 fingerstick and close monitoring.     #Neuro  -Hx ALS, A&Ox3  - According to wife, patient gets confused intermittently   -Baseline extremity strength, can move upper extremities against gravity, minimal movement of BL LE  -On home riluzole, sertraline, bromocriptine    #Respiratory  Multifocal PNA, on RA, likely from aspiration  - CT chest non-con (6/29) with consolidative opacities within left lower lobe and nodular groundglass opacities within the right upper and lower lobes representing multifocal pneumonia.  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  - Follow cultures    #Endocrine  Hypoglycemia in setting of recent sulfonylurea use 06/28  - POC Glucose 106-125 overnight  - Switched to q4 FSBG, no hypoglycemic episodes, started oral feeds    #Heme  - DVT ppx: SQH    #Ethics  DNR/DNI, Molst form completed  Follow up with palliative as family is interested in hospice care      For Follow-Up:  Endocrinology consulted for f/u DM med recommendations  Vanc swab if negatie D/C vanc  Follow blood and urine cultures  Follow up with palliative as family is interested in hospice care.

## 2021-07-01 NOTE — CHART NOTE - NSCHARTNOTEFT_GEN_A_CORE
MAR Accept Note  Transfer to:  MED  Accepting Attending Physician:  Dr. Sowmya Paul  Assigned Room:  9MON 923D    Patient seen and examined.   Labs and data reviewed.   No findings precluding transfer of service.       HPI/MICU COURSE:   Please refer to MICU transfer note for full details. Briefly, this is a 65 yo M w/ hx of DM, ALS, presenting with AMS and found to be hypoglycemic (FS 38) by EMS, taken to ED on 06/29. Last dose of glyburide 730am 07/28 per patient, patient had decreased PO intake in the last couple of days. While in ED, patient remained hypoglycemic s/p 3 amps of D50, was put on D20@100cc/hr. CT scan demonstrated multifocal PNA and Lakeshia/vanc was started. UA found to be negative. Admitted to MICU for q1 FSBG checks. In MICU abx de-escalated to Unasyn and FSBG stable, changed to Q4 checks and patient was put on D5/LR. The patient passed bedside swallow 06/30 and is tolerating oral feeds. MRSA swab neg, so vanc dc'ed. Endo has been consulted for DM medication recommendations.         FOR FOLLOW-UP:  [ ] f/u Endocrine recs for DM  [ ] f/u blood and urine cultures  [ ] f/u Palliative as family is interested in hospice care.    Nathaniel Kim, PGY-3  MAR [id number]

## 2021-07-01 NOTE — DIETITIAN INITIAL EVALUATION ADULT. - CHIEF COMPLAINT
The patient is a 64 yo M with PMH: DM, ALS, CAD, depression. Presented with AMS, multifocal pneuamonia and hypoglycemia. Continues on antibiotics as ordered.

## 2021-07-01 NOTE — PROGRESS NOTE ADULT - SUBJECTIVE AND OBJECTIVE BOX
Leo Alba MD  PGY-1, Department of Internal Medicine  Pager: [phone] ([facility])   Pager: [phone] ([facility])    Patient is a 65y old  Male who presents with a chief complaint of Hypoglycemia (2021 11:52)      SUBJECTIVE / OVERNIGHT EVENTS: Pt seen and examined. No acute overnight events. Denies fevers, chills, CP, SOB, Abdominal pain, N/V, Constipation, Diarrhea        MEDICATIONS  (STANDING):  ampicillin/sulbactam  IVPB      ampicillin/sulbactam  IVPB 1.5 Gram(s) IV Intermittent every 6 hours  aspirin enteric coated 81 milliGRAM(s) Oral daily  atorvastatin 80 milliGRAM(s) Oral at bedtime  bromocriptine Capsule 5 milliGRAM(s) Oral daily  dextrose 40% Gel 15 Gram(s) Oral once  dextrose 5% + lactated ringers. 1000 milliLiter(s) (50 mL/Hr) IV Continuous <Continuous>  dextrose 5%. 1000 milliLiter(s) (50 mL/Hr) IV Continuous <Continuous>  dextrose 5%. 1000 milliLiter(s) (100 mL/Hr) IV Continuous <Continuous>  dextrose 50% Injectable 25 Gram(s) IV Push once  dextrose 50% Injectable 12.5 Gram(s) IV Push once  dextrose 50% Injectable 25 Gram(s) IV Push once  glucagon  Injectable 1 milliGRAM(s) IntraMuscular once  heparin   Injectable 5000 Unit(s) SubCutaneous every 8 hours  insulin lispro (ADMELOG) corrective regimen sliding scale   SubCutaneous three times a day before meals  insulin lispro (ADMELOG) corrective regimen sliding scale   SubCutaneous at bedtime  polyethylene glycol 3350 17 Gram(s) Oral daily  riluzole 50 milliGRAM(s) Oral two times a day  senna 2 Tablet(s) Oral at bedtime  sertraline 50 milliGRAM(s) Oral daily    MEDICATIONS  (PRN):      I&O's Summary    2021 07:  -  2021 07:00  --------------------------------------------------------  IN: 1450 mL / OUT: 1300 mL / NET: 150 mL    2021 07:01  -  2021 06:31  --------------------------------------------------------  IN: 1800 mL / OUT: 1550 mL / NET: 250 mL        Vital Signs Last 24 Hrs  T(C): 37 (2021 03:00), Max: 37.1 (2021 07:00)  T(F): 98.6 (2021 03:00), Max: 98.8 (2021 07:00)  HR: 70 (2021 06:00) (62 - 74)  BP: 169/72 (2021 06:00) (128/58 - 176/72)  BP(mean): 104 (2021 06:00) (84 - 112)  RR: 22 (2021 06:00) (16 - 40)  SpO2: 95% (2021 06:00) (94% - 100%)    CAPILLARY BLOOD GLUCOSE      POCT Blood Glucose.: 106 mg/dL (2021 05:19)  POCT Blood Glucose.: 112 mg/dL (2021 22:14)  POCT Blood Glucose.: 116 mg/dL (2021 20:55)  POCT Blood Glucose.: 112 mg/dL (2021 19:36)  POCT Blood Glucose.: 119 mg/dL (2021 18:41)  POCT Blood Glucose.: 125 mg/dL (2021 17:23)  POCT Blood Glucose.: 165 mg/dL (2021 16:31)  POCT Blood Glucose.: 195 mg/dL (2021 16:15)  POCT Blood Glucose.: 326 mg/dL (2021 15:55)  POCT Blood Glucose.: 59 mg/dL (2021 15:46)  POCT Blood Glucose.: 59 mg/dL (2021 15:41)  POCT Blood Glucose.: 219 mg/dL (2021 12:07)  POCT Blood Glucose.: 162 mg/dL (2021 09:58)  POCT Blood Glucose.: 155 mg/dL (2021 07:45)      PHYSICAL EXAM:  GENERAL: NAD,   HEAD:  Atraumatic, Normocephalic  EYES: EOMI, PERRL, conjunctiva and sclera clear  NECK: No JVD  CHEST/LUNG: Clear to auscultation bilaterally; No wheeze  HEART: Regular rate and rhythm; No murmurs, rubs, or gallops  ABDOMEN: Soft, Nontender, Nondistended; Bowel sounds present  EXTREMITIES:  2+ Peripheral Pulses, No clubbing, cyanosis, or edema  PSYCH: AAOx3  NEUROLOGY: non-focal  SKIN: No rashes or lesions       LABS:                        9.2    10.18 )-----------( 227      ( 2021 00:53 )             27.9     Auto Eosinophil # 0.11  / Auto Eosinophil % 1.1   / Auto Neutrophil # 7.64  / Auto Neutrophil % 75.0  / BANDS % x                            9.2    11.44 )-----------( 206      ( 2021 00:34 )             27.9     Auto Eosinophil # 0.06  / Auto Eosinophil % 0.5   / Auto Neutrophil # 9.07  / Auto Neutrophil % 79.3  / BANDS % x                            9.5    14.28 )-----------( 203      ( 2021 20:40 )             29.6     Auto Eosinophil # 0.06  / Auto Eosinophil % 0.4   / Auto Neutrophil # 11.06 / Auto Neutrophil % 77.4  / BANDS % x        07-01    132<L>  |  99  |  15  ----------------------------<  110<H>  4.0   |  22  |  0.85  30    133<L>  |  101  |  16  ----------------------------<  186<H>  4.1   |  21<L>  |  0.70      132<L>  |  100  |  17  ----------------------------<  179<H>  4.1   |  20<L>  |  0.70    Ca    8.9      2021 00:53  Mg     2.0     07-01  Phos  3.1     07-  TPro  6.4  /  Alb  3.4  /  TBili  0.5  /  DBili  x   /  AST  16  /  ALT  23  /  AlkPhos  63  07-  TPro  6.0  /  Alb  3.4  /  TBili  0.7  /  DBili  x   /  AST  27  /  ALT  32  /  AlkPhos  63  06-30  TPro  6.0  /  Alb  3.4  /  TBili  0.8  /  DBili  x   /  AST  32  /  ALT  37  /  AlkPhos  65  06-    PT/INR - ( 2021 11:31 )   PT: 13.3 sec;   INR: 1.11 ratio         PTT - ( 2021 11:31 )  PTT:30.1 sec  CARDIAC MARKERS ( 2021 11:34 )  x     / x     / 35 U/L / x     / x          Urinalysis Basic - ( 2021 11:33 )    Color: Light Yellow / Appearance: Clear / S.019 / pH: x  Gluc: x / Ketone: Negative  / Bili: Negative / Urobili: 2 mg/dL   Blood: x / Protein: 100 / Nitrite: Negative   Leuk Esterase: Negative / RBC: x / WBC x   Sq Epi: x / Non Sq Epi: x / Bacteria: x            RADIOLOGY & ADDITIONAL TESTS:    Imaging Personally Reviewed:    Consultant(s) Notes Reviewed:      Care Discussed with Consultants/Other Providers:   Leo Alba MD  PGY-1, Department of Internal Medicine  Pager: [phone] ([facility])   Pager: [phone] ([facility])    Patient is a 65y old  Male who presents with a chief complaint of Hypoglycemia (2021 11:52)      SUBJECTIVE / OVERNIGHT EVENTS: Pt seen and examined. Pt continued to retain urine so griffith was placed last night. Denies fevers, chills, CP, SOB, Abdominal pain, N/V, Constipation, Diarrhea        MEDICATIONS  (STANDING):  ampicillin/sulbactam  IVPB      ampicillin/sulbactam  IVPB 1.5 Gram(s) IV Intermittent every 6 hours  aspirin enteric coated 81 milliGRAM(s) Oral daily  atorvastatin 80 milliGRAM(s) Oral at bedtime  bromocriptine Capsule 5 milliGRAM(s) Oral daily  dextrose 40% Gel 15 Gram(s) Oral once  dextrose 5% + lactated ringers. 1000 milliLiter(s) (50 mL/Hr) IV Continuous <Continuous>  dextrose 5%. 1000 milliLiter(s) (50 mL/Hr) IV Continuous <Continuous>  dextrose 5%. 1000 milliLiter(s) (100 mL/Hr) IV Continuous <Continuous>  dextrose 50% Injectable 25 Gram(s) IV Push once  dextrose 50% Injectable 12.5 Gram(s) IV Push once  dextrose 50% Injectable 25 Gram(s) IV Push once  glucagon  Injectable 1 milliGRAM(s) IntraMuscular once  heparin   Injectable 5000 Unit(s) SubCutaneous every 8 hours  insulin lispro (ADMELOG) corrective regimen sliding scale   SubCutaneous three times a day before meals  insulin lispro (ADMELOG) corrective regimen sliding scale   SubCutaneous at bedtime  polyethylene glycol 3350 17 Gram(s) Oral daily  riluzole 50 milliGRAM(s) Oral two times a day  senna 2 Tablet(s) Oral at bedtime  sertraline 50 milliGRAM(s) Oral daily    MEDICATIONS  (PRN):      I&O's Summary    2021 07:01  -  2021 07:00  --------------------------------------------------------  IN: 1450 mL / OUT: 1300 mL / NET: 150 mL    2021 07:01  -  2021 06:31  --------------------------------------------------------  IN: 1800 mL / OUT: 1550 mL / NET: 250 mL        Vital Signs Last 24 Hrs  T(C): 37 (2021 03:00), Max: 37.1 (2021 07:00)  T(F): 98.6 (2021 03:00), Max: 98.8 (2021 07:00)  HR: 70 (2021 06:00) (62 - 74)  BP: 169/72 (2021 06:00) (128/58 - 176/72)  BP(mean): 104 (2021 06:00) (84 - 112)  RR: 22 (2021 06:00) (16 - 40)  SpO2: 95% (2021 06:00) (94% - 100%)    CAPILLARY BLOOD GLUCOSE      POCT Blood Glucose.: 106 mg/dL (2021 05:19)  POCT Blood Glucose.: 112 mg/dL (2021 22:14)  POCT Blood Glucose.: 116 mg/dL (2021 20:55)  POCT Blood Glucose.: 112 mg/dL (2021 19:36)  POCT Blood Glucose.: 119 mg/dL (2021 18:41)  POCT Blood Glucose.: 125 mg/dL (2021 17:23)  POCT Blood Glucose.: 165 mg/dL (2021 16:31)  POCT Blood Glucose.: 195 mg/dL (2021 16:15)  POCT Blood Glucose.: 326 mg/dL (2021 15:55)  POCT Blood Glucose.: 59 mg/dL (2021 15:46)  POCT Blood Glucose.: 59 mg/dL (2021 15:41)  POCT Blood Glucose.: 219 mg/dL (2021 12:07)  POCT Blood Glucose.: 162 mg/dL (2021 09:58)  POCT Blood Glucose.: 155 mg/dL (2021 07:45)      PHYSICAL EXAM:  GENERAL: NAD,   HEAD:  Atraumatic, Normocephalic  EYES: EOMI, PERRL, conjunctiva and sclera clear  NECK: supple  CHEST/LUNG: Crackles heard bilaterally, improved after coughing  HEART: Regular rate and rhythm; No murmurs, rubs, or gallops  ABDOMEN: Soft, Nontender, Nondistended; Bowel sounds present  EXTREMITIES:  2+ Peripheral Pulses, No clubbing, cyanosis, or edema  PSYCH: AAOx3  NEUROLOGY: non-focal, at baseline strength  SKIN: No rashes or lesions       LABS:                        9.2    10.18 )-----------( 227      ( 2021 00:53 )             27.9     Auto Eosinophil # 0.11  / Auto Eosinophil % 1.1   / Auto Neutrophil # 7.64  / Auto Neutrophil % 75.0  / BANDS % x                            9.2    11.44 )-----------( 206      ( 2021 00:34 )             27.9     Auto Eosinophil # 0.06  / Auto Eosinophil % 0.5   / Auto Neutrophil # 9.07  / Auto Neutrophil % 79.3  / BANDS % x                            9.5    14.28 )-----------( 203      ( 2021 20:40 )             29.6     Auto Eosinophil # 0.06  / Auto Eosinophil % 0.4   / Auto Neutrophil # 11.06 / Auto Neutrophil % 77.4  / BANDS % x        07-01    132<L>  |  99  |  15  ----------------------------<  110<H>  4.0   |  22  |  0.85  06-30    133<L>  |  101  |  16  ----------------------------<  186<H>  4.1   |  21<L>  |  0.70  -29    132<L>  |  100  |  17  ----------------------------<  179<H>  4.1   |  20<L>  |  0.70    Ca    8.9      2021 00:53  Mg     2.0     07-01  Phos  3.1     -  TPro  6.4  /  Alb  3.4  /  TBili  0.5  /  DBili  x   /  AST  16  /  ALT  23  /  AlkPhos  63    TPro  6.0  /  Alb  3.4  /  TBili  0.7  /  DBili  x   /  AST  27  /  ALT  32  /  AlkPhos  63    TPro  6.0  /  Alb  3.4  /  TBili  0.8  /  DBili  x   /  AST  32  /  ALT  37  /  AlkPhos  65      PT/INR - ( 2021 11:31 )   PT: 13.3 sec;   INR: 1.11 ratio         PTT - ( 2021 11:31 )  PTT:30.1 sec  CARDIAC MARKERS ( 2021 11:34 )  x     / x     / 35 U/L / x     / x          Urinalysis Basic - ( 2021 11:33 )    Color: Light Yellow / Appearance: Clear / S.019 / pH: x  Gluc: x / Ketone: Negative  / Bili: Negative / Urobili: 2 mg/dL   Blood: x / Protein: 100 / Nitrite: Negative   Leuk Esterase: Negative / RBC: x / WBC x   Sq Epi: x / Non Sq Epi: x / Bacteria: x            RADIOLOGY & ADDITIONAL TESTS:    Imaging Personally Reviewed:    Consultant(s) Notes Reviewed:      Care Discussed with Consultants/Other Providers:

## 2021-07-02 ENCOUNTER — TRANSCRIPTION ENCOUNTER (OUTPATIENT)
Age: 66
End: 2021-07-02

## 2021-07-02 DIAGNOSIS — G12.21 AMYOTROPHIC LATERAL SCLEROSIS: ICD-10-CM

## 2021-07-02 DIAGNOSIS — R13.10 DYSPHAGIA, UNSPECIFIED: ICD-10-CM

## 2021-07-02 DIAGNOSIS — J18.9 PNEUMONIA, UNSPECIFIED ORGANISM: ICD-10-CM

## 2021-07-02 DIAGNOSIS — Z29.9 ENCOUNTER FOR PROPHYLACTIC MEASURES, UNSPECIFIED: ICD-10-CM

## 2021-07-02 DIAGNOSIS — Z71.89 OTHER SPECIFIED COUNSELING: ICD-10-CM

## 2021-07-02 LAB
-  STAPHYLOCOCCUS EPIDERMIDIS, METHICILLIN RESISTANT: SIGNIFICANT CHANGE UP
ANION GAP SERPL CALC-SCNC: 12 MMOL/L — SIGNIFICANT CHANGE UP (ref 5–17)
BUN SERPL-MCNC: 11 MG/DL — SIGNIFICANT CHANGE UP (ref 7–23)
CALCIUM SERPL-MCNC: 9.2 MG/DL — SIGNIFICANT CHANGE UP (ref 8.4–10.5)
CHLORIDE SERPL-SCNC: 102 MMOL/L — SIGNIFICANT CHANGE UP (ref 96–108)
CO2 SERPL-SCNC: 23 MMOL/L — SIGNIFICANT CHANGE UP (ref 22–31)
CREAT SERPL-MCNC: 0.63 MG/DL — SIGNIFICANT CHANGE UP (ref 0.5–1.3)
GLUCOSE BLDC GLUCOMTR-MCNC: 100 MG/DL — HIGH (ref 70–99)
GLUCOSE BLDC GLUCOMTR-MCNC: 111 MG/DL — HIGH (ref 70–99)
GLUCOSE BLDC GLUCOMTR-MCNC: 113 MG/DL — HIGH (ref 70–99)
GLUCOSE BLDC GLUCOMTR-MCNC: 121 MG/DL — HIGH (ref 70–99)
GLUCOSE BLDC GLUCOMTR-MCNC: 142 MG/DL — HIGH (ref 70–99)
GLUCOSE BLDC GLUCOMTR-MCNC: 151 MG/DL — HIGH (ref 70–99)
GLUCOSE SERPL-MCNC: 91 MG/DL — SIGNIFICANT CHANGE UP (ref 70–99)
GRAM STN FLD: SIGNIFICANT CHANGE UP
HCT VFR BLD CALC: 29.1 % — LOW (ref 39–50)
HGB BLD-MCNC: 9.4 G/DL — LOW (ref 13–17)
MAGNESIUM SERPL-MCNC: 2 MG/DL — SIGNIFICANT CHANGE UP (ref 1.6–2.6)
MCHC RBC-ENTMCNC: 28 PG — SIGNIFICANT CHANGE UP (ref 27–34)
MCHC RBC-ENTMCNC: 32.3 GM/DL — SIGNIFICANT CHANGE UP (ref 32–36)
MCV RBC AUTO: 86.6 FL — SIGNIFICANT CHANGE UP (ref 80–100)
METHOD TYPE: SIGNIFICANT CHANGE UP
NRBC # BLD: 0 /100 WBCS — SIGNIFICANT CHANGE UP (ref 0–0)
PHOSPHATE SERPL-MCNC: 3.2 MG/DL — SIGNIFICANT CHANGE UP (ref 2.5–4.5)
PLATELET # BLD AUTO: 227 K/UL — SIGNIFICANT CHANGE UP (ref 150–400)
POTASSIUM SERPL-MCNC: 3.6 MMOL/L — SIGNIFICANT CHANGE UP (ref 3.5–5.3)
POTASSIUM SERPL-SCNC: 3.6 MMOL/L — SIGNIFICANT CHANGE UP (ref 3.5–5.3)
RBC # BLD: 3.36 M/UL — LOW (ref 4.2–5.8)
RBC # FLD: 13.9 % — SIGNIFICANT CHANGE UP (ref 10.3–14.5)
SODIUM SERPL-SCNC: 137 MMOL/L — SIGNIFICANT CHANGE UP (ref 135–145)
WBC # BLD: 7.57 K/UL — SIGNIFICANT CHANGE UP (ref 3.8–10.5)
WBC # FLD AUTO: 7.57 K/UL — SIGNIFICANT CHANGE UP (ref 3.8–10.5)

## 2021-07-02 PROCEDURE — 99223 1ST HOSP IP/OBS HIGH 75: CPT | Mod: GC

## 2021-07-02 PROCEDURE — 74230 X-RAY XM SWLNG FUNCJ C+: CPT | Mod: 26

## 2021-07-02 PROCEDURE — 99232 SBSQ HOSP IP/OBS MODERATE 35: CPT

## 2021-07-02 PROCEDURE — 99222 1ST HOSP IP/OBS MODERATE 55: CPT

## 2021-07-02 RX ORDER — HYDRALAZINE HCL 50 MG
5 TABLET ORAL ONCE
Refills: 0 | Status: COMPLETED | OUTPATIENT
Start: 2021-07-02 | End: 2021-07-02

## 2021-07-02 RX ADMIN — POLYETHYLENE GLYCOL 3350 17 GRAM(S): 17 POWDER, FOR SOLUTION ORAL at 12:57

## 2021-07-02 RX ADMIN — Medication 81 MILLIGRAM(S): at 12:57

## 2021-07-02 RX ADMIN — RILUZOLE 50 MILLIGRAM(S): 50 TABLET ORAL at 05:45

## 2021-07-02 RX ADMIN — HEPARIN SODIUM 5000 UNIT(S): 5000 INJECTION INTRAVENOUS; SUBCUTANEOUS at 21:42

## 2021-07-02 RX ADMIN — AMPICILLIN SODIUM AND SULBACTAM SODIUM 100 GRAM(S): 250; 125 INJECTION, POWDER, FOR SUSPENSION INTRAMUSCULAR; INTRAVENOUS at 12:54

## 2021-07-02 RX ADMIN — Medication 5 MILLIGRAM(S): at 07:58

## 2021-07-02 RX ADMIN — HEPARIN SODIUM 5000 UNIT(S): 5000 INJECTION INTRAVENOUS; SUBCUTANEOUS at 13:25

## 2021-07-02 RX ADMIN — ATORVASTATIN CALCIUM 80 MILLIGRAM(S): 80 TABLET, FILM COATED ORAL at 21:41

## 2021-07-02 RX ADMIN — AMPICILLIN SODIUM AND SULBACTAM SODIUM 100 GRAM(S): 250; 125 INJECTION, POWDER, FOR SUSPENSION INTRAMUSCULAR; INTRAVENOUS at 17:48

## 2021-07-02 RX ADMIN — AMPICILLIN SODIUM AND SULBACTAM SODIUM 100 GRAM(S): 250; 125 INJECTION, POWDER, FOR SUSPENSION INTRAMUSCULAR; INTRAVENOUS at 05:44

## 2021-07-02 RX ADMIN — BROMOCRIPTINE MESYLATE 5 MILLIGRAM(S): 5 CAPSULE ORAL at 12:57

## 2021-07-02 RX ADMIN — SERTRALINE 50 MILLIGRAM(S): 25 TABLET, FILM COATED ORAL at 12:57

## 2021-07-02 RX ADMIN — RILUZOLE 50 MILLIGRAM(S): 50 TABLET ORAL at 17:46

## 2021-07-02 RX ADMIN — HEPARIN SODIUM 5000 UNIT(S): 5000 INJECTION INTRAVENOUS; SUBCUTANEOUS at 05:44

## 2021-07-02 RX ADMIN — SODIUM CHLORIDE 50 MILLILITER(S): 9 INJECTION, SOLUTION INTRAVENOUS at 08:00

## 2021-07-02 RX ADMIN — SENNA PLUS 2 TABLET(S): 8.6 TABLET ORAL at 21:41

## 2021-07-02 NOTE — PROGRESS NOTE ADULT - PROBLEM SELECTOR PLAN 1
presented with FS 38 likely 2/2 to sulfonyurea excess in setting of poor PO intake. s/p octreotide. A1c 5.6%  - FS q4h, last two 100 & 105  - on D5 LR 50cc/hr  - f/u Endo recs presented with FS 38 likely 2/2 to sulfonyurea excess in setting of poor PO intake. s/p octreotide. A1c 5.6%  - FS now much improved;  last two 100 & 105  - c/w FS q4h for now   - c/w D5 LR 50cc/hr, wean off D5 as pt tolerates   - endocrine following, recs appreciated

## 2021-07-02 NOTE — PROGRESS NOTE ADULT - SUBJECTIVE AND OBJECTIVE BOX
Ralph Hill MD  PGY-1 Internal Medicine  Pager:  [phone]  Available on [employer] Teams  07-02-21 @ 07:47  _________________________________________________________________________________________________________________________________________    CC:      Patient is a 65y old  Male who presents with a chief complaint of Hypoglycemia requiring q1 FSBG checks (01 Jul 2021 14:46)        SUBJECTIVE:    NAEO.      _________________________________________________________________________________________________________________________________________    OBJECTIVE:    Vital Signs Last 24 Hrs  T(C): 36.6 (02 Jul 2021 05:19), Max: 36.8 (01 Jul 2021 12:00)  T(F): 97.9 (02 Jul 2021 05:19), Max: 98.2 (01 Jul 2021 12:00)  HR: 69 (02 Jul 2021 05:19) (67 - 72)  BP: 173/78 (02 Jul 2021 05:19) (149/69 - 187/79)  BP(mean): 116 (01 Jul 2021 16:00) (99 - 120)  RR: 18 (02 Jul 2021 05:19) (15 - 30)  SpO2: 98% (02 Jul 2021 05:19) (95% - 98%)    I&O's Summary    01 Jul 2021 07:01  -  02 Jul 2021 07:00  --------------------------------------------------------  IN: 1910 mL / OUT: 2450 mL / NET: -540 mL        MEDICATIONS  (STANDING):  ampicillin/sulbactam  IVPB      ampicillin/sulbactam  IVPB 1.5 Gram(s) IV Intermittent every 6 hours  aspirin enteric coated 81 milliGRAM(s) Oral daily  atorvastatin 80 milliGRAM(s) Oral at bedtime  bromocriptine Capsule 5 milliGRAM(s) Oral daily  dextrose 40% Gel 15 Gram(s) Oral once  dextrose 5% + lactated ringers. 1000 milliLiter(s) (50 mL/Hr) IV Continuous <Continuous>  dextrose 5%. 1000 milliLiter(s) (50 mL/Hr) IV Continuous <Continuous>  dextrose 5%. 1000 milliLiter(s) (100 mL/Hr) IV Continuous <Continuous>  dextrose 50% Injectable 25 Gram(s) IV Push once  dextrose 50% Injectable 12.5 Gram(s) IV Push once  dextrose 50% Injectable 25 Gram(s) IV Push once  glucagon  Injectable 1 milliGRAM(s) IntraMuscular once  heparin   Injectable 5000 Unit(s) SubCutaneous every 8 hours  insulin lispro (ADMELOG) corrective regimen sliding scale   SubCutaneous at bedtime  insulin lispro (ADMELOG) corrective regimen sliding scale   SubCutaneous three times a day before meals  polyethylene glycol 3350 17 Gram(s) Oral daily  riluzole 50 milliGRAM(s) Oral two times a day  sertraline 50 milliGRAM(s) Oral daily    MEDICATIONS  (PRN):  senna 2 Tablet(s) Oral at bedtime PRN Constipation        PHYSICAL EXAM:    GENERAL: Laying comfortably, NAD  EYES: EOMI, PERRL, no scleral icterus  NECK: No JVD  LUNG: Clear to auscultation bilaterally; No wheeze, crackles or rhonci  HEART: Regular rate and rhythm; No murmurs, rubs, or gallops  ABDOMEN: Soft, Nontender, Nondistended  EXTREMITIES:  No LE edema, 2+ Peripheral Pulses, No clubbing, cyanosis, or edema  PSYCH: AAOx3  NEUROLOGY: non-focal, strength 5/5 in all extremities, sensation intact  SKIN: No rashes or lesions      LABS:                            9.4    7.57  )-----------( 227      ( 02 Jul 2021 06:30 )             29.1     Auto Eosinophil # x     / Auto Eosinophil % x     / Auto Neutrophil # x     / Auto Neutrophil % x     / BANDS % x                            9.2    10.18 )-----------( 227      ( 01 Jul 2021 00:53 )             27.9     Auto Eosinophil # 0.11  / Auto Eosinophil % 1.1   / Auto Neutrophil # 7.64  / Auto Neutrophil % 75.0  / BANDS % x        07-02    137  |  102  |  11  ----------------------------<  91  3.6   |  23  |  0.63  07-01    132<L>  |  99  |  15  ----------------------------<  110<H>  4.0   |  22  |  0.85    Ca    9.2      02 Jul 2021 06:30  Mg     2.0     07-02  Phos  3.2     07-02  TPro  6.4  /  Alb  3.4  /  TBili  0.5  /  DBili  x   /  AST  16  /  ALT  23  /  AlkPhos  63  07-01          _________________________________________________________________________________________________________________________________________  Ralph Hill MD  PGY-1 Internal Medicine  Pager: [phone]  Available on [employer] Teams  02 Jul 2021 07:47         Ralph Hill MD  PGY-1 Internal Medicine  Pager:  [phone]  Available on [employer] Teams  07-02-21 @ 07:47  _________________________________________________________________________________________________________________________________________    CC:      Patient is a 65y old  Male who presents with a chief complaint of Hypoglycemia requiring q1 FSBG checks (01 Jul 2021 14:46)        SUBJECTIVE:    NAEO.    Pt reports no complaints this morning. States that he has no f/c, n/v/d/c (last bowel movement was couple days ago), no SOB, no CP.  BP was elevated this morning SBP to the 170s given IV hydralazine 5 this morning with initial improvement to 161/74.    _________________________________________________________________________________________________________________________________________    OBJECTIVE:    Vital Signs Last 24 Hrs  T(C): 36.6 (02 Jul 2021 05:19), Max: 36.8 (01 Jul 2021 12:00)  T(F): 97.9 (02 Jul 2021 05:19), Max: 98.2 (01 Jul 2021 12:00)  HR: 69 (02 Jul 2021 05:19) (67 - 72)  BP: 173/78 (02 Jul 2021 05:19) (149/69 - 187/79)  BP(mean): 116 (01 Jul 2021 16:00) (99 - 120)  RR: 18 (02 Jul 2021 05:19) (15 - 30)  SpO2: 98% (02 Jul 2021 05:19) (95% - 98%)    I&O's Summary    01 Jul 2021 07:01  -  02 Jul 2021 07:00  --------------------------------------------------------  IN: 1910 mL / OUT: 2450 mL / NET: -540 mL        MEDICATIONS  (STANDING):  ampicillin/sulbactam  IVPB      ampicillin/sulbactam  IVPB 1.5 Gram(s) IV Intermittent every 6 hours  aspirin enteric coated 81 milliGRAM(s) Oral daily  atorvastatin 80 milliGRAM(s) Oral at bedtime  bromocriptine Capsule 5 milliGRAM(s) Oral daily  dextrose 40% Gel 15 Gram(s) Oral once  dextrose 5% + lactated ringers. 1000 milliLiter(s) (50 mL/Hr) IV Continuous <Continuous>  dextrose 5%. 1000 milliLiter(s) (50 mL/Hr) IV Continuous <Continuous>  dextrose 5%. 1000 milliLiter(s) (100 mL/Hr) IV Continuous <Continuous>  dextrose 50% Injectable 25 Gram(s) IV Push once  dextrose 50% Injectable 12.5 Gram(s) IV Push once  dextrose 50% Injectable 25 Gram(s) IV Push once  glucagon  Injectable 1 milliGRAM(s) IntraMuscular once  heparin   Injectable 5000 Unit(s) SubCutaneous every 8 hours  insulin lispro (ADMELOG) corrective regimen sliding scale   SubCutaneous at bedtime  insulin lispro (ADMELOG) corrective regimen sliding scale   SubCutaneous three times a day before meals  polyethylene glycol 3350 17 Gram(s) Oral daily  riluzole 50 milliGRAM(s) Oral two times a day  sertraline 50 milliGRAM(s) Oral daily    MEDICATIONS  (PRN):  senna 2 Tablet(s) Oral at bedtime PRN Constipation        PHYSICAL EXAM:    GENERAL: Laying comfortably, NAD  EYES: EOMI, PERRL, no scleral icterus  NECK: No JVD  LUNG: Clear to auscultation bilaterally; No wheeze, crackles or rhonci  HEART: Regular rate and rhythm; No murmurs, rubs, or gallops  ABDOMEN: Soft, Nontender, Nondistended  EXTREMITIES:  No LE edema, 2+ Peripheral Pulses, No clubbing, cyanosis, or edema, ALS limited motion; R<L  PSYCH: AAOx3  NEUROLOGY: non-focal, strength 5/5 in all extremities, sensation intact  SKIN: No rashes or lesions      LABS:                            9.4    7.57  )-----------( 227      ( 02 Jul 2021 06:30 )             29.1     Auto Eosinophil # x     / Auto Eosinophil % x     / Auto Neutrophil # x     / Auto Neutrophil % x     / BANDS % x                            9.2    10.18 )-----------( 227      ( 01 Jul 2021 00:53 )             27.9     Auto Eosinophil # 0.11  / Auto Eosinophil % 1.1   / Auto Neutrophil # 7.64  / Auto Neutrophil % 75.0  / BANDS % x        07-02    137  |  102  |  11  ----------------------------<  91  3.6   |  23  |  0.63  07-01    132<L>  |  99  |  15  ----------------------------<  110<H>  4.0   |  22  |  0.85    Ca    9.2      02 Jul 2021 06:30  Mg     2.0     07-02  Phos  3.2     07-02  TPro  6.4  /  Alb  3.4  /  TBili  0.5  /  DBili  x   /  AST  16  /  ALT  23  /  AlkPhos  63  07-01          _________________________________________________________________________________________________________________________________________  Ralph Hill MD  PGY-1 Internal Medicine  Pager: [phone]  Available on [employer] Teams  02 Jul 2021 07:47

## 2021-07-02 NOTE — SWALLOW VFSS/MBS ASSESSMENT ADULT - SLP PERTINENT HISTORY OF CURRENT PROBLEM
ALLERGY: LACTOSE. 65 yo male h/o DM, ALS presenting with AMS around 2am in setting of cough for the past few days. Non-productive cough, denies fevers or shortness of breath. ED spoke to wife Maribel for collateral information. Woke up 2AM "jibberish" was coming out of him "squeezing sheets" making "weird noises" "unable to make eye contact" "made gurgling noises". Aide arrived 7:30AM who help clean up patient. lips looked dry and gave sip tea and then choked on tea. Called ALS nurse who suggested to call 911. EMS took FS 38, amp of D50 given which appeared to resolve symptoms. Last dose of glyburide 730am yesterday per patient. Per family, patient was more confused today. Denies any chest pain, pressure, nausea/vomiting, dizziness, syncope episodes, headache or visual changes.

## 2021-07-02 NOTE — PROGRESS NOTE ADULT - PROBLEM SELECTOR PLAN 4
Would place patient on low does sliding scale, only correct for hyperglycemia to start at 1:50 above 200mg/dl.   Would not d/c on any antihyperglycemic agent as his A1C is 5.6% and goal of care of discussion on going.   Can monitor glucose at home and if patient with persistent hyperglycemia impacting the quality of life, would recommend starting DDP4 inhibitor (Januvia 100mg daily or Tradjenta 5mg once daily).      Will sign off.  Joyce Warren DO

## 2021-07-02 NOTE — PROVIDER CONTACT NOTE (CRITICAL VALUE NOTIFICATION) - BACKGROUND
Pt admitted for AMS, hypoglycemic. Grewal was inserted 7/1 for urinary retention. MBS 7/2 passed on soft diet.

## 2021-07-02 NOTE — PROGRESS NOTE ADULT - PROBLEM SELECTOR PLAN 5
Puree diet: Pt understands risks of oral feeds  DNR/DNI w/ completed Molst form  f/u with palliative - pt interested in hospice Puree diet: Pt understands risks of oral feeds  DNR/DNI w/ completed Molst form  f/u with palliative - pt interested in hospice  neuro consult in AM 7/2 c/w home dose atorvastatin

## 2021-07-02 NOTE — PROGRESS NOTE ADULT - PROBLEM SELECTOR PLAN 2
Multifocal PNA noted on 6/29 noncon CT chest, likely 2/2 aspiration given history: consolidative opacities in LLL and nodular groundglass w/in RU and RL lobes  - s/p vanco and meropenem x 1 day (6/29), now on day 3 Unasyn (6/30- present) - c/f total 5-7 days of abx   - RVP/COVID, legionella negative  - BCx NGTD   - MRSA swab neg 6/30

## 2021-07-02 NOTE — SWALLOW VFSS/MBS ASSESSMENT ADULT - RECOMMENDED FEEDING/EATING TECHNIQUES
crush medication (when feasible)/maintain upright posture during/after eating for 30 mins/no straws/position upright (90 degrees)/provide rest periods between swallows/small sips/bites

## 2021-07-02 NOTE — CONSULT NOTE ADULT - SUBJECTIVE AND OBJECTIVE BOX
HPI:  64 yo male h/o DM, ALS presenting with AMS around 2am in setting of cough for the past few days. Non-productive cough, denies fevers or shortness of breath. ED spoke to wife Maribel for collateral information. Woke up 2AM "jibberish" was coming out of him "squeezing sheets" making "weird noises" "unable to make eye contact" "made gurgling noises". Aide arrived 7:30AM who help clean up patient. lips looked dry and gave sip tea and then choked on tea. Called ALS nurse who suggested to call 911. EMS took FS 38, amp of D50 given which appeared to resolve symptoms. Last dose of glyburide 730am yesterday per patient. Per family, patient was more confused today. Denies any chest pain, pressure, nausea/vomiting, dizziness, syncope episodes, headache or visual changes.     While in ED, patient well-appearing and stable however had 2 episodes of hypoglycemia despite receiving up to x3 amps of D50. D10 @ 100. Last sulfonyulrea taken over 24 hours ago. Lakeshia/vanc started, UA negative, has hx of self cath, x1 dose octreotide given. Will admit to MICU for q1 fingersticks.       MEDICATIONS:  MEDICATIONS  (STANDING):  dextrose 10% + sodium chloride 0.9%. 1000 milliLiter(s) (100 mL/Hr) IV Continuous <Continuous>    MEDICATIONS  (PRN):    ALLERGIES:  Allergies    lactose (Diarrhea)  No Known Drug Allergies    Intolerances    LABS:                        10.6   15.58 )-----------( 243      ( 2021 11:31 )             32.5         134<L>  |  100  |  20  ----------------------------<  123<H>  3.7   |  22  |  0.77    Ca    8.5      2021 14:27  Mg     1.9         TPro  5.8<L>  /  Alb  3.3  /  TBili  0.4  /  DBili  x   /  AST  35  /  ALT  37  /  AlkPhos  61  -    PT/INR - ( 2021 11:31 )   PT: 13.3 sec;   INR: 1.11 ratio         PTT - ( 2021 11:31 )  PTT:30.1 sec  Urinalysis Basic - ( 2021 11:33 )    Color: Light Yellow / Appearance: Clear / S.019 / pH: x  Gluc: x / Ketone: Negative  / Bili: Negative / Urobili: 2 mg/dL   Blood: x / Protein: 100 / Nitrite: Negative   Leuk Esterase: Negative / RBC: x / WBC x   Sq Epi: x / Non Sq Epi: x / Bacteria: x    RADIOLOGY & ADDITIONAL TESTS: Reviewed. (2021 18:43)          MEDICATIONS  (STANDING):  ampicillin/sulbactam  IVPB      ampicillin/sulbactam  IVPB 1.5 Gram(s) IV Intermittent every 6 hours  aspirin enteric coated 81 milliGRAM(s) Oral daily  atorvastatin 80 milliGRAM(s) Oral at bedtime  bromocriptine Capsule 5 milliGRAM(s) Oral daily  dextrose 40% Gel 15 Gram(s) Oral once  dextrose 5% + lactated ringers. 1000 milliLiter(s) (50 mL/Hr) IV Continuous <Continuous>  dextrose 5%. 1000 milliLiter(s) (50 mL/Hr) IV Continuous <Continuous>  dextrose 5%. 1000 milliLiter(s) (100 mL/Hr) IV Continuous <Continuous>  dextrose 50% Injectable 25 Gram(s) IV Push once  dextrose 50% Injectable 12.5 Gram(s) IV Push once  dextrose 50% Injectable 25 Gram(s) IV Push once  glucagon  Injectable 1 milliGRAM(s) IntraMuscular once  heparin   Injectable 5000 Unit(s) SubCutaneous every 8 hours  insulin lispro (ADMELOG) corrective regimen sliding scale   SubCutaneous three times a day before meals  insulin lispro (ADMELOG) corrective regimen sliding scale   SubCutaneous at bedtime  polyethylene glycol 3350 17 Gram(s) Oral daily  riluzole 50 milliGRAM(s) Oral two times a day  sertraline 50 milliGRAM(s) Oral daily    MEDICATIONS  (PRN):  senna 2 Tablet(s) Oral at bedtime PRN Constipation    PAST MEDICAL & SURGICAL HISTORY:  Dyslipidemia    Deviated nasal septum    Prolactinoma    Hypertension    Type 2 diabetes mellitus    HLD (hyperlipidemia)    H/O nasal septoplasty    History of tonsillectomy      FAMILY HISTORY:  Family history of Hodgkins disease    Family history of Alzheimer&#x27;s disease      Allergies    lactose (Diarrhea)  No Known Drug Allergies    Intolerances    SHx - No smoking, No ETOH, No drug abuse      CONSTITUTIONAL: +Weakness  RESPIRATORY:+ cough, no shortness of breath  CARDIOVASCULAR: No chest pain or palpitations  GASTROINTESTINAL: no nausea, vomiting, no abdominal pain, no BRBPR  NEUROLOGICAL: no headaches, no confusion   HEME: no gum bleeding, no bruising       VITAL SIGNS:  ICU Vital Signs Last 24 Hrs  T(C): 36.3 (2021 18:05), Max: 39.1 (2021 10:58)  T(F): 97.4 (2021 18:05), Max: 102.3 (2021 10:58)  HR: 75 (2021 18:05) (75 - 94)  BP: 150/80 (2021 18:05) (100/56 - 155/74)  BP(mean): 89 (2021 18:05) (65 - 97)  ABP: --  ABP(mean): --  RR: 22 (2021 18:05) (18 - 28)  SpO2: 98% (2021 18:05) (96% - 99%)    CAPILLARY BLOOD GLUCOSE    POCT Blood Glucose.: 124 mg/dL (2021 18:05)    PHYSICAL EXAM:    General: Cachectic appearing male in no acute distress. Diffuse muscle wasting noted  Head: Normocephalic & atraumatic.  Extremities: No cyanosis or edema.  Skin: No rashes.    Neurological:  Mental status: Awake, alert. Somewhat flat affect. Follows cross commands.    Language: Speech is fluent, voice is very hypophonic.     CNs: Upward gaze palsy. Otherwise EOMI. No facial asymmetry b/l. Head turning & shoulder shrug intact b/l.     Motor: Able to move all extremities. Diffuse muscle wasting. No noticeable tremor.   Shoulder abduction 4/5 bilaterally.   Elbow flexion 4/5 bilaterally,   R  4/5, left  4/5   Hip flexion 3/5 bilaterally  L DF ***/5, R DF ***/5,     Sensation: Intact to light touch. No proprioception to vertical manipulation of bilateral hallices.    Reflexes:  Pectoralis:  Brachials symmetric *** bilaterally  Brachioradialis: ***  L patellar ***, R patellar ***    Babinski: ***    Gait: Not tested, bedrest.    Other:        137  |  102  |  11  ----------------------------<  91  3.6   |  23  |  0.63    Ca    9.2      2021 06:30  Phos  3.2     07-02  Mg     2.0     07-02    TPro  6.4  /  Alb  3.4  /  TBili  0.5  /  DBili  x   /  AST  16  /  ALT  23  /  AlkPhos  63  07-    07-02    137  |  102  |  11  ----------------------------<  91  3.6   |  23  |  0.63    Ca    9.2      2021 06:30  Phos  3.2     07-02  Mg     2.0     07-02    TPro  6.4  /  Alb  3.4  /  TBili  0.5  /  DBili  x   /  AST  16  /  ALT  23  /  AlkPhos  63  07-                          9.4    7.57  )-----------( 227      ( 2021 06:30 )             29.1       Radiology    CT  MRI  EKG:  tele:  TTE:  EEG:                HPI:  "64 yo male h/o DM, ALS presenting with AMS around 2am in setting of cough for the past few days. Non-productive cough, denies fevers or shortness of breath. ED spoke to wife Maribel for collateral information. Woke up 2AM "jibberish" was coming out of him "squeezing sheets" making "weird noises" "unable to make eye contact" "made gurgling noises". Aide arrived 7:30AM who help clean up patient. lips looked dry and gave sip tea and then choked on tea. Called ALS nurse who suggested to call 911. EMS took FS 38, amp of D50 given which appeared to resolve symptoms. Last dose of glyburide 730am yesterday per patient. Per family, patient was more confused today. Denies any chest pain, pressure, nausea/vomiting, dizziness, syncope episodes, headache or visual changes.     While in ED, patient well-appearing and stable however had 2 episodes of hypoglycemia despite receiving up to x3 amps of D50. D10 @ 100. Last sulfonyulrea taken over 24 hours ago. Lakeshia/vanc started, UA negative, has hx of self cath, x1 dose octreotide given. Will admit to MICU for q1 fingersticks."    Patient last seen by outpatient Neurology (Dr. Gama Caba) 2021. Noted to be taking riluzole without problems. Notably unable to stand on his own at that time, requiring full assist.      MEDICATIONS:  MEDICATIONS  (STANDING):  dextrose 10% + sodium chloride 0.9%. 1000 milliLiter(s) (100 mL/Hr) IV Continuous <Continuous>    MEDICATIONS  (PRN):    ALLERGIES:  Allergies    lactose (Diarrhea)  No Known Drug Allergies    Intolerances    LABS:                        10.6   15.58 )-----------( 243      ( 2021 11:31 )             32.5     06-29    134<L>  |  100  |  20  ----------------------------<  123<H>  3.7   |  22  |  0.77    Ca    8.5      2021 14:27  Mg     1.9     -    TPro  5.8<L>  /  Alb  3.3  /  TBili  0.4  /  DBili  x   /  AST  35  /  ALT  37  /  AlkPhos  61  06-29    PT/INR - ( 2021 11:31 )   PT: 13.3 sec;   INR: 1.11 ratio         PTT - ( 2021 11:31 )  PTT:30.1 sec  Urinalysis Basic - ( 2021 11:33 )    Color: Light Yellow / Appearance: Clear / S.019 / pH: x  Gluc: x / Ketone: Negative  / Bili: Negative / Urobili: 2 mg/dL   Blood: x / Protein: 100 / Nitrite: Negative   Leuk Esterase: Negative / RBC: x / WBC x   Sq Epi: x / Non Sq Epi: x / Bacteria: x    RADIOLOGY & ADDITIONAL TESTS: Reviewed. (2021 18:43)    MEDICATIONS  (STANDING):  ampicillin/sulbactam  IVPB      ampicillin/sulbactam  IVPB 1.5 Gram(s) IV Intermittent every 6 hours  aspirin enteric coated 81 milliGRAM(s) Oral daily  atorvastatin 80 milliGRAM(s) Oral at bedtime  bromocriptine Capsule 5 milliGRAM(s) Oral daily  dextrose 40% Gel 15 Gram(s) Oral once  dextrose 5% + lactated ringers. 1000 milliLiter(s) (50 mL/Hr) IV Continuous <Continuous>  dextrose 5%. 1000 milliLiter(s) (50 mL/Hr) IV Continuous <Continuous>  dextrose 5%. 1000 milliLiter(s) (100 mL/Hr) IV Continuous <Continuous>  dextrose 50% Injectable 25 Gram(s) IV Push once  dextrose 50% Injectable 12.5 Gram(s) IV Push once  dextrose 50% Injectable 25 Gram(s) IV Push once  glucagon  Injectable 1 milliGRAM(s) IntraMuscular once  heparin   Injectable 5000 Unit(s) SubCutaneous every 8 hours  insulin lispro (ADMELOG) corrective regimen sliding scale   SubCutaneous three times a day before meals  insulin lispro (ADMELOG) corrective regimen sliding scale   SubCutaneous at bedtime  polyethylene glycol 3350 17 Gram(s) Oral daily  riluzole 50 milliGRAM(s) Oral two times a day  sertraline 50 milliGRAM(s) Oral daily    MEDICATIONS  (PRN):  senna 2 Tablet(s) Oral at bedtime PRN Constipation    PAST MEDICAL & SURGICAL HISTORY:  Dyslipidemia    Deviated nasal septum    Prolactinoma    Hypertension    Type 2 diabetes mellitus    HLD (hyperlipidemia)    H/O nasal septoplasty    History of tonsillectomy      FAMILY HISTORY:  Family history of Hodgkins disease    Family history of Alzheimer&#x27;s disease      Allergies    lactose (Diarrhea)  No Known Drug Allergies    Intolerances    SHx - No smoking, No ETOH, No drug abuse    CONSTITUTIONAL: +Weakness  RESPIRATORY:+Cough, no shortness of breath  CARDIOVASCULAR: No chest pain or palpitations  GASTROINTESTINAL: no nausea, vomiting, no abdominal pain, no BRBPR  NEUROLOGICAL: no headaches, no confusion   HEME: no gum bleeding, no bruising       VITAL SIGNS:  ICU Vital Signs Last 24 Hrs  T(C): 36.3 (2021 18:05), Max: 39.1 (2021 10:58)  T(F): 97.4 (2021 18:05), Max: 102.3 (2021 10:58)  HR: 75 (2021 18:05) (75 - 94)  BP: 150/80 (2021 18:05) (100/56 - 155/74)  BP(mean): 89 (2021 18:05) (65 - 97)  ABP: --  ABP(mean): --  RR: 22 (2021 18:05) (18 - 28)  SpO2: 98% (2021 18:05) (96% - 99%)    CAPILLARY BLOOD GLUCOSE    POCT Blood Glucose.: 124 mg/dL (2021 18:05)    PHYSICAL EXAM:    General: Cachectic appearing male in no acute distress. Diffuse muscle wasting noted.  Head: Normocephalic & atraumatic.  Extremities: No cyanosis or edema.  Skin: No rashes.    Neurological:  Mental status: Awake, alert. Somewhat flat affect. Follows cross commands.    Language: Speech is fluent, voice is very hypophonic.     CNs: Impaired upward gaze. Otherwise EOMI. No facial asymmetry b/l. Head turning & shoulder shrug intact b/l.     Motor: Able to move all extremities. Diffuse muscle wasting. No noticeable tremor.   Shoulder abduction 4/5 bilaterally.   Elbow flexion 4/5 bilaterally.  Hand  4/5 bilaterally.  Hip flexion 2/5 bilaterally  L DF ***/5, R DF ***/5.     Sensation: Intact to light touch. No proprioception to vertical manipulation of bilateral hallices.    Reflexes:  Pectoralis: ***  Brachials symmetric *** bilaterally  Brachioradialis: ***  L patellar ***, R patellar ***    No clonus to ankle jerk bilaterally    Babinski: ***    Gait: Not tested, bedrest.    Other:        137  |  102  |  11  ----------------------------<  91  3.6   |  23  |  0.63    Ca    9.2      2021 06:30  Phos  3.2     07-02  Mg     2.0     07-02    TPro  6.4  /  Alb  3.4  /  TBili  0.5  /  DBili  x   /  AST  16  /  ALT  23  /  AlkPhos  63  -    137  |  102  |  11  ----------------------------<  91  3.6   |  23  |  0.63    Ca    9.2      2021 06:30  Phos  3.2     07-02  Mg     2.0     07-02    TPro  6.4  /  Alb  3.4  /  TBili  0.5  /  DBili  x   /  AST  16  /  ALT  23  /  AlkPhos  63                            9.4    7.57  )-----------( 227      ( 2021 06:30 )             29.1        HPI:  "64 yo male h/o DM, ALS presenting with AMS around 2am in setting of cough for the past few days. Non-productive cough, denies fevers or shortness of breath. ED spoke to wife Maribel for collateral information. Woke up 2AM "jibberish" was coming out of him "squeezing sheets" making "weird noises" "unable to make eye contact" "made gurgling noises". Aide arrived 7:30AM who help clean up patient. lips looked dry and gave sip tea and then choked on tea. Called ALS nurse who suggested to call 911. EMS took FS 38, amp of D50 given which appeared to resolve symptoms. Last dose of glyburide 730am yesterday per patient. Per family, patient was more confused today. Denies any chest pain, pressure, nausea/vomiting, dizziness, syncope episodes, headache or visual changes.     While in ED, patient well-appearing and stable however had 2 episodes of hypoglycemia despite receiving up to x3 amps of D50. D10 @ 100. Last sulfonyulrea taken over 24 hours ago. Lakeshia/vanc started, UA negative, has hx of self cath, x1 dose octreotide given. Will admit to MICU for q1 fingersticks."    Patient last seen by outpatient Neurology (Dr. Gama Caba) 2021. Noted to be taking riluzole without problems. Notably unable to stand on his own at that time, requiring full assist.      MEDICATIONS:  MEDICATIONS  (STANDING):  dextrose 10% + sodium chloride 0.9%. 1000 milliLiter(s) (100 mL/Hr) IV Continuous <Continuous>    MEDICATIONS  (PRN):    ALLERGIES:  Allergies    lactose (Diarrhea)  No Known Drug Allergies    Intolerances    LABS:                        10.6   15.58 )-----------( 243      ( 2021 11:31 )             32.5     06-29    134<L>  |  100  |  20  ----------------------------<  123<H>  3.7   |  22  |  0.77    Ca    8.5      2021 14:27  Mg     1.9     -    TPro  5.8<L>  /  Alb  3.3  /  TBili  0.4  /  DBili  x   /  AST  35  /  ALT  37  /  AlkPhos  61  06-29    PT/INR - ( 2021 11:31 )   PT: 13.3 sec;   INR: 1.11 ratio         PTT - ( 2021 11:31 )  PTT:30.1 sec  Urinalysis Basic - ( 2021 11:33 )    Color: Light Yellow / Appearance: Clear / S.019 / pH: x  Gluc: x / Ketone: Negative  / Bili: Negative / Urobili: 2 mg/dL   Blood: x / Protein: 100 / Nitrite: Negative   Leuk Esterase: Negative / RBC: x / WBC x   Sq Epi: x / Non Sq Epi: x / Bacteria: x    RADIOLOGY & ADDITIONAL TESTS: Reviewed. (2021 18:43)    MEDICATIONS  (STANDING):  ampicillin/sulbactam  IVPB      ampicillin/sulbactam  IVPB 1.5 Gram(s) IV Intermittent every 6 hours  aspirin enteric coated 81 milliGRAM(s) Oral daily  atorvastatin 80 milliGRAM(s) Oral at bedtime  bromocriptine Capsule 5 milliGRAM(s) Oral daily  dextrose 40% Gel 15 Gram(s) Oral once  dextrose 5% + lactated ringers. 1000 milliLiter(s) (50 mL/Hr) IV Continuous <Continuous>  dextrose 5%. 1000 milliLiter(s) (50 mL/Hr) IV Continuous <Continuous>  dextrose 5%. 1000 milliLiter(s) (100 mL/Hr) IV Continuous <Continuous>  dextrose 50% Injectable 25 Gram(s) IV Push once  dextrose 50% Injectable 12.5 Gram(s) IV Push once  dextrose 50% Injectable 25 Gram(s) IV Push once  glucagon  Injectable 1 milliGRAM(s) IntraMuscular once  heparin   Injectable 5000 Unit(s) SubCutaneous every 8 hours  insulin lispro (ADMELOG) corrective regimen sliding scale   SubCutaneous three times a day before meals  insulin lispro (ADMELOG) corrective regimen sliding scale   SubCutaneous at bedtime  polyethylene glycol 3350 17 Gram(s) Oral daily  riluzole 50 milliGRAM(s) Oral two times a day  sertraline 50 milliGRAM(s) Oral daily    MEDICATIONS  (PRN):  senna 2 Tablet(s) Oral at bedtime PRN Constipation    PAST MEDICAL & SURGICAL HISTORY:  Dyslipidemia    Deviated nasal septum    Prolactinoma    Hypertension    Type 2 diabetes mellitus    HLD (hyperlipidemia)    H/O nasal septoplasty    History of tonsillectomy      FAMILY HISTORY:  Family history of Hodgkins disease    Family history of Alzheimer&#x27;s disease      Allergies    lactose (Diarrhea)  No Known Drug Allergies    Intolerances    SHx - No smoking, No ETOH, No drug abuse    CONSTITUTIONAL: +Weakness  RESPIRATORY: +Cough, no shortness of breath  CARDIOVASCULAR: No chest pain or palpitations  GASTROINTESTINAL: no nausea, vomiting, no abdominal pain, no BRBPR  NEUROLOGICAL: no headaches, no confusion   HEME: no gum bleeding, no bruising       VITAL SIGNS:  ICU Vital Signs Last 24 Hrs  T(C): 36.3 (2021 18:05), Max: 39.1 (2021 10:58)  T(F): 97.4 (2021 18:05), Max: 102.3 (2021 10:58)  HR: 75 (2021 18:05) (75 - 94)  BP: 150/80 (2021 18:05) (100/56 - 155/74)  BP(mean): 89 (2021 18:05) (65 - 97)  ABP: --  ABP(mean): --  RR: 22 (2021 18:05) (18 - 28)  SpO2: 98% (2021 18:05) (96% - 99%)    CAPILLARY BLOOD GLUCOSE    POCT Blood Glucose.: 124 mg/dL (2021 18:05)    PHYSICAL EXAM:    General: Cachectic appearing male in no acute distress. Diffuse muscle wasting noted.  Head: Normocephalic & atraumatic.  Extremities: No cyanosis or edema.  Skin: No rashes.    Neurological:  Mental status: Awake, alert. Somewhat flat affect. Follows cross commands.    Language: Speech is fluent, voice is very hypophonic.     CNs: Impaired upward gaze. Otherwise EOMI. No facial asymmetry b/l. Head turning & shoulder shrug intact b/l.     Motor:   Diffuse muscle wasting. No noticeable tremor.   Shoulder abduction 4/5 bilaterally.   Elbow flexion 4/5 bilaterally.  Hand  4/5 bilaterally.  Hip flexion 1/5 bilaterally  L DF ***/5, R DF ***/5.     Sensation: Intact to light touch. Impaired proprioception to vertical manipulation of bilateral hallices.    Reflexes:  Pectoralis: 2+ bilaterally   Brachials 1+ bilaterally  Brachioradialis: 2+ bilaterally   L patellar 1+, R patellar 1+  Achilles: 1+ bilaterally    No clonus to ankle jerk bilaterally    Babinski: negative    Gait: Not tested, bedrest.    Other:        137  |  102  |  11  ----------------------------<  91  3.6   |  23  |  0.63    Ca    9.2      2021 06:30  Phos  3.2     07-02  Mg     2.0     07-02    TPro  6.4  /  Alb  3.4  /  TBili  0.5  /  DBili  x   /  AST  16  /  ALT  23  /  AlkPhos  63  -    137  |  102  |  11  ----------------------------<  91  3.6   |  23  |  0.63    Ca    9.2      2021 06:30  Phos  3.2     07-02  Mg     2.0     07-02    TPro  6.4  /  Alb  3.4  /  TBili  0.5  /  DBili  x   /  AST  16  /  ALT  23  /  AlkPhos  63                            9.4    7.57  )-----------( 227      ( 2021 06:30 )             29.1        HPI:  65M PMHx ALS (on home riluzole, seen by Dr. Caba outpatient), T2DM, HLD, p/w AMS, found to be hypoglycemic (FS 38) by EMS, taken to the ED on . Last dose glyburide 730AM , decreased appetite last couple days before admission. While in ED, pt remained hypoglycemic s/p 3 amps D50, started on D20@100cc/hr. CT showing multifocal PNA; meropenem/vancomycin started. Admitted to MICU. [facility] stabilized. Transferred to [facility].     Neuro consulted for ALS management.    MEDICATIONS:  MEDICATIONS  (STANDING):  dextrose 10% + sodium chloride 0.9%. 1000 milliLiter(s) (100 mL/Hr) IV Continuous <Continuous>    MEDICATIONS  (PRN):    ALLERGIES:  Allergies    lactose (Diarrhea)  No Known Drug Allergies    Intolerances    LABS:                        10.6   15.58 )-----------( 243      ( 2021 11:31 )             32.5         134<L>  |  100  |  20  ----------------------------<  123<H>  3.7   |  22  |  0.77    Ca    8.5      2021 14:27  Mg     1.9         TPro  5.8<L>  /  Alb  3.3  /  TBili  0.4  /  DBili  x   /  AST  35  /  ALT  37  /  AlkPhos  61      PT/INR - ( 2021 11:31 )   PT: 13.3 sec;   INR: 1.11 ratio         PTT - ( 2021 11:31 )  PTT:30.1 sec  Urinalysis Basic - ( 2021 11:33 )    Color: Light Yellow / Appearance: Clear / S.019 / pH: x  Gluc: x / Ketone: Negative  / Bili: Negative / Urobili: 2 mg/dL   Blood: x / Protein: 100 / Nitrite: Negative   Leuk Esterase: Negative / RBC: x / WBC x   Sq Epi: x / Non Sq Epi: x / Bacteria: x    RADIOLOGY & ADDITIONAL TESTS: Reviewed. (2021 18:43)    MEDICATIONS  (STANDING):  ampicillin/sulbactam  IVPB      ampicillin/sulbactam  IVPB 1.5 Gram(s) IV Intermittent every 6 hours  aspirin enteric coated 81 milliGRAM(s) Oral daily  atorvastatin 80 milliGRAM(s) Oral at bedtime  bromocriptine Capsule 5 milliGRAM(s) Oral daily  dextrose 40% Gel 15 Gram(s) Oral once  dextrose 5% + lactated ringers. 1000 milliLiter(s) (50 mL/Hr) IV Continuous <Continuous>  dextrose 5%. 1000 milliLiter(s) (50 mL/Hr) IV Continuous <Continuous>  dextrose 5%. 1000 milliLiter(s) (100 mL/Hr) IV Continuous <Continuous>  dextrose 50% Injectable 25 Gram(s) IV Push once  dextrose 50% Injectable 12.5 Gram(s) IV Push once  dextrose 50% Injectable 25 Gram(s) IV Push once  glucagon  Injectable 1 milliGRAM(s) IntraMuscular once  heparin   Injectable 5000 Unit(s) SubCutaneous every 8 hours  insulin lispro (ADMELOG) corrective regimen sliding scale   SubCutaneous three times a day before meals  insulin lispro (ADMELOG) corrective regimen sliding scale   SubCutaneous at bedtime  polyethylene glycol 3350 17 Gram(s) Oral daily  riluzole 50 milliGRAM(s) Oral two times a day  sertraline 50 milliGRAM(s) Oral daily    MEDICATIONS  (PRN):  senna 2 Tablet(s) Oral at bedtime PRN Constipation    PAST MEDICAL & SURGICAL HISTORY:  Dyslipidemia    Deviated nasal septum    Prolactinoma    Hypertension    Type 2 diabetes mellitus    HLD (hyperlipidemia)    H/O nasal septoplasty    History of tonsillectomy      FAMILY HISTORY:  Family history of Hodgkins disease    Family history of Alzheimer&#x27;s disease      Allergies    lactose (Diarrhea)  No Known Drug Allergies    Intolerances    SHx - No smoking, No ETOH, No drug abuse    CONSTITUTIONAL: +Weakness  RESPIRATORY: +Cough, no shortness of breath  CARDIOVASCULAR: No chest pain or palpitations  GASTROINTESTINAL: no nausea, vomiting, no abdominal pain, no BRBPR  NEUROLOGICAL: no headaches, no confusion   HEME: no gum bleeding, no bruising       VITAL SIGNS:  ICU Vital Signs Last 24 Hrs  T(C): 36.3 (2021 18:05), Max: 39.1 (2021 10:58)  T(F): 97.4 (2021 18:05), Max: 102.3 (2021 10:58)  HR: 75 (2021 18:05) (75 - 94)  BP: 150/80 (2021 18:05) (100/56 - 155/74)  BP(mean): 89 (2021 18:05) (65 - 97)  ABP: --  ABP(mean): --  RR: 22 (2021 18:05) (18 - 28)  SpO2: 98% (2021 18:05) (96% - 99%)    CAPILLARY BLOOD GLUCOSE    POCT Blood Glucose.: 124 mg/dL (2021 18:05)    PHYSICAL EXAM:    General: Cachectic appearing male in no acute distress. Diffuse muscle wasting noted.  Head: Normocephalic & atraumatic.  Extremities: No cyanosis or edema.  Skin: No rashes.    Neurological:  Mental status: Awake, alert. Somewhat flat affect. Follows cross commands.    Language: Speech is fluent, voice is very hypophonic.     CNs: Impaired upward gaze. Otherwise EOMI. No facial asymmetry b/l. Head turning & shoulder shrug intact b/l.     Motor:   Diffuse muscle wasting. No noticeable tremor.   Shoulder abduction 4/5 bilaterally.   Elbow flexion 4/5 bilaterally.  Hand  4/5 bilaterally.  Hip flexion 1/5 bilaterally  Hip Abduction/Adduction: 3/5 bilaterally  DF: 3/5 bilaterally  PF: 4/5 bilaterally  Babinski: negative    Sensation: Intact to light touch. Impaired proprioception to vertical manipulation of bilateral hallices.    Reflexes:  Pectoralis: 3+ bilaterally   Brachials 3+ bilaterally  Brachioradialis: 3+ bilaterally   L patellar 1+, R patellar 1+  Achilles: 1+ bilaterally  No cross adductor     No clonus to ankle jerk bilaterally    Gait: Not tested, bedrest.    Other:        137  |  102  |  11  ----------------------------<  91  3.6   |  23  |  0.63    Ca    9.2      2021 06:30  Phos  3.2     07-  Mg     2.0         TPro  6.4  /  Alb  3.4  /  TBili  0.5  /  DBili  x   /  AST  16  /  ALT  23  /  AlkPhos  63  07-01        137  |  102  |  11  ----------------------------<  91  3.6   |  23  |  0.63    Ca    9.2      2021 06:30  Phos  3.2     07-02  Mg     2.0     07-02    TPro  6.4  /  Alb  3.4  /  TBili  0.5  /  DBili  x   /  AST  16  /  ALT  23  /  AlkPhos  63  07-01                          9.4    7.57  )-----------( 227      ( 2021 06:30 )             29.1        HPI:  65M PMHx ALS (on home riluzole, seen by Dr. Caba outpatient), T2DM, HLD, p/w AMS, found to be hypoglycemic (FS 38) by EMS, taken to the ED on . Last dose glyburide 730AM , decreased appetite last couple days before admission. While in ED, pt remained hypoglycemic s/p 3 amps D50, started on D20@100cc/hr. CT showing multifocal PNA; meropenem/vancomycin started. Admitted to MICU. FSBG stabilized. Vancomycin discontinued. De-escalated to Unasyn. Transferred to Floors.     Neuro consulted for ALS management.    MEDICATIONS:  MEDICATIONS  (STANDING):  dextrose 10% + sodium chloride 0.9%. 1000 milliLiter(s) (100 mL/Hr) IV Continuous <Continuous>    MEDICATIONS  (PRN):    ALLERGIES:  Allergies    lactose (Diarrhea)  No Known Drug Allergies    Intolerances    LABS:                        10.6   15.58 )-----------( 243      ( 2021 11:31 )             32.5         134<L>  |  100  |  20  ----------------------------<  123<H>  3.7   |  22  |  0.77    Ca    8.5      2021 14:27  Mg     1.9         TPro  5.8<L>  /  Alb  3.3  /  TBili  0.4  /  DBili  x   /  AST  35  /  ALT  37  /  AlkPhos  61      PT/INR - ( 2021 11:31 )   PT: 13.3 sec;   INR: 1.11 ratio         PTT - ( 2021 11:31 )  PTT:30.1 sec  Urinalysis Basic - ( 2021 11:33 )    Color: Light Yellow / Appearance: Clear / S.019 / pH: x  Gluc: x / Ketone: Negative  / Bili: Negative / Urobili: 2 mg/dL   Blood: x / Protein: 100 / Nitrite: Negative   Leuk Esterase: Negative / RBC: x / WBC x   Sq Epi: x / Non Sq Epi: x / Bacteria: x    RADIOLOGY & ADDITIONAL TESTS: Reviewed. (2021 18:43)    MEDICATIONS  (STANDING):  ampicillin/sulbactam  IVPB      ampicillin/sulbactam  IVPB 1.5 Gram(s) IV Intermittent every 6 hours  aspirin enteric coated 81 milliGRAM(s) Oral daily  atorvastatin 80 milliGRAM(s) Oral at bedtime  bromocriptine Capsule 5 milliGRAM(s) Oral daily  dextrose 40% Gel 15 Gram(s) Oral once  dextrose 5% + lactated ringers. 1000 milliLiter(s) (50 mL/Hr) IV Continuous <Continuous>  dextrose 5%. 1000 milliLiter(s) (50 mL/Hr) IV Continuous <Continuous>  dextrose 5%. 1000 milliLiter(s) (100 mL/Hr) IV Continuous <Continuous>  dextrose 50% Injectable 25 Gram(s) IV Push once  dextrose 50% Injectable 12.5 Gram(s) IV Push once  dextrose 50% Injectable 25 Gram(s) IV Push once  glucagon  Injectable 1 milliGRAM(s) IntraMuscular once  heparin   Injectable 5000 Unit(s) SubCutaneous every 8 hours  insulin lispro (ADMELOG) corrective regimen sliding scale   SubCutaneous three times a day before meals  insulin lispro (ADMELOG) corrective regimen sliding scale   SubCutaneous at bedtime  polyethylene glycol 3350 17 Gram(s) Oral daily  riluzole 50 milliGRAM(s) Oral two times a day  sertraline 50 milliGRAM(s) Oral daily    MEDICATIONS  (PRN):  senna 2 Tablet(s) Oral at bedtime PRN Constipation    PAST MEDICAL & SURGICAL HISTORY:  Dyslipidemia    Deviated nasal septum    Prolactinoma    Hypertension    Type 2 diabetes mellitus    HLD (hyperlipidemia)    H/O nasal septoplasty    History of tonsillectomy      FAMILY HISTORY:  Family history of Hodgkins disease    Family history of Alzheimer&#x27;s disease      Allergies    lactose (Diarrhea)  No Known Drug Allergies    Intolerances    SHx - No smoking, No ETOH, No drug abuse    CONSTITUTIONAL: +Weakness  RESPIRATORY: +Cough, no shortness of breath  CARDIOVASCULAR: No chest pain or palpitations  GASTROINTESTINAL: no nausea, vomiting, no abdominal pain, no BRBPR  NEUROLOGICAL: no headaches, no confusion   HEME: no gum bleeding, no bruising       VITAL SIGNS:  ICU Vital Signs Last 24 Hrs  T(C): 36.3 (2021 18:05), Max: 39.1 (2021 10:58)  T(F): 97.4 (2021 18:05), Max: 102.3 (2021 10:58)  HR: 75 (2021 18:05) (75 - 94)  BP: 150/80 (2021 18:05) (100/56 - 155/74)  BP(mean): 89 (2021 18:05) (65 - 97)  ABP: --  ABP(mean): --  RR: 22 (2021 18:05) (18 - 28)  SpO2: 98% (2021 18:05) (96% - 99%)    CAPILLARY BLOOD GLUCOSE    POCT Blood Glucose.: 124 mg/dL (2021 18:05)    PHYSICAL EXAM:    General: Cachectic appearing male in no acute distress. Diffuse muscle wasting noted.  Head: Normocephalic & atraumatic.  Extremities: No cyanosis or edema.  Skin: No rashes.    Neurological:  Mental status: Awake, alert. Somewhat flat affect. Follows cross commands.    Language: Speech is fluent, voice is very hypophonic.     CNs: Impaired upward gaze. Otherwise EOMI. No facial asymmetry b/l. Head turning & shoulder shrug intact b/l.     Motor:   Diffuse muscle wasting. No noticeable tremor.   Shoulder abduction 4/5 bilaterally.   Elbow flexion 4/5 bilaterally.  Hand  4/5 bilaterally.  Hip flexion 1/5 bilaterally  Hip Abduction/Adduction: 3/5 bilaterally  DF: 3/5 bilaterally  PF: 4/5 bilaterally  Babinski: negative    Sensation: Intact to light touch. Impaired proprioception to vertical manipulation of bilateral hallices.    Reflexes:  Pectoralis: 3+ bilaterally   Brachials 3+ bilaterally  Brachioradialis: 3+ bilaterally   L patellar 1+, R patellar 1+  Achilles: 1+ bilaterally  No cross adductor     No clonus to ankle jerk bilaterally    Gait: Not tested, bedrest.    Other:    -    137  |  102  |  11  ----------------------------<  91  3.6   |  23  |  0.63    Ca    9.2      2021 06:30  Phos  3.2     07-  Mg     2.0     -    TPro  6.4  /  Alb  3.4  /  TBili  0.5  /  DBili  x   /  AST  16  /  ALT  23  /  AlkPhos  63  07-01    -02    137  |  102  |  11  ----------------------------<  91  3.6   |  23  |  0.63    Ca    9.2      2021 06:30  Phos  3.2     07-02  Mg     2.0     07-02    TPro  6.4  /  Alb  3.4  /  TBili  0.5  /  DBili  x   /  AST  16  /  ALT  23  /  AlkPhos  63  07-01                          9.4    7.57  )-----------( 227      ( 2021 06:30 )             29.1

## 2021-07-02 NOTE — PROGRESS NOTE ADULT - SUBJECTIVE AND OBJECTIVE BOX
Chief Complaint:     History:    MEDICATIONS  (STANDING):  ampicillin/sulbactam  IVPB      ampicillin/sulbactam  IVPB 1.5 Gram(s) IV Intermittent every 6 hours  aspirin enteric coated 81 milliGRAM(s) Oral daily  atorvastatin 80 milliGRAM(s) Oral at bedtime  bromocriptine Capsule 5 milliGRAM(s) Oral daily  dextrose 40% Gel 15 Gram(s) Oral once  dextrose 5% + lactated ringers. 1000 milliLiter(s) (50 mL/Hr) IV Continuous <Continuous>  dextrose 5%. 1000 milliLiter(s) (50 mL/Hr) IV Continuous <Continuous>  dextrose 5%. 1000 milliLiter(s) (100 mL/Hr) IV Continuous <Continuous>  dextrose 50% Injectable 25 Gram(s) IV Push once  dextrose 50% Injectable 12.5 Gram(s) IV Push once  dextrose 50% Injectable 25 Gram(s) IV Push once  glucagon  Injectable 1 milliGRAM(s) IntraMuscular once  heparin   Injectable 5000 Unit(s) SubCutaneous every 8 hours  insulin lispro (ADMELOG) corrective regimen sliding scale   SubCutaneous three times a day before meals  insulin lispro (ADMELOG) corrective regimen sliding scale   SubCutaneous at bedtime  polyethylene glycol 3350 17 Gram(s) Oral daily  riluzole 50 milliGRAM(s) Oral two times a day  sertraline 50 milliGRAM(s) Oral daily    MEDICATIONS  (PRN):  senna 2 Tablet(s) Oral at bedtime PRN Constipation      Allergies    lactose (Diarrhea)  No Known Drug Allergies    Intolerances      Review of Systems:  Constitutional: No fever  Eyes: No blurry vision  Neuro: No tremors  HEENT: No pain  Cardiovascular: No chest pain, palpitations  Respiratory: No SOB, no cough  GI: No nausea, vomiting, abdominal pain  : No dysuria  Skin: no rash  Psych: no depression  Endocrine: no polyuria, polydipsia  Hem/lymph: no swelling  Osteoporosis: no fractures    ALL OTHER SYSTEMS REVIEWED AND NEGATIVE    UNABLE TO OBTAIN    PHYSICAL EXAM:  VITALS: T(C): 36.7 (07-02-21 @ 07:57)  T(F): 98 (07-02-21 @ 07:57), Max: 98.2 (07-01-21 @ 12:00)  HR: 65 (07-02-21 @ 07:57) (65 - 72)  BP: 161/74 (07-02-21 @ 07:57) (149/69 - 187/79)  RR:  (15 - 30)  SpO2:  (96% - 98%)  Wt(kg): --  GENERAL: NAD, well-groomed, well-developed  EYES: No proptosis, no lid lag, anicteric  HEENT:  Atraumatic, Normocephalic, moist mucous membranes  THYROID: Normal size, no palpable nodules  RESPIRATORY: Clear to auscultation bilaterally; No rales, rhonchi, wheezing, or rubs  CARDIOVASCULAR: Regular rate and rhythm; No murmurs; no peripheral edema  GI: Soft, nontender, non distended, normal bowel sounds  SKIN: Dry, intact, No rashes or lesions  MUSCULOSKELETAL: Full range of motion, normal strength  NEURO: sensation intact, extraocular movements intact, no tremor, normal reflexes  PSYCH: Alert and oriented x 3, normal affect, normal mood  CUSHING'S SIGNS: no striae    POCT Blood Glucose.: 151 mg/dL (07-02-21 @ 09:58)  POCT Blood Glucose.: 113 mg/dL (07-02-21 @ 06:09)  POCT Blood Glucose.: 100 mg/dL (07-02-21 @ 02:05)  POCT Blood Glucose.: 105 mg/dL (07-01-21 @ 22:14)  POCT Blood Glucose.: 114 mg/dL (07-01-21 @ 17:50)  POCT Blood Glucose.: 122 mg/dL (07-01-21 @ 14:59)  POCT Blood Glucose.: 134 mg/dL (07-01-21 @ 10:16)  POCT Blood Glucose.: 114 mg/dL (07-01-21 @ 06:37)  POCT Blood Glucose.: 106 mg/dL (07-01-21 @ 05:19)  POCT Blood Glucose.: 112 mg/dL (06-30-21 @ 22:14)  POCT Blood Glucose.: 116 mg/dL (06-30-21 @ 20:55)  POCT Blood Glucose.: 112 mg/dL (06-30-21 @ 19:36)  POCT Blood Glucose.: 119 mg/dL (06-30-21 @ 18:41)  POCT Blood Glucose.: 125 mg/dL (06-30-21 @ 17:23)  POCT Blood Glucose.: 165 mg/dL (06-30-21 @ 16:31)  POCT Blood Glucose.: 195 mg/dL (06-30-21 @ 16:15)  POCT Blood Glucose.: 326 mg/dL (06-30-21 @ 15:55)  POCT Blood Glucose.: 59 mg/dL (06-30-21 @ 15:46)  POCT Blood Glucose.: 59 mg/dL (06-30-21 @ 15:41)  POCT Blood Glucose.: 219 mg/dL (06-30-21 @ 12:07)  POCT Blood Glucose.: 162 mg/dL (06-30-21 @ 09:58)  POCT Blood Glucose.: 155 mg/dL (06-30-21 @ 07:45)  POCT Blood Glucose.: 192 mg/dL (06-30-21 @ 03:56)  POCT Blood Glucose.: 182 mg/dL (06-29-21 @ 23:56)  POCT Blood Glucose.: 187 mg/dL (06-29-21 @ 22:31)  POCT Blood Glucose.: 182 mg/dL (06-29-21 @ 20:51)  POCT Blood Glucose.: 159 mg/dL (06-29-21 @ 19:52)  POCT Blood Glucose.: 146 mg/dL (06-29-21 @ 18:59)  POCT Blood Glucose.: 124 mg/dL (06-29-21 @ 18:05)  POCT Blood Glucose.: 115 mg/dL (06-29-21 @ 17:12)  POCT Blood Glucose.: 70 mg/dL (06-29-21 @ 15:55)  POCT Blood Glucose.: 85 mg/dL (06-29-21 @ 15:11)  POCT Blood Glucose.: 113 mg/dL (06-29-21 @ 14:34)  POCT Blood Glucose.: 57 mg/dL (06-29-21 @ 14:01)  POCT Blood Glucose.: 45 mg/dL (06-29-21 @ 13:59)  POCT Blood Glucose.: 84 mg/dL (06-29-21 @ 13:00)  POCT Blood Glucose.: 142 mg/dL (06-29-21 @ 12:03)  POCT Blood Glucose.: 99 mg/dL (06-29-21 @ 10:39)      07-02    137  |  102  |  11  ----------------------------<  91  3.6   |  23  |  0.63    EGFR if : 118  EGFR if non : 102    Ca    9.2      07-02  Mg     2.0     07-02  Phos  3.2     07-02    TPro  6.4  /  Alb  3.4  /  TBili  0.5  /  DBili  x   /  AST  16  /  ALT  23  /  AlkPhos  63  07-01          Thyroid Function Tests:                           Chief Complaint: T2DM    History: No further hypoglycemia. Pt is eating well.     MEDICATIONS  (STANDING):  ampicillin/sulbactam  IVPB      ampicillin/sulbactam  IVPB 1.5 Gram(s) IV Intermittent every 6 hours  aspirin enteric coated 81 milliGRAM(s) Oral daily  atorvastatin 80 milliGRAM(s) Oral at bedtime  bromocriptine Capsule 5 milliGRAM(s) Oral daily  dextrose 40% Gel 15 Gram(s) Oral once  dextrose 5% + lactated ringers. 1000 milliLiter(s) (50 mL/Hr) IV Continuous <Continuous>  dextrose 5%. 1000 milliLiter(s) (50 mL/Hr) IV Continuous <Continuous>  dextrose 5%. 1000 milliLiter(s) (100 mL/Hr) IV Continuous <Continuous>  dextrose 50% Injectable 25 Gram(s) IV Push once  dextrose 50% Injectable 12.5 Gram(s) IV Push once  dextrose 50% Injectable 25 Gram(s) IV Push once  glucagon  Injectable 1 milliGRAM(s) IntraMuscular once  heparin   Injectable 5000 Unit(s) SubCutaneous every 8 hours  insulin lispro (ADMELOG) corrective regimen sliding scale   SubCutaneous three times a day before meals  insulin lispro (ADMELOG) corrective regimen sliding scale   SubCutaneous at bedtime  polyethylene glycol 3350 17 Gram(s) Oral daily  riluzole 50 milliGRAM(s) Oral two times a day  sertraline 50 milliGRAM(s) Oral daily    MEDICATIONS  (PRN):  senna 2 Tablet(s) Oral at bedtime PRN Constipation      Allergies    lactose (Diarrhea)  No Known Drug Allergies    Intolerances      Review of Systems:  Constitutional: No fever  Eyes: No blurry vision  Neuro: No tremors  HEENT: No pain  Cardiovascular: No chest pain, palpitations  Respiratory: No SOB, no cough  GI: No nausea, vomiting, abdominal pain  : No dysuria  Skin: no rash  Psych: no depression  Endocrine: no polyuria, polydipsia      ALL OTHER SYSTEMS REVIEWED AND NEGATIVE        PHYSICAL EXAM:  VITALS: T(C): 36.7 (07-02-21 @ 07:57)  T(F): 98 (07-02-21 @ 07:57), Max: 98.2 (07-01-21 @ 12:00)  HR: 65 (07-02-21 @ 07:57) (65 - 72)  BP: 161/74 (07-02-21 @ 07:57) (149/69 - 187/79)  RR:  (15 - 30)  SpO2:  (96% - 98%)  Wt(kg): --  GENERAL: NAD, well-groomed, well-developed  EYES: No proptosis, no lid lag, anicteric  HEENT:  Atraumatic, Normocephalic, moist mucous membranes  RESPIRATORY: Clear to auscultation bilaterally  CARDIOVASCULAR: Regular rate and rhythm  GI: Soft, nontender, non distended, normal bowel sounds  SKIN: Dry, intact, No rashes or lesions  PSYCH: Alert and oriented x 3, normal affect, normal mood      POCT Blood Glucose.: 151 mg/dL (07-02-21 @ 09:58)  POCT Blood Glucose.: 113 mg/dL (07-02-21 @ 06:09)  POCT Blood Glucose.: 100 mg/dL (07-02-21 @ 02:05)  POCT Blood Glucose.: 105 mg/dL (07-01-21 @ 22:14)  POCT Blood Glucose.: 114 mg/dL (07-01-21 @ 17:50)  POCT Blood Glucose.: 122 mg/dL (07-01-21 @ 14:59)  POCT Blood Glucose.: 134 mg/dL (07-01-21 @ 10:16)  POCT Blood Glucose.: 114 mg/dL (07-01-21 @ 06:37)  POCT Blood Glucose.: 106 mg/dL (07-01-21 @ 05:19)  POCT Blood Glucose.: 112 mg/dL (06-30-21 @ 22:14)  POCT Blood Glucose.: 116 mg/dL (06-30-21 @ 20:55)  POCT Blood Glucose.: 112 mg/dL (06-30-21 @ 19:36)  POCT Blood Glucose.: 119 mg/dL (06-30-21 @ 18:41)  POCT Blood Glucose.: 125 mg/dL (06-30-21 @ 17:23)  POCT Blood Glucose.: 165 mg/dL (06-30-21 @ 16:31)  POCT Blood Glucose.: 195 mg/dL (06-30-21 @ 16:15)  POCT Blood Glucose.: 326 mg/dL (06-30-21 @ 15:55)  POCT Blood Glucose.: 59 mg/dL (06-30-21 @ 15:46)  POCT Blood Glucose.: 59 mg/dL (06-30-21 @ 15:41)  POCT Blood Glucose.: 219 mg/dL (06-30-21 @ 12:07)  POCT Blood Glucose.: 162 mg/dL (06-30-21 @ 09:58)  POCT Blood Glucose.: 155 mg/dL (06-30-21 @ 07:45)  POCT Blood Glucose.: 192 mg/dL (06-30-21 @ 03:56)  POCT Blood Glucose.: 182 mg/dL (06-29-21 @ 23:56)  POCT Blood Glucose.: 187 mg/dL (06-29-21 @ 22:31)  POCT Blood Glucose.: 182 mg/dL (06-29-21 @ 20:51)  POCT Blood Glucose.: 159 mg/dL (06-29-21 @ 19:52)  POCT Blood Glucose.: 146 mg/dL (06-29-21 @ 18:59)  POCT Blood Glucose.: 124 mg/dL (06-29-21 @ 18:05)  POCT Blood Glucose.: 115 mg/dL (06-29-21 @ 17:12)  POCT Blood Glucose.: 70 mg/dL (06-29-21 @ 15:55)  POCT Blood Glucose.: 85 mg/dL (06-29-21 @ 15:11)  POCT Blood Glucose.: 113 mg/dL (06-29-21 @ 14:34)  POCT Blood Glucose.: 57 mg/dL (06-29-21 @ 14:01)  POCT Blood Glucose.: 45 mg/dL (06-29-21 @ 13:59)  POCT Blood Glucose.: 84 mg/dL (06-29-21 @ 13:00)  POCT Blood Glucose.: 142 mg/dL (06-29-21 @ 12:03)  POCT Blood Glucose.: 99 mg/dL (06-29-21 @ 10:39)      07-02    137  |  102  |  11  ----------------------------<  91  3.6   |  23  |  0.63    EGFR if : 118  EGFR if non : 102    Ca    9.2      07-02  Mg     2.0     07-02  Phos  3.2     07-02    TPro  6.4  /  Alb  3.4  /  TBili  0.5  /  DBili  x   /  AST  16  /  ALT  23  /  AlkPhos  63  07-01

## 2021-07-02 NOTE — DISCHARGE NOTE PROVIDER - NSDCCPCAREPLAN_GEN_ALL_CORE_FT
PRINCIPAL DISCHARGE DIAGNOSIS  Diagnosis: Pneumonia  Assessment and Plan of Treatment: You came into the hospital and you were found to have pneumonia. You were treated with IV antibiotics for a full course. If you have fevers/chills/shorness of breath please come back to the hospital or all your PCP      SECONDARY DISCHARGE DIAGNOSES  Diagnosis: Hypoglycemia  Assessment and Plan of Treatment: You were foudn to have low blood sugars when you were in the hsopital. You were taking a medication in the class of sulfunoureas which commonly lowers blood sugar. you were seen by the endocrinoogists and they determined based on your hemoglobin A1c that you acn be monitored off medications. If you continue to check your sugar at home and if it is still elevated please talk to your PCP to see if you acn be started on a medication such as januvia or trajenta.    Diagnosis: ALS (amyotrophic lateral sclerosis)  Assessment and Plan of Treatment: ALS (amyotrophic lateral sclerosis)  Please continue your home regiment for ALS. Please continue to work with the palliative care/hospice team according to your goals of care

## 2021-07-02 NOTE — SWALLOW VFSS/MBS ASSESSMENT ADULT - DIAGNOSTIC IMPRESSIONS
Pt is a 63 y/o M with h/o ALS admitted with AMS in the setting of cough, known to this service with history of dysphagia. Pt now presents with 1. a mild oropharyngeal dysphagia (significantly improved compared to prior MBS). The swallow is notable for prolonged mastication, delayed oral transit time, and silent laryngeal penetration with complete retrieval with mechanical soft solids. No aspiration observed on exam. 2. Dysphonia marked by hypophonic vocal quality and reduced vocal volume (suspect related to poor effort).   Disorders: mildly reduced lingual strength/ROM/Rate of motion, reduced laryngeal closure, mildly reduced pharyngeal contractility, and signs of reduced supraglottic sensation.

## 2021-07-02 NOTE — SWALLOW VFSS/MBS ASSESSMENT ADULT - SLP GENERAL OBSERVATIONS
Pt arrived to radiology secured in LYNNE chair, able to communicate needs and follow directions for exam. +  Dysphonia with hypophonic vocal quality and reduced vocal volume.

## 2021-07-02 NOTE — CONSULT NOTE ADULT - ASSESSMENT
65M w/ PMHx ALS (on home riluzole), prolactinoma, T2DM, HLD p/w AMS, found to be hypoglycemic to 30s i/s/o multifocal PNA, initially admitted to MICU for resistant hypoglycemia, transferred to Floors s/p improved hypoglycemia. Neurology consulted for ALS management.    Of note, patient adamant that he does not want to discuss hospice at this time.    Impression:   Chronic, progressive LE paresis w/ diffuse atrophy secondary to ALS.    Plan:  -c/w home riluzole  -would defer hospice care discussion at this time, as per patient request  -aspiration precautions  -follow-up w/ Neurology outpatient (Dr. Caba, ALS specialist) upon discharge at [address] [place] [address]. ([phone]). 65M w/ PMHx ALS (on home riluzole), prolactinoma, T2DM, HLD p/w AMS, found to be hypoglycemic to 30s i/s/o multifocal PNA, initially admitted to MICU for resistant hypoglycemia, transferred to Floors s/p improved hypoglycemia. Neurology consulted for ALS management.    Impression:   Chronic, progressive LE paresis w/ diffuse atrophy secondary to ALS.    Plan:  -c/w home riluzole  -aspiration precautions  -follow-up w/ Neurology outpatient (Dr. Caba, ALS specialist) upon discharge at [address] [place] [address]. ([phone]).

## 2021-07-02 NOTE — CHART NOTE - NSCHARTNOTEFT_GEN_A_CORE
Nutrition Follow Up Note  Patient seen for: malnutrition follow up.     Chart reviewed, events noted. Pt is a 65y M with a hx of DM and ALS who presented to the ED for AMS in the setting of hypoglycemia with FS 38 and a persistent cough, found to have multifocal pneumonia on CT likely due to aspiration.   -- Pt s/p MBS recommended for soft diet with thin liquids.     Source: [x] Patient       [x] EMR        [] RN        [] Family at bedside       [] Other:    -If unable to interview patient: [] Trach/Vent/BiPAP  [] Disoriented/confused/inappropriate to interview    Diet Order:   Diet, Soft (21)    - Is current order appropriate/adequate? [x] Yes  []  No:     - PO intake :   [] >75%  Adequate    [] 50-75%  Fair       [x] <50%  Poor    - Nutrition-related concerns: Pt in good spirits at visit regarding diet advancement, anticipates PO intake will improve now that he is no longer on pureed diet. Pt previously on pureed with poor PO intake, declining to eat. Pt tolerating soft sandwich at visit, consumed half so far. Offered to obtain food preferences, pt with none at this time. Pt declining oral nutrition supplements - Ensure Clear previously ordered but pt states he does not like.     Weights:   Daily Weight in k.5 (), Weight in k.9 ()    Nutritionally Pertinent MEDICATIONS  (STANDING):  ampicillin/sulbactam  IVPB  ampicillin/sulbactam  IVPB  atorvastatin  dextrose 40% Gel  dextrose 5%.  dextrose 5%.  dextrose 50% Injectable  dextrose 50% Injectable  dextrose 50% Injectable  glucagon  Injectable  insulin lispro (ADMELOG) corrective regimen sliding scale  insulin lispro (ADMELOG) corrective regimen sliding scale  polyethylene glycol 3350    Pertinent Labs:  @ 06:30: Na 137, BUN 11, Cr 0.63, BG 91, K+ 3.6, Phos 3.2, Mg 2.0, Alk Phos --, ALT/SGPT --, AST/SGOT --, HbA1c --    A1C with Estimated Average Glucose Result: 5.4 % (21 @ 10:03)  A1C with Estimated Average Glucose Result: 5.4 % (21 @ 01:45)    Finger Sticks:  POCT Blood Glucose.: 142 mg/dL ( @ 14:42)  POCT Blood Glucose.: 151 mg/dL ( @ 09:58)  POCT Blood Glucose.: 113 mg/dL ( @ 06:09)  POCT Blood Glucose.: 100 mg/dL ( @ 02:05)  POCT Blood Glucose.: 105 mg/dL ( @ 22:14)  POCT Blood Glucose.: 114 mg/dL ( @ 17:50)  POCT Blood Glucose.: 122 mg/dL ( @ 14:59)      Skin per nursing documentation: no pressure injuries, IAD noted.  Edema: No noted edema as per flow sheets.     Estimated Needs:   [x] no change since previous assessment  [] recalculated:     Previous Nutrition Diagnosis: Moderate Malnutrition   Nutrition Diagnosis is: [x] ongoing  [] resolved [] not applicable     New Nutrition Diagnosis: [x] Not applicable    Nutrition Care Plan:  [x] In Progress - addressed with PO encouragement, diet advancement  [] Achieved  [] Not applicable    Nutrition Interventions:     Education Provided:       [x] Yes: Provided recommendations to optimize PO and protein intake, recommended small frequent meals by ordering nutrient-dense snacks and leaving non-perishable food away from tray for later consumption during the day or between meals, to start with protein.    Recommendations:      1. Recommend continue soft diet, if BG elevated may add consistent carbohydrate restriction.  2. Pt declining oral nutrition supplements at this time - will monitor and adjust as needed.  3. Consider multivitamin supplementation if not otherwise contraindicated.  4. Provide encouragement with PO intake, menu selections, and assistance with meals as needed.     Monitoring and Evaluation:   Continue to monitor nutritional intake, tolerance to diet prescription, weights, labs, skin integrity    RD remains available upon request and will follow up per protocol    Nikky Harrington MS, RD, CDN Pager# [phone]

## 2021-07-02 NOTE — CONSULT NOTE ADULT - ATTENDING COMMENTS
Mr. Galarza is a 66yo Man  w/ PMHx ALS (on home riluzole), prolactinoma, T2DM, HLD p/w AMS, found to be hypoglycemic to 30s i/s/o multifocal PNA, initially admitted to MICU for resistant hypoglycemia and was transferred to the floor s/p improved hypoglycemia and improved mental status. Neurology consulted for ALS management.  Pt is long standing patient of Dr. Caba and we discussed options re: long term care.  He expressed understanding that [facility] will continue to provide service and would complete abx regimen.  Would help with transitional planning going forward.  Patient expresses understanding and is thankful for opportunity to help plan.    Impression:   Chronic, progressive LE paresis w/ diffuse atrophy secondary to ALS.    Plan:  -c/w home riluzole  -aspiration precautions  would contact Hospice to introduce to patient and offer planning for future.

## 2021-07-02 NOTE — DISCHARGE NOTE PROVIDER - CARE PROVIDER_API CALL
Blair Jefferson)  Internal Medicine  [address] [facility], [facility] [address]  [place] [address]  Phone: [phone]  Fax: [phone]  Established Patient  Follow Up Time: 2 weeks

## 2021-07-02 NOTE — DISCHARGE NOTE PROVIDER - HOSPITAL COURSE
Mr. Wm Galarza is a 65 year old male with a PMHx of DM and ALS who presented with altered mental status and a non-productive cough. Symptoms began to worsen at 0200 on day of presentation (6/29), as he was making gurgling sounds, strange noises, and squeezes his sheets. Additionally, he unable to tolerate his morning tea. He had also had decreased PO intake for the past few days as well. At this point he was brought to the ED.     During his transfer to the ED, he was found to have a fasting glucose of 38 and was given an AMP of D50. He had received his last dose of home glyburide at 0730 on 6/28. He remained hypoglycemic in the ED and received 3 amps of D50 and put on a D20 @100 drip. His CT scan in the ED showed multifocal PNA and he was started on meropenem and vancomycin.    He was admitted to the ED for fasting blood glucose monitoring in the setting of his severe hypoglycaemia. His antibiotic regimen in the MICU was deescalated to Unasyn for 5-7 days, which was started on 6/30. This was due to potential risk for aspiration PNA. As his fasting blood sugars stabilized, he was downgraded to D5/LR on 6/30 and his checks were further spaced. Suspicion of the etiology of his hypoglycemia was significant for sulfonylurea excess in the setting of poor PO intake, and he was given octreotide which was also discontinued on 6/30. He was found to be stable and transferred down to the floor.    On the floors, internal medicine followed and continued his antibiotic course for his PNA, which continued improving. Endocrine consult was pursued and they recommended discontinuation of sulfonylurea, and potential for discharge with Januvia 100qdaily or Trajenta 5qdaily. Patient continued have stable POCT and fasting blood glucose, so he was discharged with ***. Additionally, neurology consult ALS suggested continuing on aspiration precautions, as well as home riluzole, to continue on an outpatient basis with Dr. Caba. Patient initially refused palliative consultation, but was amenable to palliative consult after conversation with neurology attending about what goals of care would be, and that he would still receive some level of treatment for general issues. Speech swallow determined that he could continue on a soft diet for now.    Patient was stabilized and discharged on ***. Mr. Wm Galarza is a 65 year old male with a PMHx of DM and ALS who presented with altered mental status and a non-productive cough. Symptoms began to worsen at 0200 on day of presentation (6/29), as he was making gurgling sounds, strange noises, and squeezes his sheets. Additionally, he unable to tolerate his morning tea. He had also had decreased PO intake for the past few days as well. At this point he was brought to the ED.     During his transfer to the ED, he was found to have a fasting glucose of 38 and was given an AMP of D50. He had received his last dose of home glyburide at 0730 on 6/28. He remained hypoglycemic in the ED and received 3 amps of D50 and put on a D20 @100 drip. His CT scan in the ED showed multifocal PNA and he was started on meropenem and vancomycin.    He was admitted to the ED for fasting blood glucose monitoring in the setting of his severe hypoglycaemia. His antibiotic regimen in the MICU was deescalated to Unasyn for 5-7 days, which was started on 6/30. This was due to potential risk for aspiration PNA. As his fasting blood sugars stabilized, he was downgraded to D5/LR on 6/30 and his checks were further spaced. Suspicion of the etiology of his hypoglycemia was significant for sulfonylurea excess in the setting of poor PO intake, and he was given octreotide which was also discontinued on 6/30. He was found to be stable and transferred down to the floor.    On the floors, internal medicine followed and continued his antibiotic course for his PNA, which continued improving. Endocrine consult was pursued and they recommended discontinuation of sulfonylurea, and potential for discharge with Januvia 100qdaily or Trajenta 5qdaily if glucose is elevated at home , but NO hypoglycemic agents on DC .   Patient continued have stable POCT and fasting blood glucose, so he was discharged with ***. Additionally, neurology consult ALS suggested continuing on aspiration precautions, as well as home riluzole, to continue on an outpatient basis with Dr. Caba. Patient initially refused palliative consultation, but was amenable to palliative consult after conversation with neurology attending  but now patient does not want palliative but wants more home services.  Speech swallow determined that he could continue on a soft diet for now.    Patient was stabilized and discharged on ***. Mr. Wm Galarza is a 65 year old male with a PMHx of DM and ALS who presented with altered mental status and a non-productive cough. Symptoms began to worsen at 0200 on day of presentation (6/29), as he was making gurgling sounds, strange noises, and squeezes his sheets. Additionally, he unable to tolerate his morning tea. He had also had decreased PO intake for the past few days as well. At this point he was brought to the ED.     During his transfer to the ED, he was found to have a fasting glucose of 38 and was given an AMP of D50. He had received his last dose of home glyburide at 0730 on 6/28. He remained hypoglycemic in the ED and received 3 amps of D50 and put on a D20 @100 drip. His CT scan in the ED showed multifocal PNA and he was started on meropenem and vancomycin.    He was admitted to the ED for fasting blood glucose monitoring in the setting of his severe hypoglycaemia. His antibiotic regimen in the MICU was deescalated to Unasyn for 5-7 days, which was started on 6/30. This was due to potential risk for aspiration PNA. As his fasting blood sugars stabilized, he was downgraded to D5/LR on 6/30 and his checks were further spaced. Suspicion of the etiology of his hypoglycemia was significant for sulfonylurea excess in the setting of poor PO intake, and he was given octreotide which was also discontinued on 6/30. He was found to be stable and transferred down to the floor.    On the floors, internal medicine followed and continued his antibiotic course for his PNA, which continued improving. Endocrine consult was pursued and they recommended discontinuation of sulfonylurea, and potential for discharge with Januvia 100qdaily or Trajenta 5qdaily if glucose is elevated at home , but NO hypoglycemic agents on DC .   Patient continued have stable POCT and fasting blood glucose, so he was discharged with ***. Additionally, neurology consult ALS suggested continuing on aspiration precautions, as well as home riluzole, to continue on an outpatient basis with Dr. Caba. Patient initially refused palliative consultation, but was amenable to palliative consult after conversation with neurology attending  but now patient does not want palliative but wants more home services.  Speech swallow determined that he could continue on a soft diet for now.    Patient finished course of antibiotics for aspiration pna. Palliative evaluated the patient and filled out evaluation for home hospice. Patient had TOV and passed and had griffith discontinued

## 2021-07-02 NOTE — PROGRESS NOTE ADULT - PROBLEM SELECTOR PLAN 4
c/w home dose atorvastatin on home riluzole 50 BID, cefuroxime 500 BID, bromocriptine 5mg   - neuro consulted, will f/u on home riluzole 50 BID    - neuro consulted, will f/u    #Prolactinoma  c/w bromocriptine    #Depression  - c/w sertraline

## 2021-07-02 NOTE — PROGRESS NOTE ADULT - ATTENDING COMMENTS
Pt seen and examined. 65M with PMH of ALS, prolactinoma, T2DM, HLD presented with AMS, found to be hypoglycemic to 30s in setting of multifocal PNA, likely from aspiration event. Pt admitted to MICU initially for resistant hypoglycemia requiring D10 drip and q1h FS monitoring. S/p vanco/meropenem for asp PNA-->unasyn. Currently vitals stable, no tachypnea/resp distress, not hypoxic. Exam pt with NAD, S1, S2, regular, lung grossly CTA, abd soft, NT, exam +b/l LE weakness. Labs show improving leukocytosis, stable anemia, stable Cr, hypoNa, normal lactate; FS ~100s currently on 50cc/hr of D5LR; culture data/MRSA swab negative.     #Encephalopathy in setting of hypoglcyemia   #Multifocal PNA   #T2DM   #ALS    -hypoglycemia improving, c/w D5 at current rate, wean as tolerated, encourage PO intake. Endo following, plan to d/c diabetes medications on discharge as A1c is well controlled.   - c/w unasyn for total of 5-7 days of abx total   - c/w low ISS as noted above   - c/w home medications - riluzole, bromocriptine and sertraline  rest of plan as above.     D/w patient and Dr Guan.

## 2021-07-02 NOTE — DISCHARGE NOTE PROVIDER - NSDCFUADDINST_GEN_ALL_CORE_FT
Please continue to monitor your glucose at home. If it is consistently above 150 please call your PCP to see if you can be restarted on a different medication that will not lower your blood sugar too much

## 2021-07-02 NOTE — PROGRESS NOTE ADULT - PROBLEM SELECTOR PLAN 2
Multifocal PNA from 6/29 noncon CT chest, likely 2/2 aspiration: consolidative opacities in LLL and nodular groundglass w/in RU and RL lobes  - on day 2 Unasyn (5-7 days)  - MRSA swab neg 6/30 Multifocal PNA noted on 6/29 noncon CT chest, likely 2/2 aspiration given history: consolidative opacities in LLL and nodular groundglass w/in RU and RL lobes  - s/p vanco and meropenem x 1 day (6/29), now on day 3 Unasyn (6/30- present) - c/f total 5-7 days of abx   - RVP/COVID, legionella negative  - BCx NGTD   - MRSA swab neg 6/30

## 2021-07-02 NOTE — PROGRESS NOTE ADULT - ASSESSMENT
Patient is a 65y M with a hx of DM and ALS who presented to the ED for AMS in the setting of FS 38 and a persistent cough, found to have multifocal pneumonia on CT likely due to aspiration.  Patient is a 65y M with a hx of DM and ALS who presented to the ED for AMS in the setting of hypoglycemia with FS 38 and a persistent cough, found to have multifocal pneumonia on CT likely due to aspiration.

## 2021-07-02 NOTE — PROGRESS NOTE ADULT - ATTENDING COMMENTS
65y M with PMH of DM and ALS who presented to the ED for sulfonylurea induced hypoglycemia requiring dextrose infusion and octreotide, and aspiration PNA, now s/p ICU stay.  Seen and examined at bedside. Denies acute complaints  SLP eval today  Cont unasyn for aspiration PNA  Hypoglycemia resolved; cont to monitor FS; appreciate endocrinology consult  Appreciate neurology consult  SW assistance with home care needs  Dispo: likely tomorrow if no acute issues arise  Rest as above

## 2021-07-02 NOTE — DISCHARGE NOTE PROVIDER - DETAILS OF MALNUTRITION DIAGNOSIS/DIAGNOSES
This patient has been assessed with a concern for Malnutrition and was treated during this hospitalization for the following Nutrition diagnosis/diagnoses:     -  07/01/2021: Moderate protein-calorie malnutrition

## 2021-07-02 NOTE — PROGRESS NOTE ADULT - ASSESSMENT
Patient is a 65y M with a hx of DM and ALS who presented to the ED for AMS in the setting of hypoglycemia with FS 38 and a persistent cough, found to have multifocal pneumonia on CT likely due to aspiration.

## 2021-07-02 NOTE — DISCHARGE NOTE PROVIDER - NSDCHHNEEDSERVICE_GEN_ALL_CORE
Catheter insertion/Medication teaching and assessment/Teaching and training Medication teaching and assessment/Teaching and training

## 2021-07-02 NOTE — PROGRESS NOTE ADULT - PROBLEM SELECTOR PLAN 4
on home riluzole 50 BID    - neuro consulted, will f/u    #Prolactinoma  c/w bromocriptine    #Depression  - c/w sertraline

## 2021-07-02 NOTE — PROGRESS NOTE ADULT - PROBLEM SELECTOR PLAN 1
presented with FS 38 likely 2/2 to sulfonyurea excess in setting of poor PO intake. s/p octreotide. A1c 5.6%  - FS now much improved;  last two 100 & 105  - c/w FS q4h for now   - c/w D5 LR 50cc/hr, wean off D5 as pt tolerates   - endocrine following, recs appreciated

## 2021-07-02 NOTE — PROGRESS NOTE ADULT - PROBLEM SELECTOR PLAN 3
A1C 5.6%  - as per Endo: low dose ISS, only correct above 200 at 1:50  - do not discharge home with any antihyperglycemic agents  - continue to monitor glucose at home. if persistent hyperglycemia, can consider DPP4 inhibitor

## 2021-07-02 NOTE — PROGRESS NOTE ADULT - PROBLEM SELECTOR PLAN 7
DNR/DNI w/ completed Molst form  f/u with palliative - pt interested in hospice DNR/DNI w/ completed Molst form  f/u with palliative - according to family and patient this morning, they are not interested in pursuing hospice;

## 2021-07-02 NOTE — PROGRESS NOTE ADULT - PROBLEM SELECTOR PLAN 3
A1C 5.6%  - as per Endo: low dose ISS, only correct above 200 at 1:50  - do not d/c any antihyperglycemic agents  - continue to monitor glucose at home. if persistent hyperglycemia -- DPP4 inhibitor A1C 5.6%  - as per Endo: low dose ISS, only correct above 200 at 1:50  - do not discharge home with any antihyperglycemic agents  - continue to monitor glucose at home. if persistent hyperglycemia, can consider DPP4 inhibitor

## 2021-07-02 NOTE — DISCHARGE NOTE PROVIDER - NSDCMRMEDTOKEN_GEN_ALL_CORE_FT
aspirin 81 mg oral delayed release tablet: 1 tab(s) orally once a day  bromocriptine 5 mg oral capsule: 1 cap(s) orally once a day  Lipitor 80 mg oral tablet: 1 tab(s) orally once a day (at bedtime)  polyethylene glycol 3350 oral powder for reconstitution: 17 gram(s) orally once a day  riluzole 50 mg oral tablet: 1 tab(s) orally 2 times a day (before meals)  senna oral tablet: 2 tab(s) orally once a day (at bedtime)  sertraline 50 mg oral tablet: 1 tab(s) orally once a day

## 2021-07-02 NOTE — PROGRESS NOTE ADULT - SUBJECTIVE AND OBJECTIVE BOX
This patient is a 65y M with a hx of DM and ALS who presented to ED on 6/29 with AMS and a non-productive cough of several days.  According to his wife, he woke up at 2am making strange noises, gurgling, and squeezing the sheets around 2am, and then was unable to tolerate his tea at breakfast, afterwhich he was brought to the ED. En route, he was found to have a FS 38 and was given an AMP of D50. His last dose of glyburide was 730am on 7/28 and he had decreased PO intake for the couple of days prior. On arrival tot he ED he remained hypoglycemic and received 3 amps of D50 and subsequently put on D20 @100.  CT scan showed multifocal PNA and he was started on meropenem and vanc.     He was admitted to MICU for q1 FSBG. While in the MICU his antibx regimen was deescalated to Unasyn for 5-7 days to cover aspiration PNA and FS became more stable and were checked q4h as he was put on D5/LR on 6/30 with FS improved >180. It was suspected hypoglycemia due to sulfonyurea excess and poor PO intake in setting of EMILY and was given octreotide, which was also d/c 6/30. Patient was found to be stable with repleted Phos and Mg,  This patient is a 65y M with a hx of DM and ALS who presented to ED on 6/29 with AMS and a non-productive cough of several days.  According to his wife, he woke up at 2am making strange noises, gurgling, and squeezing the sheets around 2am, and then was unable to tolerate his tea at breakfast, afterwhich he was brought to the ED. En route, he was found to have a FS 38 and was given an AMP of D50. His last dose of glyburide was 730am on 7/28 and he had decreased PO intake for the couple of days prior. On arrival tot he ED he remained hypoglycemic and received 3 amps of D50 and subsequently put on D20 @100.  CT scan showed multifocal PNA and he was started on meropenem and vanc.     He was admitted to MICU for q1 FSBG. While in the MICU his antibx regimen was deescalated to Unasyn for 5-7 days to cover aspiration PNA and FS became more stable and were downgraded to be checked q4h as he was put on D5/LR on 6/30 with FS improved >180. It was suspected hypoglycemia due to sulfonyurea excess in setting of poor PO intake and was given octreotide, which was also d/c 6/30. Patient's Mg and Phos were repleted, and he was found to be stable for downgrade to Medicine floors.     LABS:                         9.2    10.18 )-----------( 227      ( 01 Jul 2021 00:53 )             27.9     07-01    132<L>  |  99  |  15  ----------------------------<  110<H>  4.0   |  22  |  0.85    Ca    8.9      01 Jul 2021 00:53  Phos  3.1     07-01  Mg     2.0     07-01    TPro  6.4  /  Alb  3.4  /  TBili  0.5  /  DBili  x   /  AST  16  /  ALT  23  /  AlkPhos  63  07-01    CAPILLARY BLOOD GLUCOSE    POCT Blood Glucose.: 100 mg/dL (02 Jul 2021 02:05)  POCT Blood Glucose.: 105 mg/dL (01 Jul 2021 22:14)  POCT Blood Glucose.: 114 mg/dL (01 Jul 2021 17:50)  POCT Blood Glucose.: 122 mg/dL (01 Jul 2021 14:59)  POCT Blood Glucose.: 134 mg/dL (01 Jul 2021 10:16)  POCT Blood Glucose.: 114 mg/dL (01 Jul 2021 06:37)  POCT Blood Glucose.: 106 mg/dL (01 Jul 2021 05:19)        VITALS:   T(C): 36.8 (07-02-21 @ 01:01), Max: 37 (07-01-21 @ 03:00)  HR: 67 (07-02-21 @ 01:01) (65 - 74)  BP: 169/75 (07-02-21 @ 01:01) (149/69 - 187/79)  RR: 18 (07-02-21 @ 01:01) (15 - 30)  SpO2: 97% (07-02-21 @ 01:01) (95% - 98%)    GENERAL: NAD, sleeping in bed comfortably  HEAD:  Atraumatic, normocephalic  EYES: EOMI, PERRLA, conjunctiva and sclera clear  ENT: Moist mucous membranes  NECK: Supple, no JVD  HEART: S1S2, Regular rate and rhythm, no murmurs, rubs, or gallops  LUNGS: cough during exam, faint rhonchi appreciated  ABDOMEN: Soft, nontender, nondistended, +BS  EXTREMITIES: 2+ peripheral pulses bilaterally. No edema  NERVOUS SYSTEM:  A&Ox2, 4/5 upper extremity strength  This patient is a 65y M with a hx of DM and ALS who presented to ED on 6/29 with AMS and a non-productive cough of several days.  According to his wife, he woke up at 2am making strange noises, gurgling, and squeezing the sheets around 2am, and then was unable to tolerate his tea at breakfast, afterwhich he was brought to the ED. En route, he was found to have a FS 38 and was given an AMP of D50. His last dose of glyburide was 730am on 7/28 and he had decreased PO intake for the couple of days prior. On arrival tot he ED he remained hypoglycemic and received 3 amps of D50 and subsequently put on D20 @100.  CT scan showed multifocal PNA and he was started on meropenem and vanc.     He was admitted to MICU for q1 FSBG. While in the MICU his antibx regimen was deescalated to Unasyn for 5-7 days to cover aspiration PNA and FS became more stable and were downgraded to be checked q4h as he was put on D5/LR on 6/30 with FS improved >180. It was suspected hypoglycemia due to sulfonyurea excess in setting of poor PO intake and was given octreotide, which was also d/c 6/30. Patient's Mg and Phos were repleted, and he was found to be stable for downgrade to Medicine floors.     LABS:                         9.2    10.18 )-----------( 227      ( 01 Jul 2021 00:53 )             27.9     07-01    132<L>  |  99  |  15  ----------------------------<  110<H>  4.0   |  22  |  0.85    Ca    8.9      01 Jul 2021 00:53  Phos  3.1     07-01  Mg     2.0     07-01    TPro  6.4  /  Alb  3.4  /  TBili  0.5  /  DBili  x   /  AST  16  /  ALT  23  /  AlkPhos  63  07-01    CAPILLARY BLOOD GLUCOSE    POCT Blood Glucose.: 100 mg/dL (02 Jul 2021 02:05)  POCT Blood Glucose.: 105 mg/dL (01 Jul 2021 22:14)  POCT Blood Glucose.: 114 mg/dL (01 Jul 2021 17:50)  POCT Blood Glucose.: 122 mg/dL (01 Jul 2021 14:59)  POCT Blood Glucose.: 134 mg/dL (01 Jul 2021 10:16)  POCT Blood Glucose.: 114 mg/dL (01 Jul 2021 06:37)  POCT Blood Glucose.: 106 mg/dL (01 Jul 2021 05:19)    CT CHEST 6/29    IMPRESSION:    Consolidative opacities within left lower lobe and nodular groundglass opacities within the right upper and lower lobes representing multifocal pneumonia.      VITALS:   T(C): 36.8 (07-02-21 @ 01:01), Max: 37 (07-01-21 @ 03:00)  HR: 67 (07-02-21 @ 01:01) (65 - 74)  BP: 169/75 (07-02-21 @ 01:01) (149/69 - 187/79)  RR: 18 (07-02-21 @ 01:01) (15 - 30)  SpO2: 97% (07-02-21 @ 01:01) (95% - 98%)    GENERAL: NAD, sleeping in bed comfortably  HEAD:  Atraumatic, normocephalic  EYES: EOMI, PERRLA, conjunctiva and sclera clear  ENT: Moist mucous membranes  NECK: Supple, no JVD  HEART: S1S2, Regular rate and rhythm, no murmurs, rubs, or gallops  LUNGS: cough during exam, faint rhonchi appreciated  ABDOMEN: Soft, nontender, nondistended, +BS  EXTREMITIES: 2+ peripheral pulses bilaterally. No edema  NERVOUS SYSTEM:  A&Ox2, 4/5 upper extremity strength  This patient is a 65y M with a hx of DM and ALS who presented to ED on 6/29 with AMS and a non-productive cough of several days.  According to his wife, he woke up at 2am making strange noises, gurgling, and squeezing the sheets around 2am, and then was unable to tolerate his tea at breakfast, afterwhich he was brought to the ED. En route, he was found to have a FS 38 and was given an AMP of D50. His last dose of glyburide was 730am on 7/28 and he had decreased PO intake for the couple of days prior. On arrival tot he ED he remained hypoglycemic and received 3 amps of D50 and subsequently put on D20 @100.  CT scan showed multifocal PNA and he was started on meropenem and vanc.     He was admitted to MICU for q1 FSBG. While in the MICU his antibx regimen was deescalated to Unasyn for 5-7 days to cover aspiration PNA and FS became more stable and were downgraded to be checked q4h as he was put on D5/LR on 6/30 with FS improved >180. It was suspected hypoglycemia due to sulfonyurea excess in setting of poor PO intake and was given octreotide, which was also d/c 6/30. Patient's Mg and Phos were repleted, and he was found to be stable for downgrade to Medicine floors.     REVIEW OF SYSTEMS:    CONSTITUTIONAL: No weakness, fevers   HEAD: No headache  EYES: No visual changes; no sclera icterics, no pain or drainage  NECK: No pain or stiffness  RESPIRATORY: +cough, No shortness of breath  CARDIOVASCULAR: No chest pain  GASTROINTESTINAL: No abdominal or epigastric pain. No nausea, vomiting, or hematemesis;  NEUROLOGICAL: No numbness or weakness    ALLERGIES:   lactose  NKDA    MEDS:   pantoprazole 40mg  riluzole 50mg BID  senna 2 tabs QD  cefuroxime 500mg BID  clonazepam 0.25mg TID  aspirin 81 QD  bromocriptine 5mg QD  sertraline 500mg QD  heparin 5000u TID  Lipitor 80mg QD  Vit D3 2000u QD  Montelukast 10mg QD  PAST MEDICAL HX:  T2DM  ALS    PAST SURGICAL HX:            LABS:                         9.2    10.18 )-----------( 227      ( 01 Jul 2021 00:53 )             27.9     07-01    132<L>  |  99  |  15  ----------------------------<  110<H>  4.0   |  22  |  0.85    Ca    8.9      01 Jul 2021 00:53  Phos  3.1     07-01  Mg     2.0     07-01    TPro  6.4  /  Alb  3.4  /  TBili  0.5  /  DBili  x   /  AST  16  /  ALT  23  /  AlkPhos  63  07-01    CAPILLARY BLOOD GLUCOSE    POCT Blood Glucose.: 100 mg/dL (02 Jul 2021 02:05)  POCT Blood Glucose.: 105 mg/dL (01 Jul 2021 22:14)  POCT Blood Glucose.: 114 mg/dL (01 Jul 2021 17:50)  POCT Blood Glucose.: 122 mg/dL (01 Jul 2021 14:59)  POCT Blood Glucose.: 134 mg/dL (01 Jul 2021 10:16)  POCT Blood Glucose.: 114 mg/dL (01 Jul 2021 06:37)  POCT Blood Glucose.: 106 mg/dL (01 Jul 2021 05:19)    CT CHEST 6/29    IMPRESSION:    Consolidative opacities within left lower lobe and nodular groundglass opacities within the right upper and lower lobes representing multifocal pneumonia.      VITALS:   T(C): 36.8 (07-02-21 @ 01:01), Max: 37 (07-01-21 @ 03:00)  HR: 67 (07-02-21 @ 01:01) (65 - 74)  BP: 169/75 (07-02-21 @ 01:01) (149/69 - 187/79)  RR: 18 (07-02-21 @ 01:01) (15 - 30)  SpO2: 97% (07-02-21 @ 01:01) (95% - 98%)    GENERAL: NAD, sleeping in bed comfortably  HEAD:  Atraumatic, normocephalic  EYES: EOMI, PERRLA, conjunctiva and sclera clear  ENT: Moist mucous membranes  NECK: Supple, no JVD  HEART: S1S2, Regular rate and rhythm, no murmurs, rubs, or gallops  LUNGS: cough during exam, faint rhonchi appreciated  ABDOMEN: Soft, nontender, nondistended, +BS  EXTREMITIES: 2+ peripheral pulses bilaterally. No edema  NERVOUS SYSTEM:  A&Ox2, 4/5 upper extremity strength  Patient is a 65y old  Male who presents with a chief complaint of Hypoglycemia(01 Jul 2021 14:46)      INTERVAL HPI/OVERNIGHT EVENTS:  This patient is a 65y M with a hx of DM and ALS who presented to ED on 6/29 with AMS and a non-productive cough of several days.  According to his wife, he woke up at 2am making strange noises, gurgling, and squeezing the sheets around 2am, and then was unable to tolerate his tea at breakfast, afterwhich he was brought to the ED. En route, he was found to have a FS 38 and was given an AMP of D50. His last dose of glyburide was 730am on 7/28 and he had decreased PO intake for the couple of days prior. On arrival tot he ED he remained hypoglycemic and received 3 amps of D50 and subsequently put on D20 @100.  CT scan showed multifocal PNA and he was started on meropenem and vanc.     He was admitted to MICU for q1 FSBG. While in the MICU his antibx regimen was deescalated to Unasyn for 5-7 days to cover aspiration PNA and FS became more stable and were downgraded to be checked q4h as he was put on D5/LR on 6/30 with FS improved >180. It was suspected hypoglycemia due to sulfonyurea excess in setting of poor PO intake and was given octreotide, which was also d/c 6/30. Patient's Mg and Phos were repleted, and he was found to be stable for downgrade to Medicine floors.       REVIEW OF SYSTEMS:  CONSTITUTIONAL: No weakness, fevers, chills  EYES/ENT: No visual changes;  no vertigo or throat pain   NECK: No pain or stiffness  RESPIRATORY:+ cough, no shortness of breath  CARDIOVASCULAR: No chest pain or palpitations  GASTROINTESTINAL: no nausea, vomiting, no abdominal pain, no BRBPR  GENITOURINARY: no polyuria, no dysuria  NEUROLOGICAL: no numbness, no headaches, no confusion   MUSCULOSKELETAL: no back pain, no weakness   SKIN: No itching, burning, rashes, or lesions   PSYCH: no anxiety, depression  HEME: no gum bleeding, no bruising     PAST MEDICAL & SURGICAL HISTORY:  Dyslipidemia  Deviated nasal septum  Prolactinoma  Hypertension  Type 2 diabetes mellitus  HLD (hyperlipidemia)  H/O nasal septoplasty  History of tonsillectomy    Allergies  lactose (Diarrhea)  No Known Drug Allergies    FAMILY HISTORY:  Family history of Hodgkins disease  Family history of Alzheimer&#x27;s disease    Social History:  no smoking, no etoh, no drugs   lives with wife       MEDICATIONS  (STANDING):  ampicillin/sulbactam  IVPB      ampicillin/sulbactam  IVPB 1.5 Gram(s) IV Intermittent every 6 hours  aspirin enteric coated 81 milliGRAM(s) Oral daily  atorvastatin 80 milliGRAM(s) Oral at bedtime  bromocriptine Capsule 5 milliGRAM(s) Oral daily  dextrose 40% Gel 15 Gram(s) Oral once  dextrose 5% + lactated ringers. 1000 milliLiter(s) (50 mL/Hr) IV Continuous <Continuous>  dextrose 5%. 1000 milliLiter(s) (50 mL/Hr) IV Continuous <Continuous>  dextrose 5%. 1000 milliLiter(s) (100 mL/Hr) IV Continuous <Continuous>  dextrose 50% Injectable 25 Gram(s) IV Push once  dextrose 50% Injectable 12.5 Gram(s) IV Push once  dextrose 50% Injectable 25 Gram(s) IV Push once  glucagon  Injectable 1 milliGRAM(s) IntraMuscular once  heparin   Injectable 5000 Unit(s) SubCutaneous every 8 hours  insulin lispro (ADMELOG) corrective regimen sliding scale   SubCutaneous at bedtime  insulin lispro (ADMELOG) corrective regimen sliding scale   SubCutaneous three times a day before meals  polyethylene glycol 3350 17 Gram(s) Oral daily  riluzole 50 milliGRAM(s) Oral two times a day  sertraline 50 milliGRAM(s) Oral daily    MEDICATIONS  (PRN):  senna 2 Tablet(s) Oral at bedtime PRN Constipation      Vital Signs Last 24 Hrs  T(C): 36.8 (02 Jul 2021 01:01), Max: 36.9 (01 Jul 2021 07:00)  T(F): 98.2 (02 Jul 2021 01:01), Max: 98.5 (01 Jul 2021 07:00)  HR: 67 (02 Jul 2021 01:01) (65 - 72)  BP: 169/75 (02 Jul 2021 01:01) (149/69 - 187/79)  BP(mean): 116 (01 Jul 2021 16:00) (99 - 120)  RR: 18 (02 Jul 2021 01:01) (15 - 30)  SpO2: 97% (02 Jul 2021 01:01) (95% - 98%)    PHYSICAL EXAM:  GENERAL: NAD, sleeping in bed comfortably  HEAD:  Atraumatic, normocephalic  EYES: EOMI,  conjunctiva and sclera clear  ENT: Moist mucous membranes, no OP erythema   NECK: Supple, no JVD  HEART: S1S2, Regular rate and rhythm, no murmurs, rubs, or gallops  LUNGS: cough during exam, faint rhonchi appreciated  ABDOMEN: Soft, nontender, nondistended, +BS  EXTREMITIES: 2+ peripheral pulses bilaterally. No edema  NERVOUS SYSTEM:  A&Ox2, 4/5 upper extremity strength  PSYCH: calm, cooperative   SKIN: No rashes or lesions       LABS:  Labs, imaging and EKG personally reviewed and interpreted by me.                         9.2    10.18 )-----------( 227      ( 01 Jul 2021 00:53 )             27.9     07-01    132<L>  |  99  |  15  ----------------------------<  110<H>  4.0   |  22  |  0.85    Ca    8.9      01 Jul 2021 00:53  Phos  3.1     07-01  Mg     2.0     07-01    TPro  6.4  /  Alb  3.4  /  TBili  0.5  /  DBili  x   /  AST  16  /  ALT  23  /  AlkPhos  63  07-01      CT CHEST 6/29  IMPRESSION:  Consolidative opacities within left lower lobe and nodular groundglass opacities within the right upper and lower lobes representing multifocal pneumonia.      I&O's Summary    30 Jun 2021 07:01  -  01 Jul 2021 07:00  --------------------------------------------------------  IN: 1850 mL / OUT: 1620 mL / NET: 230 mL    01 Jul 2021 07:01  -  02 Jul 2021 05:15  --------------------------------------------------------  IN: 1600 mL / OUT: 2000 mL / NET: -400 mL        RADIOLOGY & ADDITIONAL TESTS:  Imaging Personally Reviewed:  [X] YES  [ ] NO  Consultant(s) Notes Reviewed:  [X] YES  [ ] NO  Care Discussed with Consultants/Other Providers [X] YES  [ ] NO

## 2021-07-02 NOTE — PROGRESS NOTE ADULT - PROBLEM SELECTOR PLAN 1
Improved, likely secondary to sulfonylurea use in the setting of decreased appetite.   Patient s/p octreotide gtt now discontinued due to no further episode of hypoglycemia.   Discussed with wife that we should d/c glyburide due to the potential risk of hypoglycemia in a patient with uncertain PO intake.   Continue to monitor glucose every 4 hours.  Would not d/c on any antihyperglycemic agent as his A1C is 5.6% and goal of care of discussion on going.   Can monitor glucose at home and if patient with persistent hyperglycemia impacting the quality of life, would recommend starting DDP4 inhibitor (Januvia 100mg daily or Tradjenta 5mg once daily).

## 2021-07-02 NOTE — SWALLOW VFSS/MBS ASSESSMENT ADULT - NS SWALLOW VFSS REC ASPIR MON
Monitor for s/s aspiration/laryngeal penetration. If noted:  D/C p.o. intake, provide non-oral nutrition/hydration/meds, and contact this service @ [phone]/change of breathing pattern/cough/gurgly voice/fever/pneumonia/throat clearing/upper respiratory infection

## 2021-07-03 LAB
ANION GAP SERPL CALC-SCNC: 13 MMOL/L — SIGNIFICANT CHANGE UP (ref 5–17)
BUN SERPL-MCNC: 14 MG/DL — SIGNIFICANT CHANGE UP (ref 7–23)
CALCIUM SERPL-MCNC: 9.1 MG/DL — SIGNIFICANT CHANGE UP (ref 8.4–10.5)
CHLORIDE SERPL-SCNC: 102 MMOL/L — SIGNIFICANT CHANGE UP (ref 96–108)
CO2 SERPL-SCNC: 23 MMOL/L — SIGNIFICANT CHANGE UP (ref 22–31)
CREAT SERPL-MCNC: 0.75 MG/DL — SIGNIFICANT CHANGE UP (ref 0.5–1.3)
CULTURE RESULTS: SIGNIFICANT CHANGE UP
GLUCOSE BLDC GLUCOMTR-MCNC: 136 MG/DL — HIGH (ref 70–99)
GLUCOSE BLDC GLUCOMTR-MCNC: 200 MG/DL — HIGH (ref 70–99)
GLUCOSE BLDC GLUCOMTR-MCNC: 40 MG/DL — CRITICAL LOW (ref 70–99)
GLUCOSE BLDC GLUCOMTR-MCNC: 89 MG/DL — SIGNIFICANT CHANGE UP (ref 70–99)
GLUCOSE BLDC GLUCOMTR-MCNC: 94 MG/DL — SIGNIFICANT CHANGE UP (ref 70–99)
GLUCOSE BLDC GLUCOMTR-MCNC: 94 MG/DL — SIGNIFICANT CHANGE UP (ref 70–99)
GLUCOSE BLDC GLUCOMTR-MCNC: 95 MG/DL — SIGNIFICANT CHANGE UP (ref 70–99)
GLUCOSE BLDC GLUCOMTR-MCNC: 98 MG/DL — SIGNIFICANT CHANGE UP (ref 70–99)
GLUCOSE SERPL-MCNC: 81 MG/DL — SIGNIFICANT CHANGE UP (ref 70–99)
HCT VFR BLD CALC: 28.4 % — LOW (ref 39–50)
HCV RNA FLD QL NAA+PROBE: SIGNIFICANT CHANGE UP
HGB BLD-MCNC: 9.2 G/DL — LOW (ref 13–17)
MAGNESIUM SERPL-MCNC: 2 MG/DL — SIGNIFICANT CHANGE UP (ref 1.6–2.6)
MCHC RBC-ENTMCNC: 27.6 PG — SIGNIFICANT CHANGE UP (ref 27–34)
MCHC RBC-ENTMCNC: 32.4 GM/DL — SIGNIFICANT CHANGE UP (ref 32–36)
MCV RBC AUTO: 85.3 FL — SIGNIFICANT CHANGE UP (ref 80–100)
NRBC # BLD: 0 /100 WBCS — SIGNIFICANT CHANGE UP (ref 0–0)
ORGANISM # SPEC MICROSCOPIC CNT: SIGNIFICANT CHANGE UP
ORGANISM # SPEC MICROSCOPIC CNT: SIGNIFICANT CHANGE UP
PHOSPHATE SERPL-MCNC: 3.5 MG/DL — SIGNIFICANT CHANGE UP (ref 2.5–4.5)
PLATELET # BLD AUTO: 244 K/UL — SIGNIFICANT CHANGE UP (ref 150–400)
POTASSIUM SERPL-MCNC: 3.6 MMOL/L — SIGNIFICANT CHANGE UP (ref 3.5–5.3)
POTASSIUM SERPL-SCNC: 3.6 MMOL/L — SIGNIFICANT CHANGE UP (ref 3.5–5.3)
RBC # BLD: 3.33 M/UL — LOW (ref 4.2–5.8)
RBC # FLD: 14 % — SIGNIFICANT CHANGE UP (ref 10.3–14.5)
SODIUM SERPL-SCNC: 138 MMOL/L — SIGNIFICANT CHANGE UP (ref 135–145)
SPECIMEN SOURCE: SIGNIFICANT CHANGE UP
WBC # BLD: 6.87 K/UL — SIGNIFICANT CHANGE UP (ref 3.8–10.5)
WBC # FLD AUTO: 6.87 K/UL — SIGNIFICANT CHANGE UP (ref 3.8–10.5)

## 2021-07-03 PROCEDURE — 99233 SBSQ HOSP IP/OBS HIGH 50: CPT | Mod: GC

## 2021-07-03 RX ADMIN — AMPICILLIN SODIUM AND SULBACTAM SODIUM 100 GRAM(S): 250; 125 INJECTION, POWDER, FOR SUSPENSION INTRAMUSCULAR; INTRAVENOUS at 12:57

## 2021-07-03 RX ADMIN — ATORVASTATIN CALCIUM 80 MILLIGRAM(S): 80 TABLET, FILM COATED ORAL at 21:32

## 2021-07-03 RX ADMIN — Medication 81 MILLIGRAM(S): at 12:56

## 2021-07-03 RX ADMIN — RILUZOLE 50 MILLIGRAM(S): 50 TABLET ORAL at 06:16

## 2021-07-03 RX ADMIN — SERTRALINE 50 MILLIGRAM(S): 25 TABLET, FILM COATED ORAL at 12:56

## 2021-07-03 RX ADMIN — HEPARIN SODIUM 5000 UNIT(S): 5000 INJECTION INTRAVENOUS; SUBCUTANEOUS at 21:32

## 2021-07-03 RX ADMIN — POLYETHYLENE GLYCOL 3350 17 GRAM(S): 17 POWDER, FOR SOLUTION ORAL at 12:55

## 2021-07-03 RX ADMIN — AMPICILLIN SODIUM AND SULBACTAM SODIUM 100 GRAM(S): 250; 125 INJECTION, POWDER, FOR SUSPENSION INTRAMUSCULAR; INTRAVENOUS at 06:16

## 2021-07-03 RX ADMIN — BROMOCRIPTINE MESYLATE 5 MILLIGRAM(S): 5 CAPSULE ORAL at 12:56

## 2021-07-03 RX ADMIN — HEPARIN SODIUM 5000 UNIT(S): 5000 INJECTION INTRAVENOUS; SUBCUTANEOUS at 14:46

## 2021-07-03 RX ADMIN — HEPARIN SODIUM 5000 UNIT(S): 5000 INJECTION INTRAVENOUS; SUBCUTANEOUS at 06:15

## 2021-07-03 RX ADMIN — AMPICILLIN SODIUM AND SULBACTAM SODIUM 100 GRAM(S): 250; 125 INJECTION, POWDER, FOR SUSPENSION INTRAMUSCULAR; INTRAVENOUS at 18:09

## 2021-07-03 RX ADMIN — RILUZOLE 50 MILLIGRAM(S): 50 TABLET ORAL at 18:07

## 2021-07-03 RX ADMIN — AMPICILLIN SODIUM AND SULBACTAM SODIUM 100 GRAM(S): 250; 125 INJECTION, POWDER, FOR SUSPENSION INTRAMUSCULAR; INTRAVENOUS at 00:06

## 2021-07-03 NOTE — PROGRESS NOTE ADULT - PROBLEM SELECTOR PLAN 1
presented with FS 38 likely 2/2 to sulfonyurea excess in setting of poor PO intake. s/p octreotide. A1c 5.6%  - FS now much improved;  last two 100 & 105  - c/w FS q4h for now   - d/c D5 LR 50cc/hr, wean off D5 as pt tolerates   - endocrine following, recs appreciated

## 2021-07-03 NOTE — PROGRESS NOTE ADULT - PROBLEM SELECTOR PLAN 7
DNR/DNI w/ completed Molst form  f/u with palliative - according to family and patient this morning, they are not interested in pursuing hospice; DNR/DNI w/ completed Molst form  f/u with palliative recs

## 2021-07-03 NOTE — PROGRESS NOTE ADULT - ATTENDING COMMENTS
CW unasyn for multifocal pneumonia and Glucose monitoring .  As per Nurse , there is evidence of Deep tissue injury ( I did not see it ) Wound consul was placed by the nurse , cont with frequent turning and off loading .          Nicolette Corley   HOspitalist   [phone]

## 2021-07-03 NOTE — PROGRESS NOTE ADULT - SUBJECTIVE AND OBJECTIVE BOX
Ga Barkley MD  PGY 1 Department of Internal Medicine  Pager: [phone] ([facility])   Pager: [phone] ([facility])  Also available on [employer] Teams      Patient is a 65y old  Male who presents with a chief complaint of AMS in setting of hypoglycemia  Aspiration PNA (02 Jul 2021 14:41)      SUBJECTIVE / OVERNIGHT EVENTS: No acute overnight events. Pt seen and examined. Denies fevers, chills, CP, SOB, Abdominal pain, N/V, Constipation, Diarrhea    MEDICATIONS  (STANDING):  ampicillin/sulbactam  IVPB      ampicillin/sulbactam  IVPB 1.5 Gram(s) IV Intermittent every 6 hours  aspirin enteric coated 81 milliGRAM(s) Oral daily  atorvastatin 80 milliGRAM(s) Oral at bedtime  bromocriptine Capsule 5 milliGRAM(s) Oral daily  dextrose 40% Gel 15 Gram(s) Oral once  dextrose 5%. 1000 milliLiter(s) (50 mL/Hr) IV Continuous <Continuous>  dextrose 5%. 1000 milliLiter(s) (100 mL/Hr) IV Continuous <Continuous>  dextrose 50% Injectable 25 Gram(s) IV Push once  dextrose 50% Injectable 12.5 Gram(s) IV Push once  dextrose 50% Injectable 25 Gram(s) IV Push once  glucagon  Injectable 1 milliGRAM(s) IntraMuscular once  heparin   Injectable 5000 Unit(s) SubCutaneous every 8 hours  insulin lispro (ADMELOG) corrective regimen sliding scale   SubCutaneous three times a day before meals  insulin lispro (ADMELOG) corrective regimen sliding scale   SubCutaneous at bedtime  polyethylene glycol 3350 17 Gram(s) Oral daily  riluzole 50 milliGRAM(s) Oral two times a day  sertraline 50 milliGRAM(s) Oral daily    MEDICATIONS  (PRN):  senna 2 Tablet(s) Oral at bedtime PRN Constipation      I&O's Summary    02 Jul 2021 07:01  -  03 Jul 2021 07:00  --------------------------------------------------------  IN: 890 mL / OUT: 1575 mL / NET: -685 mL    03 Jul 2021 07:01  -  03 Jul 2021 11:17  --------------------------------------------------------  IN: 250 mL / OUT: 125 mL / NET: 125 mL        Vital Signs Last 24 Hrs  T(C): 36.6 (03 Jul 2021 09:44), Max: 36.8 (02 Jul 2021 17:11)  T(F): 97.9 (03 Jul 2021 09:44), Max: 98.3 (02 Jul 2021 17:11)  HR: 68 (03 Jul 2021 09:44) (64 - 74)  BP: 165/76 (03 Jul 2021 09:44) (138/77 - 171/76)  BP(mean): --  RR: 18 (03 Jul 2021 09:44) (17 - 18)  SpO2: 94% (03 Jul 2021 09:44) (94% - 98%)    =================PHYSICAL EXAM=================    GENERAL: Laying comfortably, NAD  EYES: EOMI, PERRL, no scleral icterus  NECK: No JVD  LUNG: Clear to auscultation bilaterally; No wheeze, crackles or rhonci  HEART: Regular rate and rhythm; No murmurs, rubs, or gallops  ABDOMEN: Soft, Nontender, Nondistended  EXTREMITIES:  Upper extremity strength 4/5 bilaterally. Hip flexion 1/5 bilaterally, Hip Abduction/Adduction: 3/5 bilaterally DF: 3/5 bilaterally PF: 4/5 bilaterally  PSYCH: AAOx3  NEUROLOGY: non-focal, strength 5/5 in all extremities, sensation intact  SKIN: No rashes or lesions    =================================================    LABS:                        9.2    6.87  )-----------( 244      ( 03 Jul 2021 07:20 )             28.4     Auto Eosinophil # x     / Auto Eosinophil % x     / Auto Neutrophil # x     / Auto Neutrophil % x     / BANDS % x                            9.4    7.57  )-----------( 227      ( 02 Jul 2021 06:30 )             29.1     Auto Eosinophil # x     / Auto Eosinophil % x     / Auto Neutrophil # x     / Auto Neutrophil % x     / BANDS % x        07-03    138  |  102  |  14  ----------------------------<  81  3.6   |  23  |  0.75  07-02    137  |  102  |  11  ----------------------------<  91  3.6   |  23  |  0.63    Ca    9.1      03 Jul 2021 07:17  Mg     2.0     07-03  Phos  3.5     07-03                  RADIOLOGY & ADDITIONAL TESTS:    Imaging Personally Reviewed:    Consultant(s) Notes Reviewed:      Care Discussed with Consultants/Other Providers:

## 2021-07-04 LAB
ANION GAP SERPL CALC-SCNC: 12 MMOL/L — SIGNIFICANT CHANGE UP (ref 5–17)
BUN SERPL-MCNC: 17 MG/DL — SIGNIFICANT CHANGE UP (ref 7–23)
CALCIUM SERPL-MCNC: 8.9 MG/DL — SIGNIFICANT CHANGE UP (ref 8.4–10.5)
CHLORIDE SERPL-SCNC: 102 MMOL/L — SIGNIFICANT CHANGE UP (ref 96–108)
CO2 SERPL-SCNC: 23 MMOL/L — SIGNIFICANT CHANGE UP (ref 22–31)
CREAT SERPL-MCNC: 0.78 MG/DL — SIGNIFICANT CHANGE UP (ref 0.5–1.3)
CULTURE RESULTS: SIGNIFICANT CHANGE UP
GLUCOSE BLDC GLUCOMTR-MCNC: 103 MG/DL — HIGH (ref 70–99)
GLUCOSE BLDC GLUCOMTR-MCNC: 114 MG/DL — HIGH (ref 70–99)
GLUCOSE BLDC GLUCOMTR-MCNC: 139 MG/DL — HIGH (ref 70–99)
GLUCOSE BLDC GLUCOMTR-MCNC: 96 MG/DL — SIGNIFICANT CHANGE UP (ref 70–99)
GLUCOSE SERPL-MCNC: 96 MG/DL — SIGNIFICANT CHANGE UP (ref 70–99)
MAGNESIUM SERPL-MCNC: 2 MG/DL — SIGNIFICANT CHANGE UP (ref 1.6–2.6)
PHOSPHATE SERPL-MCNC: 3.1 MG/DL — SIGNIFICANT CHANGE UP (ref 2.5–4.5)
POTASSIUM SERPL-MCNC: 3.6 MMOL/L — SIGNIFICANT CHANGE UP (ref 3.5–5.3)
POTASSIUM SERPL-SCNC: 3.6 MMOL/L — SIGNIFICANT CHANGE UP (ref 3.5–5.3)
SODIUM SERPL-SCNC: 137 MMOL/L — SIGNIFICANT CHANGE UP (ref 135–145)
SPECIMEN SOURCE: SIGNIFICANT CHANGE UP

## 2021-07-04 PROCEDURE — 99233 SBSQ HOSP IP/OBS HIGH 50: CPT | Mod: GC

## 2021-07-04 RX ADMIN — HEPARIN SODIUM 5000 UNIT(S): 5000 INJECTION INTRAVENOUS; SUBCUTANEOUS at 21:28

## 2021-07-04 RX ADMIN — AMPICILLIN SODIUM AND SULBACTAM SODIUM 100 GRAM(S): 250; 125 INJECTION, POWDER, FOR SUSPENSION INTRAMUSCULAR; INTRAVENOUS at 00:13

## 2021-07-04 RX ADMIN — RILUZOLE 50 MILLIGRAM(S): 50 TABLET ORAL at 05:47

## 2021-07-04 RX ADMIN — AMPICILLIN SODIUM AND SULBACTAM SODIUM 100 GRAM(S): 250; 125 INJECTION, POWDER, FOR SUSPENSION INTRAMUSCULAR; INTRAVENOUS at 17:59

## 2021-07-04 RX ADMIN — AMPICILLIN SODIUM AND SULBACTAM SODIUM 100 GRAM(S): 250; 125 INJECTION, POWDER, FOR SUSPENSION INTRAMUSCULAR; INTRAVENOUS at 23:36

## 2021-07-04 RX ADMIN — AMPICILLIN SODIUM AND SULBACTAM SODIUM 100 GRAM(S): 250; 125 INJECTION, POWDER, FOR SUSPENSION INTRAMUSCULAR; INTRAVENOUS at 05:46

## 2021-07-04 RX ADMIN — HEPARIN SODIUM 5000 UNIT(S): 5000 INJECTION INTRAVENOUS; SUBCUTANEOUS at 05:47

## 2021-07-04 RX ADMIN — SERTRALINE 50 MILLIGRAM(S): 25 TABLET, FILM COATED ORAL at 13:19

## 2021-07-04 RX ADMIN — RILUZOLE 50 MILLIGRAM(S): 50 TABLET ORAL at 17:59

## 2021-07-04 RX ADMIN — AMPICILLIN SODIUM AND SULBACTAM SODIUM 100 GRAM(S): 250; 125 INJECTION, POWDER, FOR SUSPENSION INTRAMUSCULAR; INTRAVENOUS at 13:19

## 2021-07-04 RX ADMIN — HEPARIN SODIUM 5000 UNIT(S): 5000 INJECTION INTRAVENOUS; SUBCUTANEOUS at 14:21

## 2021-07-04 RX ADMIN — ATORVASTATIN CALCIUM 80 MILLIGRAM(S): 80 TABLET, FILM COATED ORAL at 21:28

## 2021-07-04 RX ADMIN — BROMOCRIPTINE MESYLATE 5 MILLIGRAM(S): 5 CAPSULE ORAL at 13:19

## 2021-07-04 RX ADMIN — Medication 81 MILLIGRAM(S): at 13:19

## 2021-07-04 NOTE — PROGRESS NOTE ADULT - ATTENDING COMMENTS
No overnight events.  WIll CW Unasyn for up to 7/5.  Pt is hemodynamically stable / Team is to discuss with  about home services as pt states to have 8 hours of HHA but would like more time .  He is not interested on end of life care           Nicolette Corley   Hospitalist   [phone]

## 2021-07-04 NOTE — PROGRESS NOTE ADULT - SUBJECTIVE AND OBJECTIVE BOX
Ralph Hill MD  PGY-1 Internal Medicine  Pager:  [phone]  Available on Microsoft Teams  07-04-21 @ 07:55  _________________________________________________________________________________________________________________________________________    CC:      Patient is a 65y old  Male who presents with a chief complaint of hypoglycemia (03 Jul 2021 11:17)        SUBJECTIVE:    NAEO.      _________________________________________________________________________________________________________________________________________    OBJECTIVE:    Vital Signs Last 24 Hrs  T(C): 36.7 (04 Jul 2021 05:44), Max: 36.8 (03 Jul 2021 21:36)  T(F): 98.1 (04 Jul 2021 05:44), Max: 98.2 (03 Jul 2021 21:36)  HR: 66 (04 Jul 2021 05:44) (64 - 70)  BP: 145/75 (04 Jul 2021 05:44) (129/69 - 165/76)  BP(mean): --  RR: 17 (04 Jul 2021 05:44) (17 - 18)  SpO2: 97% (04 Jul 2021 05:44) (94% - 99%)    I&O's Summary    03 Jul 2021 07:01  -  04 Jul 2021 07:00  --------------------------------------------------------  IN: 770 mL / OUT: 1000 mL / NET: -230 mL        MEDICATIONS  (STANDING):  ampicillin/sulbactam  IVPB      ampicillin/sulbactam  IVPB 1.5 Gram(s) IV Intermittent every 6 hours  aspirin enteric coated 81 milliGRAM(s) Oral daily  atorvastatin 80 milliGRAM(s) Oral at bedtime  bromocriptine Capsule 5 milliGRAM(s) Oral daily  dextrose 40% Gel 15 Gram(s) Oral once  dextrose 5%. 1000 milliLiter(s) (50 mL/Hr) IV Continuous <Continuous>  dextrose 5%. 1000 milliLiter(s) (100 mL/Hr) IV Continuous <Continuous>  dextrose 50% Injectable 25 Gram(s) IV Push once  dextrose 50% Injectable 12.5 Gram(s) IV Push once  dextrose 50% Injectable 25 Gram(s) IV Push once  glucagon  Injectable 1 milliGRAM(s) IntraMuscular once  heparin   Injectable 5000 Unit(s) SubCutaneous every 8 hours  insulin lispro (ADMELOG) corrective regimen sliding scale   SubCutaneous three times a day before meals  insulin lispro (ADMELOG) corrective regimen sliding scale   SubCutaneous at bedtime  polyethylene glycol 3350 17 Gram(s) Oral daily  riluzole 50 milliGRAM(s) Oral two times a day  sertraline 50 milliGRAM(s) Oral daily    MEDICATIONS  (PRN):  senna 2 Tablet(s) Oral at bedtime PRN Constipation        PHYSICAL EXAM:    GENERAL: Laying comfortably, NAD  EYES: EOMI, PERRL, no scleral icterus  NECK: No JVD  LUNG: Clear to auscultation bilaterally; No wheeze, crackles or rhonci  HEART: Regular rate and rhythm; No murmurs, rubs, or gallops  ABDOMEN: Soft, Nontender, Nondistended  EXTREMITIES:  No LE edema, 2+ Peripheral Pulses, No clubbing, cyanosis, or edema  PSYCH: AAOx3  NEUROLOGY: non-focal, strength 5/5 in all extremities, sensation intact  SKIN: No rashes or lesions      LABS:                            9.2    6.87  )-----------( 244      ( 03 Jul 2021 07:20 )             28.4     Auto Eosinophil # x     / Auto Eosinophil % x     / Auto Neutrophil # x     / Auto Neutrophil % x     / BANDS % x        07-03    138  |  102  |  14  ----------------------------<  81  3.6   |  23  |  0.75    Ca    9.1      03 Jul 2021 07:17  Mg     2.0     07-03  Phos  3.5     07-03          _________________________________________________________________________________________________________________________________________  Ralph Hill MD  PGY-1 Internal Medicine  Pager: [phone]  Available on Microsoft Teams  04 Jul 2021 07:55         Ralph Hill MD  PGY-1 Internal Medicine  Pager:  [phone]  Available on [employer] Teams  07-04-21 @ 07:55  _________________________________________________________________________________________________________________________________________    CC:      Patient is a 65y old  Male who presents with a chief complaint of hypoglycemia (03 Jul 2021 11:17)        SUBJECTIVE:    NAEO.    Pt has no acute complaints this morning. States that in terms of his palliative care consult, he is hoping to get more nursing help and assistance at home. He has assistance every day for 8 hours. Wants to increase the assistance. No other complaints this morning. No f/c, no n/v/d/c, denies any SOB, CP, lower leg edema, headaches. No complaints in regards to wound on his back.  _________________________________________________________________________________________________________________________________________    OBJECTIVE:    Vital Signs Last 24 Hrs  T(C): 36.7 (04 Jul 2021 05:44), Max: 36.8 (03 Jul 2021 21:36)  T(F): 98.1 (04 Jul 2021 05:44), Max: 98.2 (03 Jul 2021 21:36)  HR: 66 (04 Jul 2021 05:44) (64 - 70)  BP: 145/75 (04 Jul 2021 05:44) (129/69 - 165/76)  BP(mean): --  RR: 17 (04 Jul 2021 05:44) (17 - 18)  SpO2: 97% (04 Jul 2021 05:44) (94% - 99%)    I&O's Summary    03 Jul 2021 07:01  -  04 Jul 2021 07:00  --------------------------------------------------------  IN: 770 mL / OUT: 1000 mL / NET: -230 mL        MEDICATIONS  (STANDING):  ampicillin/sulbactam  IVPB      ampicillin/sulbactam  IVPB 1.5 Gram(s) IV Intermittent every 6 hours  aspirin enteric coated 81 milliGRAM(s) Oral daily  atorvastatin 80 milliGRAM(s) Oral at bedtime  bromocriptine Capsule 5 milliGRAM(s) Oral daily  dextrose 40% Gel 15 Gram(s) Oral once  dextrose 5%. 1000 milliLiter(s) (50 mL/Hr) IV Continuous <Continuous>  dextrose 5%. 1000 milliLiter(s) (100 mL/Hr) IV Continuous <Continuous>  dextrose 50% Injectable 25 Gram(s) IV Push once  dextrose 50% Injectable 12.5 Gram(s) IV Push once  dextrose 50% Injectable 25 Gram(s) IV Push once  glucagon  Injectable 1 milliGRAM(s) IntraMuscular once  heparin   Injectable 5000 Unit(s) SubCutaneous every 8 hours  insulin lispro (ADMELOG) corrective regimen sliding scale   SubCutaneous three times a day before meals  insulin lispro (ADMELOG) corrective regimen sliding scale   SubCutaneous at bedtime  polyethylene glycol 3350 17 Gram(s) Oral daily  riluzole 50 milliGRAM(s) Oral two times a day  sertraline 50 milliGRAM(s) Oral daily    MEDICATIONS  (PRN):  senna 2 Tablet(s) Oral at bedtime PRN Constipation        PHYSICAL EXAM:    GENERAL: Laying comfortably, NAD  EYES: EOMI, PERRL, no scleral icterus  NECK: No JVD  LUNG: mildly increased work of breathing, Clear to auscultation bilaterally; No wheeze, crackles or rhonci  HEART: Regular rate and rhythm; No murmurs, rubs, or gallops  ABDOMEN: Soft, Nontender, Nondistended  EXTREMITIES:  No LE edema, 2+ Peripheral Pulses, No clubbing, cyanosis, or edema  PSYCH: AAOx3  NEUROLOGY: non-focal, strength 5/5 in all extremities, sensation intact  SKIN: sacral wound site inspected; bandaged; no erythema, no purulence; area c/d/i      LABS:                 03 Jul 2021 07:20                       9.2    6.87  )-----------( 244                28.4     CBC Full  -  ( 03 Jul 2021 07:20 )  WBC Count : 6.87 K/uL  RBC Count : 3.33 M/uL  Hemoglobin : 9.2 g/dL  Hematocrit : 28.4 %  Platelet Count - Automated : 244 K/uL  Mean Cell Volume : 85.3 fl  Mean Cell Hemoglobin : 27.6 pg  Mean Cell Hemoglobin Concentration : 32.4 gm/dL  Auto Neutrophil # : x  Auto Lymphocyte # : x  Auto Monocyte # : x  Auto Eosinophil # : x  Auto Basophil # : x  Auto Neutrophil % : x  Auto Lymphocyte % : x  Auto Monocyte % : x  Auto Eosinophil % : x    07-04    137  |  102  |  17  ----------------------------<  96  3.6   |  23  |  0.78    Ca    8.9      04 Jul 2021 07:15  Phos  3.1     07-04  Mg     2.0     07-04                  _________________________________________________________________________________________________________________________________________  Ralph Hill MD  PGY-1 Internal Medicine  Pager: [phone]  Available on [employer] Teams  04 Jul 2021 07:55

## 2021-07-04 NOTE — PROGRESS NOTE ADULT - PROBLEM SELECTOR PLAN 2
Multifocal PNA noted on 6/29 noncon CT chest, likely 2/2 aspiration given history: consolidative opacities in LLL and nodular groundglass w/in RU and RL lobes  - s/p vanco and meropenem x 1 day (6/29), now on day 3 Unasyn (6/30- present) - c/f total 5-7 days of abx   - RVP/COVID, legionella negative  - BCx NGTD   - MRSA swab neg 6/30 Multifocal PNA noted on 6/29 noncon CT chest, likely 2/2 aspiration given history: consolidative opacities in LLL and nodular groundglass w/in RU and RL lobes  - s/p vanco and meropenem x 1 day (6/29), now on day 3 Unasyn (6/30- present) - c/f total 5-7 days of abx   - RVP/COVID, legionella negative  - BCx NGTD   - MRSA swab neg 6/30 7/4  -- c/w Unasyn currently day (5/7) - patient has been afebrile, white counts resolving; get another CBC tomorrow, consider stopping Unasyn tomorrow w/ improvement in dispo if he's going to get d/c'd

## 2021-07-04 NOTE — PROGRESS NOTE ADULT - PROBLEM SELECTOR PLAN 7
DNR/DNI w/ completed Mol[employer] form  f/u with palliative recs DNR/DNI w/ completed Mol[employer] form  f/u with palliative recs  - nurse eval if increased assistance is available for him at home

## 2021-07-05 LAB
ANION GAP SERPL CALC-SCNC: 12 MMOL/L — SIGNIFICANT CHANGE UP (ref 5–17)
BUN SERPL-MCNC: 12 MG/DL — SIGNIFICANT CHANGE UP (ref 7–23)
CALCIUM SERPL-MCNC: 9.2 MG/DL — SIGNIFICANT CHANGE UP (ref 8.4–10.5)
CHLORIDE SERPL-SCNC: 101 MMOL/L — SIGNIFICANT CHANGE UP (ref 96–108)
CO2 SERPL-SCNC: 23 MMOL/L — SIGNIFICANT CHANGE UP (ref 22–31)
CREAT SERPL-MCNC: 0.7 MG/DL — SIGNIFICANT CHANGE UP (ref 0.5–1.3)
GLUCOSE BLDC GLUCOMTR-MCNC: 100 MG/DL — HIGH (ref 70–99)
GLUCOSE BLDC GLUCOMTR-MCNC: 111 MG/DL — HIGH (ref 70–99)
GLUCOSE BLDC GLUCOMTR-MCNC: 122 MG/DL — HIGH (ref 70–99)
GLUCOSE BLDC GLUCOMTR-MCNC: 148 MG/DL — HIGH (ref 70–99)
GLUCOSE SERPL-MCNC: 82 MG/DL — SIGNIFICANT CHANGE UP (ref 70–99)
HCT VFR BLD CALC: 28.2 % — LOW (ref 39–50)
HGB BLD-MCNC: 9.2 G/DL — LOW (ref 13–17)
MAGNESIUM SERPL-MCNC: 2.1 MG/DL — SIGNIFICANT CHANGE UP (ref 1.6–2.6)
MCHC RBC-ENTMCNC: 27.9 PG — SIGNIFICANT CHANGE UP (ref 27–34)
MCHC RBC-ENTMCNC: 32.6 GM/DL — SIGNIFICANT CHANGE UP (ref 32–36)
MCV RBC AUTO: 85.5 FL — SIGNIFICANT CHANGE UP (ref 80–100)
NRBC # BLD: 0 /100 WBCS — SIGNIFICANT CHANGE UP (ref 0–0)
PHOSPHATE SERPL-MCNC: 2.8 MG/DL — SIGNIFICANT CHANGE UP (ref 2.5–4.5)
PLATELET # BLD AUTO: 247 K/UL — SIGNIFICANT CHANGE UP (ref 150–400)
POTASSIUM SERPL-MCNC: 3.9 MMOL/L — SIGNIFICANT CHANGE UP (ref 3.5–5.3)
POTASSIUM SERPL-SCNC: 3.9 MMOL/L — SIGNIFICANT CHANGE UP (ref 3.5–5.3)
RBC # BLD: 3.3 M/UL — LOW (ref 4.2–5.8)
RBC # FLD: 13.9 % — SIGNIFICANT CHANGE UP (ref 10.3–14.5)
SODIUM SERPL-SCNC: 136 MMOL/L — SIGNIFICANT CHANGE UP (ref 135–145)
WBC # BLD: 7.23 K/UL — SIGNIFICANT CHANGE UP (ref 3.8–10.5)
WBC # FLD AUTO: 7.23 K/UL — SIGNIFICANT CHANGE UP (ref 3.8–10.5)

## 2021-07-05 PROCEDURE — 99233 SBSQ HOSP IP/OBS HIGH 50: CPT | Mod: GC

## 2021-07-05 RX ADMIN — RILUZOLE 50 MILLIGRAM(S): 50 TABLET ORAL at 05:25

## 2021-07-05 RX ADMIN — HEPARIN SODIUM 5000 UNIT(S): 5000 INJECTION INTRAVENOUS; SUBCUTANEOUS at 05:24

## 2021-07-05 RX ADMIN — AMPICILLIN SODIUM AND SULBACTAM SODIUM 100 GRAM(S): 250; 125 INJECTION, POWDER, FOR SUSPENSION INTRAMUSCULAR; INTRAVENOUS at 05:24

## 2021-07-05 RX ADMIN — HEPARIN SODIUM 5000 UNIT(S): 5000 INJECTION INTRAVENOUS; SUBCUTANEOUS at 13:02

## 2021-07-05 RX ADMIN — RILUZOLE 50 MILLIGRAM(S): 50 TABLET ORAL at 18:18

## 2021-07-05 RX ADMIN — SENNA PLUS 2 TABLET(S): 8.6 TABLET ORAL at 21:40

## 2021-07-05 RX ADMIN — BROMOCRIPTINE MESYLATE 5 MILLIGRAM(S): 5 CAPSULE ORAL at 13:02

## 2021-07-05 RX ADMIN — POLYETHYLENE GLYCOL 3350 17 GRAM(S): 17 POWDER, FOR SOLUTION ORAL at 13:01

## 2021-07-05 RX ADMIN — HEPARIN SODIUM 5000 UNIT(S): 5000 INJECTION INTRAVENOUS; SUBCUTANEOUS at 21:40

## 2021-07-05 RX ADMIN — SERTRALINE 50 MILLIGRAM(S): 25 TABLET, FILM COATED ORAL at 13:02

## 2021-07-05 RX ADMIN — Medication 81 MILLIGRAM(S): at 13:01

## 2021-07-05 RX ADMIN — ATORVASTATIN CALCIUM 80 MILLIGRAM(S): 80 TABLET, FILM COATED ORAL at 21:40

## 2021-07-05 NOTE — PROGRESS NOTE ADULT - PROBLEM SELECTOR PLAN 1
presented with FS 38 likely 2/2 to sulfonyurea excess in setting of poor PO intake. s/p octreotide. A1c 5.6%  - FS now much improved;  last two 100 & 105  - c/w FS q4h for now   - d/c D5 LR 50cc/hr, wean off D5 as pt tolerates   - endocrine following, recs appreciated - presented with FS 38 likely 2/2 to sulfonyurea excess in setting of poor PO intake. s/p octreotide. A1c 5.6%  - POCT, last two 148 and 111, improving  - c/w FS q4h for now   - d/c D5 LR 50cc/hr, wean off D5 as pt tolerates   - endocrine following

## 2021-07-05 NOTE — PROGRESS NOTE ADULT - PROBLEM SELECTOR PLAN 7
DNR/DNI w/ completed Mol[employer] form  f/u with palliative recs  - nurse eval if increased assistance is available for him at home

## 2021-07-05 NOTE — PROGRESS NOTE ADULT - ATTENDING COMMENTS
S/P Unasyn for multifocal pneumonia/ Aspiration.  Pt is hemodynamically stable / spoke with the  about reinstating his home services .  Planning for DC in            Nicolette Corley   Hospitalist   [phone] . S/P Unasyn for multifocal pneumonia/ Aspiration.  Pt is hemodynamically stable / spoke with the  about reinstating his home services .  Planning for DC in AM . I Have spoken with daughter in law Jenna Galarza about the DC and she is in agreement and wants a detailed DC summary about instruction .  DC griffith as per daughter in Law as both patient and wife will be stressed by it           Nicolette Corley   Hospitalist   [phone] .

## 2021-07-05 NOTE — PROGRESS NOTE ADULT - PROBLEM SELECTOR PLAN 2
Multifocal PNA noted on 6/29 noncon CT chest, likely 2/2 aspiration given history: consolidative opacities in LLL and nodular groundglass w/in RU and RL lobes  - s/p vanco and meropenem x 1 day (6/29), now on day 3 Unasyn (6/30- present) - c/f total 5-7 days of abx   - RVP/COVID, legionella negative  - BCx NGTD   - MRSA swab neg 6/30 7/4  -- c/w Unasyn currently day (5/7) - patient has been afebrile, white counts resolving; get another CBC tomorrow, consider stopping Unasyn tomorrow w/ improvement in dispo if he's going to get d/c'd - likely 2/2 aspiration given history: consolidative opacities in LLL and nodular groundglass w/in RU and RL lobes  - s/p vanco and meropenem x 1 day (6/29), now on day 3 Unasyn (6/30- present) - c/f total 5-7 days of abx   - RVP/COVID, legionella negative  - BCx NGTD   - MRSA swab neg 6/30  - finished Unasyn

## 2021-07-05 NOTE — PROGRESS NOTE ADULT - SUBJECTIVE AND OBJECTIVE BOX
CC:      Patient is a 65y old  Male who presents with a chief complaint of hypoglycemia (03 Jul 2021 11:17)        SUBJECTIVE:    NAEO.    Pt has no acute complaints this morning. States that in terms of his palliative care consult, he is hoping to get more nursing help and assistance at home. He has assistance every day for 8 hours. Wants to increase the assistance. No other complaints this morning. No f/c, no n/v/d/c, denies any SOB, CP, lower leg edema, headaches. No complaints in regards to wound on his back.  _________________________________________________________________________________________________________________________________________    OBJECTIVE:    Vital Signs Last 24 Hrs  T(C): 36.4 (05 Jul 2021 05:03), Max: 36.8 (04 Jul 2021 13:32)  T(F): 97.5 (05 Jul 2021 05:03), Max: 98.3 (04 Jul 2021 13:32)  HR: 71 (05 Jul 2021 05:03) (61 - 71)  BP: 152/71 (05 Jul 2021 05:03) (152/71 - 164/76)  BP(mean): --  RR: 18 (05 Jul 2021 05:03) (18 - 18)  SpO2: 92% (05 Jul 2021 05:03) (92% - 98%)    I&O's Summary    04 Jul 2021 07:01  -  05 Jul 2021 07:00  --------------------------------------------------------  IN: 1040 mL / OUT: 1775 mL / NET: -735 mL      MEDICATIONS  (STANDING):  ampicillin/sulbactam  IVPB      ampicillin/sulbactam  IVPB 1.5 Gram(s) IV Intermittent every 6 hours  aspirin enteric coated 81 milliGRAM(s) Oral daily  atorvastatin 80 milliGRAM(s) Oral at bedtime  bromocriptine Capsule 5 milliGRAM(s) Oral daily  dextrose 40% Gel 15 Gram(s) Oral once  dextrose 5%. 1000 milliLiter(s) (50 mL/Hr) IV Continuous <Continuous>  dextrose 5%. 1000 milliLiter(s) (100 mL/Hr) IV Continuous <Continuous>  dextrose 50% Injectable 25 Gram(s) IV Push once  dextrose 50% Injectable 12.5 Gram(s) IV Push once  dextrose 50% Injectable 25 Gram(s) IV Push once  glucagon  Injectable 1 milliGRAM(s) IntraMuscular once  heparin   Injectable 5000 Unit(s) SubCutaneous every 8 hours  insulin lispro (ADMELOG) corrective regimen sliding scale   SubCutaneous three times a day before meals  insulin lispro (ADMELOG) corrective regimen sliding scale   SubCutaneous at bedtime  polyethylene glycol 3350 17 Gram(s) Oral daily  riluzole 50 milliGRAM(s) Oral two times a day  sertraline 50 milliGRAM(s) Oral daily    MEDICATIONS  (PRN):  senna 2 Tablet(s) Oral at bedtime PRN Constipation      PHYSICAL EXAM:    GENERAL: Laying comfortably, NAD  EYES: EOMI, PERRL, no scleral icterus  NECK: No JVD  LUNG: mildly increased work of breathing, Clear to auscultation bilaterally; No wheeze, crackles or rhonci  HEART: Regular rate and rhythm; No murmurs, rubs, or gallops  ABDOMEN: Soft, Nontender, Nondistended  EXTREMITIES:  No LE edema, 2+ Peripheral Pulses, No clubbing, cyanosis, or edema  PSYCH: AAOx3  NEUROLOGY: non-focal, strength 5/5 in all extremities, sensation intact  SKIN: sacral wound site inspected; bandaged; no erythema, no purulence; area c/d/i      LABS:    LABS:  cret    07-04    137  |  102  |  17  ----------------------------<  96  3.6   |  23  |  0.78    Ca    8.9      04 Jul 2021 07:15  Phos  3.1     07-04  Mg     2.0     07-04      Pedro Lisa, PGY-1  Available on [employer] Teams  04 Jul 2021 07:55         CC:      Patient is a 65y old  Male who presents with a chief complaint of hypoglycemia (03 Jul 2021 11:17)        SUBJECTIVE:    No acute events overnight. Patient is feeling well this morning. He is tolerating soft diet. He denies pain, fever, chills, chest pain, shortness of breath, changes in bowel moevments, urination, or leg swelling. He is eager to leave.     _________________________________________________________________________________________________________________________________________    OBJECTIVE:    Vital Signs Last 24 Hrs  T(C): 36.4 (05 Jul 2021 05:03), Max: 36.8 (04 Jul 2021 13:32)  T(F): 97.5 (05 Jul 2021 05:03), Max: 98.3 (04 Jul 2021 13:32)  HR: 71 (05 Jul 2021 05:03) (61 - 71)  BP: 152/71 (05 Jul 2021 05:03) (152/71 - 164/76)  BP(mean): --  RR: 18 (05 Jul 2021 05:03) (18 - 18)  SpO2: 92% (05 Jul 2021 05:03) (92% - 98%)    I&O's Summary    04 Jul 2021 07:01  -  05 Jul 2021 07:00  --------------------------------------------------------  IN: 1040 mL / OUT: 1775 mL / NET: -735 mL      MEDICATIONS  (STANDING):  ampicillin/sulbactam  IVPB      ampicillin/sulbactam  IVPB 1.5 Gram(s) IV Intermittent every 6 hours  aspirin enteric coated 81 milliGRAM(s) Oral daily  atorvastatin 80 milliGRAM(s) Oral at bedtime  bromocriptine Capsule 5 milliGRAM(s) Oral daily  dextrose 40% Gel 15 Gram(s) Oral once  dextrose 5%. 1000 milliLiter(s) (50 mL/Hr) IV Continuous <Continuous>  dextrose 5%. 1000 milliLiter(s) (100 mL/Hr) IV Continuous <Continuous>  dextrose 50% Injectable 25 Gram(s) IV Push once  dextrose 50% Injectable 12.5 Gram(s) IV Push once  dextrose 50% Injectable 25 Gram(s) IV Push once  glucagon  Injectable 1 milliGRAM(s) IntraMuscular once  heparin   Injectable 5000 Unit(s) SubCutaneous every 8 hours  insulin lispro (ADMELOG) corrective regimen sliding scale   SubCutaneous three times a day before meals  insulin lispro (ADMELOG) corrective regimen sliding scale   SubCutaneous at bedtime  polyethylene glycol 3350 17 Gram(s) Oral daily  riluzole 50 milliGRAM(s) Oral two times a day  sertraline 50 milliGRAM(s) Oral daily    MEDICATIONS  (PRN):  senna 2 Tablet(s) Oral at bedtime PRN Constipation      PHYSICAL EXAM:    GENERAL: Laying comfortably, NAD  EYES: EOMI, PERRL, no scleral icterus  NECK: No JVD  LUNG: CTAB No wheeze, crackles or rhonci  HEART: Regular rate and rhythm; No murmurs, rubs, or gallops  ABDOMEN: Soft, Nontender, Nondistended  EXTREMITIES:  No LE edema, 2+ Peripheral Pulses, No clubbing, cyanosis, or edema  PSYCH: AAOx3  NEUROLOGY: non-focal, strength 5/5 in all extremities, sensation intact  SKIN: sacral wound site inspected; bandaged; no erythema, no purulence; area c/d/i      LABS:  cret    07-04    137  |  102  |  17  ----------------------------<  96  3.6   |  23  |  0.78    Ca    8.9      04 Jul 2021 07:15  Phos  3.1     07-04  Mg     2.0     07-04      Pedro Lisa, PGY-1

## 2021-07-05 NOTE — PROGRESS NOTE ADULT - REASON FOR ADMISSION
Hypoglycemia requiring q1 FSBG checks
Hypoglycemia
AMS  Hypoglycemia
AMS and Hypoglycemia
hypoglycemia

## 2021-07-06 ENCOUNTER — TRANSCRIPTION ENCOUNTER (OUTPATIENT)
Age: 66
End: 2021-07-06

## 2021-07-06 VITALS — HEART RATE: 70 BPM | DIASTOLIC BLOOD PRESSURE: 80 MMHG | SYSTOLIC BLOOD PRESSURE: 162 MMHG

## 2021-07-06 DIAGNOSIS — Z71.89 OTHER SPECIFIED COUNSELING: ICD-10-CM

## 2021-07-06 DIAGNOSIS — R13.10 DYSPHAGIA, UNSPECIFIED: ICD-10-CM

## 2021-07-06 DIAGNOSIS — R53.2 FUNCTIONAL QUADRIPLEGIA: ICD-10-CM

## 2021-07-06 DIAGNOSIS — Z51.5 ENCOUNTER FOR PALLIATIVE CARE: ICD-10-CM

## 2021-07-06 LAB
GLUCOSE BLDC GLUCOMTR-MCNC: 106 MG/DL — HIGH (ref 70–99)
GLUCOSE BLDC GLUCOMTR-MCNC: 115 MG/DL — HIGH (ref 70–99)

## 2021-07-06 PROCEDURE — 93308 TTE F-UP OR LMTD: CPT

## 2021-07-06 PROCEDURE — 82565 ASSAY OF CREATININE: CPT

## 2021-07-06 PROCEDURE — 85027 COMPLETE CBC AUTOMATED: CPT

## 2021-07-06 PROCEDURE — 82330 ASSAY OF CALCIUM: CPT

## 2021-07-06 PROCEDURE — 96365 THER/PROPH/DIAG IV INF INIT: CPT

## 2021-07-06 PROCEDURE — 85730 THROMBOPLASTIN TIME PARTIAL: CPT

## 2021-07-06 PROCEDURE — 83605 ASSAY OF LACTIC ACID: CPT

## 2021-07-06 PROCEDURE — 87641 MR-STAPH DNA AMP PROBE: CPT

## 2021-07-06 PROCEDURE — 82803 BLOOD GASES ANY COMBINATION: CPT

## 2021-07-06 PROCEDURE — 82435 ASSAY OF BLOOD CHLORIDE: CPT

## 2021-07-06 PROCEDURE — 87449 NOS EACH ORGANISM AG IA: CPT

## 2021-07-06 PROCEDURE — 82962 GLUCOSE BLOOD TEST: CPT

## 2021-07-06 PROCEDURE — 84295 ASSAY OF SERUM SODIUM: CPT

## 2021-07-06 PROCEDURE — 94150 VITAL CAPACITY TEST: CPT

## 2021-07-06 PROCEDURE — 83036 HEMOGLOBIN GLYCOSYLATED A1C: CPT

## 2021-07-06 PROCEDURE — 71045 X-RAY EXAM CHEST 1 VIEW: CPT

## 2021-07-06 PROCEDURE — 87086 URINE CULTURE/COLONY COUNT: CPT

## 2021-07-06 PROCEDURE — 97162 PT EVAL MOD COMPLEX 30 MIN: CPT

## 2021-07-06 PROCEDURE — 84132 ASSAY OF SERUM POTASSIUM: CPT

## 2021-07-06 PROCEDURE — 0225U NFCT DS DNA&RNA 21 SARSCOV2: CPT

## 2021-07-06 PROCEDURE — 86769 SARS-COV-2 COVID-19 ANTIBODY: CPT

## 2021-07-06 PROCEDURE — 99223 1ST HOSP IP/OBS HIGH 75: CPT

## 2021-07-06 PROCEDURE — 99497 ADVNCD CARE PLAN 30 MIN: CPT | Mod: 25

## 2021-07-06 PROCEDURE — 87521 HEPATITIS C PROBE&RVRS TRNSC: CPT

## 2021-07-06 PROCEDURE — 87077 CULTURE AEROBIC IDENTIFY: CPT

## 2021-07-06 PROCEDURE — 92611 MOTION FLUOROSCOPY/SWALLOW: CPT

## 2021-07-06 PROCEDURE — 82550 ASSAY OF CK (CPK): CPT

## 2021-07-06 PROCEDURE — 84100 ASSAY OF PHOSPHORUS: CPT

## 2021-07-06 PROCEDURE — 84146 ASSAY OF PROLACTIN: CPT

## 2021-07-06 PROCEDURE — 97110 THERAPEUTIC EXERCISES: CPT

## 2021-07-06 PROCEDURE — 96375 TX/PRO/DX INJ NEW DRUG ADDON: CPT

## 2021-07-06 PROCEDURE — 83735 ASSAY OF MAGNESIUM: CPT

## 2021-07-06 PROCEDURE — 99239 HOSP IP/OBS DSCHRG MGMT >30: CPT

## 2021-07-06 PROCEDURE — 84145 PROCALCITONIN (PCT): CPT

## 2021-07-06 PROCEDURE — 74230 X-RAY XM SWLNG FUNCJ C+: CPT

## 2021-07-06 PROCEDURE — 87640 STAPH A DNA AMP PROBE: CPT

## 2021-07-06 PROCEDURE — 82947 ASSAY GLUCOSE BLOOD QUANT: CPT

## 2021-07-06 PROCEDURE — 83880 ASSAY OF NATRIURETIC PEPTIDE: CPT

## 2021-07-06 PROCEDURE — 99285 EMERGENCY DEPT VISIT HI MDM: CPT

## 2021-07-06 PROCEDURE — 80053 COMPREHEN METABOLIC PANEL: CPT

## 2021-07-06 PROCEDURE — 85018 HEMOGLOBIN: CPT

## 2021-07-06 PROCEDURE — 85610 PROTHROMBIN TIME: CPT

## 2021-07-06 PROCEDURE — 82533 TOTAL CORTISOL: CPT

## 2021-07-06 PROCEDURE — 71250 CT THORAX DX C-: CPT

## 2021-07-06 PROCEDURE — 97530 THERAPEUTIC ACTIVITIES: CPT

## 2021-07-06 PROCEDURE — 92610 EVALUATE SWALLOWING FUNCTION: CPT

## 2021-07-06 PROCEDURE — 85025 COMPLETE CBC W/AUTO DIFF WBC: CPT

## 2021-07-06 PROCEDURE — 86803 HEPATITIS C AB TEST: CPT

## 2021-07-06 PROCEDURE — 85014 HEMATOCRIT: CPT

## 2021-07-06 PROCEDURE — 87150 DNA/RNA AMPLIFIED PROBE: CPT

## 2021-07-06 PROCEDURE — 80048 BASIC METABOLIC PNL TOTAL CA: CPT

## 2021-07-06 PROCEDURE — 87040 BLOOD CULTURE FOR BACTERIA: CPT

## 2021-07-06 RX ORDER — BROMOCRIPTINE MESYLATE 5 MG/1
1 CAPSULE ORAL
Qty: 0 | Refills: 0 | DISCHARGE
Start: 2021-07-06

## 2021-07-06 RX ADMIN — RILUZOLE 50 MILLIGRAM(S): 50 TABLET ORAL at 05:20

## 2021-07-06 RX ADMIN — BROMOCRIPTINE MESYLATE 5 MILLIGRAM(S): 5 CAPSULE ORAL at 12:16

## 2021-07-06 RX ADMIN — SERTRALINE 50 MILLIGRAM(S): 25 TABLET, FILM COATED ORAL at 12:16

## 2021-07-06 RX ADMIN — POLYETHYLENE GLYCOL 3350 17 GRAM(S): 17 POWDER, FOR SOLUTION ORAL at 12:16

## 2021-07-06 RX ADMIN — Medication 81 MILLIGRAM(S): at 12:16

## 2021-07-06 RX ADMIN — HEPARIN SODIUM 5000 UNIT(S): 5000 INJECTION INTRAVENOUS; SUBCUTANEOUS at 05:20

## 2021-07-06 NOTE — CONSULT NOTE ADULT - SUBJECTIVE AND OBJECTIVE BOX
HPI  63 yo male h/o DM, ALS presenting with AMS around 2am in setting of cough for the past few days. Non-productive cough, denies fevers or shortness of breath. ED spoke to wife Maribel for collateral information. Woke up 2AM "jibberish" was coming out of him "squeezing sheets" making "weird noises" "unable to make eye contact" "made gurgling noises". Aide arrived 7:30AM who help clean up patient. lips looked dry and gave sip tea and then choked on tea. Called ALS nurse who suggested to call 911. EMS took FS 38, amp of D50 given which appeared to resolve symptoms. Last dose of glyburide 730am yesterday per patient. Per family, patient was more confused today. Denies any chest pain, pressure, nausea/vomiting, dizziness, syncope episodes, headache or visual changes.     While in ED, patient well-appearing and stable however had 2 episodes of hypoglycemia despite receiving up to x3 amps of D50. D10 @ 100. Last sulfonyulrea taken over 24 hours ago. [place]/vanc started, UA negative, has hx of self cath, x1 dose octreotide given. Will admit to MICU for q1 fingersticks.         PERTINENT PM/SXH:   Dyslipidemia    Deviated nasal septum    Prolactinoma    Hypertension    Type 2 diabetes mellitus    HLD (hyperlipidemia)      H/O nasal septoplasty    History of tonsillectomy      FAMILY HISTORY:  Family history of Hodgkins disease    Family history of Alzheimer&#x27;s disease      ITEMS NOT CHECKED ARE NOT PRESENT    SOCIAL HISTORY:   Significant other/partner[x]  [x[] Children x2 [ ]  Hindu/Spirituality:  Substance hx:  [ ]   Tobacco hx:  [ ]   Alcohol hx: [ ]   Home Opioid hx:  [ ] I-Stop Reference No:  Living Situation: [ x]Home  [ ]Long term care  [ ]Rehab [ ]Other    ADVANCE DIRECTIVES:    DNR  Yes  MOLST  [x ]    Living Will  [ ]     DECISION MAKER(s):  [ ] Health Care Proxy(s)  [x ] Surrogate(s)  [ ] Guardian           Name(s): Phone Number(s): Maribel [phone]    BASELINE (I)ADL(s) (prior to admission):  [place]: [ ]Total  [ ] Moderate [x ]Dependent    Allergies    lactose (Diarrhea)  No Known Drug Allergies    Intolerances    MEDICATIONS  (STANDING):  aspirin enteric coated 81 milliGRAM(s) Oral daily  atorvastatin 80 milliGRAM(s) Oral at bedtime  bromocriptine Capsule 5 milliGRAM(s) Oral daily  dextrose 40% Gel 15 Gram(s) Oral once  dextrose 5%. 1000 milliLiter(s) (50 mL/Hr) IV Continuous <Continuous>  dextrose 5%. 1000 milliLiter(s) (100 mL/Hr) IV Continuous <Continuous>  dextrose 50% Injectable 25 Gram(s) IV Push once  dextrose 50% Injectable 12.5 Gram(s) IV Push once  dextrose 50% Injectable 25 Gram(s) IV Push once  glucagon  Injectable 1 milliGRAM(s) IntraMuscular once  heparin   Injectable 5000 Unit(s) SubCutaneous every 8 hours  insulin lispro (ADMELOG) corrective regimen sliding scale   SubCutaneous at bedtime  insulin lispro (ADMELOG) corrective regimen sliding scale   SubCutaneous three times a day before meals  polyethylene glycol 3350 17 Gram(s) Oral daily  riluzole 50 milliGRAM(s) Oral two times a day  sertraline 50 milliGRAM(s) Oral daily    MEDICATIONS  (PRN):  senna 2 Tablet(s) Oral at bedtime PRN Constipation    PRESENT SYMPTOMS: [ ]Unable to obtain due to poor mentation   Source if other than patient:  [ ]Family   [ ]Team     Pain: [ ]yes [x ]no  QOL impact -   Location -                    Aggravating factors -  Quality -  Radiation -  Timing-  Severity (0-10 scale):  Minimal acceptable level (0-10 scale):     PAIN AD Score: 0    [email or web address] (press ctrl +  left click to view)    Dyspnea:                           [ ]Mild [ ]Moderate [ ]Severe  Anxiety:                             [ ]Mild [ ]Moderate [ ]Severe  Fatigue:                             [ ]Mild [x ]Moderate [ ]Severe  Nausea:                            [ ]Mild [ ]Moderate [ ]Severe  Loss of appetite:               [ ]Mild [x ]Moderate [ ]Severe  Constipation:                    [ ]Mild [ ]Moderate [ ]Severe    Other Symptoms:  [ ]All other review of systems negative     Palliative Performance Status Version 2:     30    %    http://npcrc.org/files/news/palliative_performance_scale_ppsv2.pdf  PHYSICAL EXAM:  Vital Signs Last 24 Hrs  T(C): 36.7 (06 Jul 2021 05:06), Max: 36.7 (06 Jul 2021 05:06)  T(F): 98 (06 Jul 2021 05:06), Max: 98 (06 Jul 2021 05:06)  HR: 64 (06 Jul 2021 05:06) (58 - 65)  BP: 150/79 (06 Jul 2021 05:06) (147/73 - 152/77)  BP(mean): --  RR: 18 (06 Jul 2021 05:06) (18 - 18)  SpO2: 96% (06 Jul 2021 05:06) (95% - 99%) I&O's Summary    05 Jul 2021 07:01  -  06 Jul 2021 07:00  --------------------------------------------------------  IN: 890 mL / OUT: 1200 mL / NET: -310 mL    06 Jul 2021 07:01  -  06 Jul 2021 13:10  --------------------------------------------------------  IN: 240 mL / OUT: 200 mL / NET: 40 mL      GENERAL:  [x ]Alert  [x ]Oriented x 4  [ ]Lethargic  [x ]Cachexia  [ ]Unarousable  [x ]Verbal  [ ]Non-Verbal    Behavioral:   [ ] Anxiety  [ ] Delirium [ ] Agitation [ ] Other    HEENT:  [x ]Normal   [ ]Dry mouth   [ ]ET Tube/Trach  [ ]Oral lesions    PULMONARY:   [x ]Clear [ ]Tachypnea  [ ]Audible excessive secretions   [ ]Rhonchi        [ ]Right [ ]Left [ ]Bilateral  [ ]Crackles        [ ]Right [ ]Left [ ]Bilateral  [ ]Wheezing     [ ]Right [ ]Left [ ]Bilatera  [ ]Diminished breath sounds [ ]right [ ]left [ ]bilateral    CARDIOVASCULAR:    [x ]Regular [ ]Irregular [ ]Tachy  [ ]Stas [ ]Murmur [ ]Other    GASTROINTESTINAL:  [ x]Soft  [x ]Distended   [x ]+BS  [ x]Non tender [ ]Tender  [ ]PEG [ ]OGT/ NGT  Last BM:     GENITOURINARY:  [ ]Normal [x ] Incontinent   [ ]Oliguria/Anuria   [ ]Grewal    [ ]External cath    MUSCULOSKELETAL:   [ ]Normal   [x ]Weakness  [ x]Bed/Wheelchair bound [ ]Edema    NEUROLOGIC:   [x ]No focal deficits  [ ]Cognitive impairment  [ x]Dysphagia [ ]Dysarthria [ ]Paresis [ ]Other     SKIN:   [ x]Normal    [ ]Rash  [ ]Pressure ulcer(s)       Present on admission [ ]y [ ]n    CRITICAL CARE:  [ ]Shock Present  [ ]Septic [ ]Cardiogenic [ ]Neurologic [ ]Hypovolemic  [ ]  Vasopressors [ ]  Inotropes   [ ]Respiratory failure present [ ]Mechanical ventilation [ ]Non-invasive ventilatory support [ ]High flow  [ ]Acute  [ ]Chronic [ ]Hypoxic  [ ]Hypercarbic [ ]Other  [ ]Other organ failure     LABS:                        9.2    7.23  )-----------( 247      ( 05 Jul 2021 07:06 )             28.2   07-05    136  |  101  |  12  ----------------------------<  82  3.9   |  23  |  0.70    Ca    9.2      05 Jul 2021 07:06  Phos  2.8     07-05  Mg     2.1     07-05          RADIOLOGY & ADDITIONAL STUDIES:    < from: CT Chest No Cont (06.29.21 @ 12:55) >  EXAM:  CT CHEST                            PROCEDURE DATE:  06/29/2021            INTERPRETATION:  .  ACC: [id number]  INDICATION: Pneumonia, hypoglycemia, altered mental status, history of prolactinoma, DM2 on insulin, HTN, CAD, ALS  TECHNIQUE: Unenhanced CT of the chest. Coronal and sagittal images were reconstructed. Maximum intensity projection images were generated.  COMPARISON: CT chest 6/17/2020    FINDINGS:    AIRWAYS, LUNGS and PLEURA: Mild retained secretions within the trachea. Patent central airways. Consolidative opacities within left lower lobe and nodular groundglass opacities within the right upper and lower lobes representing multifocal pneumonia. Trace left pleural effusion.    MEDIASTINUM AND NUZHAT: No lymphadenopathy.    HEART AND VESSELS: Heart size is normal. No pericardial effusion. Coronary calcifications. LAD stent. Thoracic aorta and pulmonary artery normal in diameter.    VISUALIZED UPPER ABDOMEN: Within normal limits.    CHEST WALL AND BONES: No aggressive osseous lesion. Ankylosis of right posterior 3rd rib is unchanged.    LOWER NECK: 0.8 cm right thyroid nodule.    IMPRESSION:    Consolidative opacities within left lower lobe and nodular groundglass opacities within the right upper and lower lobes representing multifocal pneumonia.                PRIYA RODRÍGUEZ MD; Attending Radiologist  This document has been electronically signed. Jun 29 2021  1:07PM    < end of copied text >          PROTEIN CALORIE MALNUTRITION PRESENT: [ ]mild [ ]moderate [ ]severe [ ]underweight [ ]morbid obesity  https://www.andeal.org/vault/2440/web/files/ONC/Table_Clinical%20Characteristics%20to%20Document%20Malnutrition-White%20JV%20et%20al%202012.pdf    Height (cm): 170.2 (06-30-21 @ 01:00)  Weight (kg): 58.9 (06-30-21 @ 01:00)  BMI (kg/m2): 20.3 (06-30-21 @ 01:00)    [ ]PPSV2 < or = to 30% [ ]significant weight loss  [ ]poor nutritional intake  [ ]anasarca     Albumin, Serum: 3.4 g/dL (07-01-21 @ 00:53)   [ ]Artificial Nutrition      REFERRALS:   [ ]Chaplaincy  [x ]Hospice  [ ]Child Life  [ ]Social Work  [ ]Case management [ ]Holistic Therapy     Goals of Care Document:

## 2021-07-06 NOTE — PROGRESS NOTE ADULT - PROVIDER SPECIALTY LIST ADULT
Internal Medicine
Endocrinology
MICU
MICU
Internal Medicine

## 2021-07-06 NOTE — PROGRESS NOTE ADULT - PROBLEM SELECTOR PLAN 1
- presented with FS 38 likely 2/2 to sulfonyurea excess in setting of poor PO intake. s/p octreotide. A1c 5.6%  - POCT, last two 148 and 111, improving  - c/w FS q4h for now   - d/c D5 LR 50cc/hr, wean off D5 as pt tolerates   - endocrine following - presented with FS 38 likely 2/2 to sulfonyurea excess in setting of poor PO intake. s/p octreotide. A1c 5.6%  - Fs stable in 100s  - endocrine recs no oral hyperglycemics because last A1C 5.6. Can start januvia or tradjenta at discharge.

## 2021-07-06 NOTE — PROGRESS NOTE ADULT - SUBJECTIVE AND OBJECTIVE BOX
CC:      Patient is a 65y old  Male who presents with a chief complaint of hypoglycemia (03 Jul 2021 11:17)        SUBJECTIVE:    No acute events overnight. Patient is feeling well this morning. He is tolerating soft diet. He denies pain, fever, chills, chest pain, shortness of breath, changes in bowel moevments, urination, or leg swelling. He is eager to leave.     _________________________________________________________________________________________________________________________________________    OBJECTIVE:    Vital Signs Last 24 Hrs  T(C): 36.4 (05 Jul 2021 05:03), Max: 36.8 (04 Jul 2021 13:32)  T(F): 97.5 (05 Jul 2021 05:03), Max: 98.3 (04 Jul 2021 13:32)  HR: 71 (05 Jul 2021 05:03) (61 - 71)  BP: 152/71 (05 Jul 2021 05:03) (152/71 - 164/76)  BP(mean): --  RR: 18 (05 Jul 2021 05:03) (18 - 18)  SpO2: 92% (05 Jul 2021 05:03) (92% - 98%)    I&O's Summary    04 Jul 2021 07:01  -  05 Jul 2021 07:00  --------------------------------------------------------  IN: 1040 mL / OUT: 1775 mL / NET: -735 mL      MEDICATIONS  (STANDING):  ampicillin/sulbactam  IVPB      ampicillin/sulbactam  IVPB 1.5 Gram(s) IV Intermittent every 6 hours  aspirin enteric coated 81 milliGRAM(s) Oral daily  atorvastatin 80 milliGRAM(s) Oral at bedtime  bromocriptine Capsule 5 milliGRAM(s) Oral daily  dextrose 40% Gel 15 Gram(s) Oral once  dextrose 5%. 1000 milliLiter(s) (50 mL/Hr) IV Continuous <Continuous>  dextrose 5%. 1000 milliLiter(s) (100 mL/Hr) IV Continuous <Continuous>  dextrose 50% Injectable 25 Gram(s) IV Push once  dextrose 50% Injectable 12.5 Gram(s) IV Push once  dextrose 50% Injectable 25 Gram(s) IV Push once  glucagon  Injectable 1 milliGRAM(s) IntraMuscular once  heparin   Injectable 5000 Unit(s) SubCutaneous every 8 hours  insulin lispro (ADMELOG) corrective regimen sliding scale   SubCutaneous three times a day before meals  insulin lispro (ADMELOG) corrective regimen sliding scale   SubCutaneous at bedtime  polyethylene glycol 3350 17 Gram(s) Oral daily  riluzole 50 milliGRAM(s) Oral two times a day  sertraline 50 milliGRAM(s) Oral daily    MEDICATIONS  (PRN):  senna 2 Tablet(s) Oral at bedtime PRN Constipation      PHYSICAL EXAM:    GENERAL: Laying comfortably, NAD  EYES: EOMI, PERRL, no scleral icterus  NECK: No JVD  LUNG: CTAB No wheeze, crackles or rhonci  HEART: Regular rate and rhythm; No murmurs, rubs, or gallops  ABDOMEN: Soft, Nontender, Nondistended  EXTREMITIES:  No LE edema, 2+ Peripheral Pulses, No clubbing, cyanosis, or edema  PSYCH: AAOx3  NEUROLOGY: non-focal, strength 5/5 in all extremities, sensation intact  SKIN: sacral wound site inspected; bandaged; no erythema, no purulence; area c/d/i      LABS:  cret    07-04    137  |  102  |  17  ----------------------------<  96  3.6   |  23  |  0.78    Ca    8.9      04 Jul 2021 07:15  Phos  3.1     07-04  Mg     2.0     07-04      Pedro Lisa, PGY-1           CC:      Patient is a 65y old  Male who presents with a chief complaint of hypoglycemia (03 Jul 2021 11:17)        SUBJECTIVE:    Patient is feeling well this morning, and ready to leave. He agrees to talk with palliative care about home hospice, although he is adamant he does not want barriers to discharge. He is tolerating soft diet. He denies pain, fever, chills, chest pain, shortness of breath, changes in bowel moevments, urination, or leg swelling. No other events overnight.    _________________________________________________________________________________________________________________________________________    OBJECTIVE:    Vital Signs Last 24 Hrs  T(C): 36.4 (05 Jul 2021 05:03), Max: 36.8 (04 Jul 2021 13:32)  T(F): 97.5 (05 Jul 2021 05:03), Max: 98.3 (04 Jul 2021 13:32)  HR: 71 (05 Jul 2021 05:03) (61 - 71)  BP: 152/71 (05 Jul 2021 05:03) (152/71 - 164/76)  BP(mean): --  RR: 18 (05 Jul 2021 05:03) (18 - 18)  SpO2: 92% (05 Jul 2021 05:03) (92% - 98%)    I&O's Summary    05 Jul 2021 07:01  -  06 Jul 2021 07:00  --------------------------------------------------------  IN: 890 mL / OUT: 1200 mL / NET: -310 mL    06 Jul 2021 07:01 - 06 Jul 2021 13:36  --------------------------------------------------------  IN: 360 mL / OUT: 200 mL / NET: 160 mL        MEDICATIONS  (STANDING):  ampicillin/sulbactam  IVPB      ampicillin/sulbactam  IVPB 1.5 Gram(s) IV Intermittent every 6 hours  aspirin enteric coated 81 milliGRAM(s) Oral daily  atorvastatin 80 milliGRAM(s) Oral at bedtime  bromocriptine Capsule 5 milliGRAM(s) Oral daily  dextrose 40% Gel 15 Gram(s) Oral once  dextrose 5%. 1000 milliLiter(s) (50 mL/Hr) IV Continuous <Continuous>  dextrose 5%. 1000 milliLiter(s) (100 mL/Hr) IV Continuous <Continuous>  dextrose 50% Injectable 25 Gram(s) IV Push once  dextrose 50% Injectable 12.5 Gram(s) IV Push once  dextrose 50% Injectable 25 Gram(s) IV Push once  glucagon  Injectable 1 milliGRAM(s) IntraMuscular once  heparin   Injectable 5000 Unit(s) SubCutaneous every 8 hours  insulin lispro (ADMELOG) corrective regimen sliding scale   SubCutaneous three times a day before meals  insulin lispro (ADMELOG) corrective regimen sliding scale   SubCutaneous at bedtime  polyethylene glycol 3350 17 Gram(s) Oral daily  riluzole 50 milliGRAM(s) Oral two times a day  sertraline 50 milliGRAM(s) Oral daily    MEDICATIONS  (PRN):  senna 2 Tablet(s) Oral at bedtime PRN Constipation      PHYSICAL EXAM:    GENERAL: Laying comfortably, NAD  EYES: EOMI, PERRL  NECK: No JVD  LUNG: CTAB No wheeze, crackles or rhonchi  HEART: Regular rate and rhythm; No murmurs, rubs, or gallops  ABDOMEN: Soft, Nontender, Nondistended  EXTREMITIES: 2+ Peripheral Pulses, No clubbing, cyanosis, or edema  PSYCH: AAOx3  NEUROLOGY: non-focal, strength intact in all 4 extremeties, sensation intact  SKIN: sacral wound site inspected; bandaged; no erythema, no purulence; area c/d/i      LABS:  cret                        9.2    7.23  )-----------( 247      ( 05 Jul 2021 07:06 )             28.2     07-05    136  |  101  |  12  ----------------------------<  82  3.9   |  23  |  0.70    Ca    9.2      05 Jul 2021 07:06  Phos  2.8     07-05  Mg     2.1     07-05              Pedro Lisa, PGY-1

## 2021-07-06 NOTE — CONSULT NOTE ADULT - PROBLEM SELECTOR PROBLEM 4
Type 2 diabetes mellitus without complication, without long-term current use of insulin
Encounter for palliative care

## 2021-07-06 NOTE — DISCHARGE NOTE NURSING/CASE MANAGEMENT/SOCIAL WORK - PATIENT PORTAL LINK FT
You can access the FollowMyHealth Patient Portal offered by [facility] by registering at the following website: [email or web address]. By joining [facility]’s FollowMyHealth portal, you will also be able to view your health information using other applications (apps) compatible with our system.

## 2021-07-06 NOTE — PROGRESS NOTE ADULT - PROBLEM SELECTOR PROBLEM 3
Type 2 diabetes mellitus without complication, without long-term current use of insulin
Dyslipidemia
Type 2 diabetes mellitus without complication, without long-term current use of insulin

## 2021-07-06 NOTE — PROGRESS NOTE ADULT - PROBLEM SELECTOR PLAN 2
- likely 2/2 aspiration given history: consolidative opacities in LLL and nodular groundglass w/in RU and RL lobes  - s/p vanco and meropenem x 1 day (6/29), now on day 3 Unasyn (6/30- present) - c/f total 5-7 days of abx   - RVP/COVID, legionella negative  - BCx NGTD   - MRSA swab neg 6/30  - finished Unasyn - likely 2/2 aspiration given history: consolidative opacities in LLL and nodular groundglass w/in RU and RL lobes  - s/p vanco and meropenem x 1 day (6/29), now on day 3 Unasyn (6/30- present) - c/f total 5-7 days of abx   - RVP/COVID, legionella negative  - BCx NGTD   - MRSA swab neg 6/30  - finished Unasyn 7/5

## 2021-07-06 NOTE — PROGRESS NOTE ADULT - NUTRITIONAL ASSESSMENT
This patient has been assessed with a concern for Malnutrition and has been determined to have a diagnosis/diagnoses of Moderate protein-calorie malnutrition.    This patient is being managed with:   Diet Pureed-  Supplement Feeding Modality:  Oral  Ensure Clear Cans or Servings Per Day:  3       Frequency:  Daily  Entered: Jul 1 2021  4:08PM    
This patient has been assessed with a concern for Malnutrition and has been determined to have a diagnosis/diagnoses of Moderate protein-calorie malnutrition.    This patient is being managed with:   Diet Pureed-  Supplement Feeding Modality:  Oral  Ensure Clear Cans or Servings Per Day:  3       Frequency:  Daily  Entered: Jul 1 2021  4:08PM    
This patient has been assessed with a concern for Malnutrition and has been determined to have a diagnosis/diagnoses of Moderate protein-calorie malnutrition.    This patient is being managed with:   Diet Soft-  Entered: Jul 2 2021  1:33PM    
This patient has been assessed with a concern for Malnutrition and has been determined to have a diagnosis/diagnoses of Moderate protein-calorie malnutrition.    This patient is being managed with:   Diet Pureed-  Supplement Feeding Modality:  Oral  Ensure Clear Cans or Servings Per Day:  3       Frequency:  Daily  Entered: Jul 1 2021  4:08PM    
This patient has been assessed with a concern for Malnutrition and has been determined to have a diagnosis/diagnoses of Moderate protein-calorie malnutrition.    This patient is being managed with:   Diet Soft-  Entered: Jul 2 2021  1:33PM    

## 2021-07-06 NOTE — CONSULT NOTE ADULT - PROBLEM SELECTOR RECOMMENDATION 4
Would place patient on low does sliding scale, only correct for hyperglycemia to start at 1:50 above 200mg/dl.   Would not d/c on any antihyperglycemic agent as his A1C is 5.6% and goal of care of discussion on going.   Can monitor glucose at home and if patient with persistent hyperglycemia impacting the quality of life, would recommend starting DDP4 inhibitor (Januvia 100mg daily or Tradjenta 5mg once daily)
kps 30%

## 2021-07-06 NOTE — DISCHARGE NOTE NURSING/CASE MANAGEMENT/SOCIAL WORK - NSDCVIVACCINE_GEN_ALL_CORE_FT
Tdap; 15-Mar-2017 07:07; Flavia Hill (RN); [employer]; [id number]; IntraMuscular; Deltoid Left.; 0.5 milliLiter(s); VIS (VIS Published: 09-May-2013, VIS Presented: 15-Mar-2017);

## 2021-07-06 NOTE — PROGRESS NOTE ADULT - ATTENDING COMMENTS
S/P Unasyn for multifocal pneumonia/ Aspiration.  Pt is hemodynamically stable / spoke with the  about reinstating his home services .  Planning for DC today .  DC time 45mns.  F/up with PCP, Neurologist and Endocrinologist         Nicolette Corley   Hospitalist   [phone] . S/P Unasyn for multifocal pneumonia/ Aspiration.  Pt is hemodynamically stable / spoke with the  about reinstating his home services .  Planning for DC today .  DC time 45mns.  F/up with PCP, Neurologist and Endocrinologist       Nicolette Corley   Hospitalist   [phone] .

## 2021-07-06 NOTE — CONSULT NOTE ADULT - CONVERSATION DETAILS
Met with pt at bedside greater the 20 mins spent discussing ACP.  pt has a good understanding of disease process.  Reviewed MOLST form in detail.  Reviewed home care and hospice services.  Pt wants to go home today as planned with home care but would like to have hospice referral placed for the community.  Spoke with Hospice RN to discuss.  She will place referral.  Spoke with and updated primary team. Palliative care will sign off as goals are established.

## 2021-07-06 NOTE — CONSULT NOTE ADULT - PROBLEM SELECTOR RECOMMENDATION 2
Currently with pneumonia on abx.   Management per ICU team.
2/2 ALS  pt understands the risk r/t secretions and PO intake with aspiration 2/2 dysphagia  careful hand feeding  aspiration precautions.

## 2021-07-06 NOTE — CONSULT NOTE ADULT - ASSESSMENT
Impression:    Sacral/bilateral buttocks skin discoloration  Incontinence of bowel and bladder  Incontinence dermatitis  Pressure Ulcer prophylaxis    Recommend:  1.) topical therapy: sacral/bilateral buttocks skin - cleanse with NS, pat dry, apply Oscar moisture barrier BID and with each incontinent episode  2.) Maintain on an alternating air with low air loss surface while inpatient, then pressure redistribution surface after discharge  3.) chair cushion for chair sitting  4.) turn and repositio Q 2  hours   5.) Incontinence management - incontinence cleanser, pads, pericare BID, consider male external urinary catheter  6.) Nutrition optimization  7.) Glycemic optimization  8.) offload heels/feet with pillows    Care as per medicine. Will not actively follow but will remain available. Please recall for deterioration or new issues.  Seen and discussed with clinical nurse  Thank you for this consult  Lidia Trevino, CHAVA-C, CWOCN [id number]

## 2021-07-06 NOTE — CONSULT NOTE ADULT - PROBLEM SELECTOR RECOMMENDATION 9
Improved, likely secondary to sulfonylurea use in the setting of decreased appetite.   Patient s/p octreotide gtt now discontinued due to no further episode of hypoglycemia.   Discussed with wife that we should d/c glyburide due to the potential risk of hypoglycemia in a patient with uncertain PO intake.   Continue to monitor glucose every 4 hours.  Would not d/c on any antihyperglycemia agent as his A1C is 5.6% and goal of care of discussion on going.   Can monitor glucose at home and if patient with persistent hyperglycemia impacting the quality of life, would recommend starting DDP4 inhibitor (Januvia 100mg daily or Tradjenta 5mg once daily)
Pt requiring full supportive care

## 2021-07-06 NOTE — CONSULT NOTE ADULT - SUBJECTIVE AND OBJECTIVE BOX
Wound Surgery Consult Note:    HPI:  HPI:  65 yo male h/o DM, ALS presenting with AMS around 2am in setting of cough for the past few days. Non-productive cough, denies fevers or shortness of breath. ED spoke to wife Maribel for collateral information. Woke up 2AM "jibberish" was coming out of him "squeezing sheets" making "weird noises" "unable to make eye contact" "made gurgling noises". Aide arrived 7:30AM who help clean up patient. lips looked dry and gave sip tea and then choked on tea. Called ALS nurse who suggested to call 911. EMS took FS 38, amp of D50 given which appeared to resolve symptoms. Last dose of glyburide 730am yesterday per patient. Per family, patient was more confused today. Denies any chest pain, pressure, nausea/vomiting, dizziness, syncope episodes, headache or visual changes.     While in ED, patient well-appearing and stable however had 2 episodes of hypoglycemia despite receiving up to x3 amps of D50. D10 @ 100. Last sulfonyulrea taken over 24 hours ago. Lakeshia/vanc started, UA negative, has hx of self cath, x1 dose octreotide given. Will admit to MICU for q1 fingersticks.     Request for wound care consult for bilateral buttocks and sacral skin discoloration received from  nursing. Mr. Galarza was encountered on an alternating air with low air loss surface. He was seen with his clinical nurse who reports that the discoloration on the skin in the affected area was deep in color over the last few days. On exam today, there is very faint, slight circular thin lines of maroon discoloration, not deep in nature. He was incontinent of urine on exam. Mr. Galarza reports that he has a hospital bed at home and a wheelchair with wheelchair cushion. He denied pain or itchiness to his bilateral buttocks and sacrum at this time.    PAST MEDICAL & SURGICAL HISTORY:  Dyslipidemia  Deviated nasal septum  Prolactinoma  Hypertension  Type 2 diabetes mellitus  HLD (hyperlipidemia)  H/O nasal septoplasty  History of tonsillectomy    REVIEW OF SYSTEMS  General:	no fevers or chills  Respiratory and Thorax: no SOB or cough  Cardiovascular:	no CP  Gastrointestinal: no n/v	  Genitourinary:	incontinent of urine  Musculoskeletal:	 non ambulatory  Neurological:	ALS  Vascular:	no chronic edema  Endocrine: DM  Skin: no chronic wounds	    MEDICATIONS  (STANDING):  aspirin enteric coated 81 milliGRAM(s) Oral daily  atorvastatin 80 milliGRAM(s) Oral at bedtime  bromocriptine Capsule 5 milliGRAM(s) Oral daily  dextrose 40% Gel 15 Gram(s) Oral once  dextrose 5%. 1000 milliLiter(s) (50 mL/Hr) IV Continuous <Continuous>  dextrose 5%. 1000 milliLiter(s) (100 mL/Hr) IV Continuous <Continuous>  dextrose 50% Injectable 25 Gram(s) IV Push once  dextrose 50% Injectable 12.5 Gram(s) IV Push once  dextrose 50% Injectable 25 Gram(s) IV Push once  glucagon  Injectable 1 milliGRAM(s) IntraMuscular once  heparin   Injectable 5000 Unit(s) SubCutaneous every 8 hours  insulin lispro (ADMELOG) corrective regimen sliding scale   SubCutaneous at bedtime  insulin lispro (ADMELOG) corrective regimen sliding scale   SubCutaneous three times a day before meals  polyethylene glycol 3350 17 Gram(s) Oral daily  riluzole 50 milliGRAM(s) Oral two times a day  sertraline 50 milliGRAM(s) Oral daily    MEDICATIONS  (PRN):  senna 2 Tablet(s) Oral at bedtime PRN Constipation    Allergies    lactose (Diarrhea)  No Known Drug Allergies    Intolerances    SOCIAL HISTORY:   Denies smoking, ETOH, drugs    FAMILY HISTORY:  Family history of Hodgkins disease  Family history of Alzheimer&#x27;s disease    Vital Signs Last 24 Hrs  T(C): 36.7 (06 Jul 2021 05:06), Max: 36.7 (06 Jul 2021 05:06)  T(F): 98 (06 Jul 2021 05:06), Max: 98 (06 Jul 2021 05:06)  HR: 64 (06 Jul 2021 05:06) (58 - 65)  BP: 150/79 (06 Jul 2021 05:06) (147/73 - 152/77)  BP(mean): --  RR: 18 (06 Jul 2021 05:06) (18 - 18)  SpO2: 96% (06 Jul 2021 05:06) (95% - 99%)    Physical Exam:  General: A&Ox3, thin, frail   Respiratory: no SOB on room air  Gastrointestinal: soft NT/ND   Neurology: weak BUE, diminished sensation  Musculoskeletal: no contractures, unable to move BLE  Vascular: no BLE edema  Skin:      bilateral buttocks and sacral skin intact with no drainage with very slight faint circular thin lines of maroon discoloration, not deep in nature   No odor, erythema, increased warmth, tenderness, induration, fluctuance    LABS:  07-05    136  |  101  |  12  ----------------------------<  82  3.9   |  23  |  0.70    Ca    9.2      05 Jul 2021 07:06  Phos  2.8     07-05  Mg     2.1     07-05                            9.2    7.23  )-----------( 247      ( 05 Jul 2021 07:06 )             28.2           RADIOLOGY & ADDITIONAL STUDIES:  Cultures:

## 2021-07-12 ENCOUNTER — APPOINTMENT (OUTPATIENT)
Dept: CARE COORDINATION | Facility: HOME HEALTH | Age: 66
End: 2021-07-12
Payer: MEDICARE

## 2021-07-12 VITALS
DIASTOLIC BLOOD PRESSURE: 64 MMHG | SYSTOLIC BLOOD PRESSURE: 128 MMHG | TEMPERATURE: 98.9 F | HEART RATE: 71 BPM | RESPIRATION RATE: 15 BRPM | OXYGEN SATURATION: 97 %

## 2021-07-12 DIAGNOSIS — Z87.01 PERSONAL HISTORY OF PNEUMONIA (RECURRENT): ICD-10-CM

## 2021-07-12 DIAGNOSIS — E11.9 TYPE 2 DIABETES MELLITUS W/OUT COMPLICATIONS: ICD-10-CM

## 2021-07-12 PROCEDURE — 99349 HOME/RES VST EST MOD MDM 40: CPT

## 2021-07-13 PROBLEM — Z87.01 HISTORY OF PNEUMONIA: Status: RESOLVED | Noted: 2021-07-13 | Resolved: 2021-07-13

## 2021-07-13 RX ORDER — FERROUS SULFATE 15 MG/ML
75 (15 FE) DROPS ORAL
Qty: 500 | Refills: 0 | Status: DISCONTINUED | COMMUNITY
Start: 2021-04-02 | End: 2021-07-13

## 2021-07-13 RX ORDER — LORATADINE 10 MG
17 TABLET,DISINTEGRATING ORAL
Refills: 0 | Status: ACTIVE | COMMUNITY

## 2021-07-13 RX ORDER — FLASH GLUCOSE SENSOR
KIT MISCELLANEOUS
Qty: 1 | Refills: 0 | Status: DISCONTINUED | COMMUNITY
Start: 2020-01-12 | End: 2021-07-13

## 2021-07-13 RX ORDER — GLYBURIDE 2.5 MG/1
2.5 TABLET ORAL
Qty: 60 | Refills: 0 | Status: DISCONTINUED | COMMUNITY
Start: 2021-06-25 | End: 2021-07-13

## 2021-07-13 RX ORDER — SENNOSIDES 8.6 MG TABLETS 8.6 MG/1
8.6 TABLET ORAL
Refills: 0 | Status: ACTIVE | COMMUNITY

## 2021-07-13 RX ORDER — LIDOCAINE AND MENTHOL 40; 40 MG/1; MG/1
4-4 PATCH TOPICAL
Qty: 3 | Refills: 3 | Status: DISCONTINUED | COMMUNITY
Start: 2021-01-12 | End: 2021-07-13

## 2021-07-13 RX ORDER — FLASH GLUCOSE SENSOR
KIT MISCELLANEOUS
Qty: 2 | Refills: 5 | Status: DISCONTINUED | COMMUNITY
Start: 2020-01-15 | End: 2021-07-13

## 2021-07-13 NOTE — PHYSICAL EXAM
[No Acute Distress] : no acute distress [Chronically Ill] : chronically ill [Hoarse] : was hoarse [Normal Sclera/Conjunctiva] : normal sclera/conjunctiva [Normal Outer Ear/Nose] : the outer ears and nose were normal in appearance [No JVD] : no jugular venous distention [No Tracheal Deviation] : the trachea was midline [Clear Bilaterally] : the lungs were clear to auscultation bilaterally [Rhonchi Bilateral] : no rhonchi were heard [Decreased Breath Sounds] : breath sounds were not diminished [Normal Rate] : normal rate  [Regular Rhythm] : with a regular rhythm [Normal S1, S2] : normal S1 and S2 [Pedal Pulses Present] : the pedal pulses are present [No Edema] : there was no peripheral edema [No Extremity Clubbing/Cyanosis] : no extremity clubbing/cyanosis [Soft] : abdomen soft [Non Tender] : non-tender [Non-distended] : non-distended [Normal Bowel Sounds] : normal bowel sounds [Alert and Oriented x3] : oriented to person, place, and time [Right Foot Was Examined] : Right foot ~C was examined [Left Foot Was Examined] : left foot ~C was examined [] : Both feet are normal [None] : no ulcers in either foot were found [de-identified] : sacral with moisture friction

## 2021-07-13 NOTE — HISTORY OF PRESENT ILLNESS
[Post-hospitalization from ___ Hospital] : Post-hospitalization from [unfilled] Hospital [Admitted on: ___] : The patient was admitted on [unfilled] [Discharged on ___] : discharged on [unfilled] [Discharge Summary] : discharge summary [Pertinent Labs] : pertinent labs [Discharge Med List] : discharge medication list [Med Reconciliation] : medication reconciliation has been completed [Patient Contacted By: ____] : and contacted by [unfilled] [FreeTextEntry3] : TCM Hospitalization Follow up: PNA [FreeTextEntry2] : As per [facility]'s hospital course authored on 7/6/21:\par "Mr. Wm Galarza is a 65 year old male with a PMHx of DM and ALS who presented with altered mental status and a non-productive cough. Symptoms began to worsen at 0200 on day of presentation (6/29), as he was making gurgling sounds, strange noises, and squeezes his sheets. Additionally, he unable to tolerate his morning tea. He had also had decreased PO intake for the past few days as well. At this point he was brought to the ED. \par During his transfer to the ED, he was found to have a fasting glucose of 38 and was given an AMP of D50. He had received his last dose of home glyburide at 0730 on 6/28. He remained hypoglycemic in the ED and received 3 amps of D50 and put on a D20 @100 drip. His CT scan in the ED showed multifocal PNA and he was started on meropenem and vancomycin.He was admitted to the ED for fasting blood glucose monitoring in the setting of his severe hypoglycaemia. His antibiotic regimen in the MICU was deescalated to Unasyn for 5-7 days, which was started on 6/30. This was due to potential risk for aspiration PNA. As his fasting blood sugars stabilized, he was downgraded to D5/LR on 6/30 and his checks were further spaced. Suspicion of the etiology of his hypoglycemia was significant for sulfonylurea excess in the setting of poor PO intake, and he was given octreotide which was also discontinued on 6/30. He was found to be stable and transferred down to the floor.\par On the floors, internal medicine followed and continued his antibiotic course for his PNA, which continued improving. Endocrine consult was pursued and they recommended discontinuation of sulfonylurea, and potential for discharge with Januvia 100qdaily or Trajenta 5qdaily if glucose is elevated at home , but NO hypoglycemic agents on DC .   Patient continued have stable POCT and fasting blood glucose, so he was discharged with ***. Additionally, neurology consult ALS suggested continuing on aspiration precautions, as well as home riluzole, to continue on an outpatient basis with Dr. Caba. Patient initially refused palliative consultation, but was amenable to palliative consult after conversation with neurology attending  but now patient does not want palliative but wants more home services.  Speech swallow determined that he could continue on a soft diet for now.\par Patient finished course of antibiotics for aspiration pna. Palliative evaluated the patient and filled out evaluation for home hospice. Patient had TOV and passed and had griffith discontinued"\par \par Mr. Galarza seen in the home. Spouse and HHA present during home visit. Ambulates/transfers with 2 person assist. DME: Wheelchair, hospital bed. They assist Mr. Galarza at least during all meals and sitting up afterwards. He has a visiting MD that makes home visits. \par PNA- s/p antibiotics course therapy. Spouse/HHA adamant they are adhering with aspiration precautions. \par DM- spouse is unable to complete blood glucose monitoring because she does not know how to use glucometer. \par GOC: MOLST form: DNR/DNI no feeding tube.\par ALS- patient refuses to take riluzole. Spouse attends ALS support group. \par Denies increased cough/sputum/fatigue, SOB and/or fever/chills.\par

## 2021-07-13 NOTE — PLAN
[FreeTextEntry1] : -Spouse open to Palliative MD in the community. \par -Adhere to all medications.\par Increase activity as tolerated and maintain optimal activity levels. Continue coughing and deep breathing exercises \par Receive routine pneumococcal and influenza vaccinations.\par Maintain proper nutrition and adequate hydration.\par Notify NP for worsening symptoms including fever, chills, SOB, CP, increased cough and secretions.\par Follow up with MD within 7 days of discharge.

## 2021-07-20 LAB
CORTICOSTEROID BINDING GLOBULIN RESULT: 1.5 MG/DL — LOW
CORTIS F/TOTAL MFR SERPL: 36 % — SIGNIFICANT CHANGE UP
CORTIS SERPL-MCNC: 15 UG/DL — SIGNIFICANT CHANGE UP
CORTISOL, FREE RESULT: 5.3 UG/DL — HIGH

## 2021-07-23 NOTE — DIETITIAN INITIAL EVALUATION ADULT. - PROBLEM SELECTOR PROBLEM 9
[Dear  ___] : Dear  [unfilled], [Courtesy Letter:] : I had the pleasure of seeing your patient, [unfilled], in my office today. [Consult Closing:] : Thank you very much for allowing me to participate in the care of this patient.  If you have any questions, please do not hesitate to contact me. [Sincerely,] : Sincerely, [FreeTextEntry3] : Julian Lyle MD\par Department of Otolaryngology - Head and Neck Surgery\par [facility] Discharge planning issues

## 2021-07-29 NOTE — CHART NOTE - NSCHARTNOTEFT_GEN_A_CORE
On admission , patient met SIRS/Sepsis criteria ( tem of 102.3, HR >90 and WBC >12 ) on admission and received antibiotics and the sepsis has resolved during the hospitalization.        Nicolette Corley   [facility]   [phone]

## 2021-07-29 NOTE — CHART NOTE - NSCHARTNOTESELECT_GEN_ALL_CORE
Endocrinology/Event Note
Neurology/Event Note
MAR Accept Note
MICU transfer note/Transfer Note
Nutrition Services
clarification/Event Note

## 2021-09-11 ENCOUNTER — EMERGENCY (EMERGENCY)
Facility: HOSPITAL | Age: 66
LOS: 1 days | Discharge: HOME | End: 2021-09-11
Attending: EMERGENCY MEDICINE | Admitting: EMERGENCY MEDICINE
Payer: MEDICARE

## 2021-09-11 VITALS
WEIGHT: 134.92 LBS | SYSTOLIC BLOOD PRESSURE: 97 MMHG | OXYGEN SATURATION: 97 % | HEIGHT: 67 IN | RESPIRATION RATE: 18 BRPM | HEART RATE: 58 BPM | DIASTOLIC BLOOD PRESSURE: 60 MMHG | TEMPERATURE: 98 F

## 2021-09-11 VITALS
OXYGEN SATURATION: 100 % | HEART RATE: 52 BPM | SYSTOLIC BLOOD PRESSURE: 114 MMHG | RESPIRATION RATE: 17 BRPM | DIASTOLIC BLOOD PRESSURE: 62 MMHG

## 2021-09-11 DIAGNOSIS — Z90.89 ACQUIRED ABSENCE OF OTHER ORGANS: Chronic | ICD-10-CM

## 2021-09-11 DIAGNOSIS — Z98.89 OTHER SPECIFIED POSTPROCEDURAL STATES: Chronic | ICD-10-CM

## 2021-09-11 LAB
ALBUMIN SERPL ELPH-MCNC: 2.9 G/DL — LOW (ref 3.3–5)
ALP SERPL-CCNC: 74 U/L — SIGNIFICANT CHANGE UP (ref 30–120)
ALT FLD-CCNC: 12 U/L DA — SIGNIFICANT CHANGE UP (ref 10–60)
ANION GAP SERPL CALC-SCNC: 11 MMOL/L — SIGNIFICANT CHANGE UP (ref 5–17)
APPEARANCE UR: CLEAR — SIGNIFICANT CHANGE UP
AST SERPL-CCNC: 8 U/L — LOW (ref 10–40)
BASOPHILS # BLD AUTO: 0.03 K/UL — SIGNIFICANT CHANGE UP (ref 0–0.2)
BASOPHILS NFR BLD AUTO: 0.2 % — SIGNIFICANT CHANGE UP (ref 0–2)
BILIRUB SERPL-MCNC: 0.4 MG/DL — SIGNIFICANT CHANGE UP (ref 0.2–1.2)
BILIRUB UR-MCNC: ABNORMAL
BUN SERPL-MCNC: 21 MG/DL — SIGNIFICANT CHANGE UP (ref 7–23)
CALCIUM SERPL-MCNC: 8.6 MG/DL — SIGNIFICANT CHANGE UP (ref 8.4–10.5)
CHLORIDE SERPL-SCNC: 110 MMOL/L — HIGH (ref 96–108)
CO2 SERPL-SCNC: 28 MMOL/L — SIGNIFICANT CHANGE UP (ref 22–31)
COLOR SPEC: YELLOW — SIGNIFICANT CHANGE UP
CREAT SERPL-MCNC: 0.91 MG/DL — SIGNIFICANT CHANGE UP (ref 0.5–1.3)
DIFF PNL FLD: ABNORMAL
EOSINOPHIL # BLD AUTO: 0.08 K/UL — SIGNIFICANT CHANGE UP (ref 0–0.5)
EOSINOPHIL NFR BLD AUTO: 0.6 % — SIGNIFICANT CHANGE UP (ref 0–6)
GLUCOSE SERPL-MCNC: 173 MG/DL — HIGH (ref 70–99)
GLUCOSE UR QL: 50 MG/DL
HCT VFR BLD CALC: 31.8 % — LOW (ref 39–50)
HGB BLD-MCNC: 10.2 G/DL — LOW (ref 13–17)
IMM GRANULOCYTES NFR BLD AUTO: 0.5 % — SIGNIFICANT CHANGE UP (ref 0–1.5)
KETONES UR-MCNC: NEGATIVE — SIGNIFICANT CHANGE UP
LACTATE SERPL-SCNC: 0.9 MMOL/L — SIGNIFICANT CHANGE UP (ref 0.7–2)
LEUKOCYTE ESTERASE UR-ACNC: ABNORMAL
LYMPHOCYTES # BLD AUTO: 1.68 K/UL — SIGNIFICANT CHANGE UP (ref 1–3.3)
LYMPHOCYTES # BLD AUTO: 12.7 % — LOW (ref 13–44)
MCHC RBC-ENTMCNC: 27.3 PG — SIGNIFICANT CHANGE UP (ref 27–34)
MCHC RBC-ENTMCNC: 32.1 GM/DL — SIGNIFICANT CHANGE UP (ref 32–36)
MCV RBC AUTO: 85.3 FL — SIGNIFICANT CHANGE UP (ref 80–100)
MONOCYTES # BLD AUTO: 1.05 K/UL — HIGH (ref 0–0.9)
MONOCYTES NFR BLD AUTO: 7.9 % — SIGNIFICANT CHANGE UP (ref 2–14)
NEUTROPHILS # BLD AUTO: 10.37 K/UL — HIGH (ref 1.8–7.4)
NEUTROPHILS NFR BLD AUTO: 78.1 % — HIGH (ref 43–77)
NITRITE UR-MCNC: POSITIVE
NRBC # BLD: 0 /100 WBCS — SIGNIFICANT CHANGE UP (ref 0–0)
PH UR: 5 — SIGNIFICANT CHANGE UP (ref 5–8)
PLATELET # BLD AUTO: 221 K/UL — SIGNIFICANT CHANGE UP (ref 150–400)
POTASSIUM SERPL-MCNC: 3.8 MMOL/L — SIGNIFICANT CHANGE UP (ref 3.5–5.3)
POTASSIUM SERPL-SCNC: 3.8 MMOL/L — SIGNIFICANT CHANGE UP (ref 3.5–5.3)
PROT SERPL-MCNC: 6.8 G/DL — SIGNIFICANT CHANGE UP (ref 6–8.3)
PROT UR-MCNC: 100 MG/DL
RAPID RVP RESULT: SIGNIFICANT CHANGE UP
RBC # BLD: 3.73 M/UL — LOW (ref 4.2–5.8)
RBC # FLD: 14.7 % — HIGH (ref 10.3–14.5)
RBC CASTS # UR COMP ASSIST: SIGNIFICANT CHANGE UP /HPF (ref 0–4)
SARS-COV-2 RNA SPEC QL NAA+PROBE: SIGNIFICANT CHANGE UP
SODIUM SERPL-SCNC: 149 MMOL/L — HIGH (ref 135–145)
SP GR SPEC: 1.02 — SIGNIFICANT CHANGE UP (ref 1.01–1.02)
UROBILINOGEN FLD QL: 1 MG/DL
WBC # BLD: 13.27 K/UL — HIGH (ref 3.8–10.5)
WBC # FLD AUTO: 13.27 K/UL — HIGH (ref 3.8–10.5)
WBC UR QL: SIGNIFICANT CHANGE UP

## 2021-09-11 PROCEDURE — 96360 HYDRATION IV INFUSION INIT: CPT

## 2021-09-11 PROCEDURE — 99284 EMERGENCY DEPT VISIT MOD MDM: CPT | Mod: 25

## 2021-09-11 PROCEDURE — 80053 COMPREHEN METABOLIC PANEL: CPT

## 2021-09-11 PROCEDURE — 36415 COLL VENOUS BLD VENIPUNCTURE: CPT

## 2021-09-11 PROCEDURE — 71045 X-RAY EXAM CHEST 1 VIEW: CPT

## 2021-09-11 PROCEDURE — 96361 HYDRATE IV INFUSION ADD-ON: CPT

## 2021-09-11 PROCEDURE — 85025 COMPLETE CBC W/AUTO DIFF WBC: CPT

## 2021-09-11 PROCEDURE — 83605 ASSAY OF LACTIC ACID: CPT

## 2021-09-11 PROCEDURE — 76870 US EXAM SCROTUM: CPT

## 2021-09-11 PROCEDURE — 76870 US EXAM SCROTUM: CPT | Mod: 26

## 2021-09-11 PROCEDURE — 81001 URINALYSIS AUTO W/SCOPE: CPT

## 2021-09-11 PROCEDURE — 93005 ELECTROCARDIOGRAM TRACING: CPT

## 2021-09-11 PROCEDURE — 93010 ELECTROCARDIOGRAM REPORT: CPT

## 2021-09-11 PROCEDURE — 0225U NFCT DS DNA&RNA 21 SARSCOV2: CPT

## 2021-09-11 PROCEDURE — 87040 BLOOD CULTURE FOR BACTERIA: CPT

## 2021-09-11 PROCEDURE — 99284 EMERGENCY DEPT VISIT MOD MDM: CPT

## 2021-09-11 PROCEDURE — 87086 URINE CULTURE/COLONY COUNT: CPT

## 2021-09-11 PROCEDURE — 71045 X-RAY EXAM CHEST 1 VIEW: CPT | Mod: 26

## 2021-09-11 RX ORDER — SODIUM CHLORIDE 9 MG/ML
1900 INJECTION INTRAMUSCULAR; INTRAVENOUS; SUBCUTANEOUS ONCE
Refills: 0 | Status: COMPLETED | OUTPATIENT
Start: 2021-09-11 | End: 2021-09-11

## 2021-09-11 RX ADMIN — SODIUM CHLORIDE 1900 MILLILITER(S): 9 INJECTION INTRAMUSCULAR; INTRAVENOUS; SUBCUTANEOUS at 16:14

## 2021-09-11 RX ADMIN — SODIUM CHLORIDE 1900 MILLILITER(S): 9 INJECTION INTRAMUSCULAR; INTRAVENOUS; SUBCUTANEOUS at 18:15

## 2021-09-11 NOTE — ED CLERICAL - CLERICAL COMMENTS
called [facility] ems for transport back home.  [facility] 2015[facility]  [facility]

## 2021-09-11 NOTE — ED ADULT NURSE REASSESSMENT NOTE - NS ED NURSE REASSESS COMMENT FT1
pt resting comfortably, improvement noted, in no acute distress. Respirations are even and unlabored. pt voices no complaints at this time. pt updated and aware of plan of care. pt repositioned in bed, pt hemodynamically stable. will cont to monitor.

## 2021-09-11 NOTE — ED PROVIDER NOTE - OBJECTIVE STATEMENT
67 yo male with h/o HTN, HLD, DM, prolactinoma, ALS presents to the ED c/o cough and right testicular pain x 1 day. Patient had visiting PA who evaluated patient yesterday and started him on levoquin (2 doses so far.) + productive cough. + fever yesterday. Patient bedbound at baseline. + covid vaccine (Moderna x 1 dose). Scrotal pain worse iwht movement. Denies chest pain, sob, abd pain, N/V, urinary sxs, flank pain, penile pain/discharge.     pmd: Dr. Jefferson

## 2021-09-11 NOTE — ED PROVIDER NOTE - NSFOLLOWUPINSTRUCTIONS_ED_ALL_ED_FT
1) Continue antibiotic you were prescribed by your doctor (levaquin)  2) Follow up with urologist.   3) Return to ED with any new or worsening symptoms.     WHAT YOU NEED TO KNOW:    What is epididymo-orchitis? Epididymo-orchitis is inflammation of your epididymis and testicle. The epididymis is a coiled tube inside your scrotum. It stores and carries sperm from your testicles to your penis. Epididymo-orchitis usually affects the epididymis and testicle on one side, but it may affect both sides.     What causes epididymo-orchitis?   •A urinary tract infection (UTI) or mumps virus infection that spreads to the epididymis   •Trauma or injury of the testes   •Urine that flows backward from your urethra to the epididymis  •Sexually transmitted infections (STIs) such as gonorrhea or Chlamydia  •Use of heart medicine called amiodarone    What are the signs and symptoms of epididymo-orchitis?   •Pain or tenderness in your scrotum, abdomen, or groin  •Redness or swelling of your scrotum  •Pain or burning during urination, or frequent urination  •Discharge from your penis or blood in your urine or semen  •Fever    How is epididymo-orchitis diagnosed? Your healthcare provider may examine your penis, prostate, and scrotum. He may ask about your symptoms and any health conditions you have. You may need any of the following tests:   •Blood and urine tests may be done to check for infection. If you have discharge, a small amount of this fluid will be tested for bacteria.   •An ultrasound uses sound waves to show pictures of your testicles on a monitor. An ultrasound may be used to check blood flow to your testicles.   •A nuclear scan checks the blood flow in your testicles. A small amount of radioactive material may be injected into your blood. The radioactive material helps your blood vessels show up better.    How is epididymo-orchitis treated? Treatment depends on the cause of your epididymo-orchitis and may include any of the following:   •Antibiotics help treat a bacterial infection.   •NSAIDs, such as ibuprofen, help decrease swelling, pain, and fever. This medicine is available with or without a doctor's order. NSAIDs can cause stomach bleeding or kidney problems in certain people. If you take blood thinner medicine, always ask if NSAIDs are safe for you. Always read the medicine label and follow directions. Do not give these medicines to children under 6 months of age without direction from your child's healthcare provider.  •Acetaminophen decreases pain and fever. It is available without a doctor's order. Ask how much to take and how often to take it. Follow directions. Acetaminophen can cause liver damage if not taken correctly.  •Prescription pain medicine may be given. Ask how to take this medicine safely.  •Surgery may be needed if your condition gets worse. Surgery to drain an abscess (collection of pus) may be needed. Surgery to remove part or all of your epididymis or testicle may also be done.     How can I manage epididymo-orchitis?   •Apply ice on your testicles for 15 to 20 minutes every hour or as directed. Use an ice pack, or put crushed ice in a plastic bag. Cover it with a towel. Ice helps prevent tissue damage and decreases swelling and pain.  •Rest in bed as directed. Elevate your scrotum when you sit or lie down to help reduce swelling and pain. You may be asked to do this by placing a rolled-up towel under your scrotum.  •Scrotal support may be recommended. An athletic supporter provides scrotal support and may make you more comfortable when you stand. Ask your healthcare provider how to use an athletic supporter.   •Do not lift heavy objects. You can make swelling worse if you lift heavy objects or strain.     When should I seek immediate care?   •You have severe pain in your testicles.  •Your symptoms become worse even after you start treatment with medicine.    When should I contact my healthcare provider?   •Your symptoms do not get better within 3 days of treatment or come back after treatment.  •You have a hot, red, tender area on your testicles.  •You have questions or concerns about your condition or care.    CARE AGREEMENT:    You have the right to help plan your care. Learn about your health condition and how it may be treated. Discuss treatment options with your healthcare providers to decide what care you want to receive. You always have the right to refuse treatment.

## 2021-09-11 NOTE — ED ADULT NURSE NOTE - NSIMPLEMENTINTERV_GEN_ALL_ED
Implemented All Fall with Harm Risk Interventions:  [place] to call system. Call bell, personal items and telephone within reach. Instruct patient to call for assistance. Room bathroom lighting operational. Non-slip footwear when patient is off stretcher. Physically safe environment: no spills, clutter or unnecessary equipment. Stretcher in lowest position, wheels locked, appropriate side rails in place. Provide visual cue, wrist band, yellow gown, etc. Monitor gait and stability. Monitor for mental status changes and reorient to person, place, and time. Review medications for side effects contributing to fall risk. Reinforce activity limits and safety measures with patient and family. Provide visual clues: red socks.

## 2021-09-11 NOTE — ED ADULT TRIAGE NOTE - CHIEF COMPLAINT QUOTE
" My right scrotum is swollen, only hurts when I move started yesterday, I am also coughing, on oral Levaquin "

## 2021-09-11 NOTE — ED PROVIDER NOTE - MUSCULOSKELETAL, MLM
denies pain/discomfort
Spine appears normal, range of motion is not limited, no muscle or joint tenderness

## 2021-09-11 NOTE — ED PROVIDER NOTE - PROGRESS NOTE DETAILS
67 y/o male with a PMHx of PNA, ALS presenting with AMS, HLD, DM 2, HTN, H/O sepsis presents to the ED c/o cough and right testicular pain since yesterday. Pt has an aid at home and was visited by a PA. Pt received two doses of Levofloxacin.  Pt c/o right scrotal pain with movement and fever. Denies problems breathing, pain. Received one dose of Moderna last month. Pt was admitted at  [facility] for PNA and hypoglycemia. PCP: Dr. Jefferson  Exam: Vital signs normal, afebrile, NAD, post ox 97 on room air, lungs scattered rhonchi, heart sounds s1 s2,RRR, abd soft nontender, extremities no edema , scrotum mildly tender mainly on right, no appreciable swelling, no penile discharge. Neuro: awake and alert,  generalized weakness consisted with ALS. Impression: cough r/o PNA, scrotal pain.  BUNNY Dai attest that this documentation has been prepared under the direction and in the presence of Doctor Tsai  Plan: Sepsis workup, testicular sonogram

## 2021-09-11 NOTE — ED ADULT NURSE NOTE - OBJECTIVE STATEMENT
pt comes to ED via EMS states hx of ALS c/o wet cough and right testicular pain x 1 day. pt was seen by visiting  who started him on Levaquin x 2 days. pt is bedbound at home. + covid vaccine x 1 dose. Denies chest pain, sob, abd pain, N/V, urinary sxs, flank pain, penile pain/discharge. testicular pain is tender to touch.

## 2021-09-11 NOTE — ED PROVIDER NOTE - PATIENT PORTAL LINK FT
You can access the FollowMyHealth Patient Portal offered by [facility] by registering at the following website: [email or web address]. By joining [facility]’s FollowMyHealth portal, you will also be able to view your health information using other applications (apps) compatible with our system.

## 2021-09-11 NOTE — ED PROVIDER NOTE - CLINICAL SUMMARY MEDICAL DECISION MAKING FREE TEXT BOX
67 yo male with h/o HTN, HLD, DM, prolactinoma, ALS presents to the ED c/o cough and right testicular pain x 1 day. Patient had visiting PA who evaluated patient yesterday and started him on levoquin (2 doses so far.) + productive cough. + fever yesterday. Patient bedbound at baseline. + covid vaccine (Moderna x 1 dose). Scrotal pain worse iwht movement. Denies chest pain, sob, abd pain, N/V, urinary sxs, flank pain, penile pain/discharge. PE: as above. a/p: sepsis workup, scrotal US, fluids, reassess.

## 2021-09-11 NOTE — ED PROVIDER NOTE - CARE PROVIDER_API CALL
Tin Rouse)  Urology  [address], [address]  [place] [address]  Phone: [phone]  Fax: [phone]  Follow Up Time: 1-3 Days

## 2021-09-12 LAB
CULTURE RESULTS: SIGNIFICANT CHANGE UP
SPECIMEN SOURCE: SIGNIFICANT CHANGE UP

## 2021-09-16 LAB
CULTURE RESULTS: SIGNIFICANT CHANGE UP
CULTURE RESULTS: SIGNIFICANT CHANGE UP
SPECIMEN SOURCE: SIGNIFICANT CHANGE UP
SPECIMEN SOURCE: SIGNIFICANT CHANGE UP

## 2021-09-28 ENCOUNTER — APPOINTMENT (OUTPATIENT)
Dept: NEUROLOGY | Facility: CLINIC | Age: 66
End: 2021-09-28
Payer: MEDICARE

## 2021-09-28 VITALS — HEART RATE: 70 BPM | SYSTOLIC BLOOD PRESSURE: 109 MMHG | DIASTOLIC BLOOD PRESSURE: 67 MMHG

## 2021-09-28 DIAGNOSIS — G12.21 AMYOTROPHIC LATERAL SCLEROSIS: ICD-10-CM

## 2021-09-28 PROCEDURE — 99214 OFFICE O/P EST MOD 30 MIN: CPT

## 2021-09-28 RX ORDER — RILUZOLE 50 MG/1
50 TABLET, FILM COATED ORAL
Qty: 60 | Refills: 5 | Status: DISCONTINUED | COMMUNITY
Start: 2020-09-11 | End: 2021-09-28

## 2021-09-28 RX ORDER — RILUZOLE 50 MG/1
50 TABLET, FILM COATED ORAL
Qty: 60 | Refills: 5 | Status: DISCONTINUED | COMMUNITY
Start: 2020-08-06 | End: 2021-09-28

## 2021-09-30 NOTE — HISTORY OF PRESENT ILLNESS
[FreeTextEntry1] : Patient recently in hospital for epydidimo-orchitis.  Wife states memory is worse.  He remains incontinent of bowel and bladder.  \par \par They state he is full assist for transfers and he is cutting less food and needs more help.  He is coughing on liquids and foods, and doing all hygiene at bedside.  \par \par They don't note SOB and patient denies.  He stopped riluzole before last visit.

## 2021-09-30 NOTE — PHYSICAL EXAM
[Person] : oriented to person [Place] : oriented to place [Time] : oriented to time [Short Term Intact] : short term memory intact [Remote Intact] : remote memory intact [Registration Intact] : recent registration memory intact [Concentration Intact] : normal concentrating ability [Visual Intact] : visual attention was ~T not ~L decreased [Naming Objects] : no difficulty naming common objects [Repeating Phrases] : no difficulty repeating a phrase [Writing A Sentence] : no difficulty writing a sentence [Fluency] : fluency intact [Comprehension] : comprehension intact [Reading] : reading intact [Past History] : adequate knowledge of personal past history [Cranial Nerves Optic (II)] : visual acuity intact bilaterally,  visual fields full to confrontation, pupils equal round and reactive to light [Cranial Nerves Oculomotor (III)] : extraocular motion intact [Cranial Nerves Trigeminal (V)] : facial sensation intact symmetrically [Cranial Nerves Facial (VII)] : face symmetrical [Cranial Nerves Vestibulocochlear (VIII)] : hearing was intact bilaterally [Cranial Nerves Glossopharyngeal (IX)] : tongue and palate midline [Cranial Nerves Accessory (XI - Cranial And Spinal)] : head turning and shoulder shrug symmetric [Motor Handedness Right-Handed] : the patient is right hand dominant [5] : wrist extension 5/5 [Hand Weakness Right] : the hand  was weak [Hand Weakness Left] : the hand  was weak [3] : hip extension 3/5 [4] : knee extension 4/5 [-4] : ankle eversion -4/5 [+4] : ankle inversion +4/5 [Sensation Tactile Decrease] : light touch was intact [Vibration Decrease Leg / Foot Toes Both Feet] : decreased at the toes of both feet  [Position Sensation Decrease Leg/Foot At Level Of Toes] : impaired at the toes in both legs [Non-ambulatory] : Non-ambulatory [3+] : Biceps left 3+ [2+] : Brachioradialis left 2+ [1+] : Ankle jerk left 1+ [Past-pointing] : there was no past-pointing [Tremor] : no tremor present [Plantar Reflex Right Only] : normal on the right [Plantar Reflex Left Only] : normal on the left [FreeTextEntry5] : tongue fascics with lateral scalloped atrophy and slow movements.  Fundi normal [FreeTextEntry6] : increased tone all extrems MAS 3/4, RLE knee flexion contracture to 130 degrees. ALS-FRS  20/48; Fascics noted chest and arms [FreeTextEntry9] : bilateral brisk pectoral jerks

## 2021-09-30 NOTE — ASSESSMENT
[FreeTextEntry1] : Patient has decreased motor function in 3 months - and has fall of ALS-FRS by 5 points.  He remains a significant asp PNA risk and again asserts his wish not to have NIV, trach or PEG feeds.  \par \par f/u with ALS clinic in Feb

## 2022-01-01 ENCOUNTER — APPOINTMENT (OUTPATIENT)
Dept: NEUROLOGY | Facility: CLINIC | Age: 67
End: 2022-01-01
Payer: MEDICARE

## 2022-01-01 ENCOUNTER — RX CHANGE (OUTPATIENT)
Age: 67
End: 2022-01-01

## 2022-01-01 ENCOUNTER — APPOINTMENT (OUTPATIENT)
Dept: NEUROLOGY | Facility: CLINIC | Age: 67
End: 2022-01-01

## 2022-01-01 ENCOUNTER — TRANSCRIPTION ENCOUNTER (OUTPATIENT)
Age: 67
End: 2022-01-01

## 2022-01-01 ENCOUNTER — NON-APPOINTMENT (OUTPATIENT)
Age: 67
End: 2022-01-01

## 2022-01-01 ENCOUNTER — APPOINTMENT (OUTPATIENT)
Dept: OTOLARYNGOLOGY | Facility: CLINIC | Age: 67
End: 2022-01-01

## 2022-01-01 VITALS
WEIGHT: 130 LBS | HEART RATE: 54 BPM | HEIGHT: 66 IN | BODY MASS INDEX: 20.89 KG/M2 | DIASTOLIC BLOOD PRESSURE: 68 MMHG | SYSTOLIC BLOOD PRESSURE: 140 MMHG

## 2022-01-01 VITALS
WEIGHT: 130 LBS | DIASTOLIC BLOOD PRESSURE: 70 MMHG | HEART RATE: 56 BPM | BODY MASS INDEX: 20.89 KG/M2 | SYSTOLIC BLOOD PRESSURE: 136 MMHG | HEIGHT: 66 IN

## 2022-01-01 VITALS — SYSTOLIC BLOOD PRESSURE: 140 MMHG | DIASTOLIC BLOOD PRESSURE: 77 MMHG | HEART RATE: 60 BPM

## 2022-01-01 DIAGNOSIS — F02.81 DEMENTIA IN OTHER DISEASES CLASSIFIED ELSEWHERE WITH BEHAVIORAL DISTURBANCE: ICD-10-CM

## 2022-01-01 DIAGNOSIS — M62.838 OTHER MUSCLE SPASM: ICD-10-CM

## 2022-01-01 PROCEDURE — 92610 EVALUATE SWALLOWING FUNCTION: CPT | Mod: GN

## 2022-01-01 PROCEDURE — 97162 PT EVAL MOD COMPLEX 30 MIN: CPT | Mod: GP,GO

## 2022-01-01 PROCEDURE — 99215 OFFICE O/P EST HI 40 MIN: CPT | Mod: 25

## 2022-01-01 PROCEDURE — 92610 EVALUATE SWALLOWING FUNCTION: CPT | Mod: GP,GO

## 2022-01-01 PROCEDURE — 99215 OFFICE O/P EST HI 40 MIN: CPT

## 2022-01-01 PROCEDURE — 97165 OT EVAL LOW COMPLEX 30 MIN: CPT | Mod: GO,GP

## 2022-01-01 PROCEDURE — 97161 PT EVAL LOW COMPLEX 20 MIN: CPT | Mod: GO,GP

## 2022-01-01 PROCEDURE — 97165 OT EVAL LOW COMPLEX 30 MIN: CPT | Mod: GO

## 2022-01-01 PROCEDURE — 92610 EVALUATE SWALLOWING FUNCTION: CPT

## 2022-01-01 RX ORDER — BACLOFEN 10 MG/1
10 TABLET ORAL
Qty: 60 | Refills: 0 | Status: DISCONTINUED | COMMUNITY
Start: 2022-01-01 | End: 2022-01-01

## 2022-01-01 RX ORDER — ONABOTULINUMTOXINA 200 [USP'U]/1
200 INJECTION, POWDER, LYOPHILIZED, FOR SOLUTION INTRADERMAL; INTRAMUSCULAR
Qty: 400 | Refills: 3 | Status: ACTIVE | COMMUNITY
Start: 2022-01-01 | End: 1900-01-01

## 2022-01-01 RX ORDER — BACLOFEN 10 MG/1
10 TABLET ORAL
Qty: 180 | Refills: 1 | Status: ACTIVE | COMMUNITY
Start: 2022-01-01 | End: 1900-01-01

## 2022-01-06 NOTE — ED BEHAVIORAL HEALTH ASSESSMENT NOTE - NS ED BHA MED ROS MUSCULOSKELETAL
2022  [facility] BREAST [facility] SURGERY INFORMATION  Patient: Alyssa Delatorre  : 1961  Surgeon: Corine Stanley MD   Location of Procedure:   [place] [address]     Procedure & Date: RF Tag Localization on 1/10/2022  Arrive at 12:30 pm procedure at 1:00 pm  [facility] Outpatient Radiology    Covid 19 Test: 1/10/2022 at 2:50 pm  [facility] Pre Admission Test Center  (Self Isolate After Test Until Surgery)      Surgery Date and Arrival Time:  2022 at 6:30 am  3rd Floor Hospital Same Day Surgery    Surgery Time: 8:30 am    Admission Type: Day Surgery    Anesthesia Type: Monitored Anesthesia Care    Pre-operative history and physical appointment completed:  Yes, completed on 2022 with Dr Stanley  You also may need to complete both Lab (Blood Draw) and EKG prior to surgery     Postoperative appointment date & time: 2022 at 11:00 am  [facility] [address] Joselyn Mayo NP    Nothing to eat or drink after midnight prior to surgery unless otherwise indicated.     Please remember to discontinue any of the following medications prior to surgery:  Aspirin 7 days prior to surgery  Vitamin E, Fish Oil 7 days prior to surgery  Any other blood thinning medications in the time frame that the ordering physician requested        Instructions for THE DAY OF SURGERY:    Arrive promptly at the time given to you by the pre-op nurse.    Follow the instructions given to you about eating and drinking. This is important for your safety.    Wear low-heeled shoes and loose, comfortable clothing. Sleeves, legs and waistbands should be loose enough to fit over bandages.     All jewelry and body piercing must be removed prior to surgery. Please leave valuables including jewelry at home or with a family member.    Bring your ’s license or photo identification card and  health insurance cards, as well as any co-pay and/or deductible to be paid at registration.    Leave valuables at home or with your friends/family.    We will be contacting your insurance company for any pre-authorization that may be needed prior to your surgery. Anastasiia is our Insurance Authorization Representative she can be contacted at [phone] with any questions.    Please contact our office at [phone] if you have any additional surgical/ procedures questions.    Thank you for choosing [facility] [place].         Yes

## 2022-01-22 NOTE — PROGRESS NOTE ADULT - PROBLEM SELECTOR PLAN 7
English resolved. 2/2 Metabolic encephalopathy  - dc-edwellbutrin   - c/w sertraline   - frequent reorientation resolved. 2/2 Metabolic encephalopathy  - dc-ed wellbutrin   - c/w sertraline   - frequent reorientation

## 2022-02-03 NOTE — ED ADULT TRIAGE NOTE - NS ED NURSE AMBULANCES
[id number]  Last OV    Received request via: Pharmacy    Was the patient seen in the last year in this department? Yes    Does the patient have an active prescription (recently filled or refills available) for medication(s) requested? No  
[facility]

## 2022-02-16 NOTE — ED ADULT NURSE NOTE - NEURO BEHAVIOR
[Perimenopausal] : The patient is perimenopausal [Menarche Age ____] : age at menarche was [unfilled] [Definite ___ (Date)] : the last menstrual period was [unfilled] [Total Preg ___] : G[unfilled] [Live Births ___] : P[unfilled]  [Age At Live Birth ___] : Age at live birth: [unfilled] calm

## 2022-04-11 PROBLEM — F02.81 DEMENTIA ASSOCIATED WITH OTHER UNDERLYING DISEASE WITH BEHAVIORAL DISTURBANCE: Status: ACTIVE | Noted: 2022-01-01

## 2022-04-11 NOTE — ASSESSMENT
[FreeTextEntry1] : Respiratory: O2 sat 98%, CO2 37, hr 58, RR15, VC2.2, cough flow rate 180 (weak), NIF  -50, sleeps with 2 pillows\par Has a cough assist that he uses only once a day. Encouraged  to use a cough assist often. \par Declined thickened liquid; soft and bite size solids; does not want speech therapy;

## 2022-04-11 NOTE — HISTORY OF PRESENT ILLNESS
[FreeTextEntry1] : Wife present reports he coughs and chokes "sometimes"; has not lost weight; needs help for transfers. Wears pull ups. Constipation is a big problem every week. Miralax used once day. Denies skin break down

## 2022-04-11 NOTE — REVIEW OF SYSTEMS
[Hoarseness] : hoarseness [Eye Pain] : no eye pain [Red Eyes] : eyes not red [Heart Rate Is Slow] : the heart rate was not slow [Heart Rate Is Fast] : the heart rate was not fast

## 2022-04-11 NOTE — PHYSICAL EXAM
[Person] : oriented to person [Place] : oriented to place [Time] : oriented to time [Cranial Nerves Optic (II)] : visual acuity intact bilaterally,  visual fields full to confrontation, pupils equal round and reactive to light [Cranial Nerves Oculomotor (III)] : extraocular motion intact [Cranial Nerves Trigeminal (V)] : facial sensation intact symmetrically [Cranial Nerves Glossopharyngeal (IX)] : tongue and palate midline [Cranial Nerves Right Facial: House Grade ___(1-6)] : grade II (slight, 80%) facial nerve function [Cranial Nerves Left Facial: House Grade ___(1-6)] : grade II (slight, 80%) facial nerve function [Cranial Nerves Diminished Gag Reflex Right Only] : gag was diminished on the right [Cranial Nerves Diminished Gag Reflex Left Only] : gag was diminished on the left [CNS Accessory - Diminished Shoulder Elevation (Trapezius)] : weakness of shoulder elevation was present bilaterally [CNS Accessory - Sternocleidomastoid Weakness Bilateral] : sternocleidomastoid weakness was present bilaterally [2] : knee flexion 2/5 [1] : toe flexion 1/5 [Short Term Intact] : short term memory intact [Remote Intact] : remote memory intact [Visual Intact] : visual attention was ~T not ~L decreased [Naming Objects] : no difficulty naming common objects [Writing A Sentence] : no difficulty writing a sentence [Comprehension] : comprehension intact [Fluency] : fluency intact [Reading] : reading intact [Cranial Nerves Vestibulocochlear (VIII)] : hearing was intact bilaterally [Cranial Nerves Hypoglossal (XII)] : there was no tongue deviation with protrusion [Sensation Tactile Decrease] : light touch was intact [Sensation Pain / Temperature Decrease] : pain and temperature was intact [Vibration Decrease Leg / Foot Toes Both Feet] : decreased at the toes of both feet  [Position Sensation Decrease Leg/Foot At Level Of Ankle] : impaired at the ankle in both legs [Position Sensation Decrease Leg/Foot At Level Of Toes] : impaired at the toes in both legs [2+] : Brachioradialis left 2+ [1+] : Ankle jerk left 1+ [Motor Strength Upper Extremities Bilaterally] : strength was normal in both upper extremities [4] : fingers extension 4/5 [3] : fingers adduction 3/5 [Plantar Reflex Right Only] : normal on the right [Plantar Reflex Left Only] : normal on the left [de-identified] : ALS S 26/48 [Sclera] : the sclera and conjunctiva were normal [PERRL With Normal Accommodation] : pupils were equal in size, round, reactive to light, with normal accommodation [Extraocular Movements] : extraocular movements were intact [] : no respiratory distress [Auscultation Breath Sounds / Voice Sounds] : lungs were clear to auscultation bilaterally [Heart Rate And Rhythm] : heart rate was normal and rhythm regular [Heart Sounds] : normal S1 and S2 [Heart Sounds Gallop] : no gallops [Murmurs] : no murmurs [Heart Sounds Pericardial Friction Rub] : no pericardial rub

## 2022-05-09 NOTE — PROGRESS NOTE ADULT - PROBLEM/PLAN-4
Patient Portal,  5/8/2022 12:37 PM CDT    Hello again,    So sorry I’ve been taking up so much of your time!! Based on my symptoms I don’t think it’s. UTI anymore, but I think it may be something like a yeast infection, bacterial vaginosis, or an STI. Do you think this is something that can be pushed off until our appointment on Friday May 20th or do you suggest I go to urgent care Monday?    Thank you!!   
DISPLAY PLAN FREE TEXT

## 2022-05-26 NOTE — ED PROCEDURE NOTE - CPROC ED TOLERANCE1
Patient tolerated procedure well. Preparation Of Recipient Site - Flap: The eschar was removed surgically with sharp dissection to facilitate appropriate wound healing of the following adjacent tissue rearrangement.

## 2022-08-10 PROBLEM — M62.838 MUSCLE SPASM: Status: ACTIVE | Noted: 2022-01-01

## 2022-08-14 NOTE — HISTORY OF PRESENT ILLNESS
[FreeTextEntry1] : Coughs when drinking; does not eat much as a result of problems eating; wife was told about swallowing evaluation and treatment and requests an evaluation but he may not want the barium swallow . Uses cough assist machine at home; nasal regurgitation \par \par Very stiff and difficult to straighten legs and turn his trunk to straighten him or turn him in bed. Pain induced by trying straighten trunk or limbs\par \par Incontinence of bladder and bowels\par \par confused easily lunch in the afternoon thinks it is the evening and repeats questions that have just been answered\par \par Does not speak much; voice is hoarse\par \par Expresses disappointment that he cannot go out\par

## 2022-08-14 NOTE — REVIEW OF SYSTEMS
[Eye Pain] : no eye pain [Red Eyes] : eyes not red [Hoarseness] : hoarseness [Heart Rate Is Slow] : the heart rate was not slow [Heart Rate Is Fast] : the heart rate was not fast

## 2022-08-14 NOTE — PHYSICAL EXAM
[Person] : oriented to person [Place] : oriented to place [Time] : oriented to time [Short Term Intact] : short term memory intact [Remote Intact] : remote memory intact [Visual Intact] : visual attention was ~T not ~L decreased [Naming Objects] : no difficulty naming common objects [Writing A Sentence] : no difficulty writing a sentence [Fluency] : fluency intact [Comprehension] : comprehension intact [Reading] : reading intact [Cranial Nerves Optic (II)] : visual acuity intact bilaterally,  visual fields full to confrontation, pupils equal round and reactive to light [Cranial Nerves Oculomotor (III)] : extraocular motion intact [Cranial Nerves Trigeminal (V)] : facial sensation intact symmetrically [Cranial Nerves Vestibulocochlear (VIII)] : hearing was intact bilaterally [Cranial Nerves Glossopharyngeal (IX)] : tongue and palate midline [Cranial Nerves Hypoglossal (XII)] : there was no tongue deviation with protrusion [Cranial Nerves Right Facial: House Grade ___(1-6)] : grade II (slight, 80%) facial nerve function [Cranial Nerves Left Facial: House Grade ___(1-6)] : grade II (slight, 80%) facial nerve function [Cranial Nerves Diminished Gag Reflex Right Only] : gag was diminished on the right [Cranial Nerves Diminished Gag Reflex Left Only] : gag was diminished on the left [CNS Accessory - Diminished Shoulder Elevation (Trapezius)] : weakness of shoulder elevation was present bilaterally [CNS Accessory - Sternocleidomastoid Weakness Bilateral] : sternocleidomastoid weakness was present bilaterally [Motor Strength Upper Extremities Bilaterally] : strength was normal in both upper extremities [4] : fingers extension 4/5 [3] : hip extension 3/5 [2] : knee flexion 2/5 [1] : toe flexion 1/5 [Sensation Tactile Decrease] : light touch was intact [Sensation Pain / Temperature Decrease] : pain and temperature was intact [Vibration Decrease Leg / Foot Toes Both Feet] : decreased at the toes of both feet  [Position Sensation Decrease Leg/Foot At Level Of Ankle] : impaired at the ankle in both legs [Position Sensation Decrease Leg/Foot At Level Of Toes] : impaired at the toes in both legs [2+] : Brachioradialis left 2+ [1+] : Ankle jerk left 1+ [Plantar Reflex Right Only] : normal on the right [Plantar Reflex Left Only] : normal on the left [de-identified] : ALS S 26/48 [Sclera] : the sclera and conjunctiva were normal [PERRL With Normal Accommodation] : pupils were equal in size, round, reactive to light, with normal accommodation [Extraocular Movements] : extraocular movements were intact [Auscultation Breath Sounds / Voice Sounds] : lungs were clear to auscultation bilaterally [Heart Rate And Rhythm] : heart rate was normal and rhythm regular [Heart Sounds] : normal S1 and S2 [Heart Sounds Gallop] : no gallops [Murmurs] : no murmurs [Heart Sounds Pericardial Friction Rub] : no pericardial rub [Full Pulse] : the pedal pulses are present [Edema] : there was no peripheral edema [Bowel Sounds] : normal bowel sounds [Abdomen Soft] : soft [Abdomen Tenderness] : non-tender [] : no hepato-splenomegaly [Abdomen Mass (___ Cm)] : no abdominal mass palpated [No CVA Tenderness] : no ~M costovertebral angle tenderness [No Spinal Tenderness] : no spinal tenderness

## 2022-08-14 NOTE — DISCUSSION/SUMMARY
[FreeTextEntry1] : Recommend trial of baclofen for spasticity of the lower extremities.  If that fails recommend Botox injection for spasm producing flexion of the hip and knee involuntarily.  Speech and swallowing therapy and after evaluation to have them speak.  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  After speech and swallowing evaluation shall order MRI and PET scan for FTD diagnosis
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
## 2022-10-25 NOTE — ED BEHAVIORAL HEALTH ASSESSMENT NOTE - NS ED BHA ALCOHOL
Spoke with patient today in regard to smoking cessation progress for 3 month telephone follow up, she states not tobacco free. Patient is currently enrolled in the program with scheduled follow up visits. Informed patient of benefit period, future follow ups, and contact information if any further help or support is needed. Will complete smart form for 3 month follow up on Quit attempt #1.       None known

## 2022-11-14 NOTE — ASSESSMENT
[FreeTextEntry1] : CLINICAL DYSPHAGIA EVALUATION\par  \par Wm Bradford is a 67 year-old male who was seen for a speech-language and swallow evaluation at the ALS clinic this afternoon. Patient was alert and cooperative, accompanied by spouse. Patient denies difficulty swallowing at this time. His spouse, however, reports that patient still gulps water resulting in coughing, choking and "his face gets red".  He drinks water primarily through straw. Both patient and spouse deny recent history of pneumonia. \par \par CURRENT NUTRITIONAL INTAKE: Regular / soft solids and thin liquids, as reported by patient and spouse. \par \par CLINICAL FINDINGS\par Oral Peripheral Assessment:\par Structures: WFL\par Symmetry: WFL\par Dentition: WFL\par Soft Palate: Mildly reduced elevation\par Secretions Management: WFL\par Labial Strength, and ROM: Mildly reduced upon pursing and retraction\par Lingual Strength and ROM: Mildly reduced upon protrusion, elevation and lateralization\par Volitional Cough: Weak\par Volitional Swallow: Delayed and effortful\par . \par Voice: Hoarse, breathy, strained. Low vocal volume. Restricted pitch range. Reduced coordination of respiration and phonation. MPT was 4.83 seconds.\par \par Motor Speech: Mild to moderately reduced articulatory precision and periods of rapid speech rate were noted upon limited verbal output. Overall intelligibility was fair to good at the sentence level.\par \par Language/Cognition: Patient was grossly oriented to personal, spatial and temporal concepts. He was able to respond appropriately to simple 'wh-' questions and follow two-step directives. Patient was able to express simple thoughts and opinions. Patient achieved ~5 WPM during a simple verbal fluency task.\par \par Consistencies Administered: \par Solids: __x_ Regular _x_ Soft & Bite-Sized _x_Pureed	\par Liquids: __x_ Thin _x_ Mildly-Thick ___Moderately-Thick\par _x_ via single sips _x_ via teaspoon\par \par Oral Stage: Mr. Galarza demonstrates a mild oral dysphagia characterized by increased mastication time and slow lingual motion resulting in delayed bolus collection, delayed anterior-posterior transfer and presence of trace diffuse lingual residue post swallow for regular solid textures. Trials of pureed, soft & bite sized, mildly-thick and thin liquid textures via small single cup sips yielded functional bolus collection, transport and clearance. Suspect reduced bolus control with subsequent premature spillover for thin liquids.\par \par Pharyngeal Stage: Mr. Galarza demonstrates a mild to moderate pharyngeal dysphagia characterized by a suspected delay in pharyngeal triggering with reduced elevation/excursion of the hyolaryngeal complex upon digital palpation. Inconsistent coughing noted post deglutition for thin liquids, suggestive of laryngeal penetration versus aspiration. There were no overt, clinical signs/symptoms of impaired airway protection noted for pureed, soft & bite-sized, regular solids, mildly-thick liquids. \par \par IMPRESSIONS: \par 1) Oropharyngeal dysphagia\par 2) Dysarthria\par \par PROGNOSIS: Fair \par \par RECOMMENDATIONS:\par 1) Soft & Bite-Sized Solids and Mildly-Thick Liquids via Small, Single Cup Sips\par 2) 1:1 Feeding Supervision\par 3) Dysphagia Guidelines: Upright position (90 degree angle) during/after meals for 30 minutes; Slow rate of intake; Small bites; Small single cup sips; Allow time between intakes; NO straws; Provide small frequent meals (rather than larger meals)\par 4) A Modified Barium Swallow Study is recommended to objectively assess swallow function and r/o aspiration. Patient expresses refusal for additional testing at this time.\par 5) A short course of Speech / Swallow Therapy is recommended to maximize communication and swallow function\par 6) Suggest consult with Registered Dietitian due to increased risk for malnutrition/dehydration in the setting of ALS\par 7) Follow up with referring MD as directed\par \par EDUCATION: The above findings and recommendations were discussed with the patient and his spouse. Good understanding was demonstrated.\par \par Should you have additional questions/concerns, please contact this department at [phone].\par \par Velma Evans M.S., CCC-SLP. \par \par  \par

## 2022-11-14 NOTE — ED PROVIDER NOTE - RATE
TRANSITIONAL CARE MANAGEMENT - HOSPITAL DISCHARGE FOLLOW-UP    Contacted Ms. Malcolm regarding follow-up for dyspnea, chest pain, mitral valve replacement after hospital discharge. She was discharged from the hospital on 11/13/2022. Review of the After Visit Summary from the recent hospitalization indicates that the patient needs to follow up with Akbar Zee MD.    She feels that she is doing poorly at home.   Her diet concern is none. Overall, the patient is eating well.   Ambulation: staying the same  Fever: is not present  Pain: she is experiencing pain in the chest. Patient was discharge from hospital on 11/13/2022 after having mitral valve replacement. Patient states she had mild chest pain in the hospital. Patient states that thesechest pain seem \"deeper\" and last approximately a few minutes. Patient denies SOB or radiation.      Patient states that she does have sufficient family support. She feels that she is able to ask for assistance when needed.       Upon discharge, the patient was to receive specialist follow-up with cardiology. These services have been initiated. However, due to symptom, patient was advised to contact cardiology to discuss new onset chest pain. Otherwise, writer did advise that patient should follow up in ER.     Advance Directive:   The patient has the following documents:  Power of  for Health Care    Advised patient son to follow up on continuing chest pain prior to scheduling TCM. Attempted to transfer patient to cardiology, was only able to leave message on Dr De Los Santos's nurse line. Also routing triage message to Dr. Baires's nurse. Patient son was given cardiology's contact number as well.      [phone]

## 2022-11-14 NOTE — ASSESSMENT
[FreeTextEntry1] : CLINICAL DYSPHAGIA EVALUATION\par  \par Wm Bradford is a 67 year-old male who was seen for a speech-language and swallow evaluation at the ALS clinic this afternoon. Patient was alert and cooperative, accompanied by spouse. Patient denies difficulty swallowing at this time. His spouse, however, reports that patient still gulps water resulting in coughing, choking and "his face gets red".  He drinks water primarily through straw. Both patient and spouse deny recent history of pneumonia. \par \par CURRENT NUTRITIONAL INTAKE: Regular / soft solids and thin liquids, as reported by patient and spouse. \par \par CLINICAL FINDINGS\par Oral Peripheral Assessment:\par Structures: WFL\par Symmetry: WFL\par Dentition: WFL\par Soft Palate: Mildly reduced elevation\par Secretions Management: WFL\par Labial Strength, and ROM: Mildly reduced upon pursing and retraction\par Lingual Strength and ROM: Mildly reduced upon protrusion, elevation and lateralization\par Volitional Cough: Weak\par Volitional Swallow: Delayed and effortful\par . \par Voice: Hoarse, breathy, strained. Low vocal volume. Restricted pitch range. Reduced coordination of respiration and phonation. MPT was 4.83 seconds.\par \par Motor Speech: Mild to moderately reduced articulatory precision and periods of rapid speech rate were noted upon limited verbal output. Overall intelligibility was fair to good at the sentence level.\par \par Language/Cognition: Patient was grossly oriented to personal, spatial and temporal concepts. He was able to respond appropriately to simple 'wh-' questions and follow two-step directives. Patient was able to express simple thoughts and opinions. Patient achieved ~5 WPM during a simple verbal fluency task.\par \par Consistencies Administered: \par Solids: __x_ Regular _x_ Soft & Bite-Sized _x_Pureed	\par Liquids: __x_ Thin _x_ Mildly-Thick ___Moderately-Thick\par _x_ via single sips _x_ via teaspoon\par \par Oral Stage: Mr. Galarza demonstrates a mild oral dysphagia characterized by increased mastication time and slow lingual motion resulting in delayed bolus collection, delayed anterior-posterior transfer and presence of trace diffuse lingual residue post swallow for regular solid textures. Trials of pureed, soft & bite sized, mildly-thick and thin liquid textures via small single cup sips yielded functional bolus collection, transport and clearance. Suspect reduced bolus control with subsequent premature spillover for thin liquids.\par \par Pharyngeal Stage: Mr. Galarza demonstrates a mild to moderate pharyngeal dysphagia characterized by a suspected delay in pharyngeal triggering with reduced elevation/excursion of the hyolaryngeal complex upon digital palpation. Inconsistent coughing noted post deglutition for thin liquids, suggestive of laryngeal penetration versus aspiration. There were no overt, clinical signs/symptoms of impaired airway protection noted for pureed, soft & bite-sized, regular solids, mildly-thick liquids. \par \par IMPRESSIONS: \par 1) Oropharyngeal dysphagia\par 2) Dysarthria\par \par PROGNOSIS: Fair \par \par RECOMMENDATIONS:\par 1) Soft & Bite-Sized Solids and Mildly-Thick Liquids via Small, Single Cup Sips\par 2) 1:1 Feeding Supervision\par 3) Dysphagia Guidelines: Upright position (90 degree angle) during/after meals for 30 minutes; Slow rate of intake; Small bites; Small single cup sips; Allow time between intakes; NO straws; Provide small frequent meals (rather than larger meals)\par 4) A Modified Barium Swallow Study is recommended to objectively assess swallow function and r/o aspiration. Patient expresses refusal for additional testing at this time.\par 5) A short course of Speech / Swallow Therapy is recommended to maximize communication and swallow function\par 6) Suggest consult with Registered Dietitian due to increased risk for malnutrition/dehydration in the setting of ALS\par 7) Follow up with referring MD as directed\par \par EDUCATION: The above findings and recommendations were discussed with the patient and his spouse. Good understanding was demonstrated.\par \par Should you have additional questions/concerns, please contact this department at [phone].\par \par Velma Evans M.S., CCC-SLP. \par \par  \par  Island Pedicle Flap With Canthal Suspension Text: The defect edges were debeveled with a #15 scalpel blade.  Given the location of the defect, shape of the defect and the proximity to free margins an island pedicle advancement flap was deemed most appropriate.  Using a sterile surgical marker, an appropriate advancement flap was drawn incorporating the defect, outlining the appropriate donor tissue and placing the expected incisions within the relaxed skin tension lines where possible. The area thus outlined was incised deep to adipose tissue with a #15 scalpel blade.  The skin margins were undermined to an appropriate distance in all directions around the primary defect and laterally outward around the island pedicle utilizing iris scissors.  There was minimal undermining beneath the pedicle flap. A suspension suture was placed in the canthal tendon to prevent tension and prevent ectropion.

## 2022-12-02 NOTE — DISCUSSION/SUMMARY
[FreeTextEntry1] : Amyotrophic lateral sclerosis (G 12.21) (335.20)\par ALS FRS 20\par Evaluated by me today in multidisciplinary ALS clinic; required evaluations today by PT/OT/speech and swallowing therapists.  Social work needs addressed and goals of care reinforced.  End-of-life care including healthcare proxy and patient wishes were discussed.\par \par Nutritional/dietary needs discussed and addressed.\par \par Pulmonary function was assessed by respiratory therapy and spirometry.  Patient's respiratory function is: FVC 30, he declines a machine, O2Sat 96% NIF-45, 13/min, CO2 is 32, cough flow 120. Sleeps in a hospital bed with two pillows. He has a cough assist that he does notuse more than once a day.\par  \par Patient is being seen for a face to face visit for a manual tilt-in-space wheelchair for use in the home.  Due to patient’s ALS diagnosis, patient cannot walk in his/her home using any ambulation device.  He/she requires a manual tilt-in-space wheelchair so they can perform their mobility related activities of daily living (MRADL), like getting to the bathroom for hygiene, the kitchen for eating and the bedroom for resting.  They cannot tolerate upright sitting due to their postural concerns, trunk weakness and decreased strength.  Patient requires extensive seating and postural concerns as a result of their patient as they are unable to relieve pressure conventionally.  This is the only type of wheelchair for their physical and environmental needs.  \par Saliva management needs assessed \par \par  PT/OT recommends: home PT; manual tilt  wheelchair and a hospital bed.\par \par  Speech swallow therapy recommends: mildly thick liquids and soft and bite-sized ; modified barium swallow\par Social work noted concern about cognitive insufficiency; he has excuses. \par  Continue riluzole 50 mg twice daily \par \par  Refill \par \par  Follow-up in 6 months at ALS clinic \par \par

## 2022-12-02 NOTE — PHYSICAL EXAM
[Person] : oriented to person [Place] : oriented to place [Time] : oriented to time [Short Term Intact] : short term memory intact [Remote Intact] : remote memory intact [Visual Intact] : visual attention was ~T not ~L decreased [Naming Objects] : no difficulty naming common objects [Writing A Sentence] : no difficulty writing a sentence [Fluency] : fluency intact [Comprehension] : comprehension intact [Reading] : reading intact [Cranial Nerves Optic (II)] : visual acuity intact bilaterally,  visual fields full to confrontation, pupils equal round and reactive to light [Cranial Nerves Oculomotor (III)] : extraocular motion intact [Cranial Nerves Trigeminal (V)] : facial sensation intact symmetrically [Cranial Nerves Vestibulocochlear (VIII)] : hearing was intact bilaterally [Cranial Nerves Glossopharyngeal (IX)] : tongue and palate midline [Cranial Nerves Hypoglossal (XII)] : there was no tongue deviation with protrusion [Cranial Nerves Right Facial: House Grade ___(1-6)] : grade II (slight, 80%) facial nerve function [Cranial Nerves Left Facial: House Grade ___(1-6)] : grade II (slight, 80%) facial nerve function [Cranial Nerves Diminished Gag Reflex Right Only] : gag was diminished on the right [Cranial Nerves Diminished Gag Reflex Left Only] : gag was diminished on the left [CNS Accessory - Diminished Shoulder Elevation (Trapezius)] : weakness of shoulder elevation was present bilaterally [CNS Accessory - Sternocleidomastoid Weakness Bilateral] : sternocleidomastoid weakness was present bilaterally [Motor Strength Upper Extremities Bilaterally] : strength was normal in both upper extremities [4] : fingers extension 4/5 [3] : hip extension 3/5 [2] : knee flexion 2/5 [1] : toe flexion 1/5 [Sensation Tactile Decrease] : light touch was intact [Sensation Pain / Temperature Decrease] : pain and temperature was intact [Vibration Decrease Leg / Foot Toes Both Feet] : decreased at the toes of both feet  [Position Sensation Decrease Leg/Foot At Level Of Ankle] : impaired at the ankle in both legs [Position Sensation Decrease Leg/Foot At Level Of Toes] : impaired at the toes in both legs [2+] : Brachioradialis left 2+ [1+] : Ankle jerk left 1+ [Plantar Reflex Right Only] : normal on the right [Plantar Reflex Left Only] : normal on the left [de-identified] : ALS S 26/48 [Sclera] : the sclera and conjunctiva were normal [PERRL With Normal Accommodation] : pupils were equal in size, round, reactive to light, with normal accommodation [Extraocular Movements] : extraocular movements were intact

## 2022-12-02 NOTE — PHYSICAL EXAM
[Person] : oriented to person [Place] : oriented to place [Time] : oriented to time [Short Term Intact] : short term memory intact [Remote Intact] : remote memory intact [Visual Intact] : visual attention was ~T not ~L decreased [Naming Objects] : no difficulty naming common objects [Writing A Sentence] : no difficulty writing a sentence [Fluency] : fluency intact [Comprehension] : comprehension intact [Reading] : reading intact [Cranial Nerves Optic (II)] : visual acuity intact bilaterally,  visual fields full to confrontation, pupils equal round and reactive to light [Cranial Nerves Oculomotor (III)] : extraocular motion intact [Cranial Nerves Trigeminal (V)] : facial sensation intact symmetrically [Cranial Nerves Vestibulocochlear (VIII)] : hearing was intact bilaterally [Cranial Nerves Glossopharyngeal (IX)] : tongue and palate midline [Cranial Nerves Hypoglossal (XII)] : there was no tongue deviation with protrusion [Cranial Nerves Right Facial: House Grade ___(1-6)] : grade II (slight, 80%) facial nerve function [Cranial Nerves Left Facial: House Grade ___(1-6)] : grade II (slight, 80%) facial nerve function [Cranial Nerves Diminished Gag Reflex Right Only] : gag was diminished on the right [Cranial Nerves Diminished Gag Reflex Left Only] : gag was diminished on the left [CNS Accessory - Diminished Shoulder Elevation (Trapezius)] : weakness of shoulder elevation was present bilaterally [CNS Accessory - Sternocleidomastoid Weakness Bilateral] : sternocleidomastoid weakness was present bilaterally [Motor Strength Upper Extremities Bilaterally] : strength was normal in both upper extremities [4] : fingers extension 4/5 [3] : hip extension 3/5 [2] : knee flexion 2/5 [1] : toe flexion 1/5 [Sensation Tactile Decrease] : light touch was intact [Sensation Pain / Temperature Decrease] : pain and temperature was intact [Vibration Decrease Leg / Foot Toes Both Feet] : decreased at the toes of both feet  [Position Sensation Decrease Leg/Foot At Level Of Ankle] : impaired at the ankle in both legs [Position Sensation Decrease Leg/Foot At Level Of Toes] : impaired at the toes in both legs [2+] : Brachioradialis left 2+ [1+] : Ankle jerk left 1+ [Plantar Reflex Right Only] : normal on the right [Plantar Reflex Left Only] : normal on the left [de-identified] : ALS S 26/48 [Sclera] : the sclera and conjunctiva were normal [PERRL With Normal Accommodation] : pupils were equal in size, round, reactive to light, with normal accommodation [Extraocular Movements] : extraocular movements were intact

## 2023-01-01 ENCOUNTER — INPATIENT (INPATIENT)
Facility: HOSPITAL | Age: 68
LOS: 0 days | DRG: 177 | End: 2023-02-03
Attending: INTERNAL MEDICINE | Admitting: INTERNAL MEDICINE
Payer: MEDICARE

## 2023-01-01 ENCOUNTER — APPOINTMENT (OUTPATIENT)
Dept: NEUROLOGY | Facility: CLINIC | Age: 68
End: 2023-01-01

## 2023-01-01 VITALS
RESPIRATION RATE: 27 BRPM | SYSTOLIC BLOOD PRESSURE: 110 MMHG | HEART RATE: 73 BPM | OXYGEN SATURATION: 93 % | DIASTOLIC BLOOD PRESSURE: 48 MMHG

## 2023-01-01 VITALS
OXYGEN SATURATION: 80 % | RESPIRATION RATE: 24 BRPM | HEART RATE: 82 BPM | HEIGHT: 67 IN | WEIGHT: 125 LBS | TEMPERATURE: 98 F | SYSTOLIC BLOOD PRESSURE: 186 MMHG | DIASTOLIC BLOOD PRESSURE: 89 MMHG

## 2023-01-01 DIAGNOSIS — J90 PLEURAL EFFUSION, NOT ELSEWHERE CLASSIFIED: ICD-10-CM

## 2023-01-01 DIAGNOSIS — Z98.89 OTHER SPECIFIED POSTPROCEDURAL STATES: Chronic | ICD-10-CM

## 2023-01-01 DIAGNOSIS — Z90.89 ACQUIRED ABSENCE OF OTHER ORGANS: Chronic | ICD-10-CM

## 2023-01-01 LAB
A1C WITH ESTIMATED AVERAGE GLUCOSE RESULT: 5.3 % — SIGNIFICANT CHANGE UP (ref 4–5.6)
ALBUMIN FLD-MCNC: 1.8 G/DL — SIGNIFICANT CHANGE UP
ALBUMIN SERPL ELPH-MCNC: 2.9 G/DL — LOW (ref 3.3–5)
ALP SERPL-CCNC: 74 U/L — SIGNIFICANT CHANGE UP (ref 30–120)
ALT FLD-CCNC: <10 U/L DA — LOW (ref 10–60)
ANION GAP SERPL CALC-SCNC: 11 MMOL/L — SIGNIFICANT CHANGE UP (ref 5–17)
ANION GAP SERPL CALC-SCNC: 9 MMOL/L — SIGNIFICANT CHANGE UP (ref 5–17)
APPEARANCE UR: CLEAR — SIGNIFICANT CHANGE UP
APTT BLD: 34.9 SEC — SIGNIFICANT CHANGE UP (ref 27.5–35.5)
AST SERPL-CCNC: 22 U/L — SIGNIFICANT CHANGE UP (ref 10–40)
B PERT IGG+IGM PNL SER: CLEAR — SIGNIFICANT CHANGE UP
BASE EXCESS BLDV CALC-SCNC: -6.2 MMOL/L — LOW (ref -2–3)
BASOPHILS # BLD AUTO: 0.07 K/UL — SIGNIFICANT CHANGE UP (ref 0–0.2)
BASOPHILS NFR BLD AUTO: 0.6 % — SIGNIFICANT CHANGE UP (ref 0–2)
BILIRUB SERPL-MCNC: 0.4 MG/DL — SIGNIFICANT CHANGE UP (ref 0.2–1.2)
BILIRUB UR-MCNC: NEGATIVE — SIGNIFICANT CHANGE UP
BUN SERPL-MCNC: 30 MG/DL — HIGH (ref 7–23)
BUN SERPL-MCNC: 35 MG/DL — HIGH (ref 7–23)
CALCIUM SERPL-MCNC: 8.2 MG/DL — LOW (ref 8.4–10.5)
CALCIUM SERPL-MCNC: 8.7 MG/DL — SIGNIFICANT CHANGE UP (ref 8.4–10.5)
CHLORIDE SERPL-SCNC: 103 MMOL/L — SIGNIFICANT CHANGE UP (ref 96–108)
CHLORIDE SERPL-SCNC: 108 MMOL/L — SIGNIFICANT CHANGE UP (ref 96–108)
CK SERPL-CCNC: 157 U/L — SIGNIFICANT CHANGE UP (ref 39–308)
CO2 SERPL-SCNC: 22 MMOL/L — SIGNIFICANT CHANGE UP (ref 22–31)
CO2 SERPL-SCNC: 22 MMOL/L — SIGNIFICANT CHANGE UP (ref 22–31)
COLOR FLD: YELLOW — SIGNIFICANT CHANGE UP
COLOR SPEC: YELLOW — SIGNIFICANT CHANGE UP
CREAT SERPL-MCNC: 1.61 MG/DL — HIGH (ref 0.5–1.3)
CREAT SERPL-MCNC: 1.91 MG/DL — HIGH (ref 0.5–1.3)
CRP SERPL-MCNC: 48 MG/L — HIGH
CULTURE RESULTS: NO GROWTH — SIGNIFICANT CHANGE UP
DIFF PNL FLD: ABNORMAL
EGFR: 37 ML/MIN/1.73M2 — LOW
EGFR: 46 ML/MIN/1.73M2 — LOW
EOSINOPHIL # BLD AUTO: 0.12 K/UL — SIGNIFICANT CHANGE UP (ref 0–0.5)
EOSINOPHIL NFR BLD AUTO: 1 % — SIGNIFICANT CHANGE UP (ref 0–6)
ESTIMATED AVERAGE GLUCOSE: 105 MG/DL — SIGNIFICANT CHANGE UP (ref 68–114)
FLUAV AG NPH QL: SIGNIFICANT CHANGE UP
FLUBV AG NPH QL: SIGNIFICANT CHANGE UP
FLUID INTAKE SUBSTANCE CLASS: SIGNIFICANT CHANGE UP
GLUCOSE BLDC GLUCOMTR-MCNC: 107 MG/DL — HIGH (ref 70–99)
GLUCOSE BLDC GLUCOMTR-MCNC: 112 MG/DL — HIGH (ref 70–99)
GLUCOSE BLDC GLUCOMTR-MCNC: 113 MG/DL — HIGH (ref 70–99)
GLUCOSE BLDC GLUCOMTR-MCNC: 132 MG/DL — HIGH (ref 70–99)
GLUCOSE FLD-MCNC: 117 MG/DL — SIGNIFICANT CHANGE UP
GLUCOSE SERPL-MCNC: 101 MG/DL — HIGH (ref 70–99)
GLUCOSE SERPL-MCNC: 115 MG/DL — HIGH (ref 70–99)
GLUCOSE UR QL: 50 MG/DL
GRAM STN FLD: SIGNIFICANT CHANGE UP
HCO3 BLDV-SCNC: 20 MMOL/L — LOW (ref 22–29)
HCT VFR BLD CALC: 31.2 % — LOW (ref 39–50)
HCT VFR BLD CALC: 35.6 % — LOW (ref 39–50)
HGB BLD-MCNC: 10.8 G/DL — LOW (ref 13–17)
HGB BLD-MCNC: 9.7 G/DL — LOW (ref 13–17)
IMM GRANULOCYTES NFR BLD AUTO: 0.3 % — SIGNIFICANT CHANGE UP (ref 0–0.9)
INR BLD: 1.09 RATIO — SIGNIFICANT CHANGE UP (ref 0.88–1.16)
KETONES UR-MCNC: NEGATIVE — SIGNIFICANT CHANGE UP
LACTATE SERPL-SCNC: 0.6 MMOL/L — LOW (ref 0.7–2)
LDH SERPL L TO P-CCNC: 75 U/L — SIGNIFICANT CHANGE UP
LEUKOCYTE ESTERASE UR-ACNC: ABNORMAL
LYMPHOCYTES # BLD AUTO: 1.42 K/UL — SIGNIFICANT CHANGE UP (ref 1–3.3)
LYMPHOCYTES # BLD AUTO: 12.4 % — LOW (ref 13–44)
LYMPHOCYTES # FLD: 53 % — SIGNIFICANT CHANGE UP
MAGNESIUM SERPL-MCNC: 2.2 MG/DL — SIGNIFICANT CHANGE UP (ref 1.6–2.6)
MCHC RBC-ENTMCNC: 26.9 PG — LOW (ref 27–34)
MCHC RBC-ENTMCNC: 27.1 PG — SIGNIFICANT CHANGE UP (ref 27–34)
MCHC RBC-ENTMCNC: 30.3 GM/DL — LOW (ref 32–36)
MCHC RBC-ENTMCNC: 31.1 GM/DL — LOW (ref 32–36)
MCV RBC AUTO: 86.4 FL — SIGNIFICANT CHANGE UP (ref 80–100)
MCV RBC AUTO: 89.2 FL — SIGNIFICANT CHANGE UP (ref 80–100)
MONOCYTES # BLD AUTO: 0.88 K/UL — SIGNIFICANT CHANGE UP (ref 0–0.9)
MONOCYTES NFR BLD AUTO: 7.7 % — SIGNIFICANT CHANGE UP (ref 2–14)
MONOS+MACROS # FLD: 44 % — SIGNIFICANT CHANGE UP
NEUTROPHILS # BLD AUTO: 8.91 K/UL — HIGH (ref 1.8–7.4)
NEUTROPHILS NFR BLD AUTO: 78 % — HIGH (ref 43–77)
NEUTROPHILS-BODY FLUID: 3 % — SIGNIFICANT CHANGE UP
NITRITE UR-MCNC: NEGATIVE — SIGNIFICANT CHANGE UP
NRBC # BLD: 0 /100 WBCS — SIGNIFICANT CHANGE UP (ref 0–0)
NRBC # BLD: 0 /100 WBCS — SIGNIFICANT CHANGE UP (ref 0–0)
NT-PROBNP SERPL-SCNC: HIGH PG/ML (ref 0–125)
PCO2 BLDV: 41 MMHG — LOW (ref 42–55)
PH BLDV: 7.3 — LOW (ref 7.32–7.43)
PH FLD: 7.7 — SIGNIFICANT CHANGE UP
PH UR: 5 — SIGNIFICANT CHANGE UP (ref 5–8)
PLATELET # BLD AUTO: 302 K/UL — SIGNIFICANT CHANGE UP (ref 150–400)
PLATELET # BLD AUTO: 345 K/UL — SIGNIFICANT CHANGE UP (ref 150–400)
PO2 BLDV: 54 MMHG — HIGH (ref 25–45)
POTASSIUM SERPL-MCNC: 3.5 MMOL/L — SIGNIFICANT CHANGE UP (ref 3.5–5.3)
POTASSIUM SERPL-MCNC: 3.6 MMOL/L — SIGNIFICANT CHANGE UP (ref 3.5–5.3)
POTASSIUM SERPL-SCNC: 3.5 MMOL/L — SIGNIFICANT CHANGE UP (ref 3.5–5.3)
POTASSIUM SERPL-SCNC: 3.6 MMOL/L — SIGNIFICANT CHANGE UP (ref 3.5–5.3)
PROCALCITONIN SERPL-MCNC: 0.35 NG/ML — HIGH (ref 0.02–0.1)
PROCALCITONIN SERPL-MCNC: 0.38 NG/ML — HIGH (ref 0.02–0.1)
PROT FLD-MCNC: 2.9 G/DL — SIGNIFICANT CHANGE UP
PROT SERPL-MCNC: 6.9 G/DL — SIGNIFICANT CHANGE UP (ref 6–8.3)
PROT UR-MCNC: 500 MG/DL
PROTHROM AB SERPL-ACNC: 12.6 SEC — SIGNIFICANT CHANGE UP (ref 10.5–13.4)
RBC # BLD: 3.61 M/UL — LOW (ref 4.2–5.8)
RBC # BLD: 3.99 M/UL — LOW (ref 4.2–5.8)
RBC # FLD: 14.6 % — HIGH (ref 10.3–14.5)
RBC # FLD: 14.6 % — HIGH (ref 10.3–14.5)
RCV VOL RI: < 2000 /UL — SIGNIFICANT CHANGE UP (ref 0–0)
RSV RNA NPH QL NAA+NON-PROBE: SIGNIFICANT CHANGE UP
SAO2 % BLDV: 87.2 % — SIGNIFICANT CHANGE UP (ref 67–88)
SARS-COV-2 RNA SPEC QL NAA+PROBE: SIGNIFICANT CHANGE UP
SODIUM SERPL-SCNC: 136 MMOL/L — SIGNIFICANT CHANGE UP (ref 135–145)
SODIUM SERPL-SCNC: 139 MMOL/L — SIGNIFICANT CHANGE UP (ref 135–145)
SP GR SPEC: 1.02 — SIGNIFICANT CHANGE UP (ref 1.01–1.02)
SPECIMEN SOURCE: SIGNIFICANT CHANGE UP
SPECIMEN SOURCE: SIGNIFICANT CHANGE UP
TOTAL NUCLEATED CELL COUNT, BODY FLUID: 129 /UL — SIGNIFICANT CHANGE UP
TROPONIN I, HIGH SENSITIVITY RESULT: 34.1 NG/L — SIGNIFICANT CHANGE UP
TSH SERPL-MCNC: 4.17 UIU/ML — SIGNIFICANT CHANGE UP (ref 0.27–4.2)
TUBE TYPE: SIGNIFICANT CHANGE UP
UROBILINOGEN FLD QL: NEGATIVE MG/DL — SIGNIFICANT CHANGE UP
WBC # BLD: 11.44 K/UL — HIGH (ref 3.8–10.5)
WBC # BLD: 35.78 K/UL — HIGH (ref 3.8–10.5)
WBC # FLD AUTO: 11.44 K/UL — HIGH (ref 3.8–10.5)
WBC # FLD AUTO: 35.78 K/UL — HIGH (ref 3.8–10.5)

## 2023-01-01 PROCEDURE — 83986 ASSAY PH BODY FLUID NOS: CPT

## 2023-01-01 PROCEDURE — 71045 X-RAY EXAM CHEST 1 VIEW: CPT | Mod: 26

## 2023-01-01 PROCEDURE — 89051 BODY FLUID CELL COUNT: CPT

## 2023-01-01 PROCEDURE — 36415 COLL VENOUS BLD VENIPUNCTURE: CPT

## 2023-01-01 PROCEDURE — 85730 THROMBOPLASTIN TIME PARTIAL: CPT

## 2023-01-01 PROCEDURE — 93005 ELECTROCARDIOGRAM TRACING: CPT

## 2023-01-01 PROCEDURE — 87637 SARSCOV2&INF A&B&RSV AMP PRB: CPT

## 2023-01-01 PROCEDURE — 84157 ASSAY OF PROTEIN OTHER: CPT

## 2023-01-01 PROCEDURE — 88305 TISSUE EXAM BY PATHOLOGIST: CPT | Mod: 26

## 2023-01-01 PROCEDURE — 83036 HEMOGLOBIN GLYCOSYLATED A1C: CPT

## 2023-01-01 PROCEDURE — 82945 GLUCOSE OTHER FLUID: CPT

## 2023-01-01 PROCEDURE — 99285 EMERGENCY DEPT VISIT HI MDM: CPT

## 2023-01-01 PROCEDURE — 84145 PROCALCITONIN (PCT): CPT

## 2023-01-01 PROCEDURE — 74176 CT ABD & PELVIS W/O CONTRAST: CPT | Mod: MA

## 2023-01-01 PROCEDURE — 94640 AIRWAY INHALATION TREATMENT: CPT

## 2023-01-01 PROCEDURE — 88108 CYTOPATH CONCENTRATE TECH: CPT

## 2023-01-01 PROCEDURE — 80053 COMPREHEN METABOLIC PANEL: CPT

## 2023-01-01 PROCEDURE — 84484 ASSAY OF TROPONIN QUANT: CPT

## 2023-01-01 PROCEDURE — 83735 ASSAY OF MAGNESIUM: CPT

## 2023-01-01 PROCEDURE — 82803 BLOOD GASES ANY COMBINATION: CPT

## 2023-01-01 PROCEDURE — 96367 TX/PROPH/DG ADDL SEQ IV INF: CPT

## 2023-01-01 PROCEDURE — 99239 HOSP IP/OBS DSCHRG MGMT >30: CPT

## 2023-01-01 PROCEDURE — 81001 URINALYSIS AUTO W/SCOPE: CPT

## 2023-01-01 PROCEDURE — 83605 ASSAY OF LACTIC ACID: CPT

## 2023-01-01 PROCEDURE — 82962 GLUCOSE BLOOD TEST: CPT

## 2023-01-01 PROCEDURE — 87070 CULTURE OTHR SPECIMN AEROBIC: CPT

## 2023-01-01 PROCEDURE — 85027 COMPLETE CBC AUTOMATED: CPT

## 2023-01-01 PROCEDURE — 83880 ASSAY OF NATRIURETIC PEPTIDE: CPT

## 2023-01-01 PROCEDURE — 88305 TISSUE EXAM BY PATHOLOGIST: CPT

## 2023-01-01 PROCEDURE — 71250 CT THORAX DX C-: CPT | Mod: 26,MA

## 2023-01-01 PROCEDURE — 74176 CT ABD & PELVIS W/O CONTRAST: CPT | Mod: 26,MA

## 2023-01-01 PROCEDURE — 87205 SMEAR GRAM STAIN: CPT

## 2023-01-01 PROCEDURE — 87075 CULTR BACTERIA EXCEPT BLOOD: CPT

## 2023-01-01 PROCEDURE — 87040 BLOOD CULTURE FOR BACTERIA: CPT

## 2023-01-01 PROCEDURE — 86140 C-REACTIVE PROTEIN: CPT

## 2023-01-01 PROCEDURE — 93306 TTE W/DOPPLER COMPLETE: CPT

## 2023-01-01 PROCEDURE — 71045 X-RAY EXAM CHEST 1 VIEW: CPT | Mod: 26,77

## 2023-01-01 PROCEDURE — 87102 FUNGUS ISOLATION CULTURE: CPT

## 2023-01-01 PROCEDURE — 85025 COMPLETE CBC W/AUTO DIFF WBC: CPT

## 2023-01-01 PROCEDURE — 88108 CYTOPATH CONCENTRATE TECH: CPT | Mod: 26

## 2023-01-01 PROCEDURE — 71250 CT THORAX DX C-: CPT | Mod: MA

## 2023-01-01 PROCEDURE — 83615 LACTATE (LD) (LDH) ENZYME: CPT

## 2023-01-01 PROCEDURE — 82042 OTHER SOURCE ALBUMIN QUAN EA: CPT

## 2023-01-01 PROCEDURE — 80048 BASIC METABOLIC PNL TOTAL CA: CPT

## 2023-01-01 PROCEDURE — 82550 ASSAY OF CK (CPK): CPT

## 2023-01-01 PROCEDURE — 71045 X-RAY EXAM CHEST 1 VIEW: CPT

## 2023-01-01 PROCEDURE — 85610 PROTHROMBIN TIME: CPT

## 2023-01-01 PROCEDURE — 71045 X-RAY EXAM CHEST 1 VIEW: CPT | Mod: 26,76

## 2023-01-01 PROCEDURE — 96365 THER/PROPH/DIAG IV INF INIT: CPT

## 2023-01-01 PROCEDURE — 84443 ASSAY THYROID STIM HORMONE: CPT

## 2023-01-01 PROCEDURE — 87086 URINE CULTURE/COLONY COUNT: CPT

## 2023-01-01 RX ORDER — PIPERACILLIN AND TAZOBACTAM 4; .5 G/20ML; G/20ML
3.38 INJECTION, POWDER, LYOPHILIZED, FOR SOLUTION INTRAVENOUS EVERY 8 HOURS
Refills: 0 | Status: DISCONTINUED | OUTPATIENT
Start: 2023-01-01 | End: 2023-01-01

## 2023-01-01 RX ORDER — ATORVASTATIN CALCIUM 80 MG/1
40 TABLET, FILM COATED ORAL AT BEDTIME
Refills: 0 | Status: DISCONTINUED | OUTPATIENT
Start: 2023-01-01 | End: 2023-01-01

## 2023-01-01 RX ORDER — SODIUM CHLORIDE 9 MG/ML
2000 INJECTION INTRAMUSCULAR; INTRAVENOUS; SUBCUTANEOUS ONCE
Refills: 0 | Status: COMPLETED | OUTPATIENT
Start: 2023-01-01 | End: 2023-01-01

## 2023-01-01 RX ORDER — POLYETHYLENE GLYCOL 3350 17 G/17G
17 POWDER, FOR SOLUTION ORAL AT BEDTIME
Refills: 0 | Status: DISCONTINUED | OUTPATIENT
Start: 2023-01-01 | End: 2023-01-01

## 2023-01-01 RX ORDER — PIPERACILLIN AND TAZOBACTAM 4; .5 G/20ML; G/20ML
3.38 INJECTION, POWDER, LYOPHILIZED, FOR SOLUTION INTRAVENOUS ONCE
Refills: 0 | Status: COMPLETED | OUTPATIENT
Start: 2023-01-01 | End: 2023-01-01

## 2023-01-01 RX ORDER — SODIUM CHLORIDE 9 MG/ML
4 INJECTION INTRAMUSCULAR; INTRAVENOUS; SUBCUTANEOUS EVERY 12 HOURS
Refills: 0 | Status: DISCONTINUED | OUTPATIENT
Start: 2023-01-01 | End: 2023-01-01

## 2023-01-01 RX ORDER — POLYETHYLENE GLYCOL 3350 17 G/17G
17 POWDER, FOR SOLUTION ORAL
Qty: 0 | Refills: 0 | DISCHARGE

## 2023-01-01 RX ORDER — DEXTROSE 50 % IN WATER 50 %
25 SYRINGE (ML) INTRAVENOUS ONCE
Refills: 0 | Status: DISCONTINUED | OUTPATIENT
Start: 2023-01-01 | End: 2023-01-01

## 2023-01-01 RX ORDER — ACETAMINOPHEN 500 MG
650 TABLET ORAL EVERY 6 HOURS
Refills: 0 | Status: DISCONTINUED | OUTPATIENT
Start: 2023-01-01 | End: 2023-01-01

## 2023-01-01 RX ORDER — AMLODIPINE BESYLATE 2.5 MG/1
1 TABLET ORAL
Qty: 0 | Refills: 0 | DISCHARGE

## 2023-01-01 RX ORDER — CHOLECALCIFEROL (VITAMIN D3) 125 MCG
1 CAPSULE ORAL
Qty: 0 | Refills: 0 | DISCHARGE

## 2023-01-01 RX ORDER — FUROSEMIDE 40 MG
40 TABLET ORAL ONCE
Refills: 0 | Status: COMPLETED | OUTPATIENT
Start: 2023-01-01 | End: 2023-01-01

## 2023-01-01 RX ORDER — SODIUM CHLORIDE 9 MG/ML
1000 INJECTION, SOLUTION INTRAVENOUS
Refills: 0 | Status: DISCONTINUED | OUTPATIENT
Start: 2023-01-01 | End: 2023-01-01

## 2023-01-01 RX ORDER — VANCOMYCIN HCL 1 G
750 VIAL (EA) INTRAVENOUS ONCE
Refills: 0 | Status: COMPLETED | OUTPATIENT
Start: 2023-01-01 | End: 2023-01-01

## 2023-01-01 RX ORDER — SENNA PLUS 8.6 MG/1
2 TABLET ORAL
Qty: 0 | Refills: 0 | DISCHARGE

## 2023-01-01 RX ORDER — DEXTROSE 50 % IN WATER 50 %
15 SYRINGE (ML) INTRAVENOUS ONCE
Refills: 0 | Status: DISCONTINUED | OUTPATIENT
Start: 2023-01-01 | End: 2023-01-01

## 2023-01-01 RX ORDER — FUROSEMIDE 40 MG
20 TABLET ORAL
Refills: 0 | Status: DISCONTINUED | OUTPATIENT
Start: 2023-01-01 | End: 2023-01-01

## 2023-01-01 RX ORDER — VANCOMYCIN HCL 1 G
750 VIAL (EA) INTRAVENOUS EVERY 24 HOURS
Refills: 0 | Status: DISCONTINUED | OUTPATIENT
Start: 2023-01-01 | End: 2023-01-01

## 2023-01-01 RX ORDER — ALBUTEROL 90 UG/1
2.5 AEROSOL, METERED ORAL EVERY 12 HOURS
Refills: 0 | Status: DISCONTINUED | OUTPATIENT
Start: 2023-01-01 | End: 2023-01-01

## 2023-01-01 RX ORDER — GLUCAGON INJECTION, SOLUTION 0.5 MG/.1ML
1 INJECTION, SOLUTION SUBCUTANEOUS ONCE
Refills: 0 | Status: DISCONTINUED | OUTPATIENT
Start: 2023-01-01 | End: 2023-01-01

## 2023-01-01 RX ORDER — OXYCODONE HYDROCHLORIDE 5 MG/1
5 TABLET ORAL EVERY 8 HOURS
Refills: 0 | Status: DISCONTINUED | OUTPATIENT
Start: 2023-01-01 | End: 2023-01-01

## 2023-01-01 RX ORDER — AMLODIPINE BESYLATE 2.5 MG/1
10 TABLET ORAL DAILY
Refills: 0 | Status: DISCONTINUED | OUTPATIENT
Start: 2023-01-01 | End: 2023-01-01

## 2023-01-01 RX ORDER — ATORVASTATIN CALCIUM 80 MG/1
1 TABLET, FILM COATED ORAL
Qty: 0 | Refills: 0 | DISCHARGE

## 2023-01-01 RX ORDER — ALBUTEROL 90 UG/1
2.5 AEROSOL, METERED ORAL ONCE
Refills: 0 | Status: COMPLETED | OUTPATIENT
Start: 2023-01-01 | End: 2023-01-01

## 2023-01-01 RX ORDER — SERTRALINE 25 MG/1
100 TABLET, FILM COATED ORAL DAILY
Refills: 0 | Status: DISCONTINUED | OUTPATIENT
Start: 2023-01-01 | End: 2023-01-01

## 2023-01-01 RX ORDER — VANCOMYCIN HCL 1 G
VIAL (EA) INTRAVENOUS
Refills: 0 | Status: DISCONTINUED | OUTPATIENT
Start: 2023-01-01 | End: 2023-01-01

## 2023-01-01 RX ORDER — DEXTROSE 50 % IN WATER 50 %
12.5 SYRINGE (ML) INTRAVENOUS ONCE
Refills: 0 | Status: DISCONTINUED | OUTPATIENT
Start: 2023-01-01 | End: 2023-01-01

## 2023-01-01 RX ORDER — INSULIN LISPRO 100/ML
VIAL (ML) SUBCUTANEOUS EVERY 6 HOURS
Refills: 0 | Status: DISCONTINUED | OUTPATIENT
Start: 2023-01-01 | End: 2023-01-01

## 2023-01-01 RX ORDER — LEVOFLOXACIN 5 MG/ML
1 INJECTION, SOLUTION INTRAVENOUS
Qty: 0 | Refills: 0 | DISCHARGE

## 2023-01-01 RX ORDER — HEPARIN SODIUM 5000 [USP'U]/ML
5000 INJECTION INTRAVENOUS; SUBCUTANEOUS EVERY 8 HOURS
Refills: 0 | Status: DISCONTINUED | OUTPATIENT
Start: 2023-01-01 | End: 2023-01-01

## 2023-01-01 RX ADMIN — SODIUM CHLORIDE 2000 MILLILITER(S): 9 INJECTION INTRAMUSCULAR; INTRAVENOUS; SUBCUTANEOUS at 09:40

## 2023-01-01 RX ADMIN — Medication 750 MILLIGRAM(S): at 17:41

## 2023-01-01 RX ADMIN — Medication 20 MILLIGRAM(S): at 08:58

## 2023-01-01 RX ADMIN — PIPERACILLIN AND TAZOBACTAM 3.38 GRAM(S): 4; .5 INJECTION, POWDER, LYOPHILIZED, FOR SOLUTION INTRAVENOUS at 10:16

## 2023-01-01 RX ADMIN — SODIUM CHLORIDE 4 MILLILITER(S): 9 INJECTION INTRAMUSCULAR; INTRAVENOUS; SUBCUTANEOUS at 19:17

## 2023-01-01 RX ADMIN — PIPERACILLIN AND TAZOBACTAM 200 GRAM(S): 4; .5 INJECTION, POWDER, LYOPHILIZED, FOR SOLUTION INTRAVENOUS at 09:43

## 2023-01-01 RX ADMIN — PIPERACILLIN AND TAZOBACTAM 25 GRAM(S): 4; .5 INJECTION, POWDER, LYOPHILIZED, FOR SOLUTION INTRAVENOUS at 08:52

## 2023-01-01 RX ADMIN — Medication 40 MILLIGRAM(S): at 11:52

## 2023-01-01 RX ADMIN — PIPERACILLIN AND TAZOBACTAM 25 GRAM(S): 4; .5 INJECTION, POWDER, LYOPHILIZED, FOR SOLUTION INTRAVENOUS at 00:07

## 2023-01-01 RX ADMIN — SODIUM CHLORIDE 2000 MILLILITER(S): 9 INJECTION INTRAMUSCULAR; INTRAVENOUS; SUBCUTANEOUS at 10:16

## 2023-01-01 RX ADMIN — ALBUTEROL 2.5 MILLIGRAM(S): 90 AEROSOL, METERED ORAL at 07:30

## 2023-01-01 RX ADMIN — PIPERACILLIN AND TAZOBACTAM 25 GRAM(S): 4; .5 INJECTION, POWDER, LYOPHILIZED, FOR SOLUTION INTRAVENOUS at 17:48

## 2023-01-01 RX ADMIN — SODIUM CHLORIDE 4 MILLILITER(S): 9 INJECTION INTRAMUSCULAR; INTRAVENOUS; SUBCUTANEOUS at 07:31

## 2023-01-01 RX ADMIN — ALBUTEROL 2.5 MILLIGRAM(S): 90 AEROSOL, METERED ORAL at 19:17

## 2023-01-01 RX ADMIN — ALBUTEROL 2.5 MILLIGRAM(S): 90 AEROSOL, METERED ORAL at 13:26

## 2023-02-02 NOTE — CHART NOTE - NSCHARTNOTEFT_GEN_A_CORE
Notified by ED and pulmonology of patient  - 68 yo m with pmh of ALS with functional quadriplegia, HTN, DM, prolactinoma, being admitted to Dr. Albrecht for acute hypoxic respiratory failure due to pleural effusion and pneumonia, s/p thoracentesis with course complicated by iatrogenic pneumothorax. Now patient is s/p chest tube, on high flow NC requiring high levels of o2, currently saturating 90% on 95% 30L. Patient appears comfortable denied pain, shortness of breath during interview, poorly verbal. BP and HR stable, chest tube draining serous yellow fluid, lung sounds dec in left base with bilateral upper airway rhonchi. Labs noted for mild leukocytosis, elevated creatinine that appears chronic given bun. CXRs noted with near resolution of pneumothorax after placement of chest tube. Accepting transfer to [facility], starting vancomycin, zosyn pending further labs regarding infection as patient was previously treated for pneumonia previously. Check echocardiogram. MOLST form reviewed, in chart, patient is DNR/DNI, will place order.

## 2023-02-02 NOTE — ED PROVIDER NOTE - CLINICAL SUMMARY MEDICAL DECISION MAKING FREE TEXT BOX
Patient is a 67-year-old gentleman who presents to the emergency room with a chief complaint of respiratory distress and hypoxia.  Past medical history of hypertension, hyperlipidemia, diabetes, prolactinoma, history of ALS bedbound.  Patient is awake alert to person and aware he is in hospital but confused to year and unable to provide additional history secondary to respiratory distress.  Per EMS they received a call for respiratory distress.  Patient was found to be hypoxic with an SPO2 of 60% on room air.  Nonrebreather was applied with improvement of sats to 98%.  EMS does further note that patient had a recent pneumonia last month but no further history can be obtained at this time.  Patient does arrive with a MOLST which was reviewed and he is noted to be a DNR/DNI eventually okay thank you once this fracture I do not necessarily give me a call respiratory for the Ventimask. Independent reviewed of EKG relieves a wide ORS with a rate of 80, RBBB.

## 2023-02-02 NOTE — ED PROVIDER NOTE - OBJECTIVE STATEMENT
Patient is a 67-year-old gentleman who presents to the emergency room with a chief complaint of respiratory distress and hypoxia.  Past medical history of hypertension, hyperlipidemia, diabetes, prolactinoma, history of ALS bedbound.  Patient is awake alert to person and aware he is in hospital but confused to year and unable to provide additional history secondary to respiratory distress.  Per EMS they received a call for respiratory distress.  Patient was found to be hypoxic with an SPO2 of 60% on room air.  Nonrebreather was applied with improvement of sats to 98%.  EMS does further note that patient had a recent pneumonia last month but no further history can be obtained at this time.  Patient does arrive with a MOLST which was reviewed and he is noted to be a DNR/DNI eventually okay thank you once this fracture I do not necessarily give me a call respiratory for the Ventimask

## 2023-02-02 NOTE — CONSULT NOTE ADULT - ASSESSMENT
67-year-old gentleman who presents to the emergency room with a chief complaint of respiratory distress and hypoxia.  Past medical history of hypertension, hyperlipidemia, diabetes, prolactinoma, history of ALS bedbound.  Patient is awake alert to person and aware he is in hospital but confused to year and unable to provide additional history secondary to respiratory distress    ALS  CAD  Effusions  Atelectasis  PNA  Weak Frail Ataxic gait  DM  hx of Prolactinoma    pleurocentesis done in ED - 1.8 L drained - sent for analysis  Incentive Kane  Albuterol - Hypersal Nebs - mucolysis   CT chest reviewed  eval for Residual PNA - s/p ABX completed as outpatient - ID eval - biomarkers - trend WBC  HOB elev  asp prec  oral hygiene  GOC - pt is DNR DNI  supportive med rx regimen and assist with needs - ADL  spoke with WIFE - spoke with Dtr in Law who is a Psychiatric PA

## 2023-02-02 NOTE — H&P ADULT - HISTORY OF PRESENT ILLNESS
67-year-M with pmhx  LS Bed bound, hypertension, hyperlipidemia, diabetes, prolactinoma, history of ALS bedbound p/w Respiratory Distress .    Patient is awake alert to person and aware he is in hospital but confused to year and unable to provide additional history secondary to respiratory distress.  Per EMS they received a call for respiratory distress.  Patient was found to be hypoxic with an SPO2 of 60% on room air.  Nonrebreather was applied with improvement of sats to 98%.  EMS does further note that patient had a recent pneumonia last month but no further history can be obtained at this time.  Patient does arrive with a MOLST which was reviewed and he is noted to be a DNR/DNI eventually okay thank you once this fracture I do not necessarily give me a call respiratory for the Ventimask    In the ed patient noted to have Pleural effusion on Cxr had Thoracentesis.   67-year-M with pmhx  LS Bed bound, hypertension, hyperlipidemia, T2 diabetes, prolactinoma, history of ALS bedbound p/w Respiratory Distress .    Patient is awake alert to person and aware he is in hospital but confused to year and unable to provide additional history secondary to respiratory distress.  Per EMS they received a call for respiratory distress.  Patient was found to be hypoxic with an SPO2 of 60% on room air.  Nonrebreather was applied with improvement of sats to 98%.  EMS does further note that patient had a recent pneumonia last month but no further history can be obtained at this time.  Patient does arrive with a MOLST which was reviewed and he is noted to be a DNR/DNI eventually okay thank you once this fracture I do not necessarily give me a call respiratory for the Ventimask    In the ed patient noted to have Pleural effusion on Cxr had Thoracentesis.

## 2023-02-02 NOTE — H&P ADULT - ASSESSMENT
67-year-old gentleman who presents to the emergency room with a chief complaint of respiratory distress and hypoxia.  Past medical history of hypertension, hyperlipidemia, diabetes, prolactinoma, history of ALS bedbound.  Patient is awake alert to person and aware he is in hospital but confused to year and unable to provide additional history secondary to respiratory distress.  Per EMS they received a call for respiratory distress.  Patient was found to be hypoxic with an SPO2 of 60% on room air.  Nonrebreather was applied with improvement of sats to 98%.  EMS does further note that patient had a recent pneumonia last month but no further history can be obtained at this time.  Patient does arrive with a MOLST which was reviewed and he is noted to be a DNR/DNI eventually okay thank you once this fracture I do not necessarily give me a call respiratory for the Ventimask    Acute Hypoxic respiratory Failure   Pleural Effusions     s/p Thoracentesis by Pulm   iv Abx -Vancomycin and Zosyn   Follow up cultures   ID consult called   Follow up recs   Follow up repeat Cxr     DM HISS A1C  ALS   Functional Quadriplegia   Skin care     Patient DNR/DNI

## 2023-02-02 NOTE — PATIENT PROFILE ADULT - FALL HARM RISK - HARM RISK INTERVENTIONS

## 2023-02-02 NOTE — ED PROVIDER NOTE - CARE PLAN
1 Principal Discharge DX:	Pleural effusion  Secondary Diagnosis:	Pneumonia  Secondary Diagnosis:	Respiratory distress

## 2023-02-02 NOTE — PROGRESS NOTE ADULT - ASSESSMENT
67M with ALS with functional quadriplegia, HTN, DM2, prolactinoma, recent PNA admitted for Acute Hypoxic Respiratory Failure iso L sided pleural effusion s/p thoracentesis with ccb iatrogenic PTX s/p L sided chest tube. Repeat CXR with near resolution of PTX. Admitted to SPCU for further management of Acute Hypoxic Respiratory Failure on HFNC.    Acute Hypoxic Respiratory Failure  L Pleural Effusion s/p L Thoracentesis  L Iatrogenic PTX s/p L Pigtail  ? Residual PNA  EMILY    Plan:  NEURO: Mentation intact, pain control with oxycodone and tylenol.  CARDIAC: No active issues, remains HDS. Continue home lipitor and amlodipine.  PULMONARY: Hypoxic to mid 80s at bedside, increased HFNC settings to 60L 100%. Now satting low-mid 90s. Goal SpO2 >92%. If recurrence of respiratory distress and increased WOB, will transition to BiPAP. Continue Albuterol q12h. Continue to monitor L CT output on low continuous suction, CXR shows near resolution of PTX.  GI: NPO while awaiting S&S eval. Bowel regimen.  : EMILY likely pre-renal iso dehydration. S/p 2L in ED. Continue to trend Cr and monitor I&Os.  ENDO: ISS q6h while NPO. Goal BG <180.  ID: Afebrile, WBC 11k, procalcitonin 0.38, lactate normal. RLL consolidation on CT Chest. Continue Empiric Vanco / Zosyn for possible residual PNA. F/U ID recs.  HEME: Holding chemical DVT ppx iso recent CT placement. SCDs for now.    Dispo: SPCU  CODE STATUS: DNR / DNI 67M with ALS with functional quadriplegia, HTN, DM2, prolactinoma, recent PNA admitted for Acute Hypoxic Respiratory Failure iso L sided pleural effusion s/p thoracentesis with ccb iatrogenic PTX s/p L sided chest tube. Repeat CXR with near resolution of PTX. Admitted to SPCU for further management of Acute Hypoxic Respiratory Failure on HFNC.    Acute Hypoxic Respiratory Failure  L Pleural Effusion s/p L Thoracentesis  L Iatrogenic PTX s/p L Pigtail  ? Residual PNA  EMILY    Plan:  NEURO: Mentation intact, pain control with oxycodone and tylenol.  CARDIAC: No active issues, remains HDS. Continue home lipitor and amlodipine.  PULMONARY: Hypoxic to mid 80s at bedside, increased HFNC settings to 60L 100%. Now satting low-mid 90s. Goal SpO2 >92%. If recurrence of respiratory distress and increased WOB, will transition to BiPAP. Continue Albuterol q12h. Continue to monitor L CT output on low continuous suction, CXR shows near resolution of PTX. F/U pleural fluid studies.  GI: NPO while awaiting S&S eval. Bowel regimen.  : EMILY likely pre-renal iso dehydration. S/p 2L in ED. Continue to trend Cr and monitor I&Os.  ENDO: ISS q6h while NPO. Goal BG <180.  ID: Afebrile, WBC 11k, procalcitonin 0.38, lactate normal. RLL consolidation on CT Chest. Continue Empiric Vanco / Zosyn for possible residual PNA. F/U ID recs.  HEME: Holding chemical DVT ppx iso recent CT placement. SCDs for now.    Dispo: SPCU  CODE STATUS: DNR / DNI

## 2023-02-02 NOTE — PROGRESS NOTE ADULT - SUBJECTIVE AND OBJECTIVE BOX
Patient is a 67y old  Male who presents with a chief complaint of Respiratory Distress x 1 day (2023 14:18)      BRIEF HOSPITAL COURSE: 67M with ALS with functional quadriplegia, HTN, DM2, prolactinoma admitted for Acute Hypoxic Respiratory Failure       PAST MEDICAL & SURGICAL HISTORY:  Dyslipidemia      Deviated nasal septum      Prolactinoma      Hypertension      Type 2 diabetes mellitus      HLD (hyperlipidemia)      H/O nasal septoplasty      History of tonsillectomy          Review of Systems:  CONSTITUTIONAL: No fever, chills, or fatigue  EYES: No eye pain, visual disturbances, or discharge  ENMT:  No difficulty hearing, tinnitus, vertigo; No sinus or throat pain  NECK: No pain or stiffness  RESPIRATORY: No cough, wheezing, chills or hemoptysis; No shortness of breath  CARDIOVASCULAR: No chest pain, palpitations, dizziness, or leg swelling  GASTROINTESTINAL: No abdominal or epigastric pain. No nausea, vomiting, or hematemesis; No diarrhea or constipation. No melena or hematochezia.  GENITOURINARY: No dysuria, frequency, hematuria, or incontinence  NEUROLOGICAL: No headaches, memory loss, loss of strength, numbness, or tremors  SKIN: No itching, burning, rashes, or lesions   MUSCULOSKELETAL: No joint pain or swelling; No muscle, back, or extremity pain  PSYCHIATRIC: No depression, anxiety, mood swings, or difficulty sleeping      Medications:  piperacillin/tazobactam IVPB.. 3.375 Gram(s) IV Intermittent every 8 hours  vancomycin  IVPB          albuterol    0.083% 2.5 milliGRAM(s) Nebulizer every 12 hours  sodium chloride 3%  Inhalation 4 milliLiter(s) Inhalation every 12 hours    acetaminophen     Tablet .. 650 milliGRAM(s) Oral every 6 hours PRN  oxyCODONE    IR 5 milliGRAM(s) Oral every 8 hours PRN        bisacodyl 5 milliGRAM(s) Oral every 12 hours PRN                      ICU Vital Signs Last 24 Hrs  T(C): 36.4 (2023 16:40), Max: 36.7 (2023 09:11)  T(F): 97.6 (2023 16:40), Max: 98 (2023 09:11)  HR: 73 (2023 18:00) (68 - 85)  BP: 133/58 (2023 18:00) (128/62 - 186/89)  BP(mean): 79 (2023 18:00) (79 - 87)  ABP: --  ABP(mean): --  RR: 21 (2023 18:00) (20 - 24)  SpO2: 95% (2023 18:00) (80% - 96%)    O2 Parameters below as of 2023 18:00  Patient On (Oxygen Delivery Method): nasal cannula, high flow                I&O's Detail        LABS:                        9.7    11.44 )-----------( 302      ( 2023 09:37 )             31.2     02-    136  |  103  |  30<H>  ----------------------------<  101<H>  3.5   |  22  |  1.61<H>    Ca    8.7      2023 09:37  Mg     2.2     02-    TPro  6.9  /  Alb  2.9<L>  /  TBili  0.4  /  DBili  x   /  AST  22  /  ALT  <10<L>  /  AlkPhos  74  02-02      CARDIAC MARKERS ( 2023 09:37 )  x     / x     / 157 U/L / x     / x          CAPILLARY BLOOD GLUCOSE      POCT Blood Glucose.: 132 mg/dL (2023 18:16)    PT/INR - ( 2023 09:37 )   PT: 12.6 sec;   INR: 1.09 ratio         PTT - ( 2023 09:37 )  PTT:34.9 sec  Urinalysis Basic - ( 2023 09:42 )    Color: Yellow / Appearance: Clear / S.020 / pH: x  Gluc: x / Ketone: Negative  / Bili: Negative / Urobili: Negative mg/dL   Blood: x / Protein: 500 mg/dL / Nitrite: Negative   Leuk Esterase: Trace / RBC: 6-10 /HPF / WBC 6-10   Sq Epi: x / Non Sq Epi: Moderate / Bacteria: Moderate      CULTURES:      Physical Examination:    General: No acute distress.  Alert, oriented, interactive, nonfocal, following simple commands and moving all extremities     HEENT: Pupils equal, reactive to light.  Symmetric.    PULM: Breathing comfortabley, good BS B/L with no Rales or Rhonchi, no significant sputum production    CVS: Regular rate and rhythm, no murmurs, rubs, or gallops    ABD: BS+ Soft, nondistended, nontender, no masses    EXT: No edema, nontender    SKIN: Warm and well perfused, no rashes noted.    RADIOLOGY: ***    CRITICAL CARE TIME SPENT: *** mins of time have been spent evaluating, reviewing all available lab and radiologic material, reviewing the EMR and treating this critically ill patient, who has complex medical problems and life threatening organ dysfunction. This also included speaking with the staff, consultants, and family.     Patient is a 67y old  Male who presents with a chief complaint of Respiratory Distress x 1 day (2023 14:18)      BRIEF HOSPITAL COURSE: 67M with ALS with functional quadriplegia, HTN, DM2, prolactinoma, recent PNA admitted for Acute Hypoxic Respiratory Failure iso L sided pleural effusion s/p thoracentesis with ccb iatrogenic PTX s/p L sided chest tube. Repeat CXR with near resoution of PTX. Admitted to SPCU for further management of Acute Hypoxic Respiratory Failure on HFNC.      PAST MEDICAL & SURGICAL HISTORY:  Dyslipidemia      Deviated nasal septum      Prolactinoma      Hypertension      Type 2 diabetes mellitus      HLD (hyperlipidemia)      H/O nasal septoplasty      History of tonsillectomy          Review of Systems:  CONSTITUTIONAL: No fever, chills, or fatigue  EYES: No eye pain, visual disturbances, or discharge  ENMT:  No difficulty hearing, tinnitus, vertigo; No sinus or throat pain  NECK: No pain or stiffness  RESPIRATORY: No cough, wheezing, chills or hemoptysis; No shortness of breath  CARDIOVASCULAR: No chest pain, palpitations, dizziness, or leg swelling  GASTROINTESTINAL: No abdominal or epigastric pain. No nausea, vomiting, or hematemesis; No diarrhea or constipation. No melena or hematochezia.  GENITOURINARY: No dysuria, frequency, hematuria, or incontinence  NEUROLOGICAL: No headaches, memory loss, loss of strength, numbness, or tremors  SKIN: No itching, burning, rashes, or lesions   MUSCULOSKELETAL: No joint pain or swelling; No muscle, back, or extremity pain  PSYCHIATRIC: No depression, anxiety, mood swings, or difficulty sleeping      Medications:  piperacillin/tazobactam IVPB.. 3.375 Gram(s) IV Intermittent every 8 hours  vancomycin  IVPB          albuterol    0.083% 2.5 milliGRAM(s) Nebulizer every 12 hours  sodium chloride 3%  Inhalation 4 milliLiter(s) Inhalation every 12 hours    acetaminophen     Tablet .. 650 milliGRAM(s) Oral every 6 hours PRN  oxyCODONE    IR 5 milliGRAM(s) Oral every 8 hours PRN        bisacodyl 5 milliGRAM(s) Oral every 12 hours PRN                      ICU Vital Signs Last 24 Hrs  T(C): 36.4 (2023 16:40), Max: 36.7 (2023 09:11)  T(F): 97.6 (2023 16:40), Max: 98 (2023 09:11)  HR: 73 (2023 18:00) (68 - 85)  BP: 133/58 (2023 18:00) (128/62 - 186/89)  BP(mean): 79 (2023 18:00) (79 - 87)  ABP: --  ABP(mean): --  RR: 21 (2023 18:00) (20 - 24)  SpO2: 95% (2023 18:00) (80% - 96%)    O2 Parameters below as of 2023 18:00  Patient On (Oxygen Delivery Method): nasal cannula, high flow                I&O's Detail        LABS:                        9.7    11.44 )-----------( 302      ( 2023 09:37 )             31.2     02-02    136  |  103  |  30<H>  ----------------------------<  101<H>  3.5   |  22  |  1.61<H>    Ca    8.7      2023 09:37  Mg     2.2     02-02    TPro  6.9  /  Alb  2.9<L>  /  TBili  0.4  /  DBili  x   /  AST  22  /  ALT  <10<L>  /  AlkPhos  74  02-02      CARDIAC MARKERS ( 2023 09:37 )  x     / x     / 157 U/L / x     / x          CAPILLARY BLOOD GLUCOSE      POCT Blood Glucose.: 132 mg/dL (2023 18:16)    PT/INR - ( 2023 09:37 )   PT: 12.6 sec;   INR: 1.09 ratio         PTT - ( 2023 09:37 )  PTT:34.9 sec  Urinalysis Basic - ( 2023 09:42 )    Color: Yellow / Appearance: Clear / S.020 / pH: x  Gluc: x / Ketone: Negative  / Bili: Negative / Urobili: Negative mg/dL   Blood: x / Protein: 500 mg/dL / Nitrite: Negative   Leuk Esterase: Trace / RBC: 6-10 /HPF / WBC 6-10   Sq Epi: x / Non Sq Epi: Moderate / Bacteria: Moderate      CULTURES:      Physical Examination:    General: No acute distress.  Alert, oriented, interactive, nonfocal    HEENT: Pupils equal, reactive to light.  Symmetric.    PULM: Breathing comfortably on HFNC, good BS B/L with no Rales or Rhonchi, no significant sputum production    CVS: Regular rate and rhythm, no murmurs, rubs, or gallops    ABD: BS+ Soft, nondistended, nontender, no masses    EXT: No edema, nontender    SKIN: Warm and well perfused, no rashes noted.    RADIOLOGY:  < from: Xray Chest 1 View-PORTABLE IMMEDIATE (Xray Chest 1 View-PORTABLE IMMEDIATE .) (23 @ 16:06) >  IMPRESSION:  1. Near-total reexpansion of a previously noted left-sided pneumothorax   following placement of a left basilar pigtail chest tube. Only a small   pneumothorax is now seen, greatest medially.  2. There is a left lower lobe infiltrate along with bilateral pleural   effusions, left greater than right.    < end of copied text >     Patient is a 67y old  Male who presents with a chief complaint of Respiratory Distress x 1 day (2023 14:18)      BRIEF HOSPITAL COURSE: 67M with ALS with functional quadriplegia, HTN, DM2, prolactinoma, recent PNA admitted for Acute Hypoxic Respiratory Failure iso L sided pleural effusion s/p thoracentesis with ccb iatrogenic PTX s/p L sided chest tube. Repeat CXR with near resoution of PTX. Admitted to SPCU for further management of Acute Hypoxic Respiratory Failure on HFNC.      PAST MEDICAL & SURGICAL HISTORY:  Dyslipidemia      Deviated nasal septum      Prolactinoma      Hypertension      Type 2 diabetes mellitus      HLD (hyperlipidemia)      H/O nasal septoplasty      History of tonsillectomy          Review of Systems:  CONSTITUTIONAL: No fever, chills, or fatigue  EYES: No eye pain, visual disturbances, or discharge  ENMT:  No difficulty hearing, tinnitus, vertigo; No sinus or throat pain  NECK: No pain or stiffness  RESPIRATORY: No cough, wheezing, chills or hemoptysis; No shortness of breath  CARDIOVASCULAR: No chest pain, palpitations, dizziness, or leg swelling  GASTROINTESTINAL: No abdominal or epigastric pain. No nausea, vomiting, or hematemesis; No diarrhea or constipation. No melena or hematochezia.  GENITOURINARY: No dysuria, frequency, hematuria, or incontinence  NEUROLOGICAL: No headaches, memory loss, loss of strength, numbness, or tremors  SKIN: No itching, burning, rashes, or lesions   MUSCULOSKELETAL: No joint pain or swelling; No muscle, back, or extremity pain  PSYCHIATRIC: No depression, anxiety, mood swings, or difficulty sleeping      Medications:  piperacillin/tazobactam IVPB.. 3.375 Gram(s) IV Intermittent every 8 hours  vancomycin  IVPB          albuterol    0.083% 2.5 milliGRAM(s) Nebulizer every 12 hours  sodium chloride 3%  Inhalation 4 milliLiter(s) Inhalation every 12 hours    acetaminophen     Tablet .. 650 milliGRAM(s) Oral every 6 hours PRN  oxyCODONE    IR 5 milliGRAM(s) Oral every 8 hours PRN        bisacodyl 5 milliGRAM(s) Oral every 12 hours PRN                      ICU Vital Signs Last 24 Hrs  T(C): 36.4 (2023 16:40), Max: 36.7 (2023 09:11)  T(F): 97.6 (2023 16:40), Max: 98 (2023 09:11)  HR: 73 (2023 18:00) (68 - 85)  BP: 133/58 (2023 18:00) (128/62 - 186/89)  BP(mean): 79 (2023 18:00) (79 - 87)  ABP: --  ABP(mean): --  RR: 21 (2023 18:00) (20 - 24)  SpO2: 95% (2023 18:00) (80% - 96%)    O2 Parameters below as of 2023 18:00  Patient On (Oxygen Delivery Method): nasal cannula, high flow                I&O's Detail        LABS:                        9.7    11.44 )-----------( 302      ( 2023 09:37 )             31.2     02-02    136  |  103  |  30<H>  ----------------------------<  101<H>  3.5   |  22  |  1.61<H>    Ca    8.7      2023 09:37  Mg     2.2     02-02    TPro  6.9  /  Alb  2.9<L>  /  TBili  0.4  /  DBili  x   /  AST  22  /  ALT  <10<L>  /  AlkPhos  74  02-02      CARDIAC MARKERS ( 2023 09:37 )  x     / x     / 157 U/L / x     / x          CAPILLARY BLOOD GLUCOSE      POCT Blood Glucose.: 132 mg/dL (2023 18:16)    PT/INR - ( 2023 09:37 )   PT: 12.6 sec;   INR: 1.09 ratio         PTT - ( 2023 09:37 )  PTT:34.9 sec  Urinalysis Basic - ( 2023 09:42 )    Color: Yellow / Appearance: Clear / S.020 / pH: x  Gluc: x / Ketone: Negative  / Bili: Negative / Urobili: Negative mg/dL   Blood: x / Protein: 500 mg/dL / Nitrite: Negative   Leuk Esterase: Trace / RBC: 6-10 /HPF / WBC 6-10   Sq Epi: x / Non Sq Epi: Moderate / Bacteria: Moderate      CULTURES:      Physical Examination:    General: No acute distress.  Alert, oriented, interactive, nonfocal    HEENT: Pupils equal, reactive to light.  Symmetric.    PULM: Breathing comfortably on HFNC, diminished breath sounds bilaterally, R chest tube draining serous fluid    CVS: Regular rate and rhythm, no murmurs, rubs, or gallops    ABD: BS+ Soft, nondistended, nontender, no masses    EXT: No edema, nontender    SKIN: Warm and well perfused, no rashes noted.    RADIOLOGY:  < from: Xray Chest 1 View-PORTABLE IMMEDIATE (Xray Chest 1 View-PORTABLE IMMEDIATE .) (23 @ 16:06) >  IMPRESSION:  1. Near-total reexpansion of a previously noted left-sided pneumothorax   following placement of a left basilar pigtail chest tube. Only a small   pneumothorax is now seen, greatest medially.  2. There is a left lower lobe infiltrate along with bilateral pleural   effusions, left greater than right.    < end of copied text >

## 2023-02-02 NOTE — PATIENT PROFILE ADULT - FUNCTIONAL ASSESSMENT - BASIC MOBILITY 6.
1-calculated by average/Not able to assess (calculate score using [facility] averaging method)

## 2023-02-02 NOTE — CHART NOTE - NSCHARTNOTEFT_GEN_A_CORE
chest tube placed for a ptx  most likely ptx after thoracentesis earlier today  spoke with wife  emergency consent obtained for chest tube placement  cxr post CT insertion reviewed  spcu transfer   monitoring  CT to suction  pain rx regimen  bowel rx regimen  I solis  pulse ox monitoring  HF wean and fio2 titration  albuterol - hypersal nebs

## 2023-02-03 NOTE — PROGRESS NOTE ADULT - SUBJECTIVE AND OBJECTIVE BOX
Neurology follow up note    RAHUL VITALE67yMale      Interval History:    Patient feels ok no new complaints.    MEDICATIONS    acetaminophen     Tablet .. 650 milliGRAM(s) Oral every 6 hours PRN  albuterol    0.083% 2.5 milliGRAM(s) Nebulizer every 12 hours  atorvastatin 40 milliGRAM(s) Oral at bedtime  bisacodyl 5 milliGRAM(s) Oral every 12 hours PRN  dextrose 5%. 1000 milliLiter(s) IV Continuous <Continuous>  dextrose 5%. 1000 milliLiter(s) IV Continuous <Continuous>  dextrose 50% Injectable 25 Gram(s) IV Push once  dextrose 50% Injectable 12.5 Gram(s) IV Push once  dextrose 50% Injectable 25 Gram(s) IV Push once  dextrose Oral Gel 15 Gram(s) Oral once PRN  furosemide   Injectable 20 milliGRAM(s) IV Push two times a day  glucagon  Injectable 1 milliGRAM(s) IntraMuscular once  heparin   Injectable 5000 Unit(s) SubCutaneous every 8 hours  insulin lispro (ADMELOG) corrective regimen sliding scale   SubCutaneous every 6 hours  oxyCODONE    IR 5 milliGRAM(s) Oral every 8 hours PRN  piperacillin/tazobactam IVPB.. 3.375 Gram(s) IV Intermittent every 8 hours  polyethylene glycol 3350 17 Gram(s) Oral at bedtime PRN  sertraline 100 milliGRAM(s) Oral daily  sodium chloride 3%  Inhalation 4 milliLiter(s) Inhalation every 12 hours      Allergies    lactose (Diarrhea)  No Known Drug Allergies    Intolerances        Height (cm): 162.6 ( @ 16:40)  Weight (kg): 54.2 ( @ 16:40)  BMI (kg/m2): 20.5 ( @ 16:40)    Vital Signs Last 24 Hrs  T(C): 36.1 (2023 08:00), Max: 36.7 (2023 14:38)  T(F): 97 (2023 08:00), Max: 98 (2023 14:38)  HR: 73 (2023 13:00) (67 - 79)  BP: 110/48 (2023 13:00) (108/66 - 152/67)  BP(mean): 68 (2023 13:00) (67 - 87)  RR: 27 (2023 13:00) (20 - 28)  SpO2: 93% (2023 13:00) (84% - 100%)    Parameters below as of 2023 13:00  Patient On (Oxygen Delivery Method): nasal cannula, high flow          REVIEW OF SYSTEMS:   Limited or unable to obtain secondary to patient's poor mental status.    Constitutional: No fever, chills, fatigue, weakness  Eyes: no eye pain, visual disturbances, or discharge  ENT:  No difficulty hearing, tinnitus, vertigo; No sinus or throat pain  Neck: No pain or stiffness  Respiratory: No cough, dyspnea, wheezing   Cardiovascular: No chest pain, palpitations,   Gastrointestinal: No abdominal or epigastric pain. No nausea, vomiting  No diarrhea or constipation.   Genitourinary: No dysuria, frequency, hematuria or incontinence  Neurological: No headaches, lightheadedness, vertigo, numbness or tremors  Psychiatric: No depression, anxiety, mood swings or difficulty sleeping  Musculoskeletal: No joint pain or swelling; No muscle, back or extremity pain  Skin: No itching, burning, rashes or lesions   Lymph Nodes: No enlarged glands  Endocrine: No heat or cold intolerance; No hair loss   Allergy and Immunologic: No hives or eczema    On Neurological Examination:    Mental Status - Patient is alert, awake      Follow simple commands    Speech -   hypophonic     Cranial Nerves - Pupils 3 mm equal and reactive to light,   extraocular eye movements intact.   smile symmetric  intact bilateral NLF    Motor Exam -   bilateral upper slight movement   bilateral lower but normally does have right leg weakness greater than left.  Increased tone bilateral lower extremities    Sensory    Bilateral intact to light touch    GENERAL Exam: Nontoxic , No Acute Distress   	  HEENT:  normocephalic, atraumatic  		  LUNGS: Decreased bilaterally  	  HEART: Normal S1S2   No murmur RRR        	  GI/ ABDOMEN:  Soft  Non tender    EXTREMITIES:   No Edema  No Clubbing  No Cyanosis  	   SKIN: Normal  No Ecchymosis               LABS:  CBC Full  -  ( 2023 06:28 )  WBC Count : 35.78 K/uL  RBC Count : 3.99 M/uL  Hemoglobin : 10.8 g/dL  Hematocrit : 35.6 %  Platelet Count - Automated : 345 K/uL  Mean Cell Volume : 89.2 fl  Mean Cell Hemoglobin : 27.1 pg  Mean Cell Hemoglobin Concentration : 30.3 gm/dL  Auto Neutrophil # : x  Auto Lymphocyte # : x  Auto Monocyte # : x  Auto Eosinophil # : x  Auto Basophil # : x  Auto Neutrophil % : x  Auto Lymphocyte % : x  Auto Monocyte % : x  Auto Eosinophil % : x  Auto Basophil % : x    Urinalysis Basic - ( 2023 09:42 )    Color: Yellow / Appearance: Clear / S.020 / pH: x  Gluc: x / Ketone: Negative  / Bili: Negative / Urobili: Negative mg/dL   Blood: x / Protein: 500 mg/dL / Nitrite: Negative   Leuk Esterase: Trace / RBC: 6-10 /HPF / WBC 6-10   Sq Epi: x / Non Sq Epi: Moderate / Bacteria: Moderate          139  |  108  |  35<H>  ----------------------------<  115<H>  3.6   |  22  |  1.91<H>    Ca    8.2<L>      2023 06:28  Mg     2.2         TPro  6.9  /  Alb  2.9<L>  /  TBili  0.4  /  DBili  x   /  AST  22  /  ALT  <10<L>  /  AlkPhos  74  02-    Hemoglobin A1C:     LIVER FUNCTIONS - ( 2023 09:37 )  Alb: 2.9 g/dL / Pro: 6.9 g/dL / ALK PHOS: 74 U/L / ALT: <10 U/L DA / AST: 22 U/L / GGT: x           Vitamin B12   PT/INR - ( 2023 09:37 )   PT: 12.6 sec;   INR: 1.09 ratio         PTT - ( 2023 09:37 )  PTT:34.9 sec      RADIOLOGY    ANALYSIS AND PLAN:  This is a 67-year-old with a history of ALS and underlying respiratory issues.  For underlying respiratory issues secondary to underlying pleural effusion, questionable could be secondary to decreased secretion removal from the patient's underlying history of ALS.  It does appear that at one point the patient did try Riluzole, but secondary to side effects, was discontinued, currently on no therapy.  Pulmonary followup.  Monitor respiratory status.  For history of ALS (amyotrophic lateral sclerosis), would recommend supportive therapy.  For history of anxiety and depression, continue the patient on home psychiatric medications.  For hyperlipidemia, continue the patient on statin.    Greater than 45 minutes of time was spent with the patient, plan of care, reviewing data, with greater than  50% of the visit was spent counseling and/or coordinating care with multidisciplinary healthcare team

## 2023-02-03 NOTE — PROGRESS NOTE ADULT - ASSESSMENT
66 yo m with pmh of ALS with functional quadriplegia, HTN, DM, prolactinoma, being admitted to Dr. Albrecht for acute hypoxic respiratory failure due to pleural effusion and pneumonia, s/p thoracentesis with course complicated by iatrogenic pneumothorax. Now patient is s/p chest tube, on high flow NC requiring high levels of o2.    Acute hypoxic respiratory failure - 2/2 pleural effusion, pneumothorax, possibly refractory pneumonia or aspiration   chest tube draining 2L overnight, continue continuous suction  - continue vanco, zosyn  - id eval requested  - check cultures  - procalcitonin equivocal from yesterday but WBC increased. possibly due to stress of event/procedure  tapering off o2 as tolerated, now at 50L 80% on HFNC  -pulm following  - pleural studies in process, leukocytes seen without bacteria on gram stain, overall number of cells is low. ?transudative  - echo showing NL EF, moderate pulm HTN, may have element if diastolic chf from pulm htn  - on lasix IV BID, started by cardiology    CKD, elevated creatinine - worsening, may be developing den from fluid loss from effusion  - currently on lasix for effusion  - monitor bmp    ALS - supportive care, needs swallow eval    DM - currently npo. correction insulin in place while npo q6h  - monitor fs    dvt ppx - hep sq

## 2023-02-03 NOTE — PROGRESS NOTE ADULT - ASSESSMENT
67-year-old gentleman who presents to the emergency room with a chief complaint of respiratory distress and hypoxia.  Past medical history of hypertension, hyperlipidemia, diabetes, prolactinoma, history of ALS bedbound.  Patient is awake alert to person and aware he is in hospital but confused to year and unable to provide additional history secondary to respiratory distress    ALS  CAD  Effusions  Atelectasis  PNA  Weak Frail Ataxic gait  DM  hx of Prolactinoma    emp ABX  biomarkers - imaging and labs reviewed  ID eval  Cardio eval noted - diuresis as per cardio - I and O - TTE reviewed - Pulm HTN - HFpEF  ALS - assist with needs - resp insuff - weakness -   Pleural eff - bilateral - atelectasis - s/p thoracentesis in ED - complicated by PTX - chest tube inserted in ED -   cont CT to suction - monitor vol output   GOC - DNR  prognosis guarded  HF wean in progress - goal sat > 88 pct

## 2023-02-03 NOTE — PROGRESS NOTE ADULT - SUBJECTIVE AND OBJECTIVE BOX
Patient is a 67y old  Male who presents with a chief complaint of Respiratory Distress x 1 day (2023 08:31)      INTERVAL HPI/OVERNIGHT EVENTS:  Patient seen and examined in am, on HFNC, alert, oriented to place, denied pain, shortness of breath. unable to provide further history    ROS limited by mental status        Vital Signs Last 24 Hrs  T(C): 36.1 (2023 08:00), Max: 36.7 (2023 09:11)  T(F): 97 (2023 08:00), Max: 98 (2023 09:11)  HR: 68 (2023 08:32) (67 - 85)  BP: 115/55 (2023 08:32) (108/66 - 186/89)  BP(mean): 73 (2023 08:32) (67 - 87)  RR: 25 (2023 08:32) (20 - 28)  SpO2: 93% (2023 08:32) (80% - 100%)    Parameters below as of 2023 08:32  Patient On (Oxygen Delivery Method): nasal cannula, high flow        PHYSICAL EXAM:  GENERAL: NAD, weak  HEAD:  Atraumatic, Normocephalic  EYES: EOMI, PERRLA  ENMT:  No lesions; No tonsillar erythema,    NECK: Supple, No JVD   NERVOUS SYSTEM:  Alert & Oriented X2, not moving extremities, slow terse speech  CHEST/LUNG: dec bs bilaterally, shallow breaths, no wheezing or rhonchi noted  HEART: Regular rate and rhythm; No murmurs  ABDOMEN: Soft, Nontender, Nondistended; Bowel sounds present  EXTREMITIES:  + Pulses, No edema noted  SKIN: No rashes or lesions    MEDICATIONS  (STANDING):  albuterol    0.083% 2.5 milliGRAM(s) Nebulizer every 12 hours  atorvastatin 40 milliGRAM(s) Oral at bedtime  dextrose 5%. 1000 milliLiter(s) (50 mL/Hr) IV Continuous <Continuous>  dextrose 5%. 1000 milliLiter(s) (100 mL/Hr) IV Continuous <Continuous>  dextrose 50% Injectable 25 Gram(s) IV Push once  dextrose 50% Injectable 12.5 Gram(s) IV Push once  dextrose 50% Injectable 25 Gram(s) IV Push once  furosemide   Injectable 20 milliGRAM(s) IV Push two times a day  glucagon  Injectable 1 milliGRAM(s) IntraMuscular once  heparin   Injectable 5000 Unit(s) SubCutaneous every 8 hours  insulin lispro (ADMELOG) corrective regimen sliding scale   SubCutaneous every 6 hours  piperacillin/tazobactam IVPB.. 3.375 Gram(s) IV Intermittent every 8 hours  sertraline 100 milliGRAM(s) Oral daily  sodium chloride 3%  Inhalation 4 milliLiter(s) Inhalation every 12 hours  vancomycin  IVPB      vancomycin  IVPB 750 milliGRAM(s) IV Intermittent every 24 hours    MEDICATIONS  (PRN):  acetaminophen     Tablet .. 650 milliGRAM(s) Oral every 6 hours PRN Temp greater or equal to 38C (100.4F), Mild Pain (1 - 3)  bisacodyl 5 milliGRAM(s) Oral every 12 hours PRN Constipation  dextrose Oral Gel 15 Gram(s) Oral once PRN Blood Glucose LESS THAN 70 milliGRAM(s)/deciliter  oxyCODONE    IR 5 milliGRAM(s) Oral every 8 hours PRN Moderate Pain (4 - 6)  polyethylene glycol 3350 17 Gram(s) Oral at bedtime PRN Constipation      Allergies    lactose (Diarrhea)  No Known Drug Allergies    Intolerances          LABS:                        10.8   35.78 )-----------( 345      ( 2023 06:28 )             35.6     2023 06:28    139    |  108    |  35     ----------------------------<  115    3.6     |  22     |  1.91     Ca    8.2        2023 06:28  Mg     2.2       2023 09:37    TPro  6.9    /  Alb  2.9    /  TBili  0.4    /  DBili  x      /  AST  22     /  ALT  <10    /  AlkPhos  74     2023 09:37    PT/INR - ( 2023 09:37 )   PT: 12.6 sec;   INR: 1.09 ratio         PTT - ( 2023 09:37 )  PTT:34.9 sec  Urinalysis Basic - ( 2023 09:42 )    Color: Yellow / Appearance: Clear / S.020 / pH: x  Gluc: x / Ketone: Negative  / Bili: Negative / Urobili: Negative mg/dL   Blood: x / Protein: 500 mg/dL / Nitrite: Negative   Leuk Esterase: Trace / RBC: 6-10 /HPF / WBC 6-10   Sq Epi: x / Non Sq Epi: Moderate / Bacteria: Moderate      CAPILLARY BLOOD GLUCOSE      POCT Blood Glucose.: 107 mg/dL (2023 05:52)  POCT Blood Glucose.: 112 mg/dL (2023 23:59)  POCT Blood Glucose.: 132 mg/dL (2023 18:16)      RADIOLOGY & ADDITIONAL TESTS:        Care Discussed with Consultants/Other Providers:    Advanced Directives: [ ] DNR  [ ] No feeding tube  [ ] MOLST in chart  [ ] MOLST completed today  [ ] Unknown   Patient is a 67y old  Male who presents with a chief complaint of Respiratory Distress x 1 day (2023 08:31)      INTERVAL HPI/OVERNIGHT EVENTS:  Patient seen and examined in am, on HFNC, alert, oriented to place, denied pain, shortness of breath. unable to provide further history.    ROS limited by mental status        Vital Signs Last 24 Hrs  T(C): 36.1 (2023 08:00), Max: 36.7 (2023 09:11)  T(F): 97 (2023 08:00), Max: 98 (2023 09:11)  HR: 68 (2023 08:32) (67 - 85)  BP: 115/55 (2023 08:32) (108/66 - 186/89)  BP(mean): 73 (2023 08:32) (67 - 87)  RR: 25 (2023 08:32) (20 - 28)  SpO2: 93% (2023 08:32) (80% - 100%)    Parameters below as of 2023 08:32  Patient On (Oxygen Delivery Method): nasal cannula, high flow        PHYSICAL EXAM:  GENERAL: NAD, weak  HEAD:  Atraumatic, Normocephalic  EYES: EOMI, PERRLA  ENMT:  No lesions; No tonsillar erythema,    NECK: Supple, No JVD   NERVOUS SYSTEM:  Alert & Oriented X2, not moving extremities, slow terse speech  CHEST/LUNG: dec bs bilaterally, shallow breaths, no wheezing or rhonchi noted  HEART: Regular rate and rhythm; No murmurs  ABDOMEN: Soft, Nontender, Nondistended; Bowel sounds present  EXTREMITIES:  + Pulses, No edema noted  SKIN: No rashes or lesions    MEDICATIONS  (STANDING):  albuterol    0.083% 2.5 milliGRAM(s) Nebulizer every 12 hours  atorvastatin 40 milliGRAM(s) Oral at bedtime  dextrose 5%. 1000 milliLiter(s) (50 mL/Hr) IV Continuous <Continuous>  dextrose 5%. 1000 milliLiter(s) (100 mL/Hr) IV Continuous <Continuous>  dextrose 50% Injectable 25 Gram(s) IV Push once  dextrose 50% Injectable 12.5 Gram(s) IV Push once  dextrose 50% Injectable 25 Gram(s) IV Push once  furosemide   Injectable 20 milliGRAM(s) IV Push two times a day  glucagon  Injectable 1 milliGRAM(s) IntraMuscular once  heparin   Injectable 5000 Unit(s) SubCutaneous every 8 hours  insulin lispro (ADMELOG) corrective regimen sliding scale   SubCutaneous every 6 hours  piperacillin/tazobactam IVPB.. 3.375 Gram(s) IV Intermittent every 8 hours  sertraline 100 milliGRAM(s) Oral daily  sodium chloride 3%  Inhalation 4 milliLiter(s) Inhalation every 12 hours  vancomycin  IVPB      vancomycin  IVPB 750 milliGRAM(s) IV Intermittent every 24 hours    MEDICATIONS  (PRN):  acetaminophen     Tablet .. 650 milliGRAM(s) Oral every 6 hours PRN Temp greater or equal to 38C (100.4F), Mild Pain (1 - 3)  bisacodyl 5 milliGRAM(s) Oral every 12 hours PRN Constipation  dextrose Oral Gel 15 Gram(s) Oral once PRN Blood Glucose LESS THAN 70 milliGRAM(s)/deciliter  oxyCODONE    IR 5 milliGRAM(s) Oral every 8 hours PRN Moderate Pain (4 - 6)  polyethylene glycol 3350 17 Gram(s) Oral at bedtime PRN Constipation      Allergies    lactose (Diarrhea)  No Known Drug Allergies    Intolerances          LABS:                        10.8   35.78 )-----------( 345      ( 2023 06:28 )             35.6     2023 06:28    139    |  108    |  35     ----------------------------<  115    3.6     |  22     |  1.91     Ca    8.2        2023 06:28  Mg     2.2       2023 09:37    TPro  6.9    /  Alb  2.9    /  TBili  0.4    /  DBili  x      /  AST  22     /  ALT  <10    /  AlkPhos  74     2023 09:37    PT/INR - ( 2023 09:37 )   PT: 12.6 sec;   INR: 1.09 ratio         PTT - ( 2023 09:37 )  PTT:34.9 sec  Urinalysis Basic - ( 2023 09:42 )    Color: Yellow / Appearance: Clear / S.020 / pH: x  Gluc: x / Ketone: Negative  / Bili: Negative / Urobili: Negative mg/dL   Blood: x / Protein: 500 mg/dL / Nitrite: Negative   Leuk Esterase: Trace / RBC: 6-10 /HPF / WBC 6-10   Sq Epi: x / Non Sq Epi: Moderate / Bacteria: Moderate      CAPILLARY BLOOD GLUCOSE      POCT Blood Glucose.: 107 mg/dL (2023 05:52)  POCT Blood Glucose.: 112 mg/dL (2023 23:59)  POCT Blood Glucose.: 132 mg/dL (2023 18:16)      RADIOLOGY & ADDITIONAL TESTS:        Care Discussed with Consultants/Other Providers:    Advanced Directives: [ ] DNR  [ ] No feeding tube  [ ] MOLST in chart  [ ] MOLST completed today  [ ] Unknown

## 2023-02-03 NOTE — DISCHARGE NOTE FOR THE EXPIRED PATIENT - SECONDARY DIAGNOSIS.
Pneumothorax Pneumonia Respiratory distress ALS (amyotrophic lateral sclerosis) Acute respiratory failure with hypoxia

## 2023-02-03 NOTE — SWALLOW BEDSIDE ASSESSMENT ADULT - SWALLOW EVAL: DIAGNOSIS
pt and wife agreeable to rx'd plan and MBS when appropriate.  Pt's wife requesting to speak with MD regarding pt status and nutrition/hydration, ZULEMA Venegas and Dr. Alejandro notified, MD to speak with pt/wife, all in agreement to plan.  Pt left as received NAD, will continue to follow.

## 2023-02-03 NOTE — CONSULT NOTE ADULT - SUBJECTIVE AND OBJECTIVE BOX
Date/Time Patient Seen:  		  Referring MD:   Data Reviewed	       Patient is a 67y old  Male who presents with a chief complaint of     Subjective/HPI  in bed  seen and examined  vs noted  labs reviewed  imaging reviewed  spoke with wife  spoke with dtr in law  on o2 support  old records reviewed  confused  poor historian  known ALS since 2020    · Chief Complaint: The patient is a 67y Male complaining of  · HPI Objective Statement: Patient is a 67-year-old gentleman who presents to the emergency room with a chief complaint of respiratory distress and hypoxia.  Past medical history of hypertension, hyperlipidemia, diabetes, prolactinoma, history of ALS bedbound.  Patient is awake alert to person and aware he is in hospital but confused to year and unable to provide additional history secondary to respiratory distress.  Per EMS they received a call for respiratory distress.  Patient was found to be hypoxic with an SPO2 of 60% on room air.  Nonrebreather was applied with improvement of sats to 98%.  EMS does further note that patient had a recent pneumonia last month but no further history can be obtained at this time.  Patient does arrive with a MOLST which was reviewed and he is noted to be a DNR/DNI eventually okay thank you once this fracture I do not necessarily give me a call respiratory for the Ventimask       PAST MEDICAL & SURGICAL HISTORY:  Dyslipidemia    Deviated nasal septum    Prolactinoma    Hypertension    Type 2 diabetes mellitus    HLD (hyperlipidemia)    H/O nasal septoplasty    History of tonsillectomy          Medication list         MEDICATIONS  (STANDING):  albuterol    0.083% 2.5 milliGRAM(s) Nebulizer every 12 hours  sodium chloride 3%  Inhalation 4 milliLiter(s) Inhalation every 12 hours    MEDICATIONS  (PRN):         Vitals log        ICU Vital Signs Last 24 Hrs  T(C): 36.7 (02 Feb 2023 09:11), Max: 36.7 (02 Feb 2023 09:11)  T(F): 98 (02 Feb 2023 09:11), Max: 98 (02 Feb 2023 09:11)  HR: 68 (02 Feb 2023 12:22) (68 - 85)  BP: 130/63 (02 Feb 2023 12:22) (130/63 - 186/89)  BP(mean): --  ABP: --  ABP(mean): --  RR: 20 (02 Feb 2023 12:22) (20 - 24)  SpO2: 96% (02 Feb 2023 12:22) (80% - 96%)    O2 Parameters below as of 02 Feb 2023 09:15  Patient On (Oxygen Delivery Method): mask, Venturi    O2 Concentration (%): 50       PAST SURGICAL HISTORY:  H/O nasal septoplasty     History of tonsillectomy.     FAMILY HISTORY:  Family history of Alzheimer's disease  Family history of Hodgkins disease.     Tobacco Usage:  · Tobacco Usage	Never smoker    ALLERGIES AND HOME MEDICATIONS:   Allergies:        Allergies:  	No Known Drug Allergies:   	lactose: Food, Diarrhea, Barretta, Bridget    Home Medications:   * Patient Currently Takes Medications as of 11-Sep-2021 16:30 documented in Structured Notes  · 	bromocriptine 5 mg oral capsule: 1 cap(s) orally once a day  · 	aspirin 81 mg oral delayed release tablet: 1 tab(s) orally once a day  · 	sertraline 100 mg oral tablet: 1 tab(s) orally once a day  · 	atorvastatin 40 mg oral tablet: 1 tab(s) orally once a day  · 	levoFLOXacin 750 mg oral tablet: 1 tab(s) orally every 24 hours        Input and Output:  I&O's Detail      Lab Data                        9.7    11.44 )-----------( 302      ( 02 Feb 2023 09:37 )             31.2     02-02    136  |  103  |  30<H>  ----------------------------<  101<H>  3.5   |  22  |  1.61<H>    Ca    8.7      02 Feb 2023 09:37  Mg     2.2     02-02    TPro  6.9  /  Alb  2.9<L>  /  TBili  0.4  /  DBili  x   /  AST  22  /  ALT  <10<L>  /  AlkPhos  74  02-02      CARDIAC MARKERS ( 02 Feb 2023 09:37 )  x     / x     / 157 U/L / x     / x            Review of Systems	  sob  sprague  weakness  cough      Objective     Physical Examination    on o2 support  head nc  head at  verbal  confused  weak      Pertinent Lab findings & Imaging      Grewal:  NO   Adequate UO     I&O's Detail           Discussed with:     Cultures:	        Radiology        ACC: [id number] EXAM:  CT ABDOMEN AND PELVIS   ORDERED BY: WENDY COLLINS     ACC: [id number] EXAM:  CT CHEST   ORDERED BY: WENDY COLLINS     PROCEDURE DATE:  02/02/2023          INTERPRETATION:  CLINICAL INFORMATION: Abnormal chest findings    COMPARISON: CT chest 6/29/2021, CT abdomen 7/1/2020    CONTRAST/COMPLICATIONS:  IV Contrast: NONE  Oral Contrast: NONE  Complications: None reported at time of study completion    PROCEDURE:  CT of the Chest, Abdomen and Pelvis was performed.  Sagittal and coronal reformats were performed.    FINDINGS:  CHEST:  LUNGS AND LARGE AIRWAYS: Extensive retained secretions in the left   mainstem bronchus . Total opacification left hemithorax attributed to   large left effusion and underlying compressive atelectasis .Moderate   right pleural effusion with underlying dependent   consolidation/atelectasis. Correlate clinically for active infection.   Hazy groundglass opacity throughout the aerated right lung with prominent   subpleural intralobular septa suggesting pulmonary edema .  PLEURA: As above.  VESSELS: Nonaneurysmal.  HEART: Cardiomegaly with coronary calcification. Decreased cardiac   chamber blood pool attenuation consistent with an anemic state No   pericardial effusion.  MEDIASTINUM AND NUZHAT: No lymphadenopathy. A subcentimeter hypoattenuating   right thyroid lobe nodule.  CHEST WALL AND LOWER NECK: Bilateral gynecomastia    ABDOMEN AND PELVIS:  LIVER: Within normal limits.  BILE DUCTS: Normal caliber.  GALLBLADDER: Cholelithiasis.  SPLEEN: Within normal limits.  PANCREAS: Within normal limits.  ADRENALS: Within normal limits.  KIDNEYS/URETERS: Within normal limits.    BLADDER:  collapsed with Grewal.  REPRODUCTIVE ORGANS: Slightly prominent prostate.    BOWEL: No bowel obstruction. Colonic diverticulosis. Appendix no   appendicitis  PERITONEUM: Trace pelvic ascites  VESSELS: Nonaneurysmal.  RETROPERITONEUM/LYMPH NODES: No lymphadenopathy.  ABDOMINAL WALL: Marked diffuse anasarca.  BONES: No acute or aggressive pathology.    IMPRESSION:  Moderate right ,large left pleural effusion and pulmonary edema. An   element of CHF is considered.    Extensive retained secretions left mainstem bronchus. Total opacification   left hemithorax secondary to large left effusion and underlying   atelectasis    Right lower lobe consolidation/atelectasis. Correlate clinically for   infection    Cholelithiasis.    Colonic diverticulosis (without diverticulitis).    Marked diffuse anasarca. Trace ascites    --- End of Report ---            KRZYSZTOF PALOMO MD; Attending Radiologist  This document has been electronically signed. Feb 2 2023 11:42AM                      
  HPI:  66YO M  PMH Hypertension, hyperlipidemia, T2 diabetes, prolactinoma, history of ALS bedbound Dysphagia hx of pneumonia who presented to the hospital with cc of SOB admitted found to have acute hypoxic respiratory failure  CT chest showed large left pleural effusion and Total opacification left hemithorax secondary to large left effusion and underlying atelectasis Right lower lobe consolidation. Seen by pulm and pt is sp Left thoracentesis with chest tube. WBC 35.7K today. Pt on HFNC. There is a concern for asp pneumonia. Per wife pt refused MBS in the past and she noted that pt coughs when he eats.    Infectious Disease consult was called to evaluate pt and for antibiotic management.    Past Medical & Surgical Hx:  PAST MEDICAL & SURGICAL HISTORY:  Dyslipidemia  Deviated nasal septum  Prolactinoma  Hypertension  Type 2 diabetes mellitus  HLD (hyperlipidemia)  H/O nasal septoplasty  History of tonsillectomy    Social History--  EtOH: denies   Tobacco: denies   Drug Use: denies       FAMILY HISTORY:  Family history of Hodgkins disease    Family history of Alzheimer&#x27;s disease        Allergies  lactose (Diarrhea)  No Known Drug Allergies    Intolerances  NONE    Home Medications:  amLODIPine 10 mg oral tablet: 1 tab(s) orally once a day (2023 13:29)  atorvastatin 40 mg oral tablet: 1 tab(s) orally once a day (2023 10:08)  MiraLax oral powder for reconstitution: 17 gram(s) orally once a day (at bedtime), As Needed (2023 13:29)  Senna 8.6 mg oral tablet: 2  orally once a day (at bedtime), As Needed (2023 13:29)  sertraline 100 mg oral tablet: 1 tab(s) orally once a day (2023 10:08)      Current Inpatient Medications :    ANTIBIOTICS:   piperacillin/tazobactam IVPB.. 3.375 Gram(s) IV Intermittent every 8 hours  vancomycin  IVPB      vancomycin  IVPB 750 milliGRAM(s) IV Intermittent every 24 hours      OTHER RELEVANT MEDICATIONS :  acetaminophen     Tablet .. 650 milliGRAM(s) Oral every 6 hours PRN  albuterol    0.083% 2.5 milliGRAM(s) Nebulizer every 12 hours  atorvastatin 40 milliGRAM(s) Oral at bedtime  bisacodyl 5 milliGRAM(s) Oral every 12 hours PRN  dextrose 5%. 1000 milliLiter(s) IV Continuous <Continuous>  dextrose 5%. 1000 milliLiter(s) IV Continuous <Continuous>  dextrose 50% Injectable 25 Gram(s) IV Push once  dextrose 50% Injectable 12.5 Gram(s) IV Push once  dextrose 50% Injectable 25 Gram(s) IV Push once  dextrose Oral Gel 15 Gram(s) Oral once PRN  furosemide   Injectable 20 milliGRAM(s) IV Push two times a day  glucagon  Injectable 1 milliGRAM(s) IntraMuscular once  heparin   Injectable 5000 Unit(s) SubCutaneous every 8 hours  insulin lispro (ADMELOG) corrective regimen sliding scale   SubCutaneous every 6 hours  oxyCODONE    IR 5 milliGRAM(s) Oral every 8 hours PRN  polyethylene glycol 3350 17 Gram(s) Oral at bedtime PRN  sertraline 100 milliGRAM(s) Oral daily  sodium chloride 3%  Inhalation 4 milliLiter(s) Inhalation every 12 hours      ROS:  Unable to obtain due to : poor historian        I&O's Detail    2023 07:01  -  2023 07:00  --------------------------------------------------------  IN:    IV PiggyBack: 200 mL  Total IN: 200 mL    OUT:    Chest Tube (mL): 2000 mL    Incontinent per Condom Catheter (mL): 150 mL  Total OUT: 2150 mL    Total NET: -1950 mL      Physical Exam:  Vital Signs Last 24 Hrs  T(C): 36.1 (2023 08:00), Max: 36.7 (2023 14:38)  T(F): 97 (2023 08:00), Max: 98 (2023 14:38)  HR: 68 (2023 08:32) (67 - 79)  BP: 115/55 (2023 08:32) (108/66 - 154/67)  BP(mean): 73 (2023 08:32) (67 - 87)  RR: 25 (2023 08:32) (20 - 28)  SpO2: 93% (2023 08:32) (84% - 100%)    Parameters below as of 2023 08:32  Patient On (Oxygen Delivery Method): nasal cannula, high flow      Height (cm): 162.6 ( @ 16:40)  Weight (kg): 54.2 ( @ 16:40)  BMI (kg/m2): 20.5 ( @ 16:40)  BSA (m2): 1.57 ( @ 16:40)    General: no acute distress HFNC  Neck: supple, trachea midline  Lungs: Decreased, no wheeze/rhonchi  Cardiovascular: regular rate and rhythm, S1 S2  Abdomen: soft, nontender, ND, bowel sounds normal  Neurological: awake  Skin: no rash  Extremities: +edema    Labs:                         10.8   35.78 )-----------( 345      ( 2023 06:28 )             35.6   02    139  |  108  |  35<H>  ----------------------------<  115<H>  3.6   |  22  |  1.91<H>    Ca    8.2<L>      2023 06:28  Mg     2.2     02-    TPro  6.9  /  Alb  2.9<L>  /  TBili  0.4  /  DBili  x   /  AST  22  /  ALT  <10<L>  /  AlkPhos  74  02-02    Urinalysis Basic - ( 2023 09:42 )    Color: Yellow / Appearance: Clear / S.020 / pH: x  Gluc: x / Ketone: Negative  / Bili: Negative / Urobili: Negative mg/dL   Blood: x / Protein: 500 mg/dL / Nitrite: Negative   Leuk Esterase: Trace / RBC: 6-10 /HPF / WBC 6-10   Sq Epi: x / Non Sq Epi: Moderate / Bacteria: Moderate      RECENT CULTURES:  Culture - Fungal, Body Fluid (collected 2023 12:20)  Source: Pleural Fl Pleural Fluid  Preliminary Report (2023 07:21):    Testing in progress    Culture - Body Fluid with Gram Stain (collected 2023 12:20)  Source: Pleural Fl Pleural Fluid  Gram Stain (2023 23:34):    polymorphonuclear leukocytes seen    No organisms seen    by cytocentrifuge    Culture - Urine (collected 2023 09:42)  Source: Clean Catch Clean Catch (Midstream)  Final Report (2023 10:41):    No growth      RADIOLOGY & ADDITIONAL STUDIES:    ACC: [id number] EXAM:  CT ABDOMEN AND PELVIS   ORDERED BY: WENDY COLLINS     ACC: [id number] EXAM:  CT CHEST   ORDERED BY: WENDY COLLINS     PROCEDURE DATE:  2023          INTERPRETATION:  CLINICAL INFORMATION: Abnormal chest findings    COMPARISON: CT chest 2021, CT abdomen 2020    CONTRAST/COMPLICATIONS:  IV Contrast: NONE  Oral Contrast: NONE  Complications: None reported at time of study completion    PROCEDURE:  CT of the Chest, Abdomen and Pelvis was performed.  Sagittal and coronal reformats were performed.    FINDINGS:  CHEST:  LUNGS AND LARGE AIRWAYS: Extensive retained secretions in the left   mainstem bronchus . Total opacification left hemithorax attributed to   large left effusion and underlying compressive atelectasis .Moderate   right pleural effusion with underlying dependent   consolidation/atelectasis. Correlate clinically for active infection.   Hazy groundglass opacity throughout the aerated right lung with prominent   subpleural intralobular septa suggesting pulmonary edema .  PLEURA: As above.  VESSELS: Nonaneurysmal.  HEART: Cardiomegaly with coronary calcification. Decreased cardiac   chamber blood pool attenuation consistent with an anemic state No   pericardial effusion.  MEDIASTINUM AND NUZHAT: No lymphadenopathy. A subcentimeter hypoattenuating   right thyroid lobe nodule.  CHEST WALL AND LOWER NECK: Bilateral gynecomastia    ABDOMEN AND PELVIS:  LIVER: Within normal limits.  BILE DUCTS: Normal caliber.  GALLBLADDER: Cholelithiasis.  SPLEEN: Within normal limits.  PANCREAS: Within normal limits.  ADRENALS: Within normal limits.  KIDNEYS/URETERS: Within normal limits.    BLADDER:  collapsed with Grewal.  REPRODUCTIVE ORGANS: Slightly prominent prostate.    BOWEL: No bowel obstruction. Colonic diverticulosis. Appendix no   appendicitis  PERITONEUM: Trace pelvic ascites  VESSELS: Nonaneurysmal.  RETROPERITONEUM/LYMPH NODES: No lymphadenopathy.  ABDOMINAL WALL: Marked diffuse anasarca.  BONES: No acute or aggressive pathology.    IMPRESSION:  Moderate right ,large left pleural effusion and pulmonary edema. An   element of CHF is considered.    Extensive retained secretions left mainstem bronchus. Total opacification   left hemithorax secondary to large left effusion and underlying   atelectasis    Right lower lobe consolidation/atelectasis. Correlate clinically for   infection    Cholelithiasis.    Colonic diverticulosis (without diverticulitis).    Marked diffuse anasarca. Trace ascites    Assessment :   66YO M  PMH Hypertension, hyperlipidemia, T2 diabetes, prolactinoma, history of ALS bedbound Dysphagia hx of pneumonia admitted with acute hypoxic respiratory failure sec effusions/retained secretions and likely asp pneumonia  CT chest showed large left pleural effusion and Total opacification left hemithorax secondary to large left effusion and underlying atelectasis Right lower lobe consolidation. Seen by pulm and pt is sp Left thoracentesis with chest tube 23.    Per wife pt refused MBS in the past and she noted that pt coughs when he eats.  WBC 35.7K today. Leukomoid reaction  Pt currently on HFNC.   CHF  EMILY ?CKD however renal fx in  wnl      Plan :   Cont Zosyn  Will dc Vancomycin   Fu cultures  MRSA swab  Bladder scan  Swallow study  Legionella and pneumococcal urinary antigen  Chest tube per Dr Gomez pulivett  Trend temps and cbc  Asp precautions    Continue with present regiment .  Approptiate use of antibiotics and adverse effects reviewed.      I have discussed the above plan of care with patient's wife in detail. She expressed understanding of the treatment plan . Risks, benefits and alternatives discussed in detail. I have asked if she has any questions or concerns and appropriately addressed them to the best of my ability .      > 45 minutes spent in direct patient care reviewing  the notes, lab data/ imaging , discussion with multidisciplinary team. All questions were addressed and answered to the best of my capacity .    Thank you for allowing me to participate in the care of your patient .      Brennan Casas MD  Infectious Disease  [phone]
History of Present Illness: The patient is a 67 year old male with a history of HTN, HL, DM, ALS who presents with respiratory distress. The patient is unable to provide much history. He was noted to be short of breath and hypoxic. He underwent thoracentesis for a pleural effusion, complicated by pneumothorax requiring chest tube placement.    Past Medical/Surgical History:  HTN, HL, DM, ALS    Medications:  Home Medications:  amLODIPine 10 mg oral tablet: 1 tab(s) orally once a day (02 Feb 2023 13:29)  atorvastatin 40 mg oral tablet: 1 tab(s) orally once a day (02 Feb 2023 10:08)  MiraLax oral powder for reconstitution: 17 gram(s) orally once a day (at bedtime), As Needed (02 Feb 2023 13:29)  Senna 8.6 mg oral tablet: 2  orally once a day (at bedtime), As Needed (02 Feb 2023 13:29)  sertraline 100 mg oral tablet: 1 tab(s) orally once a day (02 Feb 2023 10:08)      Family History: Non-contributory family history of premature cardiovascular atherosclerotic disease    Social History: No tobacco, alcohol or drug use    Review of Systems:  General: No fevers, chills, weight gain  Skin: No rashes, color changes  Cardiovascular: No chest pain, orthopnea  Respiratory: No shortness of breath, cough  Gastrointestinal: No nausea, abdominal pain  Genitourinary: No incontinence, pain with urination  Musculoskeletal: No pain, swelling, decreased range of motion  Neurological: No headache, weakness  Psychiatric: No depression, anxiety  Endocrine: No weight gain, increased thirst  All other systems are comprehensively negative.    Physical Exam:  Vitals:        Vital Signs Last 24 Hrs  T(C): 36.3 (03 Feb 2023 04:04), Max: 36.7 (02 Feb 2023 09:11)  T(F): 97.4 (03 Feb 2023 04:04), Max: 98 (02 Feb 2023 09:11)  HR: 68 (03 Feb 2023 07:35) (68 - 85)  BP: 108/66 (03 Feb 2023 07:00) (108/66 - 186/89)  BP(mean): 78 (03 Feb 2023 07:00) (68 - 87)  RR: 24 (03 Feb 2023 07:00) (20 - 28)  SpO2: 94% (03 Feb 2023 07:35) (80% - 100%)  Parameters below as of 03 Feb 2023 07:35  Patient On (Oxygen Delivery Method): nasal cannula, high flow,100  General: NAD  HEENT: MMM  Neck: No JVD, no carotid bruit  Lungs: CTAB  CV: RRR, nl S1/S2, no M/R/G  Abdomen: S/NT/ND, +BS  Extremities: No LE edema, no cyanosis  Neuro: AAOx1  Skin: No rash    Labs:                        10.8   35.78 )-----------( 345      ( 03 Feb 2023 06:28 )             35.6     02-03    139  |  108  |  35<H>  ----------------------------<  115<H>  3.6   |  22  |  1.91<H>    Ca    8.2<L>      03 Feb 2023 06:28  Mg     2.2     02-02    TPro  6.9  /  Alb  2.9<L>  /  TBili  0.4  /  DBili  x   /  AST  22  /  ALT  <10<L>  /  AlkPhos  74  02-02    CARDIAC MARKERS ( 02 Feb 2023 09:37 )  x     / x     / 157 U/L / x     / x          PT/INR - ( 02 Feb 2023 09:37 )   PT: 12.6 sec;   INR: 1.09 ratio         PTT - ( 02 Feb 2023 09:37 )  PTT:34.9 sec    ECG/Telemetry: NSR, normal axis, low voltage limb leads, RBBB

## 2023-02-03 NOTE — PROGRESS NOTE ADULT - SUBJECTIVE AND OBJECTIVE BOX
Date/Time Patient Seen:  		  Referring MD:   Data Reviewed	       Patient is a 67y old  Male who presents with a chief complaint of Respiratory Distress x 1 day (03 Feb 2023 08:11)      Subjective/HPI     PAST MEDICAL & SURGICAL HISTORY:  Dyslipidemia    Deviated nasal septum    Prolactinoma    Hypertension    Type 2 diabetes mellitus    HLD (hyperlipidemia)    H/O nasal septoplasty    History of tonsillectomy          Medication list         MEDICATIONS  (STANDING):  albuterol    0.083% 2.5 milliGRAM(s) Nebulizer every 12 hours  atorvastatin 40 milliGRAM(s) Oral at bedtime  dextrose 5%. 1000 milliLiter(s) (50 mL/Hr) IV Continuous <Continuous>  dextrose 5%. 1000 milliLiter(s) (100 mL/Hr) IV Continuous <Continuous>  dextrose 50% Injectable 25 Gram(s) IV Push once  dextrose 50% Injectable 12.5 Gram(s) IV Push once  dextrose 50% Injectable 25 Gram(s) IV Push once  furosemide   Injectable 20 milliGRAM(s) IV Push two times a day  glucagon  Injectable 1 milliGRAM(s) IntraMuscular once  insulin lispro (ADMELOG) corrective regimen sliding scale   SubCutaneous every 6 hours  piperacillin/tazobactam IVPB.. 3.375 Gram(s) IV Intermittent every 8 hours  sertraline 100 milliGRAM(s) Oral daily  sodium chloride 3%  Inhalation 4 milliLiter(s) Inhalation every 12 hours  vancomycin  IVPB      vancomycin  IVPB 750 milliGRAM(s) IV Intermittent every 24 hours    MEDICATIONS  (PRN):  acetaminophen     Tablet .. 650 milliGRAM(s) Oral every 6 hours PRN Temp greater or equal to 38C (100.4F), Mild Pain (1 - 3)  bisacodyl 5 milliGRAM(s) Oral every 12 hours PRN Constipation  dextrose Oral Gel 15 Gram(s) Oral once PRN Blood Glucose LESS THAN 70 milliGRAM(s)/deciliter  oxyCODONE    IR 5 milliGRAM(s) Oral every 8 hours PRN Moderate Pain (4 - 6)  polyethylene glycol 3350 17 Gram(s) Oral at bedtime PRN Constipation         Vitals log        ICU Vital Signs Last 24 Hrs  T(C): 36.3 (03 Feb 2023 04:04), Max: 36.7 (02 Feb 2023 09:11)  T(F): 97.4 (03 Feb 2023 04:04), Max: 98 (02 Feb 2023 09:11)  HR: 68 (03 Feb 2023 07:35) (68 - 85)  BP: 108/66 (03 Feb 2023 07:00) (108/66 - 186/89)  BP(mean): 78 (03 Feb 2023 07:00) (68 - 87)  ABP: --  ABP(mean): --  RR: 24 (03 Feb 2023 07:00) (20 - 28)  SpO2: 94% (03 Feb 2023 07:35) (80% - 100%)    O2 Parameters below as of 03 Feb 2023 07:35  Patient On (Oxygen Delivery Method): nasal cannula, high flow,100                 Input and Output:  I&O's Detail    02 Feb 2023 07:01  -  03 Feb 2023 07:00  --------------------------------------------------------  IN:    IV PiggyBack: 200 mL  Total IN: 200 mL    OUT:    Chest Tube (mL): 2000 mL    Incontinent per Condom Catheter (mL): 150 mL  Total OUT: 2150 mL    Total NET: -1950 mL          Lab Data                        10.8   35.78 )-----------( 345      ( 03 Feb 2023 06:28 )             35.6     02-03    139  |  108  |  35<H>  ----------------------------<  115<H>  3.6   |  22  |  1.91<H>    Ca    8.2<L>      03 Feb 2023 06:28  Mg     2.2     02-02    TPro  6.9  /  Alb  2.9<L>  /  TBili  0.4  /  DBili  x   /  AST  22  /  ALT  <10<L>  /  AlkPhos  74  02-02      CARDIAC MARKERS ( 02 Feb 2023 09:37 )  x     / x     / 157 U/L / x     / x            Review of Systems	      Objective     Physical Examination    heart s1s2  lung dc BS  head nc      Pertinent Lab findings & Imaging      Uri:  NO   Adequate UO     I&O's Detail    02 Feb 2023 07:01  -  03 Feb 2023 07:00  --------------------------------------------------------  IN:    IV PiggyBack: 200 mL  Total IN: 200 mL    OUT:    Chest Tube (mL): 2000 mL    Incontinent per Condom Catheter (mL): 150 mL  Total OUT: 2150 mL    Total NET: -1950 mL               Discussed with:     Cultures:	  Culture Results:   Testing in progress (02-02 @ 12:20)        Radiology

## 2023-02-03 NOTE — PROGRESS NOTE ADULT - REASON FOR ADMISSION
Respiratory Distress x 1 day

## 2023-02-03 NOTE — DISCHARGE NOTE FOR THE EXPIRED PATIENT - HOSPITAL COURSE
66 yo male, history of ALS with functional quadriplegia, hypertension, DM, presenting with acute hypoxic respiratory failure thought due to pleural effusion and possible pneumonia, was treated with thoracentesis, course complicated by pneumothorax, had chest tube placed and monitored in SPCU on high flow nasal cannula. Patient had remained hemodynamically stable until having sudden rapid detrioration seen on telemetry with sudden bradycardia and loss of pulse. Patient had DNR/DNI in place, pronounced dead at 1:24 pm. Wife notified over phone, consolations given

## 2023-02-03 NOTE — CONSULT NOTE ADULT - ASSESSMENT
The patient is a 67 year old male with a history of HTN, HL, DM, ALS who presents with respiratory distress in the setting of acute diastolic heart failure, pleural effusions, PNA.    Plan:  - ECG with underlying RBBB and otherwise nonspecific findings  - Echo with normal LV systolic function, moderate pulm HTN  - CT chest with moderate right and large left pleural effusions with pulm edema  - BNP 40720  - Cardiac enzymes negative The patient is a 67 year old male with a history of HTN, HL, DM, ALS who presents with respiratory distress in the setting of acute diastolic heart failure, pleural effusions, PNA.    Plan:  - ECG with underlying RBBB and otherwise nonspecific findings  - Echo with normal LV systolic function, moderate pulm HTN  - CT chest with moderate right and large left pleural effusions with pulm edema  - BNP 41614  - Cardiac enzymes negative  - Thoracentesis done - consistent with transudate  - Start furosemide 20 mg IV q12h  - Hold amlodipine for now as BP is on lower side  - Pulm follow-up  - Chest tube in place

## 2023-02-03 NOTE — CONSULT NOTE ADULT - CONSULT REASON
Acute hypoxic respiratory failure sec pna
Acute diastolic heart failure
Pleural eff  atelectasis  ALS

## 2023-02-03 NOTE — SWALLOW BEDSIDE ASSESSMENT ADULT - COMMENTS
Chart reviewed order received for swallow evaluation.  Pt with longstanding hx of dysphagia confirmed by pt and wife at bedside.  Pt was seen at [facility] for MBS 6/20/20 Rx NPO, pt declined PEG at that time, see report for details.  Pt had MBS at [facility] 7/2/21 Rx Chart reviewed order received for swallow evaluation.  Pt with longstanding hx of dysphagia confirmed by pt and wife at bedside.  Pt was seen at [facility] for MBS 6/20/20 Rx "NPO, with non-oral nutrition/hydration/medications. Discussed recommendations with patient who stated he does not wish to pursue alternative means of nutrition (e.g., PEG) despite risks/complications of aspiration. MD Kohli (T3) informed of same. Suggest GOC conversation with MD to confirm and document pt/family wishes re: provision of nutrition", see report for details.  Pt had MBS at [facility] 7/2/21 Rx "Soft texture diet with assistance and supervision", see report for details.  Per outpt EMR, pt has been followed at ALS clinic, seen for swallow eval 11/9/20 Rx "soft diet, defer liquids to MD 2* pt refusal of thickened liquids" additionally tx rx'd, however, pt refused. 8/22/22 pt seen for swallow eval at [facility] office rx soft & bite sized with thin liquids via small single cup sips, as well as MBS and tx, however, pt refused.  Follow up attempted regarding swallow eval rx's, however, pt still refused MBS & tx.  Follow up attempted 11/16/22 with rx of MBS & tx, however, pt refused.  Per wife pt eats soft solids & sometimes sandwiches & thin liquids, endorses difficulty swallowing.    Pt seen this morning for attempted swallow eval, per [facility] RN dysphagia screen completed yesterday, pt failed +gurgling after PO.  Pt received awake in bed +HFNC 50L 80% RR 21 SpO2 98%, +increased WOB noted at baseline, pt currently nodding and mouthing to communicate, wife Maribel present, pain scale 0/10 pre & post eval.  Swallow evaluation and PO trials deemed not appropriate at this time due to current respiratory status, discussed with pt and wife who verbalized agreement.  Discussed rx for this dept to follow pt to reattempt swallow eval when appropriate & additionally discussed consideration of MBS when pt medically optimized and weaned from HFNC to O2NC, (con't in Diagnosis section below) Chart reviewed order received for swallow evaluation.  Pt with longstanding hx of dysphagia confirmed by pt and wife at bedside.  Pt was seen at [facility] for MBS 6/20/20 Rx "NPO, with non-oral nutrition/hydration/medications. Discussed recommendations with patient who stated he does not wish to pursue alternative means of nutrition (e.g., PEG) despite risks/complications of aspiration. MD Kohli (T3) informed of same. Suggest GOC conversation with MD to confirm and document pt/family wishes re: provision of nutrition", see report for details.  Pt had MBS at [facility] 7/2/21 Rx "Soft texture diet with assistance and supervision", see report for details.  Per outpt EMR, pt has been followed at ALS clinic, seen for swallow eval 11/9/20 Rx "soft diet, defer liquids to MD 2* pt refusal of thickened liquids" additionally tx rx'd, however, pt refused. 8/22/22 pt seen for swallow eval at [facility] office rx soft & bite sized with thin liquids via small single cup sips, as well as MBS and tx, however, pt refused.  Follow up attempted regarding swallow eval rx's, however, pt still refused MBS & tx.  Follow up attempted 11/16/22 with rx of MBS & tx, however, pt refused.  Per wife pt eats soft solids & sometimes sandwiches & thin liquids, endorses difficulty swallowing.    Pt seen this morning for attempted swallow eval, per [facility] RN dysphagia screen completed yesterday, pt failed +gurgling after PO.  Pt received awake in bed +HFNC 50L 80% RR 21 SpO2 98%, +increased WOB noted at baseline, +chest tube, pt currently nodding and mouthing to communicate, wife Maribel present, pain scale 0/10 pre & post eval.  Swallow evaluation and PO trials deemed not appropriate at this time due to current respiratory status, discussed with pt and wife who verbalized agreement.  Discussed rx for this dept to follow pt to reattempt swallow eval when appropriate & additionally discussed consideration of MBS when pt medically optimized and weaned from HFNC to O2NC, (con't in Diagnosis section below) Chart reviewed order received for swallow evaluation.  Pt with longstanding hx of dysphagia confirmed by pt and wife at bedside.  Pt was seen at [facility] for MBS 6/20/20 Rx "NPO, with non-oral nutrition/hydration/medications. Discussed recommendations with patient who stated he does not wish to pursue alternative means of nutrition (e.g., PEG) despite risks/complications of aspiration. MD Kohli (T3) informed of same. Suggest GOC conversation with MD to confirm and document pt/family wishes re: provision of nutrition", see report for details.  Pt had MBS at [facility] 7/2/21 Rx "Soft texture diet with assistance and supervision", see report for details.  Per outpt EMR, pt has been followed at ALS clinic, seen for swallow eval 11/9/20 Rx "soft diet, defer liquids to MD 2* pt refusal of thickened liquids" additionally tx rx'd however pt refused. 8/22/22 pt seen for swallow eval at [facility] office rx soft & bite sized with thin liquids via small single cup sips as well as MBS and tx, however, pt refused.  Follow up attempted regarding swallow eval rx's, however, pt still refused MBS & tx.  Follow up attempted 11/16/22 with rx of MBS & tx, however, pt refused.  Per wife pt eats soft solids & sometimes sandwiches & thin liquids, endorses difficulty swallowing.    Pt seen this morning for attempted swallow eval, per [facility] RN dysphagia screen completed yesterday, pt failed +gurgling after PO.  Pt received awake in bed +HFNC 50L 80% RR 21 SpO2 98%, +increased WOB noted at baseline, +chest tube, pt currently nodding and mouthing to communicate, wife Maribel present, pain scale 0/10 pre & post eval.  Swallow evaluation and PO trials deemed not appropriate at this time due to current respiratory status, discussed with pt and wife who verbalized agreement.  Discussed rx for this dept to follow pt to reattempt swallow eval when appropriate & additionally discussed consideration of MBS when pt medically optimized and weaned from HFNC to O2NC, (con't in Diagnosis section below)

## 2023-02-04 LAB — LDH SERPL L TO P-CCNC: 176 U/L — SIGNIFICANT CHANGE UP (ref 50–242)

## 2023-02-07 LAB
CULTURE RESULTS: SIGNIFICANT CHANGE UP
SPECIMEN SOURCE: SIGNIFICANT CHANGE UP

## 2023-03-04 LAB
CULTURE RESULTS: SIGNIFICANT CHANGE UP
SPECIMEN SOURCE: SIGNIFICANT CHANGE UP

## 2023-04-05 ENCOUNTER — APPOINTMENT (OUTPATIENT)
Dept: NEUROLOGY | Facility: CLINIC | Age: 68
End: 2023-04-05

## 2023-04-07 NOTE — ED ADULT NURSE NOTE - CAS DISCH ACCOMP BY
Self Advancement-Rotation Flap Text: The defect edges were debeveled with a #15 scalpel blade.  Given the location of the defect, shape of the defect and the proximity to free margins an advancement-rotation flap was deemed most appropriate.  Using a sterile surgical marker, an appropriate flap was drawn incorporating the defect and placing the expected incisions within the relaxed skin tension lines where possible. The area thus outlined was incised deep to adipose tissue with a #15 scalpel blade.  The skin margins were undermined to an appropriate distance in all directions utilizing iris scissors.

## 2023-07-01 NOTE — ED BEHAVIORAL HEALTH ASSESSMENT NOTE - SELF INJURIOUS BEHAVIOR WITHOUT SUICIDAL INTENT:
End of Shift Note: Medical    What matters most to the patient this shift: Going home    Significant Events: No significant events, patient rested comfortably throughout the night.     Pain Management: Last Pain Score: Numeric Rating Scale 0-10: 1 (06/30/23 2128)                                      Pain medication:  Tylenol.  Last given:  2028   Diet: Regular Diet  2 Times/day W Breakfast & Dinner; Ensure Plus Hp/standard Oral Supplement Oral Nutrition Supplement      Bowel Function:  Normal  LBM:  Stool Occurrence: 1 (06/29/23 1105)   Activity:  Activity: Resting in bed;Sleeping/Appeared to be (07/01/23 0420)  Mobility Assistive Device:  Mobility Assistive Device: Gait belt;Walker (07/01/23 0028)  Level of Assistance:  Level of Assistance: Minimal assist (07/01/23 0028)  Positioning: Positioning: Semi-fowlers (07/01/23 0028)  LDAs: peripheral IV   Patient's Anticipated Discharge Needs: Other (comment) (unable to verbalize) (06/28/23 1500)  Expected Discharge Date:   Expected Discharge Time:        None known

## 2024-05-31 NOTE — DIETITIAN INITIAL EVALUATION ADULT. - IDEAL BODY WEIGHT (KG)
Pt came in for his first step TB placement     Immunization(s) given during visit:    Immunizations Administered       Name Date Dose Route    PPD Test 5/31/2024 0.1 mL Intradermal    Site: Right Forearm    Lot: [id number]    NDC: 04278-543-18             67.1

## 2024-11-20 NOTE — ED PROVIDER NOTE - PHYSICAL EXAMINATION
GEN - NAD; (+) clinically dehydrated; A+Ox3   HEAD - NC/AT, No visible Ecchymosis, No Abrasions, No Lacerations/Skin Tears     EYES - EOMI, PERRL, no conjunctival pallor, no scleral icterus  ENT -   mucous membranes  moist , no discharge      NECK - Neck supple, No LAD, No Swelling  PULM - CTA B/L,  symmetric breath sounds  COR -  RRR, S1 S2, no murmurs  ABD - NT/ND, soft, no guarding, no rebound, no masses    BACK - no CVA tenderness, nontender spine     EXTREMS - 2+ Pulses B/L UE and LE; 0+ edema, no gross deformity, warm and well perfused, no calf tenderness/swelling/erythema    SKIN - no rash or bruising      NEUROLOGIC - alert, CN2-12 intact, sensation nl
Lives alone  Smokes 1/2 pack a day tobacco  Drinks 2.5 pints of vodka/day

## 2025-03-06 NOTE — PROVIDER CONTACT NOTE (HYPOGLYCEMIA EVENT) - NS PROVIDER CONTACT REASON - HYPO
Patient scheduled for Colon at [facility] with Dr. Bear for 05/13.  Prep explained and sent to the pharmacy. Meds reviewed    hypoglycemic to 57
